# Patient Record
Sex: FEMALE | Race: OTHER | HISPANIC OR LATINO | Employment: OTHER | URBAN - METROPOLITAN AREA
[De-identification: names, ages, dates, MRNs, and addresses within clinical notes are randomized per-mention and may not be internally consistent; named-entity substitution may affect disease eponyms.]

---

## 2017-02-08 ENCOUNTER — ALLSCRIPTS OFFICE VISIT (OUTPATIENT)
Dept: OTHER | Facility: OTHER | Age: 27
End: 2017-02-08

## 2017-02-08 ENCOUNTER — HOSPITAL ENCOUNTER (OUTPATIENT)
Dept: RADIOLOGY | Facility: HOSPITAL | Age: 27
Discharge: HOME | End: 2017-02-08
Attending: ORTHOPAEDIC SURGERY
Payer: MEDICARE

## 2017-02-08 DIAGNOSIS — M25.539 PAIN IN WRIST: ICD-10-CM

## 2017-02-08 DIAGNOSIS — IMO0002 UNSPECIFIED DYSPAREUNIA: ICD-10-CM

## 2017-02-08 DIAGNOSIS — F32.9 MAJOR DEPRESSIVE DISORDER, SINGLE EPISODE: ICD-10-CM

## 2017-02-08 DIAGNOSIS — M06.9 RHEUMATOID ARTHRITIS (HCC): ICD-10-CM

## 2017-02-08 PROCEDURE — 73110 X-RAY EXAM OF WRIST: CPT

## 2017-02-12 ENCOUNTER — HOSPITAL ENCOUNTER (EMERGENCY)
Facility: HOSPITAL | Age: 27
Discharge: HOME | End: 2017-02-12
Attending: EMERGENCY MEDICINE | Admitting: EMERGENCY MEDICINE
Payer: MEDICARE

## 2017-02-12 ENCOUNTER — APPOINTMENT (EMERGENCY)
Dept: ULTRASOUND IMAGING | Facility: HOSPITAL | Age: 27
End: 2017-02-12
Payer: MEDICARE

## 2017-02-12 VITALS
SYSTOLIC BLOOD PRESSURE: 129 MMHG | RESPIRATION RATE: 26 BRPM | HEART RATE: 96 BPM | TEMPERATURE: 98.3 F | DIASTOLIC BLOOD PRESSURE: 77 MMHG | WEIGHT: 174 LBS | OXYGEN SATURATION: 99 %

## 2017-02-12 DIAGNOSIS — R10.9 ABDOMINAL PAIN AFFECTING PREGNANCY: Primary | ICD-10-CM

## 2017-02-12 DIAGNOSIS — R10.2 PELVIC PAIN AFFECTING PREGNANCY: ICD-10-CM

## 2017-02-12 DIAGNOSIS — O26.899 PELVIC PAIN AFFECTING PREGNANCY: ICD-10-CM

## 2017-02-12 DIAGNOSIS — O26.899 ABDOMINAL PAIN AFFECTING PREGNANCY: Primary | ICD-10-CM

## 2017-02-12 LAB
ALBUMIN SERPL BCP-MCNC: 3.6 G/DL (ref 3.5–5)
ALP SERPL-CCNC: 46 U/L (ref 46–116)
ALT SERPL W P-5'-P-CCNC: 23 U/L (ref 12–78)
ANION GAP SERPL CALCULATED.3IONS-SCNC: 7 MMOL/L (ref 4–13)
AST SERPL W P-5'-P-CCNC: 16 U/L (ref 5–45)
B-HCG SERPL-ACNC: 694.4 MIU/ML
BASOPHILS # BLD AUTO: 0.03 THOUSANDS/ΜL (ref 0–0.1)
BASOPHILS NFR BLD AUTO: 0 % (ref 0–1)
BILIRUB SERPL-MCNC: 0.4 MG/DL (ref 0.2–1)
BILIRUB UR QL STRIP: NEGATIVE
BUN SERPL-MCNC: 8 MG/DL (ref 5–25)
CALCIUM SERPL-MCNC: 8.3 MG/DL (ref 8.3–10.1)
CHLORIDE SERPL-SCNC: 106 MMOL/L (ref 100–108)
CLARITY UR: CLEAR
CO2 SERPL-SCNC: 27 MMOL/L (ref 21–32)
COLOR UR: YELLOW
COLOR, POC: YELLOW
CREAT SERPL-MCNC: 0.57 MG/DL (ref 0.6–1.3)
EOSINOPHIL # BLD AUTO: 0.02 THOUSAND/ΜL (ref 0–0.61)
EOSINOPHIL NFR BLD AUTO: 0 % (ref 0–6)
ERYTHROCYTE [DISTWIDTH] IN BLOOD BY AUTOMATED COUNT: 14.6 % (ref 11.6–15.1)
GFR SERPL CREATININE-BSD FRML MDRD: >60 ML/MIN/1.73SQ M
GLUCOSE SERPL-MCNC: 90 MG/DL (ref 65–140)
GLUCOSE UR STRIP-MCNC: NEGATIVE MG/DL
HCG UR QL: POSITIVE
HCT VFR BLD AUTO: 38.1 % (ref 34.8–46.1)
HGB BLD-MCNC: 13 G/DL (ref 11.5–15.4)
HGB UR QL STRIP.AUTO: NEGATIVE
KETONES UR STRIP-MCNC: ABNORMAL MG/DL
LEUKOCYTE ESTERASE UR QL STRIP: NEGATIVE
LIPASE SERPL-CCNC: 201 U/L (ref 73–393)
LYMPHOCYTES # BLD AUTO: 1.44 THOUSANDS/ΜL (ref 0.6–4.47)
LYMPHOCYTES NFR BLD AUTO: 17 % (ref 14–44)
MCH RBC QN AUTO: 31.3 PG (ref 26.8–34.3)
MCHC RBC AUTO-ENTMCNC: 34.1 G/DL (ref 31.4–37.4)
MCV RBC AUTO: 92 FL (ref 82–98)
MONOCYTES # BLD AUTO: 0.34 THOUSAND/ΜL (ref 0.17–1.22)
MONOCYTES NFR BLD AUTO: 4 % (ref 4–12)
NEUTROPHILS # BLD AUTO: 6.88 THOUSANDS/ΜL (ref 1.85–7.62)
NEUTS SEG NFR BLD AUTO: 79 % (ref 43–75)
NITRITE UR QL STRIP: NEGATIVE
NRBC BLD AUTO-RTO: 0 /100 WBCS
PH UR STRIP.AUTO: 6 [PH] (ref 4.5–8)
PLATELET # BLD AUTO: 194 THOUSANDS/UL (ref 149–390)
PMV BLD AUTO: 11 FL (ref 8.9–12.7)
POTASSIUM SERPL-SCNC: 3.8 MMOL/L (ref 3.5–5.3)
PROT SERPL-MCNC: 6.5 G/DL (ref 6.4–8.2)
PROT UR STRIP-MCNC: NEGATIVE MG/DL
RBC # BLD AUTO: 4.16 MILLION/UL (ref 3.81–5.12)
SODIUM SERPL-SCNC: 140 MMOL/L (ref 136–145)
SP GR UR STRIP.AUTO: 1.02 (ref 1–1.03)
UROBILINOGEN UR QL STRIP.AUTO: 0.2 E.U./DL
WBC # BLD AUTO: 8.71 THOUSAND/UL (ref 4.31–10.16)

## 2017-02-12 PROCEDURE — 81002 URINALYSIS NONAUTO W/O SCOPE: CPT | Performed by: EMERGENCY MEDICINE

## 2017-02-12 PROCEDURE — 80053 COMPREHEN METABOLIC PANEL: CPT | Performed by: EMERGENCY MEDICINE

## 2017-02-12 PROCEDURE — 96375 TX/PRO/DX INJ NEW DRUG ADDON: CPT

## 2017-02-12 PROCEDURE — 84702 CHORIONIC GONADOTROPIN TEST: CPT | Performed by: EMERGENCY MEDICINE

## 2017-02-12 PROCEDURE — 81003 URINALYSIS AUTO W/O SCOPE: CPT

## 2017-02-12 PROCEDURE — 99285 EMERGENCY DEPT VISIT HI MDM: CPT

## 2017-02-12 PROCEDURE — 85025 COMPLETE CBC W/AUTO DIFF WBC: CPT | Performed by: EMERGENCY MEDICINE

## 2017-02-12 PROCEDURE — 76815 OB US LIMITED FETUS(S): CPT

## 2017-02-12 PROCEDURE — 96374 THER/PROPH/DIAG INJ IV PUSH: CPT

## 2017-02-12 PROCEDURE — 96361 HYDRATE IV INFUSION ADD-ON: CPT

## 2017-02-12 PROCEDURE — 83690 ASSAY OF LIPASE: CPT | Performed by: EMERGENCY MEDICINE

## 2017-02-12 PROCEDURE — 36415 COLL VENOUS BLD VENIPUNCTURE: CPT | Performed by: EMERGENCY MEDICINE

## 2017-02-12 PROCEDURE — 81025 URINE PREGNANCY TEST: CPT | Performed by: EMERGENCY MEDICINE

## 2017-02-12 RX ORDER — ONDANSETRON 4 MG/1
4 TABLET, FILM COATED ORAL EVERY 8 HOURS PRN
COMMUNITY
End: 2018-12-28 | Stop reason: SDUPTHER

## 2017-02-12 RX ORDER — PREDNISONE 1 MG/1
20 TABLET ORAL DAILY
COMMUNITY

## 2017-02-12 RX ORDER — GABAPENTIN 300 MG/1
200 CAPSULE ORAL 2 TIMES DAILY
COMMUNITY
End: 2018-04-09 | Stop reason: ALTCHOICE

## 2017-02-12 RX ORDER — ONDANSETRON 2 MG/ML
4 INJECTION INTRAMUSCULAR; INTRAVENOUS ONCE
Status: COMPLETED | OUTPATIENT
Start: 2017-02-12 | End: 2017-02-12

## 2017-02-12 RX ORDER — METHOCARBAMOL 750 MG/1
750 TABLET, FILM COATED ORAL 3 TIMES DAILY PRN
COMMUNITY
End: 2017-09-01

## 2017-02-12 RX ORDER — ALPRAZOLAM 0.25 MG/1
0.5 TABLET ORAL 3 TIMES DAILY PRN
COMMUNITY
End: 2018-10-31

## 2017-02-12 RX ORDER — ALENDRONATE SODIUM 10 MG/1
10 TABLET ORAL
COMMUNITY
End: 2017-03-06

## 2017-02-12 RX ORDER — MORPHINE SULFATE 4 MG/ML
4 INJECTION, SOLUTION INTRAMUSCULAR; INTRAVENOUS ONCE
Status: COMPLETED | OUTPATIENT
Start: 2017-02-12 | End: 2017-02-12

## 2017-02-12 RX ORDER — RANITIDINE 150 MG/1
150 TABLET ORAL 2 TIMES DAILY
COMMUNITY
End: 2019-02-22 | Stop reason: SDUPTHER

## 2017-02-12 RX ORDER — MORPHINE SULFATE 2 MG/ML
2 INJECTION, SOLUTION INTRAMUSCULAR; INTRAVENOUS ONCE
Status: COMPLETED | OUTPATIENT
Start: 2017-02-12 | End: 2017-02-12

## 2017-02-12 RX ORDER — OXYCODONE HYDROCHLORIDE 30 MG/1
30 TABLET ORAL EVERY 8 HOURS PRN
COMMUNITY
End: 2019-01-14

## 2017-02-12 RX ORDER — ESCITALOPRAM OXALATE 20 MG/1
20 TABLET ORAL EVERY MORNING
COMMUNITY

## 2017-02-12 RX ADMIN — MORPHINE SULFATE 2 MG: 4 INJECTION, SOLUTION INTRAMUSCULAR; INTRAVENOUS at 15:04

## 2017-02-12 RX ADMIN — SODIUM CHLORIDE 1000 ML: 0.9 INJECTION, SOLUTION INTRAVENOUS at 15:01

## 2017-02-12 RX ADMIN — ONDANSETRON 4 MG: 2 INJECTION INTRAMUSCULAR; INTRAVENOUS at 15:01

## 2017-02-12 RX ADMIN — MORPHINE SULFATE 2 MG: 2 INJECTION, SOLUTION INTRAMUSCULAR; INTRAVENOUS at 16:55

## 2017-02-13 ENCOUNTER — ALLSCRIPTS OFFICE VISIT (OUTPATIENT)
Dept: OTHER | Facility: OTHER | Age: 27
End: 2017-02-13

## 2017-02-25 ENCOUNTER — HOSPITAL ENCOUNTER (EMERGENCY)
Facility: HOSPITAL | Age: 27
Discharge: HOME | End: 2017-02-25
Attending: EMERGENCY MEDICINE | Admitting: EMERGENCY MEDICINE
Payer: MEDICARE

## 2017-02-25 VITALS
WEIGHT: 168 LBS | DIASTOLIC BLOOD PRESSURE: 84 MMHG | SYSTOLIC BLOOD PRESSURE: 137 MMHG | HEART RATE: 95 BPM | TEMPERATURE: 98.1 F | RESPIRATION RATE: 16 BRPM | OXYGEN SATURATION: 99 %

## 2017-02-25 DIAGNOSIS — R10.9 ABDOMINAL CRAMPING: ICD-10-CM

## 2017-02-25 DIAGNOSIS — O20.9 VAGINAL BLEEDING IN PREGNANT PATIENT AT LESS THAN 20 WEEKS GESTATION: ICD-10-CM

## 2017-02-25 DIAGNOSIS — R11.2 NAUSEA AND VOMITING: ICD-10-CM

## 2017-02-25 DIAGNOSIS — O20.0 THREATENED MISCARRIAGE IN EARLY PREGNANCY: Primary | ICD-10-CM

## 2017-02-25 LAB
ABO GROUP BLD: NORMAL
B-HCG SERPL-ACNC: 1234.1 MIU/ML
BACTERIA UR QL AUTO: ABNORMAL /HPF
BASOPHILS # BLD AUTO: 0.02 THOUSANDS/ΜL (ref 0–0.1)
BASOPHILS NFR BLD AUTO: 0 % (ref 0–1)
BILIRUB UR QL STRIP: ABNORMAL
BLD GP AB SCN SERPL QL: NEGATIVE
CLARITY UR: CLEAR
COLOR UR: ABNORMAL
EOSINOPHIL # BLD AUTO: 0.02 THOUSAND/ΜL (ref 0–0.61)
EOSINOPHIL NFR BLD AUTO: 0 % (ref 0–6)
ERYTHROCYTE [DISTWIDTH] IN BLOOD BY AUTOMATED COUNT: 14.3 % (ref 11.6–15.1)
GLUCOSE UR STRIP-MCNC: NEGATIVE MG/DL
HCT VFR BLD AUTO: 38.6 % (ref 34.8–46.1)
HGB BLD-MCNC: 13.1 G/DL (ref 11.5–15.4)
HGB UR QL STRIP.AUTO: ABNORMAL
KETONES UR STRIP-MCNC: ABNORMAL MG/DL
LEUKOCYTE ESTERASE UR QL STRIP: NEGATIVE
LYMPHOCYTES # BLD AUTO: 1.48 THOUSANDS/ΜL (ref 0.6–4.47)
LYMPHOCYTES NFR BLD AUTO: 20 % (ref 14–44)
MCH RBC QN AUTO: 31.6 PG (ref 26.8–34.3)
MCHC RBC AUTO-ENTMCNC: 33.9 G/DL (ref 31.4–37.4)
MCV RBC AUTO: 93 FL (ref 82–98)
MONOCYTES # BLD AUTO: 0.39 THOUSAND/ΜL (ref 0.17–1.22)
MONOCYTES NFR BLD AUTO: 5 % (ref 4–12)
NEUTROPHILS # BLD AUTO: 5.68 THOUSANDS/ΜL (ref 1.85–7.62)
NEUTS SEG NFR BLD AUTO: 75 % (ref 43–75)
NITRITE UR QL STRIP: NEGATIVE
NON-SQ EPI CELLS URNS QL MICRO: ABNORMAL /HPF
NRBC BLD AUTO-RTO: 0 /100 WBCS
PH UR STRIP.AUTO: 5.5 [PH] (ref 4.5–8)
PLATELET # BLD AUTO: 165 THOUSANDS/UL (ref 149–390)
PMV BLD AUTO: 11.7 FL (ref 8.9–12.7)
PROGEST SERPL-MCNC: 6.4 NG/ML
PROT UR STRIP-MCNC: NEGATIVE MG/DL
RBC # BLD AUTO: 4.14 MILLION/UL (ref 3.81–5.12)
RBC #/AREA URNS AUTO: ABNORMAL /HPF
RH BLD: NEGATIVE
SP GR UR STRIP.AUTO: 1.02 (ref 1–1.03)
TSH SERPL DL<=0.05 MIU/L-ACNC: 0.31 UIU/ML (ref 0.36–3.74)
UROBILINOGEN UR QL STRIP.AUTO: 0.2 E.U./DL
WBC # BLD AUTO: 7.59 THOUSAND/UL (ref 4.31–10.16)
WBC #/AREA URNS AUTO: ABNORMAL /HPF

## 2017-02-25 PROCEDURE — 84702 CHORIONIC GONADOTROPIN TEST: CPT | Performed by: EMERGENCY MEDICINE

## 2017-02-25 PROCEDURE — 99284 EMERGENCY DEPT VISIT MOD MDM: CPT

## 2017-02-25 PROCEDURE — 87086 URINE CULTURE/COLONY COUNT: CPT

## 2017-02-25 PROCEDURE — 86900 BLOOD TYPING SEROLOGIC ABO: CPT | Performed by: EMERGENCY MEDICINE

## 2017-02-25 PROCEDURE — 81001 URINALYSIS AUTO W/SCOPE: CPT

## 2017-02-25 PROCEDURE — 81002 URINALYSIS NONAUTO W/O SCOPE: CPT | Performed by: EMERGENCY MEDICINE

## 2017-02-25 PROCEDURE — 96361 HYDRATE IV INFUSION ADD-ON: CPT

## 2017-02-25 PROCEDURE — 36415 COLL VENOUS BLD VENIPUNCTURE: CPT | Performed by: EMERGENCY MEDICINE

## 2017-02-25 PROCEDURE — 84443 ASSAY THYROID STIM HORMONE: CPT | Performed by: EMERGENCY MEDICINE

## 2017-02-25 PROCEDURE — 86901 BLOOD TYPING SEROLOGIC RH(D): CPT | Performed by: EMERGENCY MEDICINE

## 2017-02-25 PROCEDURE — 96372 THER/PROPH/DIAG INJ SC/IM: CPT

## 2017-02-25 PROCEDURE — 84144 ASSAY OF PROGESTERONE: CPT | Performed by: EMERGENCY MEDICINE

## 2017-02-25 PROCEDURE — 86850 RBC ANTIBODY SCREEN: CPT | Performed by: EMERGENCY MEDICINE

## 2017-02-25 PROCEDURE — 96374 THER/PROPH/DIAG INJ IV PUSH: CPT

## 2017-02-25 PROCEDURE — 85025 COMPLETE CBC W/AUTO DIFF WBC: CPT | Performed by: EMERGENCY MEDICINE

## 2017-02-25 RX ORDER — ONDANSETRON 2 MG/ML
4 INJECTION INTRAMUSCULAR; INTRAVENOUS ONCE
Status: COMPLETED | OUTPATIENT
Start: 2017-02-25 | End: 2017-02-25

## 2017-02-25 RX ORDER — MORPHINE SULFATE 4 MG/ML
4 INJECTION, SOLUTION INTRAMUSCULAR; INTRAVENOUS ONCE
Status: DISCONTINUED | OUTPATIENT
Start: 2017-02-25 | End: 2017-02-25

## 2017-02-25 RX ORDER — ACETAMINOPHEN 325 MG/1
650 TABLET ORAL ONCE
Status: COMPLETED | OUTPATIENT
Start: 2017-02-25 | End: 2017-02-25

## 2017-02-25 RX ADMIN — ONDANSETRON 4 MG: 2 INJECTION INTRAMUSCULAR; INTRAVENOUS at 14:21

## 2017-02-25 RX ADMIN — SODIUM CHLORIDE 500 ML: 0.9 INJECTION, SOLUTION INTRAVENOUS at 13:29

## 2017-02-25 RX ADMIN — HUMAN RHO(D) IMMUNE GLOBULIN 300 MCG: 300 INJECTION, SOLUTION INTRAMUSCULAR at 15:12

## 2017-02-25 RX ADMIN — ACETAMINOPHEN 650 MG: 325 TABLET, FILM COATED ORAL at 13:20

## 2017-02-26 LAB — BACTERIA UR CULT: NORMAL

## 2017-03-03 ENCOUNTER — HOSPITAL ENCOUNTER (EMERGENCY)
Facility: HOSPITAL | Age: 27
Discharge: HOME | End: 2017-03-03
Attending: EMERGENCY MEDICINE | Admitting: EMERGENCY MEDICINE
Payer: MEDICARE

## 2017-03-03 VITALS
OXYGEN SATURATION: 96 % | RESPIRATION RATE: 18 BRPM | HEART RATE: 84 BPM | SYSTOLIC BLOOD PRESSURE: 118 MMHG | TEMPERATURE: 99.1 F | DIASTOLIC BLOOD PRESSURE: 73 MMHG

## 2017-03-03 DIAGNOSIS — O03.4 INCOMPLETE MISCARRIAGE: Primary | ICD-10-CM

## 2017-03-03 LAB
B-HCG SERPL-ACNC: 1174.8 MIU/ML
BACTERIA UR QL AUTO: ABNORMAL /HPF
BILIRUB UR QL STRIP: ABNORMAL
CLARITY UR: ABNORMAL
COLOR UR: ABNORMAL
COLOR, POC: YELLOW
GLUCOSE SERPL-MCNC: 125 MG/DL (ref 65–140)
GLUCOSE UR STRIP-MCNC: NEGATIVE MG/DL
HCG UR QL: POSITIVE
HGB UR QL STRIP.AUTO: ABNORMAL
KETONES UR STRIP-MCNC: ABNORMAL MG/DL
LEUKOCYTE ESTERASE UR QL STRIP: NEGATIVE
NITRITE UR QL STRIP: NEGATIVE
NON-SQ EPI CELLS URNS QL MICRO: ABNORMAL /HPF
PH UR STRIP.AUTO: 6 [PH] (ref 4.5–8)
PROT UR STRIP-MCNC: NEGATIVE MG/DL
RBC #/AREA URNS AUTO: ABNORMAL /HPF
SP GR UR STRIP.AUTO: 1.02 (ref 1–1.03)
UROBILINOGEN UR QL STRIP.AUTO: 0.2 E.U./DL
WBC #/AREA URNS AUTO: ABNORMAL /HPF

## 2017-03-03 PROCEDURE — 84702 CHORIONIC GONADOTROPIN TEST: CPT | Performed by: EMERGENCY MEDICINE

## 2017-03-03 PROCEDURE — 87086 URINE CULTURE/COLONY COUNT: CPT

## 2017-03-03 PROCEDURE — 82948 REAGENT STRIP/BLOOD GLUCOSE: CPT

## 2017-03-03 PROCEDURE — 99284 EMERGENCY DEPT VISIT MOD MDM: CPT

## 2017-03-03 PROCEDURE — 36415 COLL VENOUS BLD VENIPUNCTURE: CPT | Performed by: EMERGENCY MEDICINE

## 2017-03-03 PROCEDURE — 81002 URINALYSIS NONAUTO W/O SCOPE: CPT | Performed by: EMERGENCY MEDICINE

## 2017-03-03 PROCEDURE — 81001 URINALYSIS AUTO W/SCOPE: CPT

## 2017-03-03 PROCEDURE — 81025 URINE PREGNANCY TEST: CPT | Performed by: EMERGENCY MEDICINE

## 2017-03-03 PROCEDURE — 96372 THER/PROPH/DIAG INJ SC/IM: CPT

## 2017-03-03 PROCEDURE — 87147 CULTURE TYPE IMMUNOLOGIC: CPT

## 2017-03-03 RX ORDER — KETOROLAC TROMETHAMINE 30 MG/ML
30 INJECTION, SOLUTION INTRAMUSCULAR; INTRAVENOUS ONCE
Status: COMPLETED | OUTPATIENT
Start: 2017-03-03 | End: 2017-03-03

## 2017-03-03 RX ADMIN — KETOROLAC TROMETHAMINE 30 MG: 30 INJECTION, SOLUTION INTRAMUSCULAR at 19:45

## 2017-03-04 LAB
BACTERIA UR CULT: NORMAL
BACTERIA UR CULT: NORMAL

## 2017-03-06 RX ORDER — LEVOTHYROXINE SODIUM 0.03 MG/1
25 TABLET ORAL DAILY
COMMUNITY
End: 2017-09-01

## 2017-03-06 RX ORDER — FAMOTIDINE 20 MG
1 TABLET ORAL DAILY
COMMUNITY
End: 2017-09-01

## 2017-03-06 RX ORDER — GABAPENTIN 600 MG/1
600 TABLET ORAL
COMMUNITY
End: 2018-04-09 | Stop reason: ALTCHOICE

## 2017-03-10 ENCOUNTER — ANESTHESIA EVENT (OUTPATIENT)
Dept: PERIOP | Facility: HOSPITAL | Age: 27
End: 2017-03-10
Payer: MEDICARE

## 2017-03-14 ENCOUNTER — ANESTHESIA (OUTPATIENT)
Dept: PERIOP | Facility: HOSPITAL | Age: 27
End: 2017-03-14
Payer: MEDICARE

## 2017-03-14 ENCOUNTER — HOSPITAL ENCOUNTER (OUTPATIENT)
Facility: HOSPITAL | Age: 27
Setting detail: OUTPATIENT SURGERY
Discharge: HOME | End: 2017-03-14
Attending: SURGERY | Admitting: SURGERY
Payer: MEDICARE

## 2017-03-14 VITALS
SYSTOLIC BLOOD PRESSURE: 147 MMHG | OXYGEN SATURATION: 98 % | HEART RATE: 120 BPM | BODY MASS INDEX: 27.31 KG/M2 | WEIGHT: 174 LBS | HEIGHT: 67 IN | DIASTOLIC BLOOD PRESSURE: 85 MMHG | TEMPERATURE: 99.4 F | RESPIRATION RATE: 16 BRPM

## 2017-03-14 DIAGNOSIS — K80.20 CALCULUS OF GALLBLADDER WITHOUT CHOLECYSTITIS WITHOUT OBSTRUCTION: ICD-10-CM

## 2017-03-14 LAB — EXT PREGNANCY TEST URINE: NEGATIVE

## 2017-03-14 PROCEDURE — 81025 URINE PREGNANCY TEST: CPT | Performed by: ANESTHESIOLOGY

## 2017-03-14 PROCEDURE — 88304 TISSUE EXAM BY PATHOLOGIST: CPT | Performed by: SURGERY

## 2017-03-14 RX ORDER — MAGNESIUM HYDROXIDE 1200 MG/15ML
LIQUID ORAL AS NEEDED
Status: DISCONTINUED | OUTPATIENT
Start: 2017-03-14 | End: 2017-03-14 | Stop reason: HOSPADM

## 2017-03-14 RX ORDER — KETOROLAC TROMETHAMINE 30 MG/ML
30 INJECTION, SOLUTION INTRAMUSCULAR; INTRAVENOUS ONCE
Status: COMPLETED | OUTPATIENT
Start: 2017-03-14 | End: 2017-03-14

## 2017-03-14 RX ORDER — MORPHINE SULFATE 4 MG/ML
4 INJECTION, SOLUTION INTRAMUSCULAR; INTRAVENOUS
Status: DISCONTINUED | OUTPATIENT
Start: 2017-03-14 | End: 2017-03-14 | Stop reason: HOSPADM

## 2017-03-14 RX ORDER — ONDANSETRON 2 MG/ML
INJECTION INTRAMUSCULAR; INTRAVENOUS AS NEEDED
Status: DISCONTINUED | OUTPATIENT
Start: 2017-03-14 | End: 2017-03-14 | Stop reason: SURG

## 2017-03-14 RX ORDER — OXYCODONE HYDROCHLORIDE AND ACETAMINOPHEN 5; 325 MG/1; MG/1
2 TABLET ORAL EVERY 4 HOURS PRN
Qty: 28 TABLET | Refills: 0 | Status: SHIPPED | OUTPATIENT
Start: 2017-03-14 | End: 2017-07-13 | Stop reason: ALTCHOICE

## 2017-03-14 RX ORDER — ROCURONIUM BROMIDE 10 MG/ML
INJECTION, SOLUTION INTRAVENOUS AS NEEDED
Status: DISCONTINUED | OUTPATIENT
Start: 2017-03-14 | End: 2017-03-14 | Stop reason: SURG

## 2017-03-14 RX ORDER — SODIUM CHLORIDE, SODIUM LACTATE, POTASSIUM CHLORIDE, CALCIUM CHLORIDE 600; 310; 30; 20 MG/100ML; MG/100ML; MG/100ML; MG/100ML
125 INJECTION, SOLUTION INTRAVENOUS CONTINUOUS
Status: DISCONTINUED | OUTPATIENT
Start: 2017-03-14 | End: 2017-03-14 | Stop reason: HOSPADM

## 2017-03-14 RX ORDER — FENTANYL CITRATE/PF 50 MCG/ML
25 SYRINGE (ML) INJECTION
Status: DISCONTINUED | OUTPATIENT
Start: 2017-03-14 | End: 2017-03-14 | Stop reason: HOSPADM

## 2017-03-14 RX ORDER — SODIUM CHLORIDE, SODIUM LACTATE, POTASSIUM CHLORIDE, CALCIUM CHLORIDE 600; 310; 30; 20 MG/100ML; MG/100ML; MG/100ML; MG/100ML
100 INJECTION, SOLUTION INTRAVENOUS CONTINUOUS
Status: DISCONTINUED | OUTPATIENT
Start: 2017-03-14 | End: 2017-03-14 | Stop reason: HOSPADM

## 2017-03-14 RX ORDER — PROPOFOL 10 MG/ML
INJECTION, EMULSION INTRAVENOUS AS NEEDED
Status: DISCONTINUED | OUTPATIENT
Start: 2017-03-14 | End: 2017-03-14 | Stop reason: SURG

## 2017-03-14 RX ORDER — ERGOCALCIFEROL 1.25 MG/1
50000 CAPSULE ORAL WEEKLY
COMMUNITY

## 2017-03-14 RX ORDER — FENTANYL CITRATE 50 UG/ML
INJECTION, SOLUTION INTRAMUSCULAR; INTRAVENOUS AS NEEDED
Status: DISCONTINUED | OUTPATIENT
Start: 2017-03-14 | End: 2017-03-14 | Stop reason: SURG

## 2017-03-14 RX ORDER — GLYCOPYRROLATE 0.2 MG/ML
INJECTION INTRAMUSCULAR; INTRAVENOUS AS NEEDED
Status: DISCONTINUED | OUTPATIENT
Start: 2017-03-14 | End: 2017-03-14 | Stop reason: SURG

## 2017-03-14 RX ORDER — MIDAZOLAM HYDROCHLORIDE 1 MG/ML
INJECTION INTRAMUSCULAR; INTRAVENOUS AS NEEDED
Status: DISCONTINUED | OUTPATIENT
Start: 2017-03-14 | End: 2017-03-14 | Stop reason: SURG

## 2017-03-14 RX ORDER — BUPIVACAINE HYDROCHLORIDE AND EPINEPHRINE 2.5; 5 MG/ML; UG/ML
INJECTION, SOLUTION EPIDURAL; INFILTRATION; INTRACAUDAL; PERINEURAL AS NEEDED
Status: DISCONTINUED | OUTPATIENT
Start: 2017-03-14 | End: 2017-03-14 | Stop reason: HOSPADM

## 2017-03-14 RX ORDER — PROMETHAZINE HYDROCHLORIDE 25 MG/ML
12.5 INJECTION, SOLUTION INTRAMUSCULAR; INTRAVENOUS ONCE
Status: COMPLETED | OUTPATIENT
Start: 2017-03-14 | End: 2017-03-14

## 2017-03-14 RX ORDER — OXYCODONE HYDROCHLORIDE AND ACETAMINOPHEN 5; 325 MG/1; MG/1
TABLET ORAL
Status: COMPLETED
Start: 2017-03-14 | End: 2017-03-14

## 2017-03-14 RX ORDER — ONDANSETRON 2 MG/ML
4 INJECTION INTRAMUSCULAR; INTRAVENOUS EVERY 4 HOURS PRN
Status: DISCONTINUED | OUTPATIENT
Start: 2017-03-14 | End: 2017-03-14 | Stop reason: HOSPADM

## 2017-03-14 RX ORDER — OXYCODONE HYDROCHLORIDE AND ACETAMINOPHEN 5; 325 MG/1; MG/1
2 TABLET ORAL EVERY 4 HOURS PRN
Status: DISCONTINUED | OUTPATIENT
Start: 2017-03-14 | End: 2017-03-14 | Stop reason: HOSPADM

## 2017-03-14 RX ORDER — LIDOCAINE HYDROCHLORIDE 10 MG/ML
INJECTION, SOLUTION INFILTRATION; PERINEURAL AS NEEDED
Status: DISCONTINUED | OUTPATIENT
Start: 2017-03-14 | End: 2017-03-14 | Stop reason: SURG

## 2017-03-14 RX ORDER — ACETAMINOPHEN 500 MG
500 TABLET ORAL EVERY 6 HOURS PRN
COMMUNITY
End: 2018-08-07

## 2017-03-14 RX ADMIN — FENTANYL CITRATE 50 MCG: 50 INJECTION, SOLUTION INTRAMUSCULAR; INTRAVENOUS at 08:05

## 2017-03-14 RX ADMIN — FENTANYL CITRATE 25 MCG: 50 INJECTION INTRAMUSCULAR; INTRAVENOUS at 08:58

## 2017-03-14 RX ADMIN — FENTANYL CITRATE 50 MCG: 50 INJECTION, SOLUTION INTRAMUSCULAR; INTRAVENOUS at 07:52

## 2017-03-14 RX ADMIN — HYDROMORPHONE HYDROCHLORIDE 0.4 MG: 1 INJECTION, SOLUTION INTRAMUSCULAR; INTRAVENOUS; SUBCUTANEOUS at 09:25

## 2017-03-14 RX ADMIN — CEFAZOLIN SODIUM 1000 MG: 1 SOLUTION INTRAVENOUS at 07:56

## 2017-03-14 RX ADMIN — NEOSTIGMINE METHYLSULFATE 3 MG: 1 INJECTION INTRAMUSCULAR; INTRAVENOUS; SUBCUTANEOUS at 08:30

## 2017-03-14 RX ADMIN — PROPOFOL 150 MG: 10 INJECTION, EMULSION INTRAVENOUS at 07:54

## 2017-03-14 RX ADMIN — FENTANYL CITRATE 25 MCG: 50 INJECTION INTRAMUSCULAR; INTRAVENOUS at 09:08

## 2017-03-14 RX ADMIN — GLYCOPYRROLATE 0.2 MG: 0.2 INJECTION INTRAMUSCULAR; INTRAVENOUS at 08:30

## 2017-03-14 RX ADMIN — LIDOCAINE HYDROCHLORIDE 50 MG: 10 INJECTION, SOLUTION INFILTRATION; PERINEURAL at 07:53

## 2017-03-14 RX ADMIN — FENTANYL CITRATE 50 MCG: 50 INJECTION, SOLUTION INTRAMUSCULAR; INTRAVENOUS at 08:48

## 2017-03-14 RX ADMIN — MIDAZOLAM HYDROCHLORIDE 2 MG: 1 INJECTION, SOLUTION INTRAMUSCULAR; INTRAVENOUS at 07:42

## 2017-03-14 RX ADMIN — FENTANYL CITRATE 25 MCG: 50 INJECTION INTRAMUSCULAR; INTRAVENOUS at 09:03

## 2017-03-14 RX ADMIN — SODIUM CHLORIDE, SODIUM LACTATE, POTASSIUM CHLORIDE, AND CALCIUM CHLORIDE 125 ML/HR: .6; .31; .03; .02 INJECTION, SOLUTION INTRAVENOUS at 09:40

## 2017-03-14 RX ADMIN — HYDROMORPHONE HYDROCHLORIDE 0.4 MG: 1 INJECTION, SOLUTION INTRAMUSCULAR; INTRAVENOUS; SUBCUTANEOUS at 09:15

## 2017-03-14 RX ADMIN — ONDANSETRON 4 MG: 2 INJECTION INTRAMUSCULAR; INTRAVENOUS at 08:26

## 2017-03-14 RX ADMIN — FENTANYL CITRATE 50 MCG: 50 INJECTION, SOLUTION INTRAMUSCULAR; INTRAVENOUS at 07:55

## 2017-03-14 RX ADMIN — DEXAMETHASONE SODIUM PHOSPHATE 10 MG: 10 INJECTION INTRAMUSCULAR; INTRAVENOUS at 08:09

## 2017-03-14 RX ADMIN — OXYCODONE HYDROCHLORIDE AND ACETAMINOPHEN 2 TABLET: 5; 325 TABLET ORAL at 10:12

## 2017-03-14 RX ADMIN — ROCURONIUM BROMIDE 30 MG: 10 INJECTION, SOLUTION INTRAVENOUS at 07:54

## 2017-03-14 RX ADMIN — PROMETHAZINE HYDROCHLORIDE 12.5 MG: 25 INJECTION, SOLUTION INTRAMUSCULAR; INTRAVENOUS at 09:03

## 2017-03-14 RX ADMIN — SODIUM CHLORIDE, SODIUM LACTATE, POTASSIUM CHLORIDE, AND CALCIUM CHLORIDE: .6; .31; .03; .02 INJECTION, SOLUTION INTRAVENOUS at 07:30

## 2017-03-14 RX ADMIN — HYDROMORPHONE HYDROCHLORIDE 0.4 MG: 1 INJECTION, SOLUTION INTRAMUSCULAR; INTRAVENOUS; SUBCUTANEOUS at 09:35

## 2017-03-14 RX ADMIN — FENTANYL CITRATE 25 MCG: 50 INJECTION INTRAMUSCULAR; INTRAVENOUS at 09:13

## 2017-03-14 RX ADMIN — KETOROLAC TROMETHAMINE 30 MG: 30 INJECTION, SOLUTION INTRAMUSCULAR at 08:52

## 2017-03-14 RX ADMIN — PROPOFOL 50 MG: 10 INJECTION, EMULSION INTRAVENOUS at 07:55

## 2017-03-27 ENCOUNTER — ALLSCRIPTS OFFICE VISIT (OUTPATIENT)
Dept: OTHER | Facility: OTHER | Age: 27
End: 2017-03-27

## 2017-03-31 ENCOUNTER — OFFICE VISIT (OUTPATIENT)
Dept: LAB | Facility: HOSPITAL | Age: 27
End: 2017-03-31
Attending: ORTHOPAEDIC SURGERY
Payer: MEDICARE

## 2017-03-31 ENCOUNTER — TRANSCRIBE ORDERS (OUTPATIENT)
Dept: LAB | Facility: HOSPITAL | Age: 27
End: 2017-03-31

## 2017-03-31 DIAGNOSIS — M25.532 PAIN IN JOINT, FOREARM, LEFT: Primary | ICD-10-CM

## 2017-03-31 DIAGNOSIS — M25.532 PAIN IN JOINT, FOREARM, LEFT: ICD-10-CM

## 2017-03-31 LAB
ATRIAL RATE: 84 BPM
P AXIS: 69 DEGREES
PR INTERVAL: 140 MS
QRS AXIS: 56 DEGREES
QRSD INTERVAL: 90 MS
QT INTERVAL: 360 MS
QTC INTERVAL: 425 MS
T WAVE AXIS: 35 DEGREES
VENTRICULAR RATE: 84 BPM

## 2017-03-31 PROCEDURE — 93005 ELECTROCARDIOGRAM TRACING: CPT

## 2017-04-04 ENCOUNTER — HOSPITAL ENCOUNTER (OUTPATIENT)
Dept: RADIOLOGY | Facility: HOSPITAL | Age: 27
Setting detail: SURGERY ADMIT
Discharge: HOME | End: 2017-04-04
Payer: MEDICARE

## 2017-04-04 ENCOUNTER — ANESTHESIA (OUTPATIENT)
Dept: PERIOP | Facility: HOSPITAL | Age: 27
End: 2017-04-04
Payer: MEDICARE

## 2017-04-04 ENCOUNTER — ANESTHESIA EVENT (OUTPATIENT)
Dept: PERIOP | Facility: HOSPITAL | Age: 27
End: 2017-04-04
Payer: MEDICARE

## 2017-04-04 ENCOUNTER — HOSPITAL ENCOUNTER (OUTPATIENT)
Facility: HOSPITAL | Age: 27
Discharge: HOME | End: 2017-04-05
Attending: ORTHOPAEDIC SURGERY | Admitting: ORTHOPAEDIC SURGERY
Payer: MEDICARE

## 2017-04-04 DIAGNOSIS — M05.732 RHEUMATOID ARTHRITIS INVOLVING LEFT WRIST WITH POSITIVE RHEUMATOID FACTOR (HCC): Primary | ICD-10-CM

## 2017-04-04 DIAGNOSIS — M19.90 DJD (DEGENERATIVE JOINT DISEASE): ICD-10-CM

## 2017-04-04 PROBLEM — M06.9 RHEUMATOID ARTHRITIS (HCC): Status: ACTIVE | Noted: 2017-04-04

## 2017-04-04 PROCEDURE — C1713 ANCHOR/SCREW BN/BN,TIS/BN: HCPCS | Performed by: ORTHOPAEDIC SURGERY

## 2017-04-04 PROCEDURE — C1776 JOINT DEVICE (IMPLANTABLE): HCPCS | Performed by: ORTHOPAEDIC SURGERY

## 2017-04-04 PROCEDURE — 73100 X-RAY EXAM OF WRIST: CPT

## 2017-04-04 DEVICE — LCP WRIST FUSION STANDARD BEND PLATE
Type: IMPLANTABLE DEVICE | Site: WRIST | Status: NON-FUNCTIONAL
Brand: LCP
Removed: 2018-11-13

## 2017-04-04 DEVICE — 3.5MM STARDRIVE CORTEX SCREW SELF-TAPPING 16MM
Type: IMPLANTABLE DEVICE | Site: WRIST | Status: NON-FUNCTIONAL
Removed: 2018-11-13

## 2017-04-04 DEVICE — 2.7MM CORTEX SCREW SLF-TPNG WITH T8 STARDRIVE RECESS 16MM
Type: IMPLANTABLE DEVICE | Site: WRIST | Status: NON-FUNCTIONAL
Removed: 2018-11-13

## 2017-04-04 DEVICE — 2.7MM CORTEX SCREW SLF-TPNG WITH T8 STARDRIVE RECESS 12MM
Type: IMPLANTABLE DEVICE | Site: WRIST | Status: NON-FUNCTIONAL
Removed: 2018-11-13

## 2017-04-04 DEVICE — 2.7MM LOCKING SCREW SLF-TPNG WITH T8 STARDRIVE RECESS 16MM
Type: IMPLANTABLE DEVICE | Site: WRIST | Status: NON-FUNCTIONAL
Removed: 2018-11-13

## 2017-04-04 DEVICE — IMPLANTABLE DEVICE: Type: IMPLANTABLE DEVICE | Site: WRIST | Status: FUNCTIONAL

## 2017-04-04 RX ORDER — SODIUM CHLORIDE, SODIUM LACTATE, POTASSIUM CHLORIDE, CALCIUM CHLORIDE 600; 310; 30; 20 MG/100ML; MG/100ML; MG/100ML; MG/100ML
20 INJECTION, SOLUTION INTRAVENOUS CONTINUOUS
Status: DISCONTINUED | OUTPATIENT
Start: 2017-04-04 | End: 2017-04-05 | Stop reason: HOSPADM

## 2017-04-04 RX ORDER — GABAPENTIN 100 MG/1
100 CAPSULE ORAL 2 TIMES DAILY
Status: DISCONTINUED | OUTPATIENT
Start: 2017-04-04 | End: 2017-04-04

## 2017-04-04 RX ORDER — GABAPENTIN 100 MG/1
200 CAPSULE ORAL 2 TIMES DAILY
Status: DISCONTINUED | OUTPATIENT
Start: 2017-04-04 | End: 2017-04-05 | Stop reason: HOSPADM

## 2017-04-04 RX ORDER — ERGOCALCIFEROL 1.25 MG/1
50000 CAPSULE ORAL WEEKLY
Status: DISCONTINUED | OUTPATIENT
Start: 2017-04-05 | End: 2017-04-05 | Stop reason: HOSPADM

## 2017-04-04 RX ORDER — METOCLOPRAMIDE HYDROCHLORIDE 5 MG/ML
10 INJECTION INTRAMUSCULAR; INTRAVENOUS ONCE AS NEEDED
Status: DISCONTINUED | OUTPATIENT
Start: 2017-04-04 | End: 2017-04-04

## 2017-04-04 RX ORDER — ROCURONIUM BROMIDE 10 MG/ML
INJECTION, SOLUTION INTRAVENOUS AS NEEDED
Status: DISCONTINUED | OUTPATIENT
Start: 2017-04-04 | End: 2017-04-04 | Stop reason: SURG

## 2017-04-04 RX ORDER — OXYCODONE HYDROCHLORIDE 10 MG/1
10 TABLET ORAL EVERY 4 HOURS PRN
Status: DISCONTINUED | OUTPATIENT
Start: 2017-04-04 | End: 2017-04-04

## 2017-04-04 RX ORDER — KETOROLAC TROMETHAMINE 30 MG/ML
INJECTION, SOLUTION INTRAMUSCULAR; INTRAVENOUS AS NEEDED
Status: DISCONTINUED | OUTPATIENT
Start: 2017-04-04 | End: 2017-04-04 | Stop reason: SURG

## 2017-04-04 RX ORDER — ONDANSETRON 2 MG/ML
INJECTION INTRAMUSCULAR; INTRAVENOUS AS NEEDED
Status: DISCONTINUED | OUTPATIENT
Start: 2017-04-04 | End: 2017-04-04 | Stop reason: SURG

## 2017-04-04 RX ORDER — ROPIVACAINE HYDROCHLORIDE 5 MG/ML
INJECTION, SOLUTION EPIDURAL; INFILTRATION; PERINEURAL AS NEEDED
Status: DISCONTINUED | OUTPATIENT
Start: 2017-04-04 | End: 2017-04-04 | Stop reason: SURG

## 2017-04-04 RX ORDER — OXYCODONE HYDROCHLORIDE AND ACETAMINOPHEN 5; 325 MG/1; MG/1
2 TABLET ORAL EVERY 4 HOURS PRN
Status: DISCONTINUED | OUTPATIENT
Start: 2017-04-04 | End: 2017-04-04 | Stop reason: SDUPTHER

## 2017-04-04 RX ORDER — ONDANSETRON 2 MG/ML
4 INJECTION INTRAMUSCULAR; INTRAVENOUS ONCE
Status: DISCONTINUED | OUTPATIENT
Start: 2017-04-04 | End: 2017-04-04

## 2017-04-04 RX ORDER — LEVOTHYROXINE SODIUM 0.03 MG/1
25 TABLET ORAL
Status: DISCONTINUED | OUTPATIENT
Start: 2017-04-05 | End: 2017-04-05 | Stop reason: HOSPADM

## 2017-04-04 RX ORDER — PROPOFOL 10 MG/ML
INJECTION, EMULSION INTRAVENOUS AS NEEDED
Status: DISCONTINUED | OUTPATIENT
Start: 2017-04-04 | End: 2017-04-04 | Stop reason: SURG

## 2017-04-04 RX ORDER — GABAPENTIN 300 MG/1
300 CAPSULE ORAL 2 TIMES DAILY
Status: DISCONTINUED | OUTPATIENT
Start: 2017-04-05 | End: 2017-04-04

## 2017-04-04 RX ORDER — ALPRAZOLAM 0.25 MG/1
0.25 TABLET ORAL DAILY
Status: DISCONTINUED | OUTPATIENT
Start: 2017-04-05 | End: 2017-04-05 | Stop reason: HOSPADM

## 2017-04-04 RX ORDER — ACETAMINOPHEN 325 MG/1
650 TABLET ORAL EVERY 4 HOURS PRN
Status: DISCONTINUED | OUTPATIENT
Start: 2017-04-04 | End: 2017-04-05

## 2017-04-04 RX ORDER — SODIUM CHLORIDE, SODIUM LACTATE, POTASSIUM CHLORIDE, CALCIUM CHLORIDE 600; 310; 30; 20 MG/100ML; MG/100ML; MG/100ML; MG/100ML
125 INJECTION, SOLUTION INTRAVENOUS CONTINUOUS
Status: DISCONTINUED | OUTPATIENT
Start: 2017-04-04 | End: 2017-04-05 | Stop reason: HOSPADM

## 2017-04-04 RX ORDER — ONDANSETRON 4 MG/1
4 TABLET, FILM COATED ORAL EVERY 8 HOURS PRN
Status: DISCONTINUED | OUTPATIENT
Start: 2017-04-04 | End: 2017-04-04

## 2017-04-04 RX ORDER — RANITIDINE 150 MG/1
150 TABLET ORAL 2 TIMES DAILY
Status: DISCONTINUED | OUTPATIENT
Start: 2017-04-04 | End: 2017-04-05

## 2017-04-04 RX ORDER — ACETAMINOPHEN 325 MG/1
500 TABLET ORAL EVERY 6 HOURS PRN
Status: DISCONTINUED | OUTPATIENT
Start: 2017-04-04 | End: 2017-04-04

## 2017-04-04 RX ORDER — OXYCODONE HYDROCHLORIDE 10 MG/1
30 TABLET ORAL EVERY 8 HOURS PRN
Status: DISCONTINUED | OUTPATIENT
Start: 2017-04-04 | End: 2017-04-05

## 2017-04-04 RX ORDER — PREDNISONE 1 MG/1
5 TABLET ORAL 2 TIMES DAILY
Status: DISCONTINUED | OUTPATIENT
Start: 2017-04-05 | End: 2017-04-05 | Stop reason: HOSPADM

## 2017-04-04 RX ORDER — OXYCODONE HYDROCHLORIDE 5 MG/1
5 TABLET ORAL EVERY 4 HOURS PRN
Status: DISCONTINUED | OUTPATIENT
Start: 2017-04-04 | End: 2017-04-04

## 2017-04-04 RX ORDER — ESCITALOPRAM OXALATE 20 MG/1
20 TABLET ORAL DAILY
Status: DISCONTINUED | OUTPATIENT
Start: 2017-04-05 | End: 2017-04-05 | Stop reason: HOSPADM

## 2017-04-04 RX ORDER — FENTANYL CITRATE/PF 50 MCG/ML
25 SYRINGE (ML) INJECTION
Status: DISCONTINUED | OUTPATIENT
Start: 2017-04-04 | End: 2017-04-04 | Stop reason: HOSPADM

## 2017-04-04 RX ORDER — GABAPENTIN 600 MG/1
600 TABLET ORAL
Status: DISCONTINUED | OUTPATIENT
Start: 2017-04-04 | End: 2017-04-04 | Stop reason: CLARIF

## 2017-04-04 RX ORDER — MIDAZOLAM HYDROCHLORIDE 1 MG/ML
INJECTION INTRAMUSCULAR; INTRAVENOUS AS NEEDED
Status: DISCONTINUED | OUTPATIENT
Start: 2017-04-04 | End: 2017-04-04 | Stop reason: SURG

## 2017-04-04 RX ORDER — GABAPENTIN 300 MG/1
600 CAPSULE ORAL
Status: DISCONTINUED | OUTPATIENT
Start: 2017-04-04 | End: 2017-04-05 | Stop reason: HOSPADM

## 2017-04-04 RX ORDER — MELATONIN
1000 DAILY
Status: DISCONTINUED | OUTPATIENT
Start: 2017-04-05 | End: 2017-04-05 | Stop reason: HOSPADM

## 2017-04-04 RX ORDER — ONDANSETRON 4 MG/1
4 TABLET, ORALLY DISINTEGRATING ORAL EVERY 8 HOURS PRN
Status: DISCONTINUED | OUTPATIENT
Start: 2017-04-04 | End: 2017-04-05 | Stop reason: HOSPADM

## 2017-04-04 RX ORDER — FENTANYL CITRATE/PF 50 MCG/ML
25 SYRINGE (ML) INJECTION
Status: DISCONTINUED | OUTPATIENT
Start: 2017-04-04 | End: 2017-04-04

## 2017-04-04 RX ORDER — MORPHINE SULFATE 4 MG/ML
4 INJECTION, SOLUTION INTRAMUSCULAR; INTRAVENOUS EVERY 4 HOURS PRN
Status: DISCONTINUED | OUTPATIENT
Start: 2017-04-04 | End: 2017-04-05 | Stop reason: HOSPADM

## 2017-04-04 RX ORDER — GLYCOPYRROLATE 0.2 MG/ML
INJECTION INTRAMUSCULAR; INTRAVENOUS AS NEEDED
Status: DISCONTINUED | OUTPATIENT
Start: 2017-04-04 | End: 2017-04-04 | Stop reason: SURG

## 2017-04-04 RX ORDER — MAGNESIUM HYDROXIDE 1200 MG/15ML
LIQUID ORAL AS NEEDED
Status: DISCONTINUED | OUTPATIENT
Start: 2017-04-04 | End: 2017-04-04 | Stop reason: HOSPADM

## 2017-04-04 RX ORDER — METHOCARBAMOL 750 MG/1
750 TABLET, FILM COATED ORAL 3 TIMES DAILY PRN
Status: DISCONTINUED | OUTPATIENT
Start: 2017-04-04 | End: 2017-04-05 | Stop reason: HOSPADM

## 2017-04-04 RX ORDER — ONDANSETRON 2 MG/ML
4 INJECTION INTRAMUSCULAR; INTRAVENOUS ONCE
Status: COMPLETED | OUTPATIENT
Start: 2017-04-04 | End: 2017-04-04

## 2017-04-04 RX ORDER — ALPRAZOLAM 0.5 MG/1
0.5 TABLET ORAL 3 TIMES DAILY PRN
Status: DISCONTINUED | OUTPATIENT
Start: 2017-04-04 | End: 2017-04-05 | Stop reason: HOSPADM

## 2017-04-04 RX ADMIN — OXYCODONE HYDROCHLORIDE 30 MG: 10 TABLET ORAL at 19:42

## 2017-04-04 RX ADMIN — ROPIVACAINE HYDROCHLORIDE 25 ML: 5 INJECTION, SOLUTION EPIDURAL; INFILTRATION; PERINEURAL at 11:23

## 2017-04-04 RX ADMIN — SODIUM CHLORIDE, SODIUM LACTATE, POTASSIUM CHLORIDE, AND CALCIUM CHLORIDE 125 ML/HR: .6; .31; .03; .02 INJECTION, SOLUTION INTRAVENOUS at 17:30

## 2017-04-04 RX ADMIN — HYDROMORPHONE HYDROCHLORIDE 0.5 MG: 1 INJECTION, SOLUTION INTRAMUSCULAR; INTRAVENOUS; SUBCUTANEOUS at 18:15

## 2017-04-04 RX ADMIN — NEOSTIGMINE METHYLSULFATE 2 MG: 1 INJECTION INTRAMUSCULAR; INTRAVENOUS; SUBCUTANEOUS at 15:23

## 2017-04-04 RX ADMIN — CEFAZOLIN SODIUM 1000 MG: 1 SOLUTION INTRAVENOUS at 21:47

## 2017-04-04 RX ADMIN — SODIUM CHLORIDE, SODIUM LACTATE, POTASSIUM CHLORIDE, AND CALCIUM CHLORIDE 125 ML/HR: .6; .31; .03; .02 INJECTION, SOLUTION INTRAVENOUS at 23:46

## 2017-04-04 RX ADMIN — GABAPENTIN 200 MG: 100 CAPSULE ORAL at 20:45

## 2017-04-04 RX ADMIN — GLYCOPYRROLATE 0.4 MG: 0.2 INJECTION INTRAMUSCULAR; INTRAVENOUS at 15:23

## 2017-04-04 RX ADMIN — ALPRAZOLAM 0.5 MG: 0.5 TABLET ORAL at 23:39

## 2017-04-04 RX ADMIN — FENTANYL CITRATE 25 MCG: 50 INJECTION INTRAMUSCULAR; INTRAVENOUS at 17:00

## 2017-04-04 RX ADMIN — HYDROCORTISONE SODIUM SUCCINATE 100 MG: 100 INJECTION, POWDER, FOR SOLUTION INTRAMUSCULAR; INTRAVENOUS at 12:46

## 2017-04-04 RX ADMIN — LIDOCAINE HYDROCHLORIDE 100 MG: 20 INJECTION, SOLUTION INTRAVENOUS at 12:40

## 2017-04-04 RX ADMIN — HYDROMORPHONE HYDROCHLORIDE 0.5 MG: 1 INJECTION, SOLUTION INTRAMUSCULAR; INTRAVENOUS; SUBCUTANEOUS at 17:27

## 2017-04-04 RX ADMIN — ACETAMINOPHEN 650 MG: 325 TABLET, FILM COATED ORAL at 19:42

## 2017-04-04 RX ADMIN — CEFAZOLIN SODIUM 2000 MG: 2 SOLUTION INTRAVENOUS at 12:45

## 2017-04-04 RX ADMIN — FENTANYL CITRATE 25 MCG: 50 INJECTION INTRAMUSCULAR; INTRAVENOUS at 16:54

## 2017-04-04 RX ADMIN — ROCURONIUM BROMIDE 30 MG: 10 INJECTION, SOLUTION INTRAVENOUS at 12:40

## 2017-04-04 RX ADMIN — ONDANSETRON 4 MG: 2 INJECTION INTRAMUSCULAR; INTRAVENOUS at 15:19

## 2017-04-04 RX ADMIN — SODIUM CHLORIDE, SODIUM LACTATE, POTASSIUM CHLORIDE, AND CALCIUM CHLORIDE 125 ML/HR: .6; .31; .03; .02 INJECTION, SOLUTION INTRAVENOUS at 19:44

## 2017-04-04 RX ADMIN — ROPIVACAINE HYDROCHLORIDE: 5 INJECTION, SOLUTION EPIDURAL; INFILTRATION; PERINEURAL at 19:44

## 2017-04-04 RX ADMIN — KETOROLAC TROMETHAMINE 30 MG: 30 INJECTION, SOLUTION INTRAMUSCULAR at 15:19

## 2017-04-04 RX ADMIN — GABAPENTIN 600 MG: 300 CAPSULE ORAL at 23:40

## 2017-04-04 RX ADMIN — FENTANYL CITRATE 25 MCG: 50 INJECTION INTRAMUSCULAR; INTRAVENOUS at 17:08

## 2017-04-04 RX ADMIN — METHOCARBAMOL 750 MG: 750 TABLET ORAL at 19:42

## 2017-04-04 RX ADMIN — FENTANYL CITRATE 25 MCG: 50 INJECTION INTRAMUSCULAR; INTRAVENOUS at 17:04

## 2017-04-04 RX ADMIN — SODIUM CHLORIDE, SODIUM LACTATE, POTASSIUM CHLORIDE, AND CALCIUM CHLORIDE 20 ML/HR: .6; .31; .03; .02 INJECTION, SOLUTION INTRAVENOUS at 11:03

## 2017-04-04 RX ADMIN — ONDANSETRON 4 MG: 2 INJECTION INTRAMUSCULAR; INTRAVENOUS at 18:17

## 2017-04-04 RX ADMIN — MIDAZOLAM HYDROCHLORIDE 2 MG: 1 INJECTION, SOLUTION INTRAMUSCULAR; INTRAVENOUS at 12:34

## 2017-04-04 RX ADMIN — MORPHINE SULFATE 4 MG: 4 INJECTION, SOLUTION INTRAMUSCULAR; INTRAVENOUS at 21:15

## 2017-04-04 RX ADMIN — FENTANYL CITRATE 25 MCG: 50 INJECTION INTRAMUSCULAR; INTRAVENOUS at 17:22

## 2017-04-04 RX ADMIN — FENTANYL CITRATE 25 MCG: 50 INJECTION INTRAMUSCULAR; INTRAVENOUS at 17:25

## 2017-04-04 RX ADMIN — ROPIVACAINE HYDROCHLORIDE: 5 INJECTION, SOLUTION EPIDURAL; INFILTRATION; PERINEURAL at 18:00

## 2017-04-04 RX ADMIN — PROPOFOL 200 MG: 10 INJECTION, EMULSION INTRAVENOUS at 12:40

## 2017-04-04 RX ADMIN — ROPIVACAINE HYDROCHLORIDE 5 ML: 5 INJECTION, SOLUTION EPIDURAL; INFILTRATION; PERINEURAL at 16:36

## 2017-04-05 VITALS
SYSTOLIC BLOOD PRESSURE: 147 MMHG | RESPIRATION RATE: 18 BRPM | OXYGEN SATURATION: 96 % | WEIGHT: 172 LBS | BODY MASS INDEX: 27 KG/M2 | DIASTOLIC BLOOD PRESSURE: 72 MMHG | HEIGHT: 67 IN | HEART RATE: 117 BPM | TEMPERATURE: 98.3 F

## 2017-04-05 LAB
ANION GAP SERPL CALCULATED.3IONS-SCNC: 5 MMOL/L (ref 4–13)
BUN SERPL-MCNC: 7 MG/DL (ref 5–25)
CALCIUM SERPL-MCNC: 8.4 MG/DL (ref 8.3–10.1)
CHLORIDE SERPL-SCNC: 105 MMOL/L (ref 100–108)
CO2 SERPL-SCNC: 28 MMOL/L (ref 21–32)
CREAT SERPL-MCNC: 0.58 MG/DL (ref 0.6–1.3)
ERYTHROCYTE [DISTWIDTH] IN BLOOD BY AUTOMATED COUNT: 13.6 % (ref 11.6–15.1)
GFR SERPL CREATININE-BSD FRML MDRD: >60 ML/MIN/1.73SQ M
GLUCOSE SERPL-MCNC: 81 MG/DL (ref 65–140)
HCT VFR BLD AUTO: 35.1 % (ref 34.8–46.1)
HGB BLD-MCNC: 11.2 G/DL (ref 11.5–15.4)
MCH RBC QN AUTO: 30.4 PG (ref 26.8–34.3)
MCHC RBC AUTO-ENTMCNC: 31.9 G/DL (ref 31.4–37.4)
MCV RBC AUTO: 95 FL (ref 82–98)
PLATELET # BLD AUTO: 179 THOUSANDS/UL (ref 149–390)
PMV BLD AUTO: 11.5 FL (ref 8.9–12.7)
POTASSIUM SERPL-SCNC: 3.7 MMOL/L (ref 3.5–5.3)
RBC # BLD AUTO: 3.69 MILLION/UL (ref 3.81–5.12)
SODIUM SERPL-SCNC: 138 MMOL/L (ref 136–145)
WBC # BLD AUTO: 9.09 THOUSAND/UL (ref 4.31–10.16)

## 2017-04-05 PROCEDURE — 85027 COMPLETE CBC AUTOMATED: CPT | Performed by: ORTHOPAEDIC SURGERY

## 2017-04-05 PROCEDURE — 80048 BASIC METABOLIC PNL TOTAL CA: CPT | Performed by: ORTHOPAEDIC SURGERY

## 2017-04-05 RX ORDER — OXYCODONE HYDROCHLORIDE AND ACETAMINOPHEN 5; 325 MG/1; MG/1
2 TABLET ORAL EVERY 4 HOURS PRN
Status: DISCONTINUED | OUTPATIENT
Start: 2017-04-05 | End: 2017-04-05 | Stop reason: HOSPADM

## 2017-04-05 RX ORDER — OXYCODONE HYDROCHLORIDE 10 MG/1
30 TABLET ORAL EVERY 8 HOURS PRN
Status: DISCONTINUED | OUTPATIENT
Start: 2017-04-05 | End: 2017-04-05 | Stop reason: HOSPADM

## 2017-04-05 RX ORDER — OXYCODONE HYDROCHLORIDE AND ACETAMINOPHEN 5; 325 MG/1; MG/1
1 TABLET ORAL EVERY 4 HOURS PRN
Status: DISCONTINUED | OUTPATIENT
Start: 2017-04-05 | End: 2017-04-05 | Stop reason: HOSPADM

## 2017-04-05 RX ORDER — FAMOTIDINE 20 MG/1
20 TABLET, FILM COATED ORAL 2 TIMES DAILY
Status: DISCONTINUED | OUTPATIENT
Start: 2017-04-05 | End: 2017-04-05 | Stop reason: HOSPADM

## 2017-04-05 RX ORDER — OXYCODONE HYDROCHLORIDE AND ACETAMINOPHEN 5; 325 MG/1; MG/1
TABLET ORAL
Qty: 60 TABLET | Refills: 0 | Status: SHIPPED | OUTPATIENT
Start: 2017-04-05 | End: 2017-07-13 | Stop reason: ALTCHOICE

## 2017-04-05 RX ADMIN — ESCITALOPRAM OXALATE 20 MG: 20 TABLET ORAL at 08:44

## 2017-04-05 RX ADMIN — OXYCODONE HYDROCHLORIDE 30 MG: 10 TABLET ORAL at 12:11

## 2017-04-05 RX ADMIN — METHOCARBAMOL 750 MG: 750 TABLET ORAL at 03:43

## 2017-04-05 RX ADMIN — OXYCODONE HYDROCHLORIDE AND ACETAMINOPHEN 2 TABLET: 5; 325 TABLET ORAL at 15:18

## 2017-04-05 RX ADMIN — ONDANSETRON 4 MG: 4 TABLET, ORALLY DISINTEGRATING ORAL at 06:21

## 2017-04-05 RX ADMIN — OXYCODONE HYDROCHLORIDE 30 MG: 10 TABLET ORAL at 03:43

## 2017-04-05 RX ADMIN — ALPRAZOLAM 0.25 MG: 0.25 TABLET ORAL at 08:44

## 2017-04-05 RX ADMIN — FAMOTIDINE 20 MG: 20 TABLET ORAL at 09:15

## 2017-04-05 RX ADMIN — SODIUM CHLORIDE, SODIUM LACTATE, POTASSIUM CHLORIDE, AND CALCIUM CHLORIDE 125 ML/HR: .6; .31; .03; .02 INJECTION, SOLUTION INTRAVENOUS at 05:33

## 2017-04-05 RX ADMIN — ERGOCALCIFEROL 50000 UNITS: 1.25 CAPSULE ORAL at 08:44

## 2017-04-05 RX ADMIN — MORPHINE SULFATE 4 MG: 4 INJECTION, SOLUTION INTRAMUSCULAR; INTRAVENOUS at 04:43

## 2017-04-05 RX ADMIN — PREDNISONE 5 MG: 5 TABLET ORAL at 08:44

## 2017-04-05 RX ADMIN — CEFAZOLIN SODIUM 1000 MG: 1 SOLUTION INTRAVENOUS at 05:30

## 2017-04-05 RX ADMIN — GABAPENTIN 200 MG: 100 CAPSULE ORAL at 08:44

## 2017-04-05 RX ADMIN — OXYCODONE HYDROCHLORIDE AND ACETAMINOPHEN 2 TABLET: 5; 325 TABLET ORAL at 08:45

## 2017-04-05 RX ADMIN — VITAMIN D, TAB 1000IU (100/BT) 1000 UNITS: 25 TAB at 08:45

## 2017-04-12 ENCOUNTER — ALLSCRIPTS OFFICE VISIT (OUTPATIENT)
Dept: OTHER | Facility: OTHER | Age: 27
End: 2017-04-12

## 2017-04-19 ENCOUNTER — HOSPITAL ENCOUNTER (OUTPATIENT)
Dept: RADIOLOGY | Facility: HOSPITAL | Age: 27
Discharge: HOME | End: 2017-04-19
Attending: ORTHOPAEDIC SURGERY
Payer: MEDICARE

## 2017-04-19 ENCOUNTER — ALLSCRIPTS OFFICE VISIT (OUTPATIENT)
Dept: OTHER | Facility: OTHER | Age: 27
End: 2017-04-19

## 2017-04-19 DIAGNOSIS — Z47.89 ENCOUNTER FOR OTHER ORTHOPEDIC AFTERCARE: ICD-10-CM

## 2017-04-19 DIAGNOSIS — M25.539 PAIN IN WRIST: ICD-10-CM

## 2017-04-19 DIAGNOSIS — Z47.89 OTHER ORTHOPEDIC AFTERCARE(V54.89): ICD-10-CM

## 2017-04-19 PROCEDURE — 73110 X-RAY EXAM OF WRIST: CPT

## 2017-05-05 ENCOUNTER — APPOINTMENT (OUTPATIENT)
Dept: OCCUPATIONAL THERAPY | Facility: HOSPITAL | Age: 27
End: 2017-05-05
Payer: MEDICARE

## 2017-05-05 ENCOUNTER — ALLSCRIPTS OFFICE VISIT (OUTPATIENT)
Dept: OTHER | Facility: OTHER | Age: 27
End: 2017-05-05

## 2017-05-05 PROCEDURE — L3906 WHO W/O JOINTS CF: HCPCS

## 2017-05-26 ENCOUNTER — ALLSCRIPTS OFFICE VISIT (OUTPATIENT)
Dept: OTHER | Facility: OTHER | Age: 27
End: 2017-05-26

## 2017-05-26 ENCOUNTER — HOSPITAL ENCOUNTER (OUTPATIENT)
Dept: RADIOLOGY | Facility: HOSPITAL | Age: 27
Discharge: HOME | End: 2017-05-26
Attending: ORTHOPAEDIC SURGERY
Payer: MEDICARE

## 2017-05-26 ENCOUNTER — APPOINTMENT (OUTPATIENT)
Dept: OCCUPATIONAL THERAPY | Facility: HOSPITAL | Age: 27
End: 2017-05-26
Payer: MEDICARE

## 2017-05-26 DIAGNOSIS — Z47.1 AFTERCARE FOLLOWING JOINT REPLACEMENT SURGERY: ICD-10-CM

## 2017-05-26 DIAGNOSIS — Z47.89 ENCOUNTER FOR OTHER ORTHOPEDIC AFTERCARE: ICD-10-CM

## 2017-05-26 PROCEDURE — 73110 X-RAY EXAM OF WRIST: CPT

## 2017-05-26 PROCEDURE — L3906 WHO W/O JOINTS CF: HCPCS

## 2017-06-06 ENCOUNTER — ALLSCRIPTS OFFICE VISIT (OUTPATIENT)
Dept: OTHER | Facility: OTHER | Age: 27
End: 2017-06-06

## 2017-07-07 ENCOUNTER — HOSPITAL ENCOUNTER (OUTPATIENT)
Dept: RADIOLOGY | Facility: HOSPITAL | Age: 27
Discharge: HOME | End: 2017-07-07
Attending: ORTHOPAEDIC SURGERY
Payer: MEDICARE

## 2017-07-07 ENCOUNTER — ALLSCRIPTS OFFICE VISIT (OUTPATIENT)
Dept: OTHER | Facility: OTHER | Age: 27
End: 2017-07-07

## 2017-07-07 DIAGNOSIS — Z47.1 AFTERCARE FOLLOWING JOINT REPLACEMENT SURGERY: ICD-10-CM

## 2017-07-07 PROCEDURE — 73110 X-RAY EXAM OF WRIST: CPT

## 2017-07-13 ENCOUNTER — HOSPITAL ENCOUNTER (EMERGENCY)
Facility: HOSPITAL | Age: 27
Discharge: HOME | End: 2017-07-13
Attending: EMERGENCY MEDICINE | Admitting: EMERGENCY MEDICINE
Payer: MEDICARE

## 2017-07-13 ENCOUNTER — APPOINTMENT (EMERGENCY)
Dept: ULTRASOUND IMAGING | Facility: HOSPITAL | Age: 27
End: 2017-07-13
Payer: MEDICARE

## 2017-07-13 VITALS
RESPIRATION RATE: 16 BRPM | TEMPERATURE: 98.6 F | HEART RATE: 85 BPM | WEIGHT: 170 LBS | SYSTOLIC BLOOD PRESSURE: 146 MMHG | OXYGEN SATURATION: 98 % | DIASTOLIC BLOOD PRESSURE: 92 MMHG | BODY MASS INDEX: 26.63 KG/M2

## 2017-07-13 DIAGNOSIS — O20.0 THREATENED MISCARRIAGE IN EARLY PREGNANCY: Primary | ICD-10-CM

## 2017-07-13 LAB
ABO GROUP BLD: NORMAL
B-HCG SERPL-ACNC: ABNORMAL MIU/ML
BACTERIA UR QL AUTO: ABNORMAL /HPF
BASOPHILS # BLD AUTO: 0.03 THOUSANDS/ΜL (ref 0–0.1)
BASOPHILS NFR BLD AUTO: 0 % (ref 0–1)
BILIRUB UR QL STRIP: ABNORMAL
BLD GP AB SCN SERPL QL: NEGATIVE
CLARITY UR: CLEAR
COLOR UR: ABNORMAL
EOSINOPHIL # BLD AUTO: 0.02 THOUSAND/ΜL (ref 0–0.61)
EOSINOPHIL NFR BLD AUTO: 0 % (ref 0–6)
ERYTHROCYTE [DISTWIDTH] IN BLOOD BY AUTOMATED COUNT: 14.3 % (ref 11.6–15.1)
GLUCOSE UR STRIP-MCNC: NEGATIVE MG/DL
HCG UR QL: POSITIVE
HCT VFR BLD AUTO: 37.7 % (ref 34.8–46.1)
HGB BLD-MCNC: 13.1 G/DL (ref 11.5–15.4)
HGB UR QL STRIP.AUTO: NEGATIVE
KETONES UR STRIP-MCNC: ABNORMAL MG/DL
LEUKOCYTE ESTERASE UR QL STRIP: NEGATIVE
LYMPHOCYTES # BLD AUTO: 1.44 THOUSANDS/ΜL (ref 0.6–4.47)
LYMPHOCYTES NFR BLD AUTO: 18 % (ref 14–44)
MCH RBC QN AUTO: 32.2 PG (ref 26.8–34.3)
MCHC RBC AUTO-ENTMCNC: 34.7 G/DL (ref 31.4–37.4)
MCV RBC AUTO: 93 FL (ref 82–98)
MONOCYTES # BLD AUTO: 0.36 THOUSAND/ΜL (ref 0.17–1.22)
MONOCYTES NFR BLD AUTO: 5 % (ref 4–12)
NEUTROPHILS # BLD AUTO: 6.03 THOUSANDS/ΜL (ref 1.85–7.62)
NEUTS SEG NFR BLD AUTO: 77 % (ref 43–75)
NITRITE UR QL STRIP: NEGATIVE
NON-SQ EPI CELLS URNS QL MICRO: ABNORMAL /HPF
NRBC BLD AUTO-RTO: 0 /100 WBCS
PH UR STRIP.AUTO: 7 [PH] (ref 4.5–8)
PLATELET # BLD AUTO: 166 THOUSANDS/UL (ref 149–390)
PMV BLD AUTO: 11.2 FL (ref 8.9–12.7)
PROT UR STRIP-MCNC: ABNORMAL MG/DL
RBC # BLD AUTO: 4.07 MILLION/UL (ref 3.81–5.12)
RBC #/AREA URNS AUTO: ABNORMAL /HPF
RH BLD: NEGATIVE
SP GR UR STRIP.AUTO: 1.02 (ref 1–1.03)
SPECIMEN EXPIRATION DATE: NORMAL
UROBILINOGEN UR QL STRIP.AUTO: 1 E.U./DL
WBC # BLD AUTO: 7.88 THOUSAND/UL (ref 4.31–10.16)
WBC #/AREA URNS AUTO: ABNORMAL /HPF

## 2017-07-13 PROCEDURE — 99284 EMERGENCY DEPT VISIT MOD MDM: CPT

## 2017-07-13 PROCEDURE — 85025 COMPLETE CBC W/AUTO DIFF WBC: CPT | Performed by: EMERGENCY MEDICINE

## 2017-07-13 PROCEDURE — 96372 THER/PROPH/DIAG INJ SC/IM: CPT

## 2017-07-13 PROCEDURE — 86901 BLOOD TYPING SEROLOGIC RH(D): CPT | Performed by: EMERGENCY MEDICINE

## 2017-07-13 PROCEDURE — 84702 CHORIONIC GONADOTROPIN TEST: CPT | Performed by: EMERGENCY MEDICINE

## 2017-07-13 PROCEDURE — 81002 URINALYSIS NONAUTO W/O SCOPE: CPT | Performed by: EMERGENCY MEDICINE

## 2017-07-13 PROCEDURE — 86850 RBC ANTIBODY SCREEN: CPT | Performed by: EMERGENCY MEDICINE

## 2017-07-13 PROCEDURE — 76801 OB US < 14 WKS SINGLE FETUS: CPT

## 2017-07-13 PROCEDURE — 81001 URINALYSIS AUTO W/SCOPE: CPT

## 2017-07-13 PROCEDURE — 36415 COLL VENOUS BLD VENIPUNCTURE: CPT | Performed by: EMERGENCY MEDICINE

## 2017-07-13 PROCEDURE — 81025 URINE PREGNANCY TEST: CPT | Performed by: EMERGENCY MEDICINE

## 2017-07-13 PROCEDURE — 86900 BLOOD TYPING SEROLOGIC ABO: CPT | Performed by: EMERGENCY MEDICINE

## 2017-07-13 RX ORDER — OXYCODONE HYDROCHLORIDE AND ACETAMINOPHEN 5; 325 MG/1; MG/1
1 TABLET ORAL ONCE
Status: COMPLETED | OUTPATIENT
Start: 2017-07-13 | End: 2017-07-13

## 2017-07-13 RX ORDER — SULFASALAZINE 500 MG/1
500 TABLET ORAL 2 TIMES DAILY
COMMUNITY
End: 2017-09-01

## 2017-07-13 RX ADMIN — HUMAN RHO(D) IMMUNE GLOBULIN 300 MCG: 300 INJECTION, SOLUTION INTRAMUSCULAR at 18:13

## 2017-07-13 RX ADMIN — OXYCODONE HYDROCHLORIDE AND ACETAMINOPHEN 1 TABLET: 5; 325 TABLET ORAL at 16:13

## 2017-08-25 RX ORDER — METHYLPREDNISOLONE SODIUM SUCCINATE 125 MG/2ML
100 INJECTION, POWDER, LYOPHILIZED, FOR SOLUTION INTRAMUSCULAR; INTRAVENOUS ONCE
Status: DISCONTINUED | OUTPATIENT
Start: 2017-08-28 | End: 2017-08-31 | Stop reason: HOSPADM

## 2017-08-25 RX ORDER — DIPHENHYDRAMINE HCL 25 MG
50 TABLET ORAL ONCE
Status: DISCONTINUED | OUTPATIENT
Start: 2017-08-28 | End: 2017-08-31 | Stop reason: HOSPADM

## 2017-08-25 RX ORDER — SODIUM CHLORIDE 9 MG/ML
20 INJECTION, SOLUTION INTRAVENOUS CONTINUOUS
Status: DISCONTINUED | OUTPATIENT
Start: 2017-08-28 | End: 2017-08-31 | Stop reason: HOSPADM

## 2017-08-25 RX ORDER — ACETAMINOPHEN 325 MG/1
1000 TABLET ORAL ONCE
Status: DISCONTINUED | OUTPATIENT
Start: 2017-08-28 | End: 2017-08-31 | Stop reason: HOSPADM

## 2017-08-28 ENCOUNTER — HOSPITAL ENCOUNTER (OUTPATIENT)
Dept: INFUSION CENTER | Facility: CLINIC | Age: 27
Discharge: HOME | End: 2017-08-28
Payer: MEDICARE

## 2017-08-31 RX ORDER — ACETAMINOPHEN 325 MG/1
1000 TABLET ORAL ONCE
Status: COMPLETED | OUTPATIENT
Start: 2017-09-01 | End: 2017-09-01

## 2017-08-31 RX ORDER — SODIUM CHLORIDE 9 MG/ML
20 INJECTION, SOLUTION INTRAVENOUS CONTINUOUS
Status: DISCONTINUED | OUTPATIENT
Start: 2017-09-01 | End: 2017-09-04 | Stop reason: HOSPADM

## 2017-08-31 RX ORDER — METHYLPREDNISOLONE SODIUM SUCCINATE 125 MG/2ML
100 INJECTION, POWDER, LYOPHILIZED, FOR SOLUTION INTRAMUSCULAR; INTRAVENOUS ONCE
Status: COMPLETED | OUTPATIENT
Start: 2017-09-01 | End: 2017-09-01

## 2017-08-31 RX ORDER — DIPHENHYDRAMINE HCL 25 MG
50 TABLET ORAL ONCE
Status: COMPLETED | OUTPATIENT
Start: 2017-09-01 | End: 2017-09-01

## 2017-09-01 ENCOUNTER — HOSPITAL ENCOUNTER (OUTPATIENT)
Dept: INFUSION CENTER | Facility: CLINIC | Age: 27
Discharge: HOME | End: 2017-09-01
Payer: MEDICARE

## 2017-09-01 VITALS
HEART RATE: 91 BPM | DIASTOLIC BLOOD PRESSURE: 85 MMHG | WEIGHT: 175.27 LBS | RESPIRATION RATE: 16 BRPM | SYSTOLIC BLOOD PRESSURE: 117 MMHG | BODY MASS INDEX: 27.45 KG/M2 | TEMPERATURE: 98.3 F

## 2017-09-01 LAB
ALBUMIN SERPL BCP-MCNC: 3.5 G/DL (ref 3.5–5)
ALP SERPL-CCNC: 83 U/L (ref 46–116)
ALT SERPL W P-5'-P-CCNC: 31 U/L (ref 12–78)
AST SERPL W P-5'-P-CCNC: 14 U/L (ref 5–45)
BILIRUB DIRECT SERPL-MCNC: 0.11 MG/DL (ref 0–0.2)
BILIRUB SERPL-MCNC: 0.27 MG/DL (ref 0.2–1)
CREAT SERPL-MCNC: 0.53 MG/DL (ref 0.6–1.3)
CRP SERPL QL: 47 MG/L
ERYTHROCYTE [DISTWIDTH] IN BLOOD BY AUTOMATED COUNT: 14.1 % (ref 11.6–15.1)
ERYTHROCYTE [SEDIMENTATION RATE] IN BLOOD: 13 MM/HOUR (ref 0–20)
GFR SERPL CREATININE-BSD FRML MDRD: 131 ML/MIN/1.73SQ M
GRANULOCYTES NFR BLD AUTO: 62.9 % (ref 47–80)
GRANULOCYTES NFR BLD: 5.4 THOUSAND/ΜL (ref 1.85–7.82)
HCT VFR BLD AUTO: 38.4 % (ref 34.8–46.1)
HGB BLD-MCNC: 12.6 G/DL (ref 11.5–15.4)
LYMPHOCYTES # BLD AUTO: 2.9 THOUSANDS/ΜL (ref 0.6–4.47)
LYMPHOCYTES NFR BLD AUTO: 34 % (ref 14–44)
MCH RBC QN AUTO: 31.4 PG (ref 26.8–34.3)
MCHC RBC AUTO-ENTMCNC: 32.8 G/DL (ref 31.4–37.4)
MCV RBC AUTO: 96 FL (ref 82–98)
MONOCYTES # BLD AUTO: 0.3 THOUSAND/ΜL (ref 0.17–1.22)
MONOCYTES NFR BLD AUTO: 4 % (ref 4–12)
PLATELET # BLD AUTO: 206 THOUSANDS/UL (ref 149–390)
PMV BLD AUTO: 8.6 FL (ref 8.9–12.7)
PROT SERPL-MCNC: 6.9 G/DL (ref 6.4–8.2)
RBC # BLD AUTO: 4.01 MILLION/UL (ref 3.81–5.12)
WBC # BLD AUTO: 8.6 THOUSAND/UL (ref 4.31–10.16)
WBC NRBC COR # BLD: 8.6 THOUSAND/UL (ref 4.31–10.16)

## 2017-09-01 PROCEDURE — 80076 HEPATIC FUNCTION PANEL: CPT | Performed by: INTERNAL MEDICINE

## 2017-09-01 PROCEDURE — 85652 RBC SED RATE AUTOMATED: CPT | Performed by: INTERNAL MEDICINE

## 2017-09-01 PROCEDURE — 85025 COMPLETE CBC W/AUTO DIFF WBC: CPT | Performed by: INTERNAL MEDICINE

## 2017-09-01 PROCEDURE — 82565 ASSAY OF CREATININE: CPT | Performed by: INTERNAL MEDICINE

## 2017-09-01 PROCEDURE — 96375 TX/PRO/DX INJ NEW DRUG ADDON: CPT

## 2017-09-01 PROCEDURE — 96413 CHEMO IV INFUSION 1 HR: CPT

## 2017-09-01 PROCEDURE — 96415 CHEMO IV INFUSION ADDL HR: CPT

## 2017-09-01 PROCEDURE — 86140 C-REACTIVE PROTEIN: CPT | Performed by: INTERNAL MEDICINE

## 2017-09-01 RX ORDER — DIPHENHYDRAMINE HYDROCHLORIDE 50 MG/ML
50 INJECTION INTRAMUSCULAR; INTRAVENOUS ONCE
Status: COMPLETED | OUTPATIENT
Start: 2017-09-01 | End: 2017-09-01

## 2017-09-01 RX ADMIN — RITUXIMAB: 10 INJECTION, SOLUTION INTRAVENOUS at 10:04

## 2017-09-01 RX ADMIN — METHYLPREDNISOLONE SODIUM SUCCINATE 100 MG: 125 INJECTION, POWDER, FOR SOLUTION INTRAMUSCULAR; INTRAVENOUS at 09:07

## 2017-09-01 RX ADMIN — DIPHENHYDRAMINE HYDROCHLORIDE 50 MG: 50 INJECTION INTRAMUSCULAR; INTRAVENOUS at 11:44

## 2017-09-01 RX ADMIN — SODIUM CHLORIDE 20 ML/HR: 0.9 INJECTION, SOLUTION INTRAVENOUS at 09:00

## 2017-09-01 RX ADMIN — FAMOTIDINE 20 MG: 10 INJECTION, SOLUTION INTRAVENOUS at 11:48

## 2017-09-01 RX ADMIN — HYDROCORTISONE SODIUM SUCCINATE 100 MG: 100 INJECTION, POWDER, FOR SOLUTION INTRAMUSCULAR; INTRAVENOUS at 11:46

## 2017-09-01 RX ADMIN — ACETAMINOPHEN 975 MG: 325 TABLET, FILM COATED ORAL at 08:42

## 2017-09-01 RX ADMIN — DIPHENHYDRAMINE HCL 50 MG: 25 TABLET ORAL at 08:42

## 2017-09-01 NOTE — PLAN OF CARE
Problem: Potential for Falls  Goal: Patient will remain free of falls  INTERVENTIONS:  - Assess patient frequently for physical needs  -  Identify cognitive and physical deficits and behaviors that affect risk of falls    -  [place] fall precautions as indicated by assessment   - Educate patient/family on patient safety including physical limitations  - Instruct patient to call for assistance with activity based on assessment  - Modify environment to reduce risk of injury  - Consider OT/PT consult to assist with strengthening/mobility   Outcome: Progressing

## 2017-09-01 NOTE — PROGRESS NOTES
Patient arrived to the infusion center for 1st dose of Rituxan  Pt denies any discomfort or active/recent infections  Rituxan infusion started post premeds  Approximately one hour after the start of Rituxan pt c/o of swelling/pain in her throat  Pt VS WNL  Pt denied SOB  Rituxan infusion stopped and hypersensitivity medications given  Once pt returned to baseline, Dr Massie Ganser notified and verbal order obtained to restart Rituxan  Pt tolerated remainder of infusion well without incident or complaints  Pt is aware of next Rituxan infusion appointment in 2 weeks

## 2017-09-14 RX ORDER — DIPHENHYDRAMINE HCL 25 MG
25 TABLET ORAL ONCE
Status: COMPLETED | OUTPATIENT
Start: 2017-09-15 | End: 2017-09-15

## 2017-09-14 RX ORDER — SODIUM CHLORIDE 9 MG/ML
20 INJECTION, SOLUTION INTRAVENOUS CONTINUOUS
Status: DISCONTINUED | OUTPATIENT
Start: 2017-09-15 | End: 2017-09-18 | Stop reason: HOSPADM

## 2017-09-14 RX ORDER — METHYLPREDNISOLONE SODIUM SUCCINATE 125 MG/2ML
100 INJECTION, POWDER, LYOPHILIZED, FOR SOLUTION INTRAMUSCULAR; INTRAVENOUS ONCE
Status: COMPLETED | OUTPATIENT
Start: 2017-09-15 | End: 2017-09-15

## 2017-09-14 RX ORDER — ACETAMINOPHEN 325 MG/1
650 TABLET ORAL ONCE
Status: COMPLETED | OUTPATIENT
Start: 2017-09-15 | End: 2017-09-15

## 2017-09-15 ENCOUNTER — HOSPITAL ENCOUNTER (OUTPATIENT)
Dept: INFUSION CENTER | Facility: CLINIC | Age: 27
Discharge: HOME | End: 2017-09-15
Payer: MEDICARE

## 2017-09-15 VITALS
RESPIRATION RATE: 18 BRPM | DIASTOLIC BLOOD PRESSURE: 82 MMHG | HEART RATE: 86 BPM | TEMPERATURE: 96.8 F | SYSTOLIC BLOOD PRESSURE: 119 MMHG

## 2017-09-15 PROCEDURE — 96413 CHEMO IV INFUSION 1 HR: CPT

## 2017-09-15 PROCEDURE — 96415 CHEMO IV INFUSION ADDL HR: CPT

## 2017-09-15 PROCEDURE — 96375 TX/PRO/DX INJ NEW DRUG ADDON: CPT

## 2017-09-15 RX ADMIN — DIPHENHYDRAMINE HCL 25 MG: 25 TABLET ORAL at 10:11

## 2017-09-15 RX ADMIN — METHYLPREDNISOLONE SODIUM SUCCINATE 100 MG: 125 INJECTION, POWDER, FOR SOLUTION INTRAMUSCULAR; INTRAVENOUS at 10:11

## 2017-09-15 RX ADMIN — ACETAMINOPHEN 650 MG: 325 TABLET, FILM COATED ORAL at 10:11

## 2017-09-15 RX ADMIN — RITUXIMAB: 10 INJECTION, SOLUTION INTRAVENOUS at 10:54

## 2017-09-15 RX ADMIN — SODIUM CHLORIDE 20 ML/HR: 0.9 INJECTION, SOLUTION INTRAVENOUS at 10:04

## 2017-09-15 NOTE — PROGRESS NOTES
Patient tolerated rituxan infusion today without complications  Patient aware of upcoming appointments   Declined AVS

## 2017-09-15 NOTE — PLAN OF CARE
Problem: Potential for Falls  Goal: Patient will remain free of falls  INTERVENTIONS:  - Assess patient frequently for physical needs  -  Identify cognitive and physical deficits and behaviors that affect risk of falls  -  [place] fall precautions as indicated by assessment   - Educate patient/family on patient safety including physical limitations  - Instruct patient to call for assistance with activity based on assessment  - Modify environment to reduce risk of injury  - Consider OT/PT consult to assist with strengthening/mobility   Outcome: Progressing      Problem: SAFETY ADULT  Goal: Patient will remain free of falls  INTERVENTIONS:  - Assess patient frequently for physical needs  -  Identify cognitive and physical deficits and behaviors that affect risk of falls  -  [place] fall precautions as indicated by assessment   - Educate patient/family on patient safety including physical limitations  - Instruct patient to call for assistance with activity based on assessment  - Modify environment to reduce risk of injury  - Consider OT/PT consult to assist with strengthening/mobility   Outcome: Progressing      Problem: Knowledge Deficit  Goal: Patient/family/caregiver demonstrates understanding of disease process, treatment plan, medications, and discharge instructions  Complete learning assessment and assess knowledge base    Interventions:  - Provide teaching at level of understanding  - Provide teaching via preferred learning methods  Outcome: Progressing

## 2017-09-27 ENCOUNTER — TRANSCRIBE ORDERS (OUTPATIENT)
Dept: LAB | Facility: HOSPITAL | Age: 27
End: 2017-09-27

## 2017-09-27 ENCOUNTER — APPOINTMENT (OUTPATIENT)
Dept: LAB | Facility: HOSPITAL | Age: 27
End: 2017-09-27
Attending: ORTHOPAEDIC SURGERY
Payer: MEDICARE

## 2017-09-27 ENCOUNTER — APPOINTMENT (OUTPATIENT)
Dept: OCCUPATIONAL THERAPY | Facility: HOSPITAL | Age: 27
End: 2017-09-27
Attending: ORTHOPAEDIC SURGERY
Payer: MEDICARE

## 2017-09-27 ENCOUNTER — ALLSCRIPTS OFFICE VISIT (OUTPATIENT)
Dept: OTHER | Facility: OTHER | Age: 27
End: 2017-09-27

## 2017-09-27 ENCOUNTER — TRANSCRIBE ORDERS (OUTPATIENT)
Dept: ADMINISTRATIVE | Facility: HOSPITAL | Age: 27
End: 2017-09-27

## 2017-09-27 ENCOUNTER — HOSPITAL ENCOUNTER (OUTPATIENT)
Dept: RADIOLOGY | Facility: HOSPITAL | Age: 27
Discharge: HOME | End: 2017-09-27
Attending: ORTHOPAEDIC SURGERY
Payer: MEDICARE

## 2017-09-27 DIAGNOSIS — Z96.632 PRESENCE OF LEFT ARTIFICIAL WRIST JOINT: ICD-10-CM

## 2017-09-27 DIAGNOSIS — Z98.1 ARTHRODESIS STATUS: ICD-10-CM

## 2017-09-27 DIAGNOSIS — M25.539 PAIN IN WRIST: ICD-10-CM

## 2017-09-27 DIAGNOSIS — Z96.632 PRESENCE OF LEFT ARTIFICIAL WRIST JOINT: Primary | ICD-10-CM

## 2017-09-27 DIAGNOSIS — Z47.1 AFTERCARE FOLLOWING JOINT REPLACEMENT SURGERY: ICD-10-CM

## 2017-09-27 DIAGNOSIS — N94.6 DYSMENORRHEA: ICD-10-CM

## 2017-09-27 LAB
ANION GAP SERPL CALCULATED.3IONS-SCNC: 7 MMOL/L (ref 4–13)
BUN SERPL-MCNC: 4 MG/DL (ref 5–25)
CALCIUM SERPL-MCNC: 8.7 MG/DL (ref 8.3–10.1)
CHLORIDE SERPL-SCNC: 105 MMOL/L (ref 100–108)
CO2 SERPL-SCNC: 26 MMOL/L (ref 21–32)
CREAT SERPL-MCNC: 0.64 MG/DL (ref 0.6–1.3)
CRP SERPL QL: 20.9 MG/L
ERYTHROCYTE [DISTWIDTH] IN BLOOD BY AUTOMATED COUNT: 12.9 % (ref 11.6–15.1)
ERYTHROCYTE [SEDIMENTATION RATE] IN BLOOD: 16 MM/HOUR (ref 0–20)
GFR SERPL CREATININE-BSD FRML MDRD: 123 ML/MIN/1.73SQ M
GLUCOSE SERPL-MCNC: 107 MG/DL (ref 65–140)
HCT VFR BLD AUTO: 38.5 % (ref 34.8–46.1)
HGB BLD-MCNC: 12.7 G/DL (ref 11.5–15.4)
MCH RBC QN AUTO: 31.7 PG (ref 26.8–34.3)
MCHC RBC AUTO-ENTMCNC: 33 G/DL (ref 31.4–37.4)
MCV RBC AUTO: 96 FL (ref 82–98)
PLATELET # BLD AUTO: 224 THOUSANDS/UL (ref 149–390)
PMV BLD AUTO: 11.1 FL (ref 8.9–12.7)
POTASSIUM SERPL-SCNC: 3.4 MMOL/L (ref 3.5–5.3)
RBC # BLD AUTO: 4.01 MILLION/UL (ref 3.81–5.12)
SODIUM SERPL-SCNC: 138 MMOL/L (ref 136–145)
WBC # BLD AUTO: 14.5 THOUSAND/UL (ref 4.31–10.16)

## 2017-09-27 PROCEDURE — 85652 RBC SED RATE AUTOMATED: CPT

## 2017-09-27 PROCEDURE — 36415 COLL VENOUS BLD VENIPUNCTURE: CPT

## 2017-09-27 PROCEDURE — 73130 X-RAY EXAM OF HAND: CPT

## 2017-09-27 PROCEDURE — L3702 EO W/O JOINTS CF: HCPCS

## 2017-09-27 PROCEDURE — L3906 WHO W/O JOINTS CF: HCPCS

## 2017-09-27 PROCEDURE — 80048 BASIC METABOLIC PNL TOTAL CA: CPT

## 2017-09-27 PROCEDURE — 85027 COMPLETE CBC AUTOMATED: CPT

## 2017-09-27 PROCEDURE — 86140 C-REACTIVE PROTEIN: CPT

## 2017-09-27 PROCEDURE — 73110 X-RAY EXAM OF WRIST: CPT

## 2017-09-28 ENCOUNTER — HOSPITAL ENCOUNTER (OUTPATIENT)
Dept: CT IMAGING | Facility: HOSPITAL | Age: 27
Discharge: HOME | End: 2017-09-28
Attending: ORTHOPAEDIC SURGERY
Payer: MEDICARE

## 2017-09-28 DIAGNOSIS — M25.539 PAIN IN WRIST: ICD-10-CM

## 2017-09-28 DIAGNOSIS — Z98.1 ARTHRODESIS STATUS: ICD-10-CM

## 2017-09-28 DIAGNOSIS — Z96.632 PRESENCE OF LEFT ARTIFICIAL WRIST JOINT: ICD-10-CM

## 2017-09-28 PROCEDURE — 73200 CT UPPER EXTREMITY W/O DYE: CPT

## 2017-10-03 ENCOUNTER — ALLSCRIPTS OFFICE VISIT (OUTPATIENT)
Dept: OTHER | Facility: OTHER | Age: 27
End: 2017-10-03

## 2017-10-04 ENCOUNTER — TRANSCRIBE ORDERS (OUTPATIENT)
Dept: LAB | Facility: HOSPITAL | Age: 27
End: 2017-10-04

## 2017-10-04 ENCOUNTER — GENERIC CONVERSION - ENCOUNTER (OUTPATIENT)
Dept: OTHER | Facility: OTHER | Age: 27
End: 2017-10-04

## 2017-10-04 ENCOUNTER — APPOINTMENT (OUTPATIENT)
Dept: LAB | Facility: HOSPITAL | Age: 27
End: 2017-10-04
Attending: OBSTETRICS & GYNECOLOGY
Payer: MEDICARE

## 2017-10-04 DIAGNOSIS — N94.6 DYSMENORRHEA: ICD-10-CM

## 2017-10-04 LAB
B-HCG SERPL-ACNC: <2 MIU/ML
ERYTHROCYTE [DISTWIDTH] IN BLOOD BY AUTOMATED COUNT: 13.7 % (ref 11.6–15.1)
HCT VFR BLD AUTO: 38.8 % (ref 34.8–46.1)
HGB BLD-MCNC: 12.7 G/DL (ref 11.5–15.4)
MCH RBC QN AUTO: 31.9 PG (ref 26.8–34.3)
MCHC RBC AUTO-ENTMCNC: 32.7 G/DL (ref 31.4–37.4)
MCV RBC AUTO: 98 FL (ref 82–98)
PLATELET # BLD AUTO: 260 THOUSANDS/UL (ref 149–390)
PMV BLD AUTO: 11.2 FL (ref 8.9–12.7)
RBC # BLD AUTO: 3.98 MILLION/UL (ref 3.81–5.12)
TSH SERPL DL<=0.05 MIU/L-ACNC: 1.7 UIU/ML (ref 0.36–3.74)
WBC # BLD AUTO: 16.89 THOUSAND/UL (ref 4.31–10.16)

## 2017-10-04 PROCEDURE — 36415 COLL VENOUS BLD VENIPUNCTURE: CPT

## 2017-10-04 PROCEDURE — 84443 ASSAY THYROID STIM HORMONE: CPT

## 2017-10-04 PROCEDURE — 85027 COMPLETE CBC AUTOMATED: CPT

## 2017-10-04 PROCEDURE — 84702 CHORIONIC GONADOTROPIN TEST: CPT

## 2017-10-05 ENCOUNTER — HOSPITAL ENCOUNTER (OUTPATIENT)
Dept: ULTRASOUND IMAGING | Facility: HOSPITAL | Age: 27
Discharge: HOME | End: 2017-10-05
Attending: OBSTETRICS & GYNECOLOGY
Payer: MEDICARE

## 2017-10-05 DIAGNOSIS — N94.6 DYSMENORRHEA: ICD-10-CM

## 2017-10-05 PROCEDURE — 76830 TRANSVAGINAL US NON-OB: CPT

## 2017-10-05 PROCEDURE — 76856 US EXAM PELVIC COMPLETE: CPT

## 2017-10-06 ENCOUNTER — HOSPITAL ENCOUNTER (EMERGENCY)
Facility: HOSPITAL | Age: 27
Discharge: HOME | End: 2017-10-07
Admitting: EMERGENCY MEDICINE
Payer: MEDICARE

## 2017-10-06 ENCOUNTER — GENERIC CONVERSION - ENCOUNTER (OUTPATIENT)
Dept: OTHER | Facility: OTHER | Age: 27
End: 2017-10-06

## 2017-10-06 DIAGNOSIS — N93.9 VAGINAL BLEEDING, ABNORMAL: Primary | ICD-10-CM

## 2017-10-06 LAB
BASOPHILS # BLD AUTO: 0.02 THOUSANDS/ΜL (ref 0–0.1)
BASOPHILS NFR BLD AUTO: 0 % (ref 0–1)
EOSINOPHIL # BLD AUTO: 0.02 THOUSAND/ΜL (ref 0–0.61)
EOSINOPHIL NFR BLD AUTO: 0 % (ref 0–6)
ERYTHROCYTE [DISTWIDTH] IN BLOOD BY AUTOMATED COUNT: 13.9 % (ref 11.6–15.1)
HCT VFR BLD AUTO: 34.9 % (ref 34.8–46.1)
HGB BLD-MCNC: 11.5 G/DL (ref 11.5–15.4)
LYMPHOCYTES # BLD AUTO: 1.09 THOUSANDS/ΜL (ref 0.6–4.47)
LYMPHOCYTES NFR BLD AUTO: 11 % (ref 14–44)
MCH RBC QN AUTO: 31.9 PG (ref 26.8–34.3)
MCHC RBC AUTO-ENTMCNC: 33 G/DL (ref 31.4–37.4)
MCV RBC AUTO: 97 FL (ref 82–98)
MONOCYTES # BLD AUTO: 0.38 THOUSAND/ΜL (ref 0.17–1.22)
MONOCYTES NFR BLD AUTO: 4 % (ref 4–12)
NEUTROPHILS # BLD AUTO: 8.4 THOUSANDS/ΜL (ref 1.85–7.62)
NEUTS SEG NFR BLD AUTO: 85 % (ref 43–75)
NRBC BLD AUTO-RTO: 0 /100 WBCS
PLATELET # BLD AUTO: 258 THOUSANDS/UL (ref 149–390)
PMV BLD AUTO: 11.3 FL (ref 8.9–12.7)
RBC # BLD AUTO: 3.6 MILLION/UL (ref 3.81–5.12)
WBC # BLD AUTO: 9.91 THOUSAND/UL (ref 4.31–10.16)

## 2017-10-06 PROCEDURE — 96361 HYDRATE IV INFUSION ADD-ON: CPT

## 2017-10-06 PROCEDURE — 96374 THER/PROPH/DIAG INJ IV PUSH: CPT

## 2017-10-06 PROCEDURE — 36415 COLL VENOUS BLD VENIPUNCTURE: CPT | Performed by: PHYSICIAN ASSISTANT

## 2017-10-06 PROCEDURE — 85025 COMPLETE CBC W/AUTO DIFF WBC: CPT | Performed by: PHYSICIAN ASSISTANT

## 2017-10-06 PROCEDURE — 96375 TX/PRO/DX INJ NEW DRUG ADDON: CPT

## 2017-10-06 RX ORDER — KETOROLAC TROMETHAMINE 30 MG/ML
15 INJECTION, SOLUTION INTRAMUSCULAR; INTRAVENOUS ONCE
Status: COMPLETED | OUTPATIENT
Start: 2017-10-06 | End: 2017-10-06

## 2017-10-06 RX ORDER — ONDANSETRON 2 MG/ML
4 INJECTION INTRAMUSCULAR; INTRAVENOUS ONCE
Status: COMPLETED | OUTPATIENT
Start: 2017-10-06 | End: 2017-10-06

## 2017-10-06 RX ORDER — ONDANSETRON 2 MG/ML
INJECTION INTRAMUSCULAR; INTRAVENOUS
Status: COMPLETED
Start: 2017-10-06 | End: 2017-10-06

## 2017-10-06 RX ADMIN — ONDANSETRON 4 MG: 2 INJECTION INTRAMUSCULAR; INTRAVENOUS at 22:43

## 2017-10-06 RX ADMIN — HYDROMORPHONE HYDROCHLORIDE 0.5 MG: 1 INJECTION, SOLUTION INTRAMUSCULAR; INTRAVENOUS; SUBCUTANEOUS at 23:07

## 2017-10-06 RX ADMIN — KETOROLAC TROMETHAMINE 15 MG: 30 INJECTION, SOLUTION INTRAMUSCULAR at 23:07

## 2017-10-06 RX ADMIN — SODIUM CHLORIDE 1000 ML: 0.9 INJECTION, SOLUTION INTRAVENOUS at 22:30

## 2017-10-07 VITALS
DIASTOLIC BLOOD PRESSURE: 62 MMHG | TEMPERATURE: 98 F | BODY MASS INDEX: 27.41 KG/M2 | OXYGEN SATURATION: 100 % | SYSTOLIC BLOOD PRESSURE: 107 MMHG | WEIGHT: 175 LBS | RESPIRATION RATE: 16 BRPM | HEART RATE: 82 BPM

## 2017-10-07 PROCEDURE — 96376 TX/PRO/DX INJ SAME DRUG ADON: CPT

## 2017-10-07 PROCEDURE — 99284 EMERGENCY DEPT VISIT MOD MDM: CPT

## 2017-10-07 RX ORDER — OXYCODONE HYDROCHLORIDE AND ACETAMINOPHEN 5; 325 MG/1; MG/1
1 TABLET ORAL EVERY 4 HOURS PRN
Qty: 10 TABLET | Refills: 0 | Status: SHIPPED | OUTPATIENT
Start: 2017-10-07 | End: 2017-10-16 | Stop reason: ALTCHOICE

## 2017-10-07 RX ADMIN — HYDROMORPHONE HYDROCHLORIDE 0.5 MG: 1 INJECTION, SOLUTION INTRAMUSCULAR; INTRAVENOUS; SUBCUTANEOUS at 00:45

## 2017-10-07 NOTE — ED NOTES
Patient returns from bathroom at this time  Reports that she saturated through 2 pads and "a paper towel"  Reports additional passage of blood clots          Los Youngblood RN  10/06/17 2704

## 2017-10-07 NOTE — ED PROVIDER NOTES
History  Chief Complaint   Patient presents with    Vaginal Bleeding     Has been bleeding for almost one week  Saw her doctor and had blood work and ultrasound done  Pt told that if she continues to bleed to come to ED  25-year-old female presents emergency department for persistent vaginal bleeding  Patient evaluated earlier in this week by her known OB gyn physician Roberto Shepard  An ultrasound was ordered demonstrating lesions suspicious av malformation in the posterior endometrium  Patient with persistent bleeding and had called known OB and was told to go to the emergency department for evaluation  Patient presents with abdominal cramping para to the right side  Patient denies fevers chills shortness of breath weakness of the hands arms legs or feet  At this time will obtain CBC and perform pelvic internal exam   Patient admits to multiple soaked sanitary napkins this day  Prior to Admission Medications   Prescriptions Last Dose Informant Patient Reported? Taking? ALPRAZolam (XANAX) 0 25 mg tablet  Self Yes Yes   Sig: Take 0 5 mg by mouth 3 (three) times a day as needed for anxiety     Calcium Carb-Cholecalciferol (CALCIUM 1000 + D PO)   Yes Yes   Sig: Take 1 tablet by mouth daily   NON FORMULARY   Yes Yes   Sig: Reports Iron Infusion   RiTUXimab (RITUXAN IV)   Yes Yes   Sig: Infuse into a venous catheter Reports that she is not due again for 4-6 months  Previous  Infusions this past September  Unsure of dose     acetaminophen (TYLENOL) 500 mg tablet   Yes Yes   Sig: Take 500 mg by mouth every 6 (six) hours as needed for mild pain   ergocalciferol (VITAMIN D2) 50,000 units   Yes Yes   Sig: Take 50,000 Units by mouth once a week   escitalopram (LEXAPRO) 20 mg tablet   Yes Yes   Sig: Take 20 mg by mouth daily   gabapentin (NEURONTIN) 300 mg capsule   Yes Yes   Sig: Take 200 mg by mouth 2 (two) times a day 600mg at night     gabapentin (NEURONTIN) 600 MG tablet   Yes Yes   Sig: Take 600 mg by mouth daily at bedtime   ondansetron (ZOFRAN) 4 mg tablet   Yes Yes   Sig: Take 4 mg by mouth every 8 (eight) hours as needed for nausea or vomiting   oxyCODONE (ROXICODONE) 30 MG immediate release tablet   Yes Yes   Sig: Take 30 mg by mouth every 8 (eight) hours as needed for moderate pain   predniSONE 5 mg tablet  Self Yes Yes   Sig: Take 10 mg by mouth daily     ranitidine (ZANTAC) 150 mg tablet   Yes Yes   Sig: Take 150 mg by mouth 2 (two) times a day      Facility-Administered Medications: None       Past Medical History:   Diagnosis Date    Anemia     Iron deficiency     Neuropathy     Osteopenia     Psychiatric disorder     Rheumatoid arthritis (HCC)     Vasculitis ([address] )        Past Surgical History:   Procedure Laterality Date    CHOLECYSTECTOMY      JOINT REPLACEMENT Bilateral     knees    OR FUSION/GRAFT WRIST JOINT Left 4/4/2017    Procedure: WRIST FUSION / SPLINT APPLICATION ;  Surgeon: Lilly Cunningham MD;  Location: [facility];  Service: Orthopedics    OR LAP,CHOLECYSTECTOMY N/A 3/14/2017    Procedure: CHOLECYSTECTOMY LAPAROSCOPIC;  Surgeon: Shantel Garcia DO;  Location: [facility];  Service: General    REPLACEMENT TOTAL KNEE      WRIST SURGERY      WRIST SURGERY Left 4/4/2017    Procedure: ARTHROPLASTY WRIST ULNAR HEAD ARTHROPLASTY - INTEGRA SIZE 5 5 MM, 16 HEAD;  Surgeon: Lilly Cunningham MD;  Location: [facility];  Service:        History reviewed  No pertinent family history  I have reviewed and agree with the history as documented  Social History   Substance Use Topics    Smoking status: Former Smoker    Smokeless tobacco: Former User    Alcohol use No        Review of Systems   Constitutional: Negative for fatigue and fever  HENT: Negative for dental problem  Eyes: Negative for pain  Respiratory: Negative for cough  Cardiovascular: Negative for chest pain  Gastrointestinal: Negative for abdominal pain          Lower abdominal pelvic cramping Genitourinary: Negative for dysuria and flank pain  Musculoskeletal: Negative for back pain  Neurological: Negative for light-headedness  Psychiatric/Behavioral: Negative for agitation and confusion  Physical Exam  ED Triage Vitals [10/06/17 2207]   Temperature Pulse Respirations Blood Pressure SpO2   98 °F (36 7 °C) 101 16 113/68 98 %      Temp Source Heart Rate Source Patient Position - Orthostatic VS BP Location FiO2 (%)   Temporal Monitor Sitting Right arm --      Pain Score       7           Physical Exam   Constitutional: She is oriented to person, place, and time  She appears well-developed and well-nourished  She appears distressed  Mild distress  HENT:   Head: Normocephalic and atraumatic  Eyes: Conjunctivae are normal  Right eye exhibits no discharge  Left eye exhibits no discharge  Neck: Normal range of motion  Neck supple  Cardiovascular: Normal rate and regular rhythm  Pulmonary/Chest: Effort normal    Abdominal: Soft  Genitourinary:             Neurological: She is alert and oriented to person, place, and time  Skin: Skin is warm and dry  Capillary refill takes less than 2 seconds  Normal turgor   Psychiatric: She has a normal mood and affect         ED Medications  Medications   sodium chloride 0 9 % bolus 1,000 mL (0 mL Intravenous Stopped 10/7/17 0040)   ondansetron (ZOFRAN) injection 4 mg (4 mg Intravenous Given 10/6/17 2243)   ondansetron (ZOFRAN) injection 4 mg (0 mg Intravenous Override Pull 10/6/17 2243)   ketorolac (TORADOL) 30 mg/mL injection 15 mg (15 mg Intravenous Given 10/6/17 2307)   HYDROmorphone (DILAUDID) 1 mg/mL injection 0 5 mg (0 5 mg Intravenous Given 10/6/17 2307)   HYDROmorphone (DILAUDID) 1 mg/mL injection 0 5 mg (0 5 mg Intravenous Given 10/7/17 0045)       Diagnostic Studies  Labs Reviewed   CBC AND DIFFERENTIAL - Abnormal        Result Value Ref Range Status    RBC 3 60 (*) 3 81 - 5 12 Million/uL Final    Neutrophils Relative 85 (*) 43 - 75 % Final    Lymphocytes Relative 11 (*) 14 - 44 % Final    Neutrophils Absolute 8 40 (*) 1 85 - 7 62 Thousands/µL Final    WBC 9 91  4 31 - 10 16 Thousand/uL Final    Hemoglobin 11 5  11 5 - 15 4 g/dL Final    Hematocrit 34 9  34 8 - 46 1 % Final    MCV 97  82 - 98 fL Final    MCH 31 9  26 8 - 34 3 pg Final    MCHC 33 0  31 4 - 37 4 g/dL Final    RDW 13 9  11 6 - 15 1 % Final    MPV 11 3  8 9 - 12 7 fL Final    Platelets 199  117 - 390 Thousands/uL Final    nRBC 0  /100 WBCs Final    Monocytes Relative 4  4 - 12 % Final    Eosinophils Relative 0  0 - 6 % Final    Basophils Relative 0  0 - 1 % Final    Lymphocytes Absolute 1 09  0 60 - 4 47 Thousands/µL Final    Monocytes Absolute 0 38  0 17 - 1 22 Thousand/µL Final    Eosinophils Absolute 0 02  0 00 - 0 61 Thousand/µL Final    Basophils Absolute 0 02  0 00 - 0 10 Thousands/µL Final       No orders to display       Procedures  Procedures      Phone Contacts  ED Phone Contact    ED Course  ED Course                                MDM  Number of Diagnoses or Management Options  Vaginal bleeding, abnormal:   Diagnosis management comments: Labs reviewed  History and physical discussed with chief OBGYN resident Juan Pablo Beckman who has run case by his attending  Patient with stable vital signs  Patient with known ultrasound results  Patient with stable H&H  At this time is felt the patient is safe for discharge to home with close follow-up with her known OBGYN  Patient was encouraged to continue to take medicines as prescribed in the office  Patient educated on persistent or worsening signs and symptoms and to return to the emergency department or call known OBGYN  Patient and male significant other admit to understanding and agreement  CritCare Time    Disposition  Final diagnoses:   Vaginal bleeding, abnormal     ED Disposition     ED Disposition Condition Comment    Discharge  [address] discharge to home/self care      Condition at discharge: Stable Follow-up Information     Follow up With Specialties Details Why Kristine Swan 1999, DO Obstetrics and Gynecology  If symptoms worsen [address] [address]  [place] [address]  [phone]          Discharge Medication List as of 10/7/2017 12:36 AM      CONTINUE these medications which have NOT CHANGED    Details   acetaminophen (TYLENOL) 500 mg tablet Take 500 mg by mouth every 6 (six) hours as needed for mild pain, Until Discontinued, Historical Med      ALPRAZolam (XANAX) 0 25 mg tablet Take 0 5 mg by mouth 3 (three) times a day as needed for anxiety  , Until Discontinued, Historical Med      Calcium Carb-Cholecalciferol (CALCIUM 1000 + D PO) Take 1 tablet by mouth daily, Historical Med      ergocalciferol (VITAMIN D2) 50,000 units Take 50,000 Units by mouth once a week, Until Discontinued, Historical Med      escitalopram (LEXAPRO) 20 mg tablet Take 20 mg by mouth daily, Until Discontinued, Historical Med      gabapentin (NEURONTIN) 300 mg capsule Take 200 mg by mouth 2 (two) times a day 600mg at night  , Until Discontinued, Historical Med      gabapentin (NEURONTIN) 600 MG tablet Take 600 mg by mouth daily at bedtime, Until Discontinued, Historical Med      NON FORMULARY Reports Iron Infusion, Historical Med      ondansetron (ZOFRAN) 4 mg tablet Take 4 mg by mouth every 8 (eight) hours as needed for nausea or vomiting, Until Discontinued, Historical Med      oxyCODONE (ROXICODONE) 30 MG immediate release tablet Take 30 mg by mouth every 8 (eight) hours as needed for moderate pain, Until Discontinued, Historical Med      predniSONE 5 mg tablet Take 10 mg by mouth daily  , Until Discontinued, Historical Med      ranitidine (ZANTAC) 150 mg tablet Take 150 mg by mouth 2 (two) times a day, Until Discontinued, Historical Med      RiTUXimab (RITUXAN IV) Infuse into a venous catheter Reports that she is not due again for 4-6 months  Previous  Infusions this past September   Unsure of dose , Historical Med           No discharge procedures on file      ED Provider  Electronically Signed by       Dana Kelly PA-C  10/07/17 5191

## 2017-10-07 NOTE — DISCHARGE INSTRUCTIONS
Dysfunctional Uterine Bleeding   WHAT YOU NEED TO KNOW:   Dysfunctional uterine bleeding (DUB) is abnormal uterine bleeding that is caused by a problem with your hormones  You may have bleeding from your uterus at times other than your normal monthly period  Your monthly periods may last longer or shorter, and bleeding may be heavier or lighter than usual    DISCHARGE INSTRUCTIONS:   Medicines:   · Hormones  help decrease bleeding by making your monthly periods more regular  Sometimes this medicine may be given as birth control pills  · NSAIDs  help decrease swelling, pain, and fever  This medicine is available with or without a doctor's order  NSAIDs can cause stomach bleeding or kidney problems in certain people  If you take blood thinner medicine, always ask your healthcare provider if NSAIDs are safe for you  Always read the medicine label and follow directions  · Iron supplements  may be given if your blood iron level decreases because of heavy bleeding  Iron may make you constipated  Ask your healthcare provider for ways to prevent or treat constipation  Iron may also make your bowel movements turn dark or black  · Take your medicine as directed  Contact your healthcare provider if you think your medicine is not helping or if you have side effects  Tell him or her if you are allergic to any medicine  Keep a list of the medicines, vitamins, and herbs you take  Include the amounts, and when and why you take them  Bring the list or the pill bottles to follow-up visits  Carry your medicine list with you in case of an emergency  Follow up with your healthcare provider as directed:  Write down your questions so you remember to ask them during your visits  Self-care:   · Apply heat  on your lower abdomen for 20 to 30 minutes every 2 hours for as many days as directed  Heat helps decrease pain and muscle spasms  · Include foods high in iron  if needed   Examples of foods high in iron are leafy green vegetables, beef, pork, liver, eggs, and whole-grain breads and cereals  · Keep a diary of your menstrual cycles  Keep track of the number of tampons or pads you use each day  · Talk to your healthcare provider before you start a weight loss program   You may need to wait until the abnormal bleeding has stopped before you try to lose weight  The amount of iron in your blood should be normal before you lose weight  Contact your healthcare provider if:   · You need to change your sanitary pad or tampon more than once an hour  · Your medicine causes nausea, vomiting, or diarrhea  · You have questions or concerns about your condition or care  Return to the emergency department if:   · You continue to bleed heavily, or you feel faint  © 2017 [address]  Information is for End User's use only and may not be sold, redistributed or otherwise used for commercial purposes  All illustrations and images included in CareNotes® are the copyrighted property of [employer]  or Dhruv Holley  The above information is an  only  It is not intended as medical advice for individual conditions or treatments  Talk to your doctor, nurse or pharmacist before following any medical regimen to see if it is safe and effective for you

## 2017-10-11 ENCOUNTER — HOSPITAL ENCOUNTER (OUTPATIENT)
Dept: NUCLEAR MEDICINE | Facility: HOSPITAL | Age: 27
End: 2017-10-11
Payer: MEDICARE

## 2017-10-11 ENCOUNTER — HOSPITAL ENCOUNTER (OUTPATIENT)
Dept: NUCLEAR MEDICINE | Facility: HOSPITAL | Age: 27
Discharge: HOME | End: 2017-10-11
Payer: MEDICARE

## 2017-10-11 DIAGNOSIS — Z98.1 ARTHRODESIS STATUS: ICD-10-CM

## 2017-10-12 ENCOUNTER — APPOINTMENT (OUTPATIENT)
Dept: NUCLEAR MEDICINE | Facility: HOSPITAL | Age: 27
End: 2017-10-12
Payer: MEDICARE

## 2017-10-12 ENCOUNTER — HOSPITAL ENCOUNTER (OUTPATIENT)
Dept: NUCLEAR MEDICINE | Facility: HOSPITAL | Age: 27
Discharge: HOME | End: 2017-10-12
Payer: MEDICARE

## 2017-10-13 ENCOUNTER — HOSPITAL ENCOUNTER (OUTPATIENT)
Dept: NUCLEAR MEDICINE | Facility: HOSPITAL | Age: 27
Discharge: HOME | End: 2017-10-13
Payer: MEDICARE

## 2017-10-13 DIAGNOSIS — Z98.1 ARTHRODESIS STATUS: ICD-10-CM

## 2017-10-13 PROCEDURE — 78807: CPT

## 2017-10-13 PROCEDURE — A9541 TC99M SULFUR COLLOID: HCPCS

## 2017-10-13 PROCEDURE — A9570 INDIUM IN-111 AUTO WBC: HCPCS

## 2017-10-16 ENCOUNTER — GENERIC CONVERSION - ENCOUNTER (OUTPATIENT)
Dept: OTHER | Facility: OTHER | Age: 27
End: 2017-10-16

## 2017-10-16 ENCOUNTER — HOSPITAL ENCOUNTER (EMERGENCY)
Facility: HOSPITAL | Age: 27
Discharge: HOME | End: 2017-10-16
Attending: EMERGENCY MEDICINE | Admitting: EMERGENCY MEDICINE
Payer: MEDICARE

## 2017-10-16 VITALS
HEART RATE: 93 BPM | WEIGHT: 172 LBS | BODY MASS INDEX: 26.94 KG/M2 | DIASTOLIC BLOOD PRESSURE: 79 MMHG | SYSTOLIC BLOOD PRESSURE: 126 MMHG | RESPIRATION RATE: 16 BRPM | TEMPERATURE: 98.4 F | OXYGEN SATURATION: 98 %

## 2017-10-16 DIAGNOSIS — N93.9 ABNORMAL VAGINAL BLEEDING: Primary | ICD-10-CM

## 2017-10-16 LAB
ABO GROUP BLD: NORMAL
ANION GAP SERPL CALCULATED.3IONS-SCNC: 6 MMOL/L (ref 4–13)
BASOPHILS # BLD AUTO: 0.05 THOUSANDS/ΜL (ref 0–0.1)
BASOPHILS NFR BLD AUTO: 1 % (ref 0–1)
BILIRUB UR QL STRIP: NEGATIVE
BLD GP AB SCN SERPL QL: NEGATIVE
BUN SERPL-MCNC: 7 MG/DL (ref 5–25)
CALCIUM SERPL-MCNC: 8.3 MG/DL (ref 8.3–10.1)
CHLORIDE SERPL-SCNC: 106 MMOL/L (ref 100–108)
CLARITY UR: CLEAR
CO2 SERPL-SCNC: 28 MMOL/L (ref 21–32)
COLOR UR: YELLOW
CREAT SERPL-MCNC: 0.63 MG/DL (ref 0.6–1.3)
EOSINOPHIL # BLD AUTO: 0.18 THOUSAND/ΜL (ref 0–0.61)
EOSINOPHIL NFR BLD AUTO: 2 % (ref 0–6)
ERYTHROCYTE [DISTWIDTH] IN BLOOD BY AUTOMATED COUNT: 15.3 % (ref 11.6–15.1)
EXT PREG TEST URINE: NEGATIVE
GFR SERPL CREATININE-BSD FRML MDRD: 122 ML/MIN/1.73SQ M
GLUCOSE SERPL-MCNC: 85 MG/DL (ref 65–140)
GLUCOSE UR STRIP-MCNC: NEGATIVE MG/DL
HCT VFR BLD AUTO: 36.7 % (ref 34.8–46.1)
HGB BLD-MCNC: 11.7 G/DL (ref 11.5–15.4)
HGB UR QL STRIP.AUTO: ABNORMAL
KETONES UR STRIP-MCNC: NEGATIVE MG/DL
LEUKOCYTE ESTERASE UR QL STRIP: NEGATIVE
LYMPHOCYTES # BLD AUTO: 3.23 THOUSANDS/ΜL (ref 0.6–4.47)
LYMPHOCYTES NFR BLD AUTO: 40 % (ref 14–44)
MCH RBC QN AUTO: 31.9 PG (ref 26.8–34.3)
MCHC RBC AUTO-ENTMCNC: 31.9 G/DL (ref 31.4–37.4)
MCV RBC AUTO: 100 FL (ref 82–98)
MONOCYTES # BLD AUTO: 0.46 THOUSAND/ΜL (ref 0.17–1.22)
MONOCYTES NFR BLD AUTO: 6 % (ref 4–12)
NEUTROPHILS # BLD AUTO: 4.25 THOUSANDS/ΜL (ref 1.85–7.62)
NEUTS SEG NFR BLD AUTO: 51 % (ref 43–75)
NITRITE UR QL STRIP: NEGATIVE
NRBC BLD AUTO-RTO: 0 /100 WBCS
PH UR STRIP.AUTO: 6.5 [PH] (ref 4.5–8)
PLATELET # BLD AUTO: 279 THOUSANDS/UL (ref 149–390)
PMV BLD AUTO: 11 FL (ref 8.9–12.7)
POTASSIUM SERPL-SCNC: 3.5 MMOL/L (ref 3.5–5.3)
PROT UR STRIP-MCNC: ABNORMAL MG/DL
RBC # BLD AUTO: 3.67 MILLION/UL (ref 3.81–5.12)
RH BLD: NEGATIVE
SODIUM SERPL-SCNC: 140 MMOL/L (ref 136–145)
SP GR UR STRIP.AUTO: 1.02 (ref 1–1.03)
SPECIMEN EXPIRATION DATE: NORMAL
UROBILINOGEN UR QL STRIP.AUTO: 0.2 E.U./DL
WBC # BLD AUTO: 8.17 THOUSAND/UL (ref 4.31–10.16)

## 2017-10-16 PROCEDURE — 86901 BLOOD TYPING SEROLOGIC RH(D): CPT | Performed by: PHYSICIAN ASSISTANT

## 2017-10-16 PROCEDURE — 81002 URINALYSIS NONAUTO W/O SCOPE: CPT | Performed by: PHYSICIAN ASSISTANT

## 2017-10-16 PROCEDURE — 96374 THER/PROPH/DIAG INJ IV PUSH: CPT

## 2017-10-16 PROCEDURE — 86850 RBC ANTIBODY SCREEN: CPT | Performed by: PHYSICIAN ASSISTANT

## 2017-10-16 PROCEDURE — 85025 COMPLETE CBC W/AUTO DIFF WBC: CPT | Performed by: PHYSICIAN ASSISTANT

## 2017-10-16 PROCEDURE — 80048 BASIC METABOLIC PNL TOTAL CA: CPT | Performed by: PHYSICIAN ASSISTANT

## 2017-10-16 PROCEDURE — 96361 HYDRATE IV INFUSION ADD-ON: CPT

## 2017-10-16 PROCEDURE — 86900 BLOOD TYPING SEROLOGIC ABO: CPT | Performed by: PHYSICIAN ASSISTANT

## 2017-10-16 PROCEDURE — 99284 EMERGENCY DEPT VISIT MOD MDM: CPT

## 2017-10-16 PROCEDURE — 36415 COLL VENOUS BLD VENIPUNCTURE: CPT | Performed by: PHYSICIAN ASSISTANT

## 2017-10-16 PROCEDURE — 81025 URINE PREGNANCY TEST: CPT | Performed by: PHYSICIAN ASSISTANT

## 2017-10-16 RX ORDER — KETOROLAC TROMETHAMINE 30 MG/ML
15 INJECTION, SOLUTION INTRAMUSCULAR; INTRAVENOUS ONCE
Status: COMPLETED | OUTPATIENT
Start: 2017-10-16 | End: 2017-10-16

## 2017-10-16 RX ADMIN — SODIUM CHLORIDE 1000 ML: 0.9 INJECTION, SOLUTION INTRAVENOUS at 12:33

## 2017-10-16 RX ADMIN — KETOROLAC TROMETHAMINE 15 MG: 30 INJECTION, SOLUTION INTRAMUSCULAR at 12:37

## 2017-10-16 NOTE — ED PROVIDER NOTES
History  Chief Complaint   Patient presents with    Generalized Body Aches     Pt reports generalized body aches with heavy vaginal bleeding x 2 weeks  pt also reports feels lightheaded and dizzy     Vaginal Bleeding     79-year-old female presents emergency room for evaluation of persistent vaginal bleeding over the past 2 weeks  Patient has been seen by her OBGYN and here in the emergency department  Patient had a pelvic ultrasound completed which was suspicious for an AV malformation  She states she is passing large clots and filling 5 pads and a tampon an hour in the morning  Bleeding then slows and quickens throughout the day with cramping  She complains of lower abdominal cramping radiating to her back  This is associated with nausea  Admits to urinary urgency and sensation that she needs to push her urine out  Patient admits to recent urinary tract infection for which she completed a course of antibiotics  Patient states she had appointment with her OBGYN today however missed it and came here  She has been taking a hormone pill called norethindrone which helped initially on the 1st day however is no longer giving her any relief  Patient states she has been feeling faint with dizziness, generalized weakness, and body aches and pains  States she has not passed out  She also admits to history of chronic anemia for which she use to receive iron infusions for  Also states she had a miscarriage and D&E August 10th  History provided by:  Patient  Generalized Body Aches   Associated symptoms: fatigue and nausea    Associated symptoms: no chest pain, no fever, no rash, no shortness of breath and no vomiting    Vaginal Bleeding   Associated symptoms: back pain, dizziness, fatigue and nausea    Associated symptoms: no dysuria, no fever and no vaginal discharge        Prior to Admission Medications   Prescriptions Last Dose Informant Patient Reported? Taking?    ALPRAZolam (XANAX) 0 25 mg tablet 10/16/2017 at Unknown time Self Yes Yes   Sig: Take 0 5 mg by mouth 3 (three) times a day as needed for anxiety     Calcium Carb-Cholecalciferol (CALCIUM 1000 + D PO) 10/15/2017 at Unknown time  Yes Yes   Sig: Take 1 tablet by mouth daily   NON FORMULARY Past Month at Unknown time  Yes Yes   Sig: Reports Iron Infusion   RiTUXimab (RITUXAN IV) Past Month at Unknown time  Yes Yes   Sig: Infuse into a venous catheter Reports that she is not due again for 4-6 months  Previous  Infusions this past September  Unsure of dose     acetaminophen (TYLENOL) 500 mg tablet 10/16/2017 at 1100  Yes Yes   Sig: Take 500 mg by mouth every 6 (six) hours as needed for mild pain   ergocalciferol (VITAMIN D2) 50,000 units 10/15/2017 at Unknown time  Yes Yes   Sig: Take 50,000 Units by mouth once a week   escitalopram (LEXAPRO) 20 mg tablet 10/15/2017 at Unknown time  Yes Yes   Sig: Take 20 mg by mouth daily   gabapentin (NEURONTIN) 300 mg capsule 10/16/2017 at Unknown time  Yes Yes   Sig: Take 200 mg by mouth 2 (two) times a day 600mg at night     gabapentin (NEURONTIN) 600 MG tablet 10/15/2017 at Unknown time  Yes Yes   Sig: Take 600 mg by mouth daily at bedtime   norethindrone (AYGESTIN) 5 mg tablet 10/15/2017 at Unknown time  Yes Yes   Sig: Take 5 mg by mouth daily   ondansetron (ZOFRAN) 4 mg tablet 10/15/2017 at Unknown time  Yes Yes   Sig: Take 4 mg by mouth every 8 (eight) hours as needed for nausea or vomiting   oxyCODONE (ROXICODONE) 30 MG immediate release tablet 10/15/2017 at Unknown time  Yes Yes   Sig: Take 30 mg by mouth every 8 (eight) hours as needed for moderate pain   predniSONE 5 mg tablet 10/15/2017 at Unknown time Self Yes Yes   Sig: Take 10 mg by mouth daily     ranitidine (ZANTAC) 150 mg tablet 10/16/2017 at Unknown time  Yes Yes   Sig: Take 150 mg by mouth 2 (two) times a day      Facility-Administered Medications: None       Past Medical History:   Diagnosis Date    Anemia     Iron deficiency     Neuropathy     Osteopenia     Psychiatric disorder     [facility] [address] [facility]     Vasculitis ([facility] [address] [facility]        Past Surgical History:   Procedure Laterality Date    CHOLECYSTECTOMY      JOINT REPLACEMENT Bilateral     knees    IL FUSION/GRAFT WRIST JOINT Left 4/4/2017    Procedure: WRIST FUSION / SPLINT APPLICATION ;  Surgeon: Enrique Dan MD;  [facility];  Service: Orthopedics    IL LAP,CHOLECYSTECTOMY N/A 3/14/2017    Procedure: CHOLECYSTECTOMY LAPAROSCOPIC;  Surgeon: Andrzej Benavides DO;  [facility];  Service: General    REPLACEMENT TOTAL KNEE      WRIST SURGERY      WRIST SURGERY Left 4/4/2017    Procedure: ARTHROPLASTY WRIST ULNAR HEAD ARTHROPLASTY - INTEGRA SIZE 5 5 MM, 16 HEAD;  Surgeon: Enrique Dan MD;  [facility];  Service:        History reviewed  No pertinent family history  I have reviewed and agree with the history as documented  Social History   Substance Use Topics    Smoking status: Former Smoker    Smokeless tobacco: Former User    Alcohol use No        Review of Systems   Constitutional: Positive for fatigue  Negative for chills and fever  Respiratory: Negative for shortness of breath  Cardiovascular: Negative for chest pain  Gastrointestinal: Positive for nausea  Negative for vomiting  Genitourinary: Positive for decreased urine volume, frequency and vaginal bleeding  Negative for dysuria, flank pain, vaginal discharge and vaginal pain  Musculoskeletal: Positive for back pain  Skin: Negative for rash and wound  Neurological: Positive for dizziness and light-headedness  Negative for syncope         Physical Exam  ED Triage Vitals [10/16/17 1157]   Temperature Pulse Respirations Blood Pressure SpO2   98 4 °F (36 9 °C) (!) 109 16 123/88 100 %      Temp Source Heart Rate Source Patient Position - Orthostatic VS BP Location FiO2 (%)   Oral Monitor Sitting Right arm --      Pain Score       8           Physical Exam   Constitutional: She appears well-developed and well-nourished  Eyes: Conjunctivae are normal    Neck: Neck supple  Cardiovascular: Normal rate, regular rhythm and normal heart sounds  Pulmonary/Chest: Effort normal and breath sounds normal    Abdominal: Soft  Bowel sounds are normal  She exhibits no distension  There is tenderness in the suprapubic area  There is no rigidity, no guarding and no CVA tenderness  Genitourinary: There is no injury on the right labia  There is no injury on the left labia  Uterus is tender (mild)  Uterus is not deviated, not enlarged and not fixed  Cervix exhibits no motion tenderness, no discharge and no friability  Right adnexum displays no mass, no tenderness and no fullness  Left adnexum displays no mass, no tenderness and no fullness  There is bleeding in the vagina  No erythema or tenderness in the vagina  No foreign body in the vagina  No signs of injury around the vagina  No vaginal discharge found  Genitourinary Comments: OS is parous, no active bleeding, no clots    Dmitriy Sosa RN present for exam   Neurological: She is alert  Skin: Skin is warm and dry  No rash noted  Psychiatric: She has a normal mood and affect  Nursing note and vitals reviewed        ED Medications  Medications   sodium chloride 0 9 % bolus 1,000 mL (0 mL Intravenous Stopped 10/16/17 1325)   ketorolac (TORADOL) 30 mg/mL injection 15 mg (15 mg Intravenous Given 10/16/17 1237)       Diagnostic Studies  Labs Reviewed   CBC AND DIFFERENTIAL - Abnormal        Result Value Ref Range Status    RBC 3 67 (*) 3 81 - 5 12 Million/uL Final     (*) 82 - 98 fL Final    RDW 15 3 (*) 11 6 - 15 1 % Final    WBC 8 17  4 31 - 10 16 Thousand/uL Final    Hemoglobin 11 7  11 5 - 15 4 g/dL Final    Hematocrit 36 7  34 8 - 46 1 % Final    MCH 31 9  26 8 - 34 3 pg Final    MCHC 31 9  31 4 - 37 4 g/dL Final    MPV 11 0  8 9 - 12 7 fL Final    Platelets 232  849 - 390 Thousands/uL Final    nRBC 0  /100 WBCs Final    Neutrophils Relative 51  43 - 75 % Final    Lymphocytes Relative 40  14 - 44 % Final    Monocytes Relative 6  4 - 12 % Final    Eosinophils Relative 2  0 - 6 % Final    Basophils Relative 1  0 - 1 % Final    Neutrophils Absolute 4 25  1 85 - 7 62 Thousands/µL Final    Lymphocytes Absolute 3 23  0 60 - 4 47 Thousands/µL Final    Monocytes Absolute 0 46  0 17 - 1 22 Thousand/µL Final    Eosinophils Absolute 0 18  0 00 - 0 61 Thousand/µL Final    Basophils Absolute 0 05  0 00 - 0 10 Thousands/µL Final   POCT URINALYSIS DIPSTICK - Abnormal    ED URINE MACROSCOPIC - Abnormal     Protein, UA 30 (1+) (*) Negative mg/dl Final    Blood, UA Large (*) Negative Final    Color, UA Yellow   Final    Clarity, UA Clear   Final    pH, UA 6 5  4 5 - 8 0 Final    Leukocytes, UA Negative  Negative Final    Nitrite, UA Negative  Negative Final    Glucose, UA Negative  Negative mg/dl Final    Ketones, UA Negative  Negative mg/dl Final    Urobilinogen, UA 0 2  0 2, 1 0 E U /dl E U /dl Final    Bilirubin, UA Negative  Negative Final    Specific Gravity, UA 1 020  1 003 - 1 030 Final    Narrative:     CLINITEK RESULT   POCT PREGNANCY, URINE - Normal    EXT PREG TEST UR (Ref: Negative) negative   Final   BASIC METABOLIC PANEL    Sodium 026  136 - 145 mmol/L Final    Potassium 3 5  3 5 - 5 3 mmol/L Final    Chloride 106  100 - 108 mmol/L Final    CO2 28  21 - 32 mmol/L Final    Anion Gap 6  4 - 13 mmol/L Final    BUN 7  5 - 25 mg/dL Final    Creatinine 0 63  0 60 - 1 30 mg/dL Final    Comment: Standardized to IDMS reference method    Glucose 85  65 - 140 mg/dL Final    Comment:   If the patient is fasting, the ADA then defines impaired fasting glucose as > 100 mg/dL and diabetes as > or equal to 123 mg/dL  Specimen collection should occur prior to Sulfasalazine administration due to the potential for falsely depressed results  Specimen collection should occur prior to Sulfapyridine administration due to the potential for falsely elevated results      Calcium 8 3  8 3 - 10 1 mg/dL Final    eGFR 122  ml/min/1 73sq m Final    Narrative:     National Kidney Disease Education Program recommendations are as follows:  GFR calculation is accurate only with a steady state creatinine  Chronic Kidney disease less than 60 ml/min/1 73 sq  meters  Kidney failure less than 15 ml/min/1 73 sq  meters  URINE MICROSCOPIC   TYPE AND SCREEN       No orders to display       Procedures  Procedures      Phone Contacts  ED Phone Contact    ED Course  ED Course as of Oct 16 1331   Mon Oct 16, 2017   7719  35 [facility] Ob/gyn Dr Dorinda Castillo paged  MDM  Number of Diagnoses or Management Options     Amount and/or Complexity of Data Reviewed  Clinical lab tests: ordered and reviewed  Review and summarize past medical records: yes  Discuss the patient with other providers: yes (Ob/gyn Dr Aurora Sparks - who advised patient to come to his office this afternoon to discuss further care  He states she will need an elective outpatient D&C )      CritCare Time    Disposition  Final diagnoses:   Abnormal vaginal bleeding     ED Disposition     ED Disposition Condition Comment    Discharge  [address] discharge to home/self care  Condition at discharge: Good        Follow-up Information     Follow up With Specialties Details Why Contact Info Additional Information    MUSTAPHA Gan, DO Obstetrics and Gynecology Today  [address]  [address]  [facility] [phone]       [facility] Emergency Department Emergency Medicine  If symptoms worsen [address] [facility]  [phone] [place] ED, [address] [facility] , [place], [place], [address]        Patient's Medications   Discharge Prescriptions    No medications on file     No discharge procedures on file      ED Provider  Electronically Signed by       Sid Gonsalez PA-C  10/16/17 2704

## 2017-10-17 ENCOUNTER — TRANSCRIBE ORDERS (OUTPATIENT)
Dept: ADMINISTRATIVE | Facility: HOSPITAL | Age: 27
End: 2017-10-17

## 2017-10-17 DIAGNOSIS — N93.9 VAGINAL HEMORRHAGE: Primary | ICD-10-CM

## 2017-10-17 DIAGNOSIS — Q27.30 ARTERIOVENOUS MALFORMATION: ICD-10-CM

## 2017-10-17 DIAGNOSIS — N92.1 METRORRHAGIA: ICD-10-CM

## 2017-10-25 ENCOUNTER — HOSPITAL ENCOUNTER (OUTPATIENT)
Dept: RADIOLOGY | Facility: HOSPITAL | Age: 27
Discharge: HOME | End: 2017-10-25
Attending: OBSTETRICS & GYNECOLOGY
Payer: MEDICARE

## 2017-10-25 DIAGNOSIS — N93.9 VAGINAL HEMORRHAGE: ICD-10-CM

## 2017-10-25 DIAGNOSIS — Q27.30 ARTERIOVENOUS MALFORMATION: ICD-10-CM

## 2017-10-25 DIAGNOSIS — N92.1 METRORRHAGIA: ICD-10-CM

## 2017-10-25 PROCEDURE — 72197 MRI PELVIS W/O & W/DYE: CPT

## 2017-10-25 PROCEDURE — A9585 GADOBUTROL INJECTION: HCPCS | Performed by: OBSTETRICS & GYNECOLOGY

## 2017-10-25 RX ADMIN — GADOBUTROL 8 ML: 604.72 INJECTION INTRAVENOUS at 23:21

## 2017-10-27 NOTE — PROGRESS NOTES
Assessment  1  Abnormal uterine bleeding (AUB) (626 9) (N93 9)   2  Menometrorrhagia (626 2) (N92 1)   3  Severe dysmenorrhea (625 3) (N94 6)   4  Legal termination of pregnancy (635 90) (Z33 2)    Plan  Severe dysmenorrhea    · Norethindrone Acetate 5 MG Oral Tablet; Take 4 pills daily x 4 days, then 3 tablets  daily x 3 days, then 2 tablets daily continous   Rx By: Beba Bowers; Dispense: 14 Days ; #:40 Tablet; Refill: 1;For: Severe dysmenorrhea; BROWN = N; Sent To: "[facility]" [address]   · (1) CBC/ PLT (NO DIFF); Status:Active; Requested SANDRA:[id number];    Perform:[facility]; UGA:[id number]; Ordered; For:Severe dysmenorrhea; Ordered By:Riedel, C William;   · (1) HCG QUANT; Status:Active; Requested DOJ:[id number];    Perform:[facility]; DZJ:[id number]; Ordered; For:Severe dysmenorrhea; Ordered By:Riedel, C William;   · (1) TSH WITH FT4 REFLEX; Status:Active; Requested WRQ:[id number];    Perform:[facility]; DEQ:[id number]; Ordered; For:Severe dysmenorrhea; Ordered By:Riedel, C William;   · * US PELVIS COMPLETE (TRANSABDOMINAL AND TRANSVAGINAL); Status:Hold For -  Scheduling; Requested YXB:[id number];    Perform:[facility]; MTM:[id number]; Ordered; For:Severe dysmenorrhea; Ordered By:Riedel, C William;  Unlinked    · PredniSONE 5 MG Oral Tablet   Dispense: 0 Days ; #:0 Tablet; Refill: 0; BROWN = N; Record; Last Updated By: Barbarann Kocher; 10/3/2017 4:31:58 PM    Discussion/Summary  Discussion Summary:   As noted above patient with heavy menstrual flow severe dysmenorrhea and menorrhagia status post elective termination of pregnancy in August of this past year  Recommend screening laboratory studies to rule out retained products recommend pelvic ultrasound  Norethindrone taper ordered  20 mg daily ? 4 days 50 mg daily ? 3 days then 10 mg continuous  Follow-up within the next 2 weeks     Counseling Documentation With Imm: The patient was counseled regarding diagnostic results,-- instructions for management,-- risk factor reductions,-- prognosis,-- impressions,-- importance of compliance with treatment,-- Menometrorrhagia, abnormal uterine bleeding, recent termination of pregnancy  total time of encounter was 25min minutes-- and-- 15 minutes minutes was spent counseling  Chief Complaint  Chief Complaint Free Text Note Form: PATIENT IS HERE FOR PROBLEM VISIT, PATIENT C/O HEAVY BLEEDING WITH LARGE CLOTS,BAD CRAMPS SINCE SATURDAY, PATIENT IS GOING THROUGH 1 TAMPON AND PAD EVERY HOUR      History of Present Illness  HPI: 58-year-old female  4 para 2? 0?2-2 here today with complaints of heavy vaginal bleeding cramps and passing large blood clots  Patient states she was pregnant recently and had a termination done in late August of this year  Her leading heavily several days ago and is passing large clots at times  She does have a history of chronic anemia and has received iron infusions in the past to help improve her anemia  Currently she's not on any birth control  She has severe rheumatoid arthritis has had multiple surgeries in both hands for reconstruction and plates and screws  Was recently told that she may have an infection in one of her surgically implanted orthopedic devices  Today patient complains of bleeding heavily and bleeding past protection  Review of Systems  ROS Reviewed:   ROS reviewed  Active Problems  1  Aftercare following left wrist joint replacement surgery (V54 81,V43 63) (Z47 1,Z96 632)   2  Aftercare following surgery of the musculoskeletal system (V58 78) (Z47 89)   3  Bacterial vaginosis (616 10,041 9) (N76 0,B96 89)   4  Birth control (V25 9) (Z30 9)   5  Closed Intra-articular Fracture Of The Base Of The Right Fourth Metacarpal (815 02)   6  Depression (311) (F32 9)   7  Dyspareunia, female (625 0) (N94 10)   8  Encounter for initial prescription of contraceptive pills (V25 01) (Z30 011)   9  Mass of right elbow (719 62) (R22 31)   10  Nondisplaced Fracture Of Lunate Bone Of Right Wrist (814 02)   11  Orthopedic Aftercare For Healing Traumatic Fx Lower Arm (V54 12)   12  Rheumatoid arthritis (714 0) (M06 9)   13  Rupture of extensor tendon of right hand, initial encounter (842 10) (B14 285C)   14  S/P wrist joint replacement, left (V43 63) (N75 031)   15  Status post fusion of wrist (V45 4) (Z98 1)   16  Thyroid condition (246 9) (E07 9)   17  Urge urinary incontinence (788 31) (N39 41)   18  Wrist pain (719 43) (M25 539)    Past Medical History  1  Aftercare following surgery of the musculoskeletal system (V58 78) (Z47 89)   2  History of chlamydia infection (V12 09) (Z86 19)   3  History of Pregnancy test-positive (V72 42) (Z32 01)    Surgical History  1  History of Denial Of Any Significant Medical History   2  Orthopedic Aftercare For Healing Traumatic Fx Lower Arm (V54 12)    Family History  Family History    1  Family history of Osteoarthritis (V17 7)    Social History   · Former smoker (Y94 75) (Q50 568)   · Tobacco Non-user    Current Meds   1  ALPRAZolam 0 25 MG Oral Tablet Recorded   2  Clotrimazole-Betamethasone 1-0 05 % External Cream; APPLY  AND RUB  IN A THIN   FILM TO AFFECTED AREAS TWICE DAILY  (AM AND PM); Therapy: 05XLX1813 to (Last Rx:16Bfs8415)  Requested for: 66WPI2797 Ordered   3  Escitalopram Oxalate 20 MG Oral Tablet Recorded   4  MonoNessa 0 25-35 MG-MCG Oral Tablet; TAKE 1 TABLET BY MOUTH EVERY DAY AS   DIRECTED, START FIRST SUNDAY AFTER THE ONSET OF HER NEXT MENSTRUAL   CYCLE; Therapy: 18PVJ1368 to (Evaluate:59Ghy8172)  Requested for: 72AEF2725; Last   ZY:[id number] Ordered   5  Oxycodone-Acetaminophen 5-325 MG Oral Tablet; TAKE 1 TABLET EVERY 6 HOURS; Therapy: 12Rkf3989 to (Evaluate:89Qtj9892); Last Rx:[id number] Ordered   6  PredniSONE 5 MG Oral Tablet Recorded   7  Sprintec 28 0 25-35 MG-MCG Oral Tablet; TAKE 1 TABLET DAILY AS DIRECTED;    Therapy: 12UJI5118 to (Evaluate:69Lax7890)  Requested for: 27MEX8574; Last ZN:[id number] Ordered   8  Sprintec 28 0 25-35 MG-MCG Oral Tablet; Take 1 tablet daily; Therapy: 28IXL0910 to (Evaluate:35Zqu2769)  Requested for: 21Nov2016; Last   Rx:19May2016; Status: ACTIVE - Renewal Denied Ordered   9  VESIcare 10 MG Oral Tablet; one by mouth daily; Therapy: 84NCO0415 to (Last Rx:19May2016)  Requested for: 14SNP7292 Ordered    Allergies  1  No Known Drug Allergies    Vitals  Vital Signs    Recorded: 14CAA1117 39:71DI   Systolic 488, LUE, Sitting   Diastolic 84, LUE, Sitting   Height 5 ft 7 in   Weight 175 lb    BMI Calculated 27 41   BSA Calculated 1 91   LMP 68Tru3487     Physical Exam    Pulmonary   Auscultation of lungs: Clear to auscultation  Cardiovascular   Auscultation of heart: Normal rate and rhythm, normal S1 and S2, no murmurs  Genitourinary   External genitalia: Normal and no lesions appreciated  Vagina: Abnormal  -- Heavy menstrual flow, no clots  Cervix: Normal, no palpable masses  Uterus: Normal, non-tender, not enlarged, and no palpable masses  Adnexa/parametria: Normal, non-tender and no fullness or masses appreciated  Future Appointments    Date/Time Provider Specialty Site   10/04/2017 10:00 AM [facility]   [address]     Signatures   Electronically signed by : Ree Ashley DO; Oct  3 2017  5:08PM EST                       (Author)

## 2017-10-30 DIAGNOSIS — Z96.632 PRESENCE OF LEFT ARTIFICIAL WRIST JOINT: ICD-10-CM

## 2017-10-30 DIAGNOSIS — N93.9 ABNORMAL UTERINE AND VAGINAL BLEEDING, UNSPECIFIED (CODE): ICD-10-CM

## 2017-10-30 DIAGNOSIS — Q27.30 ARTERIOVENOUS MALFORMATION: ICD-10-CM

## 2017-11-03 ENCOUNTER — HOSPITAL ENCOUNTER (OUTPATIENT)
Dept: RADIOLOGY | Facility: HOSPITAL | Age: 27
Discharge: HOME | End: 2017-11-03
Attending: OBSTETRICS & GYNECOLOGY
Payer: MEDICARE

## 2017-11-03 ENCOUNTER — TRANSCRIBE ORDERS (OUTPATIENT)
Dept: ADMINISTRATIVE | Facility: HOSPITAL | Age: 27
End: 2017-11-03

## 2017-11-03 DIAGNOSIS — Q27.30 ARTERIOVENOUS MALFORMATION: ICD-10-CM

## 2017-11-03 DIAGNOSIS — N93.9 VAGINAL HEMORRHAGE: Primary | ICD-10-CM

## 2017-11-03 DIAGNOSIS — N93.9 VAGINAL HEMORRHAGE: ICD-10-CM

## 2017-11-03 PROCEDURE — 76856 US EXAM PELVIC COMPLETE: CPT

## 2017-11-03 PROCEDURE — 76830 TRANSVAGINAL US NON-OB: CPT

## 2017-11-06 ENCOUNTER — GENERIC CONVERSION - ENCOUNTER (OUTPATIENT)
Dept: OTHER | Facility: OTHER | Age: 27
End: 2017-11-06

## 2017-11-13 ENCOUNTER — GENERIC CONVERSION - ENCOUNTER (OUTPATIENT)
Dept: OTHER | Facility: OTHER | Age: 27
End: 2017-11-13

## 2017-11-16 ENCOUNTER — GENERIC CONVERSION - ENCOUNTER (OUTPATIENT)
Dept: OTHER | Facility: OTHER | Age: 27
End: 2017-11-16

## 2017-11-18 ENCOUNTER — APPOINTMENT (EMERGENCY)
Dept: RADIOLOGY | Facility: HOSPITAL | Age: 27
End: 2017-11-18
Payer: MEDICARE

## 2017-11-18 ENCOUNTER — HOSPITAL ENCOUNTER (EMERGENCY)
Facility: HOSPITAL | Age: 27
Discharge: HOME | End: 2017-11-18
Attending: EMERGENCY MEDICINE | Admitting: EMERGENCY MEDICINE
Payer: MEDICARE

## 2017-11-18 VITALS
DIASTOLIC BLOOD PRESSURE: 80 MMHG | WEIGHT: 174 LBS | RESPIRATION RATE: 16 BRPM | SYSTOLIC BLOOD PRESSURE: 135 MMHG | OXYGEN SATURATION: 99 % | BODY MASS INDEX: 27.25 KG/M2 | HEART RATE: 78 BPM | TEMPERATURE: 98.4 F

## 2017-11-18 DIAGNOSIS — M06.9 RHEUMATOID ARTHRITIS (HCC): ICD-10-CM

## 2017-11-18 DIAGNOSIS — M86.9 OSTEOMYELITIS OF RIGHT WRIST (HCC): Primary | ICD-10-CM

## 2017-11-18 LAB
ANION GAP SERPL CALCULATED.3IONS-SCNC: 11 MMOL/L (ref 4–13)
BASOPHILS # BLD MANUAL: 0 THOUSAND/UL (ref 0–0.1)
BASOPHILS NFR MAR MANUAL: 0 % (ref 0–1)
BILIRUB UR QL STRIP: NEGATIVE
BUN SERPL-MCNC: 4 MG/DL (ref 5–25)
CALCIUM SERPL-MCNC: 8.5 MG/DL (ref 8.3–10.1)
CHLORIDE SERPL-SCNC: 105 MMOL/L (ref 100–108)
CLARITY UR: CLEAR
CO2 SERPL-SCNC: 25 MMOL/L (ref 21–32)
COLOR UR: YELLOW
COLOR, POC: YELLOW
CREAT SERPL-MCNC: 0.59 MG/DL (ref 0.6–1.3)
EOSINOPHIL # BLD MANUAL: 0.08 THOUSAND/UL (ref 0–0.4)
EOSINOPHIL NFR BLD MANUAL: 1 % (ref 0–6)
ERYTHROCYTE [DISTWIDTH] IN BLOOD BY AUTOMATED COUNT: 13.3 % (ref 11.6–15.1)
EXT PREG TEST URINE: NEGATIVE
GFR SERPL CREATININE-BSD FRML MDRD: 125 ML/MIN/1.73SQ M
GLUCOSE SERPL-MCNC: 91 MG/DL (ref 65–140)
GLUCOSE UR STRIP-MCNC: NEGATIVE MG/DL
HCT VFR BLD AUTO: 42.6 % (ref 34.8–46.1)
HGB BLD-MCNC: 14.2 G/DL (ref 11.5–15.4)
HGB UR QL STRIP.AUTO: NEGATIVE
KETONES UR STRIP-MCNC: NEGATIVE MG/DL
LEUKOCYTE ESTERASE UR QL STRIP: NEGATIVE
LYMPHOCYTES # BLD AUTO: 0.25 THOUSAND/UL (ref 0.6–4.47)
LYMPHOCYTES # BLD AUTO: 3 % (ref 14–44)
MCH RBC QN AUTO: 32.3 PG (ref 26.8–34.3)
MCHC RBC AUTO-ENTMCNC: 33.3 G/DL (ref 31.4–37.4)
MCV RBC AUTO: 97 FL (ref 82–98)
MONOCYTES # BLD AUTO: 0.25 THOUSAND/UL (ref 0–1.22)
MONOCYTES NFR BLD: 3 % (ref 4–12)
NEUTROPHILS # BLD MANUAL: 7.76 THOUSAND/UL (ref 1.85–7.62)
NEUTS BAND NFR BLD MANUAL: 3 % (ref 0–8)
NEUTS SEG NFR BLD AUTO: 90 % (ref 43–75)
NITRITE UR QL STRIP: NEGATIVE
NRBC BLD AUTO-RTO: 0 /100 WBCS
PH UR STRIP.AUTO: 8.5 [PH] (ref 4.5–8)
PLATELET # BLD AUTO: 219 THOUSANDS/UL (ref 149–390)
PLATELET BLD QL SMEAR: ADEQUATE
PMV BLD AUTO: 11.7 FL (ref 8.9–12.7)
POTASSIUM SERPL-SCNC: 4 MMOL/L (ref 3.5–5.3)
PROT UR STRIP-MCNC: NEGATIVE MG/DL
RBC # BLD AUTO: 4.4 MILLION/UL (ref 3.81–5.12)
RBC MORPH BLD: NORMAL
SODIUM SERPL-SCNC: 141 MMOL/L (ref 136–145)
SP GR UR STRIP.AUTO: 1.01 (ref 1–1.03)
TOTAL CELLS COUNTED SPEC: 100
UROBILINOGEN UR QL STRIP.AUTO: 0.2 E.U./DL
WBC # BLD AUTO: 8.34 THOUSAND/UL (ref 4.31–10.16)

## 2017-11-18 PROCEDURE — 81002 URINALYSIS NONAUTO W/O SCOPE: CPT | Performed by: PHYSICIAN ASSISTANT

## 2017-11-18 PROCEDURE — 85007 BL SMEAR W/DIFF WBC COUNT: CPT | Performed by: PHYSICIAN ASSISTANT

## 2017-11-18 PROCEDURE — 73130 X-RAY EXAM OF HAND: CPT

## 2017-11-18 PROCEDURE — 73090 X-RAY EXAM OF FOREARM: CPT

## 2017-11-18 PROCEDURE — 36415 COLL VENOUS BLD VENIPUNCTURE: CPT | Performed by: PHYSICIAN ASSISTANT

## 2017-11-18 PROCEDURE — 81003 URINALYSIS AUTO W/O SCOPE: CPT

## 2017-11-18 PROCEDURE — 85027 COMPLETE CBC AUTOMATED: CPT | Performed by: PHYSICIAN ASSISTANT

## 2017-11-18 PROCEDURE — 99283 EMERGENCY DEPT VISIT LOW MDM: CPT

## 2017-11-18 PROCEDURE — 81025 URINE PREGNANCY TEST: CPT | Performed by: PHYSICIAN ASSISTANT

## 2017-11-18 PROCEDURE — 80048 BASIC METABOLIC PNL TOTAL CA: CPT | Performed by: PHYSICIAN ASSISTANT

## 2017-11-18 RX ORDER — IBUPROFEN 600 MG/1
600 TABLET ORAL ONCE
Status: COMPLETED | OUTPATIENT
Start: 2017-11-18 | End: 2017-11-18

## 2017-11-18 RX ORDER — HYDROXYZINE HYDROCHLORIDE 25 MG/1
25 TABLET, FILM COATED ORAL 2 TIMES DAILY
COMMUNITY
End: 2018-10-31

## 2017-11-18 RX ORDER — IBUPROFEN 600 MG/1
600 TABLET ORAL EVERY 6 HOURS PRN
Qty: 12 TABLET | Refills: 0 | Status: SHIPPED | OUTPATIENT
Start: 2017-11-18 | End: 2018-08-07

## 2017-11-18 RX ADMIN — IBUPROFEN 600 MG: 600 TABLET, FILM COATED ORAL at 20:03

## 2017-11-18 NOTE — ED ATTENDING ATTESTATION
Daryl Bernabe MD, saw and evaluated the patient  I have discussed the patient with the resident/non-physician practitioner and agree with the resident's/non-physician practitioner's findings, Plan of Care, and MDM as documented in the resident's/non-physician practitioner's note, except where noted  All available labs and Radiology studies were reviewed  At this point I agree with the current assessment done in the Emergency Department  I have conducted an independent evaluation of this patient a history and physical is as follows:  29 yo female with RA, c/o right wrist pain, saying that she is following up with surgeon to consider additional surgery for possible infection  Her next followup is in 2 days, but came in for increased pain  Right wrist has surgical scars, chronic appearing deformity, but no acute redness, or crepitus of the skin  Review of records shows osteomyelitis was already localized by NM study to the wrist, corresponding to her history  She is non toxic  D/W orthopedics, agree that outpatient followup is still indicated without any immediate intervention at this time          Critical Care Time  CritCare Time

## 2017-11-18 NOTE — ED PROVIDER NOTES
History  Chief Complaint   Patient presents with    Wrist Pain     patient reports she had R wrist surgery in 2015  patient states she needs to have surgery to remove plates in her wrist  patient stating she has some kind of infection in her R wrist where she is having the pain  patient stating she has a joint infection   Medical Problem     The patient is a 27 yo female with PMHx of RA who presents to the ED with R wrist pain  She has had corrective surgery on both wrists for RA -- the left in March 2017, and the right in 2015  She had to go off of biologics perioperatively in March, and was told she could expect a flare up of pain in the R wrist, which she did  In April, she noticed that the R hand was becoming deformed in that it was "stuck" in partial flexion of the MCPs, in a "claw-like" position  Noticed more pain with this as well  This was ongoing for several months  Saw her hand surgeon periodically for follow ups on the L wrist surgery  In October, she was there and did have x-rays done of both wrists and was told she had an infection of the R wrist  She says she was told the treatment would be to remove the hardware in her right wrist, followed by a course of IV abx and then replace the hardware  Over the last several days, she had had worsening pain and "swelling" of the R hand/wrist/forearm  Says she cannot do anything with the hand  Very tender  Today, she notes that she has had "chills " States she has some chills at baseline because of hormonal medication she is taking for vaginal bleeding, but that today's was "different " No objective fever  No urinary complaints  No headache currently  During exam, she does complain of some tenderness of the cervical paraspinous muscles that limits ROM of the neck, but states she often has this during RA flares  She was supposed to see the surgeon today, but was late for her appt and rescheduled for Monday      DDX includes but is not limited to: worsening infection, fracture, cellulitis            Prior to Admission Medications   Prescriptions Last Dose Informant Patient Reported? Taking? ALPRAZolam (XANAX) 0 25 mg tablet  Self Yes No   Sig: Take 0 5 mg by mouth 3 (three) times a day as needed for anxiety     Calcium Carb-Cholecalciferol (CALCIUM 1000 + D PO)   Yes No   Sig: Take 1 tablet by mouth daily   NON FORMULARY   Yes No   Sig: Reports Iron Infusion   RiTUXimab (RITUXAN IV)   Yes No   Sig: Infuse into a venous catheter Reports that she is not due again for 4-6 months  Previous  Infusions this past September  Unsure of dose     acetaminophen (TYLENOL) 500 mg tablet   Yes No   Sig: Take 500 mg by mouth every 6 (six) hours as needed for mild pain   ergocalciferol (VITAMIN D2) 50,000 units   Yes No   Sig: Take 50,000 Units by mouth once a week   escitalopram (LEXAPRO) 20 mg tablet   Yes No   Sig: Take 20 mg by mouth daily   gabapentin (NEURONTIN) 300 mg capsule   Yes No   Sig: Take 200 mg by mouth 2 (two) times a day 600mg at night     gabapentin (NEURONTIN) 600 MG tablet   Yes No   Sig: Take 600 mg by mouth daily at bedtime   hydrOXYzine HCL (ATARAX) 25 mg tablet   Yes Yes   Sig: Take 25 mg by mouth 2 (two) times a day   norethindrone (AYGESTIN) 5 mg tablet   Yes No   Sig: Take 5 mg by mouth daily   ondansetron (ZOFRAN) 4 mg tablet   Yes No   Sig: Take 4 mg by mouth every 8 (eight) hours as needed for nausea or vomiting   oxyCODONE (ROXICODONE) 30 MG immediate release tablet   Yes No   Sig: Take 30 mg by mouth every 8 (eight) hours as needed for moderate pain   predniSONE 5 mg tablet  Self Yes No   Sig: Take 10 mg by mouth daily     ranitidine (ZANTAC) 150 mg tablet   Yes No   Sig: Take 150 mg by mouth 2 (two) times a day      Facility-Administered Medications: None       Past Medical History:   Diagnosis Date    Anemia     Iron deficiency     Neuropathy     Osteopenia     Psychiatric disorder     Rheumatoid arthritis (HCC)     Vasculitis (HCC) Past Surgical History:   Procedure Laterality Date    CHOLECYSTECTOMY      JOINT REPLACEMENT Bilateral     knees    OK FUSION/GRAFT WRIST JOINT Left 4/4/2017    Procedure: WRIST FUSION / SPLINT APPLICATION ;  Surgeon: Vernon Schmid MD;  [facility];  Service: Orthopedics    OK LAP,CHOLECYSTECTOMY N/A 3/14/2017    Procedure: CHOLECYSTECTOMY LAPAROSCOPIC;  Surgeon: Carlos Parker DO;  [facility];  Service: General    REPLACEMENT TOTAL KNEE      WRIST SURGERY      WRIST SURGERY Left 4/4/2017    Procedure: ARTHROPLASTY WRIST ULNAR HEAD ARTHROPLASTY - INTEGRA SIZE 5 5 MM, 16 HEAD;  Surgeon: Vernon Schmid MD;  Location[facility];  Service:        History reviewed  No pertinent family history  I have reviewed and agree with the history as documented  Social History   Substance Use Topics    Smoking status: Former Smoker    Smokeless tobacco: Former User    Alcohol use No        Review of Systems   Constitutional: Positive for chills  Musculoskeletal: Positive for arthralgias, joint swelling, myalgias and neck pain  All other systems reviewed and are negative  Physical Exam  ED Triage Vitals   Temperature Pulse Respirations Blood Pressure SpO2   11/18/17 1754 11/18/17 1752 11/18/17 1752 11/18/17 1752 11/18/17 1752   98 4 °F (36 9 °C) 90 18 146/96 98 %      Temp Source Heart Rate Source Patient Position - Orthostatic VS BP Location FiO2 (%)   11/18/17 1754 11/18/17 1752 11/18/17 1752 11/18/17 1857 --   Oral Monitor Sitting Left arm       Pain Score       11/18/17 1752       7           Orthostatic Vital Signs  Vitals:    11/18/17 1752 11/18/17 1857   BP: 146/96 135/80   Pulse: 90 78   Patient Position - Orthostatic VS: Sitting Lying       Physical Exam   Constitutional: She is oriented to person, place, and time  She appears well-developed and well-nourished  No distress  HENT:   Head: Normocephalic and atraumatic     Right Ear: External ear normal    Left Ear: External ear normal    Mouth/Throat: Oropharynx is clear and moist    Eyes: Conjunctivae are normal    Neck: Normal range of motion  Neck supple  Cardiovascular: Normal rate, regular rhythm and normal heart sounds  Exam reveals no gallop and no friction rub  No murmur heard  Pulmonary/Chest: Effort normal and breath sounds normal  No respiratory distress  She has no wheezes  She has no rales  Abdominal: Soft  Musculoskeletal:   Tenderness to palpation of the cervical paraspinous muscles  Pain elicited with ROM, but able to complete normal ROM (pt states these sx are consistent with previous RA flares)  Surgical scar noted on the dorsal aspect of the R wrist  The R hand is deformed with partial flexion of the MCPs into a "claw-like" position  There is a nodule over the distal ulna that is mobile and not exquisitely tender to palpation  No erythema, edema, or induration noted  No abscess  There is tenderness to palpation over the R forearm, wrist, and hand  The hand is normal color and warmth  Pulses intact  ROM of the R fingers is decreased  Flexion/extension of the wrist is absent (previous surgical fusion)  Flexion/extension of the R elbow and abduction/adduction/flexion/extension of the R shoulder are normal      Sensation to light touch is impaired from the R wrist to the fingers  Neurological: She is alert and oriented to person, place, and time  Skin: Skin is warm and dry  She is not diaphoretic  Psychiatric: She has a normal mood and affect   Her behavior is normal  Judgment and thought content normal        ED Medications  Medications   ibuprofen (MOTRIN) tablet 600 mg (600 mg Oral Given 11/18/17 2003)       Diagnostic Studies  Results Reviewed     Procedure Component Value Units Date/Time    CBC and differential [[id number]]  (Normal) Collected:  11/18/17 1852    Lab Status:  Final result Specimen:  Blood from Arm, Left Updated:  11/18/17 1944     WBC 8 34 Thousand/uL      RBC 4 40 Million/uL      Hemoglobin 14 2 g/dL      Hematocrit 42 6 %      MCV 97 fL      MCH 32 3 pg      MCHC 33 3 g/dL      RDW 13 3 %      MPV 11 7 fL      Platelets 746 Thousands/uL      nRBC 0 /100 WBCs     Narrative: This is an appended report  These results have been appended to a previously verified report  POCT pregnancy, urine [[id number]]  (Normal) Resulted:  11/18/17 1906    Lab Status:  Final result Updated:  11/18/17 1907     EXT PREG TEST UR (Ref: Negative) negative    POCT urinalysis dipstick [[id number]]  (Normal) Resulted:  11/18/17 1906    Lab Status:  Final result Updated:  11/18/17 1906     Color, UA yellow    Basic metabolic panel [[id number]]  (Abnormal) Collected:  11/18/17 1852    Lab Status:  Final result Specimen:  Blood from Arm, Left Updated:  11/18/17 1906     Sodium 141 mmol/L      Potassium 4 0 mmol/L      Chloride 105 mmol/L      CO2 25 mmol/L      Anion Gap 11 mmol/L      BUN 4 (L) mg/dL      Creatinine 0 59 (L) mg/dL      Glucose 91 mg/dL      Calcium 8 5 mg/dL      eGFR 125 ml/min/1 73sq m     Narrative:         National Kidney Disease Education Program recommendations are as follows:  GFR calculation is accurate only with a steady state creatinine  Chronic Kidney disease less than 60 ml/min/1 73 sq  meters  Kidney failure less than 15 ml/min/1 73 sq  meters      ED Urine Macroscopic [[id number]]  (Abnormal) Collected:  11/18/17 2016    Lab Status:  Final result Specimen:  Urine Updated:  11/18/17 1900     Color, UA Yellow     Clarity, UA Clear     pH, UA 8 5 (H)     Leukocytes, UA Negative     Nitrite, UA Negative     Protein, UA Negative mg/dl      Glucose, UA Negative mg/dl      Ketones, UA Negative mg/dl      Urobilinogen, UA 0 2 E U /dl      Bilirubin, UA Negative     Blood, UA Negative     Specific Gravity, UA 1 015    Narrative:       CLINITEK RESULT                 XR forearm 2 views RIGHT    (Results Pending)   XR hand 3+ views RIGHT    (Results Pending) Procedures  Procedures       Phone Contacts  ED Phone Contact    ED Course  ED Course                                MDM  Number of Diagnoses or Management Options  Osteomyelitis of right wrist [facility]):   Rheumatoid arthritis [facility]):   Diagnosis management comments: 27 yo female with PMHx of RA and bilateral wrist surgeries with hardware placement presenting for worsening R wrist and hand pain  See HPI  Reviewed result of her previous nuclear study done last month that diagnosed osteomyelitis of the R wrist  Plan is to undergo removal of hardware in the near future followed by IV abx, per the patient  See HPI  On exam, she is non-toxic and well-appearing  Afebrile  Labs showed normal WBC  X-rays of the R forearm/wrist/hand were reviewed by Dr Mare Rangel and me  Hardware noted, no fracture seen  Discussed with PA from ortho (Dr Perla Best office); comfortable keeping her appt on Monday  She was given Rx for ibuprofen for pain and given first dose in the Ed  Instructed the patient to return to the ED for any development of redness, swelling, increased pain, fever or chills, which she understood  She understood and agreed with the treatment plan and had no further questions          Amount and/or Complexity of Data Reviewed  Clinical lab tests: reviewed and ordered  Tests in the radiology section of CPT®: ordered and reviewed  Tests in the medicine section of CPT®: ordered and reviewed      CritCare Time    Disposition  Final diagnoses:   Osteomyelitis of right wrist ([facility] [address] )   Rheumatoid arthritis ([facility] [address] )     Time reflects when diagnosis was documented in both MDM as applicable and the Disposition within this note     Time User Action Codes Description Comment    11/18/2017  7:58 PM Diane Solorzano [address] [M86 9] Osteomyelitis of right wrist ([facility] [address] )     11/18/2017  7:58 PM Samson Solorzano [address] [M06 9] Rheumatoid arthritis [facility])       ED Disposition     ED Disposition Condition Comment    Discharge  Canelo Saenz discharge to home/self care      Condition at discharge: Good        Follow-up Information     Follow up With Specialties Details Why Contact Info    Vernon Schmid MD Orthopedic Surgery In 2 days Keep appt on 11/21 as scheduled [facility]  [phone]          Discharge Medication List as of 11/18/2017  8:00 PM      START taking these medications    Details   ibuprofen (MOTRIN) 600 mg tablet Take 1 tablet by mouth every 6 (six) hours as needed for mild pain for up to 3 days, Starting Sat 11/18/2017, Until Tue 11/21/2017, Print         CONTINUE these medications which have NOT CHANGED    Details   hydrOXYzine HCL (ATARAX) 25 mg tablet Take 25 mg by mouth 2 (two) times a day, Historical Med      acetaminophen (TYLENOL) 500 mg tablet Take 500 mg by mouth every 6 (six) hours as needed for mild pain, Until Discontinued, Historical Med      ALPRAZolam (XANAX) 0 25 mg tablet Take 0 5 mg by mouth 3 (three) times a day as needed for anxiety  , Until Discontinued, Historical Med      Calcium Carb-Cholecalciferol (CALCIUM 1000 + D PO) Take 1 tablet by mouth daily, Historical Med      ergocalciferol (VITAMIN D2) 50,000 units Take 50,000 Units by mouth once a week, Until Discontinued, Historical Med      escitalopram (LEXAPRO) 20 mg tablet Take 20 mg by mouth daily, Until Discontinued, Historical Med      gabapentin (NEURONTIN) 300 mg capsule Take 200 mg by mouth 2 (two) times a day 600mg at night  , Until Discontinued, Historical Med      gabapentin (NEURONTIN) 600 MG tablet Take 600 mg by mouth daily at bedtime, Until Discontinued, Historical Med      NON FORMULARY Reports Iron Infusion, Historical Med      norethindrone (AYGESTIN) 5 mg tablet Take 5 mg by mouth daily, Historical Med      ondansetron (ZOFRAN) 4 mg tablet Take 4 mg by mouth every 8 (eight) hours as needed for nausea or vomiting, Until Discontinued, Historical Med      oxyCODONE (ROXICODONE) 30 MG immediate release tablet Take 30 mg by mouth every 8 (eight) hours as needed for moderate pain, Until Discontinued, Historical Med      predniSONE 5 mg tablet Take 10 mg by mouth daily  , Until Discontinued, Historical Med      ranitidine (ZANTAC) 150 mg tablet Take 150 mg by mouth 2 (two) times a day, Until Discontinued, Historical Med      RiTUXimab (RITUXAN IV) Infuse into a venous catheter Reports that she is not due again for 4-6 months  Previous  Infusions this past September  Unsure of dose , Historical Med           No discharge procedures on file      ED Provider  Electronically Signed by           Leigh Sánchez PA-C  11/18/17 4236

## 2017-11-19 NOTE — DISCHARGE INSTRUCTIONS
Osteomyelitis   WHAT YOU NEED TO KNOW:   Osteomyelitis is a severe bone infection  It can develop in any bone, but often involves the long bones, such as your arm and leg bones, or the bones of the spine  Osteomyelitis is caused by different types of germs, such as bacteria or a fungus  DISCHARGE INSTRUCTIONS:   Call 911 for the following:   · You have sudden trouble breathing  Seek care immediately if:   · You have severe pain  · Your bone breaks  Contact your healthcare provider if:   · Your symptoms return  · You have increased swelling, pain, or redness of your infected area  · You have new drainage or an odor from your wound  · You have questions or concerns about your condition or care  Medicines:   · Prescription pain medicine  may be given  Ask how to take this medicine safely  · Antibiotics  help treat or prevent a bacterial infection  · Antifungals  help treat or prevent a fungal infection  · Acetaminophen  decreases pain and fever  It is available without a doctor's order  Ask how much to take and how often to take it  Follow directions  Read the labels of all other medicines you are using to see if they also contain acetaminophen, or ask your doctor or pharmacist  Acetaminophen can cause liver damage if not taken correctly  Do not use more than 4 grams (4,000 milligrams) total of acetaminophen in one day  · Take your medicine as directed  Contact your healthcare provider if you think your medicine is not helping or if you have side effects  Tell him or her if you are allergic to any medicine  Keep a list of the medicines, vitamins, and herbs you take  Include the amounts, and when and why you take them  Bring the list or the pill bottles to follow-up visits  Carry your medicine list with you in case of an emergency  Rest and immobilize: You may need to rest and wear a splint to help your bone heal  A splint will prevent your bone from moving   Keep weight off of your leg by using crutches, a cane, or walker as directed  Ask your healthcare provider for more information about splints and when you can return to your normal activities  Eat a variety of healthy foods:  Healthy foods include fruits, vegetables, whole-grain breads, low-fat dairy products, beans, lean meats, and fish  Healthy foods will help you heal  Ask if you need to be on a special diet  Do not smoke:  Nicotine and other chemicals in cigarettes and cigars can cause lung damage and prevent healing  Ask your healthcare provider for information if you currently smoke and need help to quit  E-cigarettes or smokeless tobacco still contain nicotine  Talk to your healthcare provider before you use these products  If you smoke, it is never too late to quit  Ask your healthcare provider for information if you need help quitting  Control other medical conditions:  Control other medical conditions, such as diabetes, to prevent more bone damage  It is hard to get rid of an infection if your blood sugars are high  Wound care:  Care for your wound as directed  Carefully wash the wound with soap and water  Dry the area and put on new, clean bandages as directed  Change your bandages when they get wet or dirty  Follow up with your healthcare provider as directed: You may need to return for more blood tests or x-rays  Write down your questions so you remember to ask them during your visits  © 2017 [employer]  Information is for End User's use only and may not be sold, redistributed or otherwise used for commercial purposes  All illustrations and images included in CareNotes® are the copyrighted property of [employer]  or [address]  The above information is an  only  It is not intended as medical advice for individual conditions or treatments  Talk to your doctor, nurse or pharmacist before following any medical regimen to see if it is safe and effective for you

## 2017-11-20 ENCOUNTER — GENERIC CONVERSION - ENCOUNTER (OUTPATIENT)
Dept: OTHER | Facility: OTHER | Age: 27
End: 2017-11-20

## 2017-11-21 ENCOUNTER — GENERIC CONVERSION - ENCOUNTER (OUTPATIENT)
Dept: OTHER | Facility: OTHER | Age: 27
End: 2017-11-21

## 2017-11-30 ENCOUNTER — ANESTHESIA EVENT (OUTPATIENT)
Dept: PERIOP | Facility: HOSPITAL | Age: 27
End: 2017-11-30
Payer: MEDICARE

## 2017-11-30 NOTE — PRE-PROCEDURE INSTRUCTIONS
Pre-Surgery Instructions:   Medication Instructions    acetaminophen (TYLENOL) 500 mg tablet Instructed patient per Anesthesia Guidelines   ALPRAZolam (XANAX) 0 25 mg tablet Instructed patient per Anesthesia Guidelines   Calcium Carb-Cholecalciferol (CALCIUM 1000 + D PO) Instructed patient per Anesthesia Guidelines   ergocalciferol (VITAMIN D2) 50,000 units Instructed patient per Anesthesia Guidelines   escitalopram (LEXAPRO) 20 mg tablet Instructed patient per Anesthesia Guidelines   gabapentin (NEURONTIN) 300 mg capsule Instructed patient per Anesthesia Guidelines   gabapentin (NEURONTIN) 600 MG tablet Instructed patient per Anesthesia Guidelines   hydrOXYzine HCL (ATARAX) 25 mg tablet Instructed patient per Anesthesia Guidelines   ibuprofen (MOTRIN) 600 mg tablet Instructed patient per Anesthesia Guidelines   NON FORMULARY Instructed patient per Anesthesia Guidelines   norethindrone (AYGESTIN) 5 mg tablet Instructed patient per Anesthesia Guidelines   ondansetron (ZOFRAN) 4 mg tablet Instructed patient per Anesthesia Guidelines   oxyCODONE (ROXICODONE) 30 MG immediate release tablet Instructed patient per Anesthesia Guidelines   predniSONE 5 mg tablet Instructed patient per Anesthesia Guidelines   ranitidine (ZANTAC) 150 mg tablet Instructed patient per Anesthesia Guidelines   RiTUXimab (RITUXAN IV) Instructed patient per Anesthesia Guidelines  Per anesthesia patient was told on day of surgery with sip of anna she may take medication for anxiety/depression if needed, Oxycodone if needed, Prednisone, Zantac and Gabapentin if needed

## 2017-12-01 ENCOUNTER — HOSPITAL ENCOUNTER (OUTPATIENT)
Facility: HOSPITAL | Age: 27
Setting detail: OUTPATIENT SURGERY
Discharge: HOME | End: 2017-12-01
Attending: OBSTETRICS & GYNECOLOGY | Admitting: OBSTETRICS & GYNECOLOGY
Payer: MEDICARE

## 2017-12-01 ENCOUNTER — ANESTHESIA (OUTPATIENT)
Dept: PERIOP | Facility: HOSPITAL | Age: 27
End: 2017-12-01
Payer: MEDICARE

## 2017-12-01 VITALS
BODY MASS INDEX: 27 KG/M2 | DIASTOLIC BLOOD PRESSURE: 72 MMHG | OXYGEN SATURATION: 99 % | RESPIRATION RATE: 20 BRPM | SYSTOLIC BLOOD PRESSURE: 121 MMHG | HEIGHT: 67 IN | HEART RATE: 94 BPM | WEIGHT: 172 LBS | TEMPERATURE: 98.7 F

## 2017-12-01 DIAGNOSIS — N93.9 ABNORMAL UTERINE AND VAGINAL BLEEDING, UNSPECIFIED (CODE): ICD-10-CM

## 2017-12-01 DIAGNOSIS — N92.0 EXCESSIVE AND FREQUENT MENSTRUATION WITH REGULAR CYCLE: ICD-10-CM

## 2017-12-01 PROBLEM — Z98.890 S/P DILATION AND CURETTAGE: Status: ACTIVE | Noted: 2017-12-01

## 2017-12-01 PROBLEM — N94.10 DYSPAREUNIA, FEMALE: Status: ACTIVE | Noted: 2017-12-01

## 2017-12-01 PROBLEM — N94.6 SEVERE DYSMENORRHEA: Status: ACTIVE | Noted: 2017-12-01

## 2017-12-01 PROBLEM — M17.10 OSTEOARTHRITIS OF KNEE: Status: ACTIVE | Noted: 2017-12-01

## 2017-12-01 PROBLEM — Z33.2 LEGAL TERMINATION OF PREGNANCY: Status: ACTIVE | Noted: 2017-12-01

## 2017-12-01 PROBLEM — S62.123A: Status: ACTIVE | Noted: 2017-12-01

## 2017-12-01 PROBLEM — Z96.60 HISTORY OF ARTIFICIAL JOINT: Status: ACTIVE | Noted: 2017-12-01

## 2017-12-01 PROBLEM — F32.A DEPRESSION: Status: ACTIVE | Noted: 2017-12-01

## 2017-12-01 PROBLEM — S62.319A: Status: ACTIVE | Noted: 2017-12-01

## 2017-12-01 PROBLEM — S66.919A RUPTURE OF TENDON OF HAND: Status: ACTIVE | Noted: 2017-12-01

## 2017-12-01 PROBLEM — D64.9 ANEMIA: Status: ACTIVE | Noted: 2017-12-01

## 2017-12-01 PROBLEM — N39.41 URGE INCONTINENCE OF URINE: Status: ACTIVE | Noted: 2017-12-01

## 2017-12-01 PROBLEM — M25.9 ELBOW DISORDER: Status: ACTIVE | Noted: 2017-12-01

## 2017-12-01 PROBLEM — Q27.30 AVM (ARTERIOVENOUS MALFORMATION): Status: ACTIVE | Noted: 2017-12-01

## 2017-12-01 PROBLEM — N92.1 MENOMETRORRHAGIA: Status: ACTIVE | Noted: 2017-12-01

## 2017-12-01 PROBLEM — M17.9 OSTEOARTHRITIS OF KNEE: Status: ACTIVE | Noted: 2017-12-01

## 2017-12-01 LAB
BASOPHILS # BLD AUTO: 0.04 THOUSANDS/ΜL (ref 0–0.1)
BASOPHILS NFR BLD AUTO: 0 % (ref 0–1)
EOSINOPHIL # BLD AUTO: 0.09 THOUSAND/ΜL (ref 0–0.61)
EOSINOPHIL NFR BLD AUTO: 1 % (ref 0–6)
ERYTHROCYTE [DISTWIDTH] IN BLOOD BY AUTOMATED COUNT: 13.5 % (ref 11.6–15.1)
HCG SERPL QL: NEGATIVE
HCT VFR BLD AUTO: 45.5 % (ref 34.8–46.1)
HGB BLD-MCNC: 15.1 G/DL (ref 11.5–15.4)
LYMPHOCYTES # BLD AUTO: 3.01 THOUSANDS/ΜL (ref 0.6–4.47)
LYMPHOCYTES NFR BLD AUTO: 25 % (ref 14–44)
MCH RBC QN AUTO: 32.5 PG (ref 26.8–34.3)
MCHC RBC AUTO-ENTMCNC: 33.2 G/DL (ref 31.4–37.4)
MCV RBC AUTO: 98 FL (ref 82–98)
MONOCYTES # BLD AUTO: 0.74 THOUSAND/ΜL (ref 0.17–1.22)
MONOCYTES NFR BLD AUTO: 6 % (ref 4–12)
NEUTROPHILS # BLD AUTO: 7.98 THOUSANDS/ΜL (ref 1.85–7.62)
NEUTS SEG NFR BLD AUTO: 68 % (ref 43–75)
NRBC BLD AUTO-RTO: 0 /100 WBCS
PLATELET # BLD AUTO: 230 THOUSANDS/UL (ref 149–390)
PMV BLD AUTO: 11.4 FL (ref 8.9–12.7)
RBC # BLD AUTO: 4.65 MILLION/UL (ref 3.81–5.12)
WBC # BLD AUTO: 11.86 THOUSAND/UL (ref 4.31–10.16)

## 2017-12-01 PROCEDURE — 85025 COMPLETE CBC W/AUTO DIFF WBC: CPT | Performed by: OBSTETRICS & GYNECOLOGY

## 2017-12-01 PROCEDURE — 84703 CHORIONIC GONADOTROPIN ASSAY: CPT | Performed by: OBSTETRICS & GYNECOLOGY

## 2017-12-01 PROCEDURE — 88305 TISSUE EXAM BY PATHOLOGIST: CPT | Performed by: OBSTETRICS & GYNECOLOGY

## 2017-12-01 RX ORDER — PROPOFOL 10 MG/ML
INJECTION, EMULSION INTRAVENOUS AS NEEDED
Status: DISCONTINUED | OUTPATIENT
Start: 2017-12-01 | End: 2017-12-01 | Stop reason: SURG

## 2017-12-01 RX ORDER — KETOROLAC TROMETHAMINE 30 MG/ML
INJECTION, SOLUTION INTRAMUSCULAR; INTRAVENOUS AS NEEDED
Status: DISCONTINUED | OUTPATIENT
Start: 2017-12-01 | End: 2017-12-01 | Stop reason: SURG

## 2017-12-01 RX ORDER — MIDAZOLAM HYDROCHLORIDE 1 MG/ML
INJECTION INTRAMUSCULAR; INTRAVENOUS AS NEEDED
Status: DISCONTINUED | OUTPATIENT
Start: 2017-12-01 | End: 2017-12-01 | Stop reason: SURG

## 2017-12-01 RX ORDER — OXYCODONE HYDROCHLORIDE AND ACETAMINOPHEN 5; 325 MG/1; MG/1
2 TABLET ORAL EVERY 4 HOURS PRN
Status: DISCONTINUED | OUTPATIENT
Start: 2017-12-01 | End: 2017-12-01 | Stop reason: HOSPADM

## 2017-12-01 RX ORDER — OXYCODONE HYDROCHLORIDE AND ACETAMINOPHEN 5; 325 MG/1; MG/1
1 TABLET ORAL EVERY 4 HOURS PRN
Status: DISCONTINUED | OUTPATIENT
Start: 2017-12-01 | End: 2017-12-01 | Stop reason: HOSPADM

## 2017-12-01 RX ORDER — IBUPROFEN 600 MG/1
600 TABLET ORAL EVERY 6 HOURS PRN
Status: DISCONTINUED | OUTPATIENT
Start: 2017-12-01 | End: 2017-12-01 | Stop reason: HOSPADM

## 2017-12-01 RX ORDER — SODIUM CHLORIDE, SODIUM LACTATE, POTASSIUM CHLORIDE, CALCIUM CHLORIDE 600; 310; 30; 20 MG/100ML; MG/100ML; MG/100ML; MG/100ML
125 INJECTION, SOLUTION INTRAVENOUS CONTINUOUS
Status: DISCONTINUED | OUTPATIENT
Start: 2017-12-01 | End: 2017-12-01 | Stop reason: HOSPADM

## 2017-12-01 RX ORDER — SODIUM CHLORIDE 9 MG/ML
125 INJECTION, SOLUTION INTRAVENOUS CONTINUOUS
Status: DISCONTINUED | OUTPATIENT
Start: 2017-12-01 | End: 2017-12-01 | Stop reason: HOSPADM

## 2017-12-01 RX ORDER — SODIUM CHLORIDE 9 MG/ML
INJECTION, SOLUTION INTRAVENOUS AS NEEDED
Status: DISCONTINUED | OUTPATIENT
Start: 2017-12-01 | End: 2017-12-01 | Stop reason: HOSPADM

## 2017-12-01 RX ORDER — FENTANYL CITRATE/PF 50 MCG/ML
25 SYRINGE (ML) INJECTION
Status: COMPLETED | OUTPATIENT
Start: 2017-12-01 | End: 2017-12-01

## 2017-12-01 RX ORDER — ONDANSETRON 2 MG/ML
INJECTION INTRAMUSCULAR; INTRAVENOUS AS NEEDED
Status: DISCONTINUED | OUTPATIENT
Start: 2017-12-01 | End: 2017-12-01 | Stop reason: SURG

## 2017-12-01 RX ORDER — ONDANSETRON 2 MG/ML
4 INJECTION INTRAMUSCULAR; INTRAVENOUS ONCE AS NEEDED
Status: COMPLETED | OUTPATIENT
Start: 2017-12-01 | End: 2017-12-01

## 2017-12-01 RX ORDER — LIDOCAINE HYDROCHLORIDE 10 MG/ML
INJECTION, SOLUTION INFILTRATION; PERINEURAL AS NEEDED
Status: DISCONTINUED | OUTPATIENT
Start: 2017-12-01 | End: 2017-12-01 | Stop reason: SURG

## 2017-12-01 RX ADMIN — CEFAZOLIN SODIUM 1000 MG: 1 SOLUTION INTRAVENOUS at 12:26

## 2017-12-01 RX ADMIN — FENTANYL CITRATE 25 MCG: 50 INJECTION INTRAMUSCULAR; INTRAVENOUS at 13:14

## 2017-12-01 RX ADMIN — SODIUM CHLORIDE: 0.9 INJECTION, SOLUTION INTRAVENOUS at 11:54

## 2017-12-01 RX ADMIN — PROPOFOL 200 MG: 10 INJECTION, EMULSION INTRAVENOUS at 12:21

## 2017-12-01 RX ADMIN — FENTANYL CITRATE 25 MCG: 50 INJECTION INTRAMUSCULAR; INTRAVENOUS at 12:59

## 2017-12-01 RX ADMIN — KETOROLAC TROMETHAMINE 30 MG: 30 INJECTION, SOLUTION INTRAMUSCULAR at 12:33

## 2017-12-01 RX ADMIN — LIDOCAINE HYDROCHLORIDE 80 MG: 10 INJECTION, SOLUTION INFILTRATION; PERINEURAL at 12:21

## 2017-12-01 RX ADMIN — SODIUM CHLORIDE 125 ML/HR: 0.9 INJECTION, SOLUTION INTRAVENOUS at 13:19

## 2017-12-01 RX ADMIN — HYDROMORPHONE HYDROCHLORIDE 0.5 MG: 1 INJECTION, SOLUTION INTRAMUSCULAR; INTRAVENOUS; SUBCUTANEOUS at 12:23

## 2017-12-01 RX ADMIN — ONDANSETRON 4 MG: 2 INJECTION INTRAMUSCULAR; INTRAVENOUS at 13:01

## 2017-12-01 RX ADMIN — OXYCODONE HYDROCHLORIDE AND ACETAMINOPHEN 2 TABLET: 5; 325 TABLET ORAL at 14:04

## 2017-12-01 RX ADMIN — FENTANYL CITRATE 25 MCG: 50 INJECTION INTRAMUSCULAR; INTRAVENOUS at 13:20

## 2017-12-01 RX ADMIN — METRONIDAZOLE 500 MG: 500 INJECTION, SOLUTION INTRAVENOUS at 12:26

## 2017-12-01 RX ADMIN — DEXAMETHASONE SODIUM PHOSPHATE 4 MG: 10 INJECTION INTRAMUSCULAR; INTRAVENOUS at 12:25

## 2017-12-01 RX ADMIN — ONDANSETRON HYDROCHLORIDE 4 MG: 2 INJECTION, SOLUTION INTRAVENOUS at 12:25

## 2017-12-01 RX ADMIN — MIDAZOLAM HYDROCHLORIDE 2 MG: 1 INJECTION, SOLUTION INTRAMUSCULAR; INTRAVENOUS at 12:17

## 2017-12-01 RX ADMIN — FENTANYL CITRATE 25 MCG: 50 INJECTION INTRAMUSCULAR; INTRAVENOUS at 13:08

## 2017-12-01 NOTE — ANESTHESIA PREPROCEDURE EVALUATION
Review of Systems/Medical History  Patient summary reviewed  Chart reviewed  History of anesthetic complications PONV    Cardiovascular  Negative cardio ROS Exercise tolerance: good,     Pulmonary  Negative pulmonary ROS ,        GI/Hepatic    GERD well controlled,        Negative  ROS        Endo/Other  Arthritis (rheumatoid arthritis)  Comment: Nondisplaced Fracture Of Lunate Bone Of Right Wrist  Chronic steroid use: 10mg prednisone daily, took it today   GYN       Hematology  Negative hematology ROS      Musculoskeletal  Rheumatoid arthritis Severity: moderate,        Neurology   Psychology   Anxiety, Depression , being treated for depression,          Lab Results   Component Value Date    WBC 8 34 11/18/2017    HGB 14 2 11/18/2017    HCT 42 6 11/18/2017    MCV 97 11/18/2017     11/18/2017     Lab Results   Component Value Date    GLUCOSE 91 11/18/2017    CALCIUM 8 5 11/18/2017     11/18/2017    K 4 0 11/18/2017    CO2 25 11/18/2017     11/18/2017    BUN 4 (L) 11/18/2017    CREATININE 0 59 (L) 11/18/2017     Lab Results   Component Value Date    INR 1 07 06/10/2014    PROTIME 14 1 06/10/2014     No results found for: PTT  Type and Screen:  O        I, Dr Jackie Lange, the attending physician, have personally seen and evaluated the patient prior to anesthetic care  I have reviewed the pre-anesthetic record, and other medical records if appropriate to the anesthetic care  If a CRNA is involved in the case, I have reviewed the CRNA assessment, if present, and agree  The patient is in a suitable condition to proceed with my formulated anesthetic plan  Physical Exam    Airway  Comment: Bilateral TMJ pain  Limited extension due to pain  Mallampati score:  I  TM Distance: >3 FB  Neck ROM: limited     Dental   No notable dental hx     Cardiovascular  Comment: Negative ROS, Rhythm: regular, Rate: normal, Cardiovascular exam normal    Pulmonary  Pulmonary exam normal Breath sounds clear to auscultation,     Other Findings        Anesthesia Plan  ASA Score- 2       Anesthesia Type- general with ASA Monitors  Additional Monitors:   Airway Plan:     Comment: PONV with epidurals only  Induction- intravenous  Informed Consent- Anesthetic plan and risks discussed with patient

## 2017-12-01 NOTE — DISCHARGE INSTRUCTIONS
Dilation and Curettage   WHAT YOU SHOULD KNOW:   Dilation and curettage (D and C) is a procedure to remove tissue from the lining of your uterus  AFTER YOU LEAVE:   Medicines:   · Pain medicine: You may be given medicine to take away or decrease pain  Do not wait until the pain is severe before you take your medicine  · Antibiotics: This medicine will help fight or prevent an infection  · Take your medicine as directed  Call your healthcare provider if you think your medicine is not helping or if you have side effects  Tell him if you are allergic to any medicine  Keep a list of the medicines, vitamins, and herbs you take  Include the amounts, and when and why you take them  Bring the list or the pill bottles to follow-up visits  Carry your medicine list with you in case of an emergency  Follow up with your healthcare provider as directed:  Write down your questions so you remember to ask them during your visits  Activity:  Ask your primary healthcare provider when you can return to your normal activities  Contact your primary healthcare provider if:   · You have foul-smelling vaginal discharge  · You do not get your monthly period  · You feel depressed or anxious  · You feel very tired and weak  · You have questions or concerns about your condition or care  Seek care immediately or call 911 if:   · You have heavy vaginal bleeding that soaks 1 pad in 1 hour for 2 hours in a row  · You have a fever and abdominal cramps  · Your pain does not get better, even after treatment  © 2014 [employer] is for End User's use only and may not be sold, redistributed or otherwise used for commercial purposes  All illustrations and images included in CareNotes® are the copyrighted property of [employer]  or [employer]  The above information is an  only  It is not intended as medical advice for individual conditions or treatments  Talk to your doctor, nurse or pharmacist before following any medical regimen to see if it is safe and effective for you

## 2017-12-01 NOTE — OP NOTE
OPERATIVE REPORT  PATIENT NAME: Salvador Pedraza    :  1990  MRN: [id number]  Pt Location: [facility] OR ROOM 04    SURGERY DATE: 2017    Surgeon(s) and Role:     * MUSTAPHA Mcintyre DO - Primary     * Erica Chairez MD - Assisting    Preop Diagnosis:  Excessive and frequent menstruation with regular cycle [N92 0]  Abnormal uterine and vaginal bleeding, unspecified (CODE) [N93 9]    Post-Op Diagnosis Codes:     * Excessive and frequent menstruation with regular cycle [N92 0]     * Abnormal uterine and vaginal bleeding, unspecified (CODE) [N93 9]    Procedure(s) (LRB):  DILATATION AND CURETTAGE (D&C) WITH HYSTEROSCOPY (N/A)    Specimen(s):  ID Type Source Tests Collected by Time Destination   1 : endometrial curettings Tissue Endometrium TISSUE EXAM MUSTAPHA Mcintyre DO 2017 1228        Estimated Blood Loss:   Minimal    Drains:       Anesthesia Type:   Choice    Operative Indications:  Excessive and frequent menstruation with regular cycle [N92 0]  Abnormal uterine and vaginal bleeding, unspecified (CODE) [N93 9]      Operative Findings:  Proliferative endometrium    Complications:   None    Procedure and Technique:    Pt is a 28year old  female with a history of abnormal uterine bleeding, menometrorrhagia, and dysmenorrhea who presents for a dilation and curettage with hysteroscopy  She currently takes 20mg norethindrone daily and will taper down to 5mg daily after the procedure  Patient was taken to the operating room were a time out was performed to confirm correct patient and correct procedure  General LMA anesthesia (LMA) was administered and the patient was positioned on the OR table in the dorsal lithotomy position  All pressure points were padded and a herve hugger was placed to maintain control of core body temperature  A bimanual exam was performed and the uterus was noted to be anteverted, normal in size and consistency with no palpable adnexal masses or fullness   The patient was prepped and draped in the usual sterile fashion  A straight catheter was introduced into the bladder, which was drained of 20cc of clear yellow urine  A weighted speculum was inserted into the vagina and a Maldonado retractor was used to visualize the anterior lip of the cervix, which was then grasped with a single toothed tenaculum  The uterus was sounded to 8cm  The cervix was serially dilated to 16 using Hanks dilators for introduction of the hysteroscope  Hysteroscope was introduced under direct visualization using normal saline solution as the distention media  Hysteroscope was advanced to the uterine fundus and the entire uterine cavity was inspected in a systematic manner  There was noted to be proliferative endometrium  Bilateral ostia were visualized and appeared normal  Hysteroscope was withdrawn and sharp curetting was performed, starting at the 12'oclock position and rotating a total of 360 degrees to cover all surfaces  Four total passes were made  Endometrial tissue was obtained and sent for pathology  The single toothed tenaculum was removed from the anterior lip of the cervix  Good hemostasis was confirmed at the tenaculum puncture sites  Weighted speculum was then removed from the vagina  At the conclusion of the procedure, all needle, sponge, and instrument counts were noted to be correct x2  Dr Allison Cedillo was present and participated in all key portions of the case      Patient Disposition:  PACU     SIGNATURE: Sudha Zuniga MD  DATE: December 1, 2017  TIME: 12:52 PM

## 2017-12-01 NOTE — PROGRESS NOTES
OR holding made aware of scratches/rash & itching of legs & Rt arm, Rt arm brace not permited, but they will position for comfort

## 2017-12-08 ENCOUNTER — TRANSCRIBE ORDERS (OUTPATIENT)
Dept: LAB | Facility: HOSPITAL | Age: 27
End: 2017-12-08

## 2017-12-08 ENCOUNTER — GENERIC CONVERSION - ENCOUNTER (OUTPATIENT)
Dept: OBGYN CLINIC | Facility: HOSPITAL | Age: 27
End: 2017-12-08

## 2017-12-08 ENCOUNTER — GENERIC CONVERSION - ENCOUNTER (OUTPATIENT)
Dept: OTHER | Facility: OTHER | Age: 27
End: 2017-12-08

## 2017-12-08 ENCOUNTER — LAB (OUTPATIENT)
Dept: LAB | Facility: HOSPITAL | Age: 27
End: 2017-12-08
Attending: ORTHOPAEDIC SURGERY
Payer: MEDICARE

## 2017-12-08 DIAGNOSIS — Z96.632 PRESENCE OF LEFT ARTIFICIAL WRIST JOINT: ICD-10-CM

## 2017-12-08 DIAGNOSIS — Z96.632 PRESENCE OF LEFT ARTIFICIAL WRIST JOINT: Primary | ICD-10-CM

## 2017-12-08 LAB
ATRIAL RATE: 70 BPM
BASOPHILS # BLD AUTO: 0.03 THOUSANDS/ΜL (ref 0–0.1)
BASOPHILS NFR BLD AUTO: 0 % (ref 0–1)
EOSINOPHIL # BLD AUTO: 0.04 THOUSAND/ΜL (ref 0–0.61)
EOSINOPHIL NFR BLD AUTO: 0 % (ref 0–6)
ERYTHROCYTE [DISTWIDTH] IN BLOOD BY AUTOMATED COUNT: 13.4 % (ref 11.6–15.1)
HCT VFR BLD AUTO: 43.5 % (ref 34.8–46.1)
HGB BLD-MCNC: 14.2 G/DL (ref 11.5–15.4)
LYMPHOCYTES # BLD AUTO: 1.05 THOUSANDS/ΜL (ref 0.6–4.47)
LYMPHOCYTES NFR BLD AUTO: 10 % (ref 14–44)
MCH RBC QN AUTO: 31.5 PG (ref 26.8–34.3)
MCHC RBC AUTO-ENTMCNC: 32.6 G/DL (ref 31.4–37.4)
MCV RBC AUTO: 97 FL (ref 82–98)
MONOCYTES # BLD AUTO: 0.53 THOUSAND/ΜL (ref 0.17–1.22)
MONOCYTES NFR BLD AUTO: 5 % (ref 4–12)
NEUTROPHILS # BLD AUTO: 8.69 THOUSANDS/ΜL (ref 1.85–7.62)
NEUTS SEG NFR BLD AUTO: 85 % (ref 43–75)
NRBC BLD AUTO-RTO: 0 /100 WBCS
P AXIS: 33 DEGREES
PLATELET # BLD AUTO: 225 THOUSANDS/UL (ref 149–390)
PMV BLD AUTO: 11.9 FL (ref 8.9–12.7)
PR INTERVAL: 178 MS
QRS AXIS: 49 DEGREES
QRSD INTERVAL: 80 MS
QT INTERVAL: 380 MS
QTC INTERVAL: 410 MS
RBC # BLD AUTO: 4.51 MILLION/UL (ref 3.81–5.12)
T WAVE AXIS: 35 DEGREES
VENTRICULAR RATE: 70 BPM
WBC # BLD AUTO: 10.37 THOUSAND/UL (ref 4.31–10.16)

## 2017-12-08 PROCEDURE — 85025 COMPLETE CBC W/AUTO DIFF WBC: CPT

## 2017-12-08 PROCEDURE — 36415 COLL VENOUS BLD VENIPUNCTURE: CPT

## 2017-12-08 PROCEDURE — 93005 ELECTROCARDIOGRAM TRACING: CPT

## 2017-12-09 LAB
ATRIAL RATE: 67 BPM
P AXIS: 33 DEGREES
PR INTERVAL: 160 MS
QRS AXIS: 47 DEGREES
QRSD INTERVAL: 80 MS
QT INTERVAL: 394 MS
QTC INTERVAL: 416 MS
T WAVE AXIS: 32 DEGREES
VENTRICULAR RATE: 67 BPM

## 2017-12-11 ENCOUNTER — GENERIC CONVERSION - ENCOUNTER (OUTPATIENT)
Dept: OTHER | Facility: OTHER | Age: 27
End: 2017-12-11

## 2017-12-26 ENCOUNTER — ANESTHESIA EVENT (OUTPATIENT)
Dept: PERIOP | Facility: HOSPITAL | Age: 27
End: 2017-12-26

## 2017-12-26 ENCOUNTER — HOSPITAL ENCOUNTER (OUTPATIENT)
Dept: RADIOLOGY | Facility: HOSPITAL | Age: 27
Setting detail: OUTPATIENT SURGERY
Discharge: HOME | End: 2017-12-26

## 2017-12-26 ENCOUNTER — GENERIC CONVERSION - ENCOUNTER (OUTPATIENT)
Dept: PERIOP | Facility: HOSPITAL | Age: 27
End: 2017-12-26

## 2017-12-26 ENCOUNTER — ANESTHESIA (OUTPATIENT)
Dept: PERIOP | Facility: HOSPITAL | Age: 27
End: 2017-12-26

## 2017-12-26 DIAGNOSIS — Z98.1 ARTHRODESIS STATUS: ICD-10-CM

## 2018-01-11 NOTE — PROGRESS NOTES
Preliminary Nursing Report                Patient Information    Initial Encounter Entry Date:   2017 9:15 AM EST (Automated Transmission Automated Transmission)       Last Modified:   {RichardsonH  ModifiedOn}              Legal Name: Yolanda Duarte        Social Security Number:        YOB: 1990        Age (years): 28        Gender: F        Body Mass Index (BMI): 28 kg/m2        Height: 67 in  Weight: 177 lbs (80 kgs)           Address:   [address] [place] [id number] [place]              Phone: [phone]   (consent to leave messages)        Email:        Ethnicity: Decline to State        Rastafarian:        Marital Status:        Preferred Language: English        Race: Other Race                    Patient Insurance Information        Primary Insurance Information Carrier Name: {Primary  CarrierName}           Carrier Address:   {Primary  CarrierAddress}              Carrier Phone: {Primary  CarrierPhone}          Group Number: {Primary  GroupNumber}          Policy Number: {Primary  PolicyNumber}          Insured Name: {Primary  InsuredName}          Insured : {Primary  InsuredDOB}          Relationship to Insured: {Primary  RelationshiptoInsured}           Secondary Insurance Information Carrier Name: {Secondary  CarrierName}           Carrier Address:   {Secondary  CarrierAddress}              Carrier Phone: {Secondary  CarrierPhone}          Group Number: {Secondary  GroupNumber}          Policy Number: {Secondary  PolicyNumber}          Insured Name: {Secondary  InsuredName}          Insured : {Secondary  InsuredDOB}          Relationship to Insured: {Secondary  RelationshiptoInsured}                       Health Profile   Booking #:   Nagi Levine #: [id number]               DOS: 2018    Surgery : FUSION/GRAFT OF WRIST JOINT      Add'l Procedures/Notes:     Surgery Risk: Intermediate          Precautions          Allergies    No Known Drug Allergies Medications    ALPRAZolam 0 25 MG Oral Tablet       Clotrimazole-Betamethasone 1-0 05 % External Cream       Escitalopram Oxalate 20 MG Oral Tablet       HydrOXYzine HCl - 25 MG Oral Tablet       MonoNessa 0 25-35 MG-MCG Oral Tablet       Norethindrone Acetate 5 MG Oral Tablet       Oxycodone-Acetaminophen 5-325 MG Oral Tablet       PredniSONE 5 MG Oral Tablet       Sprintec 28 0 25-35 MG-MCG Oral Tablet       VESIcare 10 MG Oral Tablet               Conditions    Abnormal uterine bleeding (AUB)       Aftercare following left wrist joint replacement surgery       Aftercare following surgery of the musculoskeletal system       AVM (arteriovenous malformation)       Bacterial vaginosis       Birth control       Closed Intra-articular Fracture Of The Base Of The Right Fourth Metacarpal       Depression       Dyspareunia, female       Encounter for initial prescription of contraceptive pills       Legal termination of pregnancy       Mass of right elbow       Menometrorrhagia       Nondisplaced Fracture Of Lunate Bone Of Right Wrist       Rheumatoid arthritis       Rupture of extensor tendon of right hand, initial encounter       S/P wrist joint replacement, left       Severe dysmenorrhea       Status post fusion of wrist       Thyroid condition       Urge urinary incontinence       Wrist pain               Family History    None             Surgical History    None             Social History    Former smoker       Tobacco Non-user                               Patient Instructions       Medical Procedure Risk  NPO Instructions   The day before surgery it is recommended to have a light dinner at your usual time and you are allowed a light snack early in the evening  Do not eat anything heavy or eat a big meal after 7pm  Do not eat or drink anything after midnight prior to your surgery  If you are supposed to take any of your medications, do so with a sip of water   Failure to follow these instructions can lead to an increased risk of lung complications and may result in a delay or cancellation of your procedure  If you have any questions, contact your institution for further instructions  No candy, no gum, no mints, no chewing tobacco          ALPRAZolam 0 25 MG Oral Tablet  Medication Instruction (Benzodiazepine) 15  Please continue the following medications, if needed, up to and including the day of surgery (with a sip of water)  Oxycodone-Acetaminophen 5-325 MG Oral Tablet  Medication Instruction (Opioids - Pain Medication) 62  Please continue the following medications, if needed, up to and including the day of surgery (with a sip of water)  Escitalopram Oxalate 20 MG Oral Tablet  Medication Instruction (SSRI - Antidepressants) 80  Please continue to take this medication on your normal schedule  If this is an oral medication and you take in the morning, you may do so with a sip of water  PredniSONE 5 MG Oral Tablet  Medication Instruction (Steroids) 83, 84, 102  Please continue your steroid medications up to and including the day of surgery (with a sip of water, if oral medication)  Norethindrone Acetate 5 MG Oral Tablet  Medication Instruction (Hormone Replacement Therapy) 42, 43, 44, 45  Please continue to take this medication on your normal schedule  If this is an oral medication and you take in the morning, you may do so with a sip of water  This medication may need to be discontinued four weeks before surgery if the surgical procedure is associated with a moderate to high risk of venous thromboembolism (e g  hip or knee replacement)  Please discuss with your surgeon to determine if this is required  Testing Considerations       ? Complete Blood Count (CBC) t  If test was completed and normal within last six months, repeat test is not necessary  Triggered by: Menometrorrhagia         ?  Electrocardiogram (ECG) t  Patient does not need new test if normal ECG is present within the last six months and no change in clinical condition  Triggered by: Rheumatoid arthritis         ? Pregnancy Test t  Consider a urine pregnancy test on the morning of surgery  Triggered by: Age or Facility Rec, Gender               Consultations       ? Primary Care Physician Evaluation   Primary care physician may need to evaluate patient prior to surgery  This is likely NOT necessary if the listed conditions are chronic and stable  Triggered by: Rheumatoid arthritis               Miscellaneous Questions         Question: Are you able to walk up a flight of stairs, walk up a hill or do heavy housework WITHOUT having chest pain or shortness of breath? Answer: YES                   Allergies/Conditions/Medications Not Found        The following were not recognized by our system when generating the recommendations  Please consider if this would impact any preoperative protocols  ? Closed Intra-articular Fracture Of The Base Of The Right Fourth Metacarpal       ? Nondisplaced Fracture Of Lunate Bone Of Right Wrist       ? Tobacco Non-user       ? Clotrimazole-Betamethasone 1-0 05 % External Cream       ? MonoNessa 0 25-35 MG-MCG Oral Tablet       ? Sprintec 28 0 25-35 MG-MCG Oral Tablet                  Appointment Information         Date:    02/20/2018        Location:    [facility]        Address:           Directions:                      Footnotes revision 14      ?? Denotes a free-text entry  Legal Disclaimer: Any and all recommendations and services provided herein are designed to assist in the preoperative care of the patient  Nothing contained herein is designed to replace, eliminate or alleviate the responsibility of the attending physician to supervise and determine the patient?s preoperative care and course of treatment  Failure to provide complete, accurate information may negatively impact the system?s ability to recommend the proper preoperative protocol       THE ATTENDING PHYSICIAN IS RESPONSIBLE TO REVIEW THE SUGGESTED PREOPERATIVE PROTOCOLS/COURSE OF TREATMENT AND PRESCRIBE THE FINAL COURSE OF PREOPERATIVE TREATMENT IN CONSULTATION WITH THE PATIENT  THE ePREOP SYSTEM AND ITS MATERIALS ARE PROVIDED ? AS IS? WITHOUT WARRANTY OF ANY KIND, EXPRESS OR IMPLIED, INCLUDING, BUT NOT LIMITED TO, WARRANTIES OF PERFORMANCE OR MERCHANTABILITY OR FITNESS FOR A PARTICULAR PURPOSE  PATIENT AND PHYSICIANS HEREBY AGREE THAT THEIR USE OF THE MATERIALS AND RESOURCES ACT AS A CONSENT TO RELEASE AND WAIVE ePREOP FROM ANY AND ALL CLAIMS OF WARRANTY, TORT OR CONTRACT LAW OF ANY KIND  Electronically signed Seb ROSS    Nov 21 2017  1:35PM EST

## 2018-01-12 VITALS
HEIGHT: 67 IN | BODY MASS INDEX: 27 KG/M2 | WEIGHT: 172 LBS | DIASTOLIC BLOOD PRESSURE: 89 MMHG | SYSTOLIC BLOOD PRESSURE: 135 MMHG

## 2018-01-12 VITALS
DIASTOLIC BLOOD PRESSURE: 84 MMHG | BODY MASS INDEX: 27.15 KG/M2 | WEIGHT: 173 LBS | SYSTOLIC BLOOD PRESSURE: 160 MMHG | HEIGHT: 67 IN | HEART RATE: 99 BPM

## 2018-01-12 NOTE — MISCELLANEOUS
Provider Comments  Provider Comments:   PT NO SHOW 11/06/2017 6TH NO SHOW GIVEN TO DAV[facility] TO ADDRESS      Signatures   Electronically signed by :  [facility], ; Nov 6 2017  3:25PM EST                       (Author)

## 2018-01-13 VITALS
HEART RATE: 76 BPM | BODY MASS INDEX: 27.07 KG/M2 | DIASTOLIC BLOOD PRESSURE: 77 MMHG | HEIGHT: 67 IN | WEIGHT: 172.5 LBS | SYSTOLIC BLOOD PRESSURE: 114 MMHG

## 2018-01-13 VITALS
SYSTOLIC BLOOD PRESSURE: 126 MMHG | HEIGHT: 67 IN | BODY MASS INDEX: 27.47 KG/M2 | WEIGHT: 175 LBS | DIASTOLIC BLOOD PRESSURE: 84 MMHG

## 2018-01-13 VITALS
BODY MASS INDEX: 27.15 KG/M2 | HEIGHT: 67 IN | DIASTOLIC BLOOD PRESSURE: 85 MMHG | WEIGHT: 173 LBS | SYSTOLIC BLOOD PRESSURE: 120 MMHG

## 2018-01-13 VITALS
WEIGHT: 171.38 LBS | SYSTOLIC BLOOD PRESSURE: 126 MMHG | DIASTOLIC BLOOD PRESSURE: 72 MMHG | HEIGHT: 67 IN | BODY MASS INDEX: 26.9 KG/M2

## 2018-01-13 VITALS
SYSTOLIC BLOOD PRESSURE: 110 MMHG | WEIGHT: 173.25 LBS | HEIGHT: 67 IN | DIASTOLIC BLOOD PRESSURE: 75 MMHG | BODY MASS INDEX: 27.19 KG/M2 | HEART RATE: 112 BPM

## 2018-01-14 VITALS
HEART RATE: 102 BPM | SYSTOLIC BLOOD PRESSURE: 136 MMHG | HEIGHT: 67 IN | DIASTOLIC BLOOD PRESSURE: 89 MMHG | WEIGHT: 175.25 LBS | BODY MASS INDEX: 27.51 KG/M2

## 2018-01-14 VITALS
BODY MASS INDEX: 25.27 KG/M2 | WEIGHT: 161 LBS | DIASTOLIC BLOOD PRESSURE: 89 MMHG | HEART RATE: 86 BPM | HEIGHT: 67 IN | SYSTOLIC BLOOD PRESSURE: 128 MMHG

## 2018-01-14 VITALS
DIASTOLIC BLOOD PRESSURE: 77 MMHG | BODY MASS INDEX: 27.02 KG/M2 | WEIGHT: 172.13 LBS | SYSTOLIC BLOOD PRESSURE: 110 MMHG | HEIGHT: 67 IN | HEART RATE: 88 BPM

## 2018-01-14 VITALS
WEIGHT: 171 LBS | DIASTOLIC BLOOD PRESSURE: 70 MMHG | SYSTOLIC BLOOD PRESSURE: 112 MMHG | BODY MASS INDEX: 26.84 KG/M2 | HEIGHT: 67 IN

## 2018-01-16 NOTE — MISCELLANEOUS
Message  PTS SPOUSE CALLED PT HAS HEADACHE, DIZZY AND BLURRED VISION  SHE IS 18 WEEKS PREGNANT  PER DR DUTTON PT TO BE SEEN THIS AFTERNOON, APPT MADE      Active Problems    1  Abnormal uterine bleeding (AUB) (626 9) (N93 9)   2  Aftercare following left wrist joint replacement surgery (V54 81,V43 63) (Z47 1,Z96 632)   3  Aftercare following surgery of the musculoskeletal system (V58 78) (Z47 89)   4  Bacterial vaginosis (616 10,041 9) (N76 0,B96 89)   5  Birth control (V25 9) (Z30 9)   6  Closed Intra-articular Fracture Of The Base Of The Right Fourth Metacarpal (815 02)   7  Depression (311) (F32 9)   8  Dyspareunia, female (625 0) (N94 10)   9  Encounter for initial prescription of contraceptive pills (V25 01) (Z30 011)   10  Legal termination of pregnancy (635 90) (Z33 2)   11  Mass of right elbow (719 62) (R22 31)   12  Menometrorrhagia (626 2) (N92 1)   13  Nondisplaced Fracture Of Lunate Bone Of Right Wrist (814 02)   14  Orthopedic Aftercare For Healing Traumatic Fx Lower Arm (V54 12)   15  Rheumatoid arthritis (714 0) (M06 9)   16  Rupture of extensor tendon of right hand, initial encounter (842 10) (X27 243K)   17  S/P wrist joint replacement, left (V43 63) (U75 602)   18  Severe dysmenorrhea (625 3) (N94 6)   19  Status post fusion of wrist (V45 4) (Z98 1)   20  Thyroid condition (246 9) (E07 9)   21  Urge urinary incontinence (788 31) (N39 41)   22  Wrist pain (719 43) (M25 539)    Current Meds   1  ALPRAZolam 0 25 MG Oral Tablet Recorded   2  Clotrimazole-Betamethasone 1-0 05 % External Cream; APPLY  AND RUB  IN A THIN   FILM TO AFFECTED AREAS TWICE DAILY  (AM AND PM); Therapy: 67NRG6142 to (Last Rx:66Ndy7470)  Requested for: 32NIS4703 Ordered   3  Escitalopram Oxalate 20 MG Oral Tablet Recorded   4  MonoNessa 0 25-35 MG-MCG Oral Tablet; TAKE 1 TABLET BY MOUTH EVERY DAY AS   DIRECTED, START FIRST SUNDAY AFTER THE ONSET OF HER NEXT MENSTRUAL   CYCLE;    Therapy: 59OKQ0215 to Melissa Hudson) Requested for: 26HPK3842; Last   BD:96LNM6504 Ordered   5  Norethindrone Acetate 5 MG Oral Tablet; TAKE 4 TABLETS BY MOUTH DAILY FOR 4   DAYS THEN 3 TABLETS FOR 3 DAYS THEN 2 TABLETS DAILY CONTINOUS;   Therapy: 95UVV7833 to (Evaluate:31Oct2017)  Requested for: 98BMP5740; Last   Rx:03Oct2017 Ordered   6  Oxycodone-Acetaminophen 5-325 MG Oral Tablet (Percocet); TAKE 1 TABLET EVERY 6   HOURS; Therapy: 73Fsr9536 to (Evaluate:05May2017); Last Rx:25Apr2017 Ordered   7  PredniSONE 5 MG Oral Tablet Recorded   8  Sprintec 28 0 25-35 MG-MCG Oral Tablet; TAKE 1 TABLET DAILY AS DIRECTED; Therapy: 25DLA0485 to (Evaluate:75Ggf5914)  Requested for: 37BBC0711; Last   Rx:06Jun2017 Ordered   9  Sprintec 28 0 25-35 MG-MCG Oral Tablet; Take 1 tablet daily; Therapy: 60WKR2769 to (Evaluate:99Xjh2600)  Requested for: 21Nov2016; Last   Rx:18Fae4029; Status: ACTIVE - Renewal Denied Ordered   10  VESIcare 10 MG Oral Tablet; one by mouth daily; Therapy: 82RRL4758 to (Last Rx:46Whk5433)  Requested for: 61IZU7986 Ordered    Allergies    1  No Known Drug Allergies    Signatures   Electronically signed by :  Anand Young, ; Oct 16 2017 12:01PM EST                       (Author)

## 2018-01-18 NOTE — MISCELLANEOUS
Message  Message Free Text Note Form: Patient called with complaints of pelvic cramps and heavy bleeding  She was changing pads/tampon every 2-3 hours with clots  Suggested rest, adequate hydration, and continuation of progesterone medication as prescribed by Dr Theresa Amaya  Recommend she follow up with Matt Sadler in the near future as needed        Signatures   Electronically signed by : INGE Castillo ; Oct 12 2017  1:09PM EST                       (Author)

## 2018-01-22 VITALS
DIASTOLIC BLOOD PRESSURE: 82 MMHG | HEART RATE: 85 BPM | HEIGHT: 67 IN | WEIGHT: 177 LBS | BODY MASS INDEX: 27.78 KG/M2 | SYSTOLIC BLOOD PRESSURE: 121 MMHG

## 2018-01-22 VITALS
HEIGHT: 67 IN | DIASTOLIC BLOOD PRESSURE: 80 MMHG | SYSTOLIC BLOOD PRESSURE: 130 MMHG | BODY MASS INDEX: 27.47 KG/M2 | WEIGHT: 175 LBS

## 2018-01-22 VITALS
DIASTOLIC BLOOD PRESSURE: 80 MMHG | SYSTOLIC BLOOD PRESSURE: 114 MMHG | BODY MASS INDEX: 27.37 KG/M2 | WEIGHT: 174.38 LBS | HEIGHT: 67 IN

## 2018-01-22 VITALS
HEIGHT: 67 IN | DIASTOLIC BLOOD PRESSURE: 70 MMHG | WEIGHT: 177 LBS | SYSTOLIC BLOOD PRESSURE: 125 MMHG | BODY MASS INDEX: 27.78 KG/M2

## 2018-01-22 VITALS
BODY MASS INDEX: 27.94 KG/M2 | HEIGHT: 67 IN | WEIGHT: 178 LBS | SYSTOLIC BLOOD PRESSURE: 123 MMHG | HEART RATE: 92 BPM | DIASTOLIC BLOOD PRESSURE: 75 MMHG

## 2018-01-23 NOTE — PROGRESS NOTES
Preliminary Nursing Report                Patient Information    Initial Encounter Entry Date:   2017 2:20 PM EST (Automated Transmission Automated Transmission)       Last Modified:   {Shay Travis}              Legal Name: Nahun Luna        Social Security Number:        YOB: 1990        Age (years): 28        Gender: F        Body Mass Index (BMI): 28 kg/m2        Height: 67 in  Weight: 177 lbs (80 kgs)           Address:   [address] [place] [id number]               Phone: [phone]   (consent to leave messages)        Email:        Ethnicity: Decline to State        Druze:        Marital Status:        Preferred Language: English        Race: Other Race                    Patient Insurance Information        Primary Insurance Information Carrier Name: {Primary  CarrierName}           Carrier Address:   {Primary  CarrierAddress}              Carrier Phone: {Primary  CarrierPhone}          Group Number: {Primary  GroupNumber}          Policy Number: {Primary  PolicyNumber}          Insured Name: {Primary  InsuredName}          Insured : {Primary  InsuredDOB}          Relationship to Insured: {Primary  RelationshiptoInsured}           Secondary Insurance Information Carrier Name: {Secondary  CarrierName}           Carrier Address:   {Secondary  CarrierAddress}              Carrier Phone: {Secondary  CarrierPhone}          Group Number: {Secondary  GroupNumber}          Policy Number: {Secondary  PolicyNumber}          Insured Name: {Secondary  InsuredName}          Insured : {Secondary  InsuredDOB}          Relationship to Insured: {Secondary  RelationshiptoInsured}                       Health Profile   Booking #:   Prachi Elder #: [id number]               DOS: 2018    Surgery : FUSION/GRAFT OF WRIST JOINT      Add'l Procedures/Notes:     Surgery Risk: Intermediate          Precautions          Allergies    No Known Drug Allergies Medications    ALPRAZolam 0 25 MG Oral Tablet       Clotrimazole-Betamethasone 1-0 05 % External Cream       Escitalopram Oxalate 20 MG Oral Tablet       HydrOXYzine HCl - 25 MG Oral Tablet       MonoNessa 0 25-35 MG-MCG Oral Tablet       Norethindrone Acetate 5 MG Oral Tablet       Oxycodone-Acetaminophen 5-325 MG Oral Tablet       PredniSONE 5 MG Oral Tablet       Sprintec 28 0 25-35 MG-MCG Oral Tablet       VESIcare 10 MG Oral Tablet               Conditions    Abnormal uterine bleeding (AUB)       Aftercare following left wrist joint replacement surgery       Aftercare following surgery of the musculoskeletal system       AVM (arteriovenous malformation)       Bacterial vaginosis       Birth control       Closed Intra-articular Fracture Of The Base Of The Right Fourth Metacarpal       Depression       Dyspareunia, female       Encounter for initial prescription of contraceptive pills       Legal termination of pregnancy       Mass of right elbow       Menometrorrhagia       Nondisplaced Fracture Of Lunate Bone Of Right Wrist       Rheumatoid arthritis       Rupture of extensor tendon of right hand, initial encounter       S/P wrist joint replacement, left       Severe dysmenorrhea       Status post fusion of wrist       Thyroid condition       Urge urinary incontinence       Wrist pain               Family History    None             Surgical History    None             Social History    Former smoker       Tobacco Non-user                               Patient Instructions       Medical Procedure Risk  NPO Instructions   The day before surgery it is recommended to have a light dinner at your usual time and you are allowed a light snack early in the evening  Do not eat anything heavy or eat a big meal after 7pm  Do not eat or drink anything after midnight prior to your surgery  If you are supposed to take any of your medications, do so with a sip of water   Failure to follow these instructions can lead to an increased risk of lung complications and may result in a delay or cancellation of your procedure  If you have any questions, contact your institution for further instructions  No candy, no gum, no mints, no chewing tobacco          ALPRAZolam 0 25 MG Oral Tablet  Medication Instruction (Benzodiazepine) 15  Please continue the following medications, if needed, up to and including the day of surgery (with a sip of water)  Oxycodone-Acetaminophen 5-325 MG Oral Tablet  Medication Instruction (Opioids - Pain Medication) 62  Please continue the following medications, if needed, up to and including the day of surgery (with a sip of water)  Escitalopram Oxalate 20 MG Oral Tablet  Medication Instruction (SSRI - Antidepressants) 80  Please continue to take this medication on your normal schedule  If this is an oral medication and you take in the morning, you may do so with a sip of water  PredniSONE 5 MG Oral Tablet  Medication Instruction (Steroids) 83, 84, 102  Please continue your steroid medications up to and including the day of surgery (with a sip of water, if oral medication)  Norethindrone Acetate 5 MG Oral Tablet  Medication Instruction (Hormone Replacement Therapy) 42, 43, 44, 45  Please continue to take this medication on your normal schedule  If this is an oral medication and you take in the morning, you may do so with a sip of water  This medication may need to be discontinued four weeks before surgery if the surgical procedure is associated with a moderate to high risk of venous thromboembolism (e g  hip or knee replacement)  Please discuss with your surgeon to determine if this is required  Testing Considerations       ? Complete Blood Count (CBC) t  If test was completed and normal within last six months, repeat test is not necessary  Triggered by: Menometrorrhagia         ?  Electrocardiogram (ECG) t  Patient does not need new test if normal ECG is present within the last six months and no change in clinical condition  Triggered by: Rheumatoid arthritis         ? Pregnancy Test t  Consider a urine pregnancy test on the morning of surgery  Triggered by: Age or Facility Rec, Gender               Consultations       ? Primary Care Physician Evaluation   Primary care physician may need to evaluate patient prior to surgery  This is likely NOT necessary if the listed conditions are chronic and stable  Triggered by: Rheumatoid arthritis               Miscellaneous Questions         Question: Are you able to walk up a flight of stairs, walk up a hill or do heavy housework WITHOUT having chest pain or shortness of breath? Answer: YES                   Allergies/Conditions/Medications Not Found        The following were not recognized by our system when generating the recommendations  Please consider if this would impact any preoperative protocols  ? Closed Intra-articular Fracture Of The Base Of The Right Fourth Metacarpal       ? Nondisplaced Fracture Of Lunate Bone Of Right Wrist       ? Tobacco Non-user       ? Clotrimazole-Betamethasone 1-0 05 % External Cream       ? MonoNessa 0 25-35 MG-MCG Oral Tablet       ? Sprintec 28 0 25-35 MG-MCG Oral Tablet                  Appointment Information         Date:    01/30/2018        Location:    [place]        Address:           Directions:                      Footnotes revision 14      ?? Denotes a free-text entry  Legal Disclaimer: Any and all recommendations and services provided herein are designed to assist in the preoperative care of the patient  Nothing contained herein is designed to replace, eliminate or alleviate the responsibility of the attending physician to supervise and determine the patient?s preoperative care and course of treatment  Failure to provide complete, accurate information may negatively impact the system?s ability to recommend the proper preoperative protocol       THE ATTENDING PHYSICIAN IS RESPONSIBLE TO REVIEW THE SUGGESTED PREOPERATIVE PROTOCOLS/COURSE OF TREATMENT AND PRESCRIBE THE FINAL COURSE OF PREOPERATIVE TREATMENT IN CONSULTATION WITH THE PATIENT  THE ePREOP SYSTEM AND ITS MATERIALS ARE PROVIDED ? AS IS? WITHOUT WARRANTY OF ANY KIND, EXPRESS OR IMPLIED, INCLUDING, BUT NOT LIMITED TO, WARRANTIES OF PERFORMANCE OR MERCHANTABILITY OR FITNESS FOR A PARTICULAR PURPOSE  PATIENT AND PHYSICIANS HEREBY AGREE THAT THEIR USE OF THE MATERIALS AND RESOURCES ACT AS A CONSENT TO RELEASE AND WAIVE ePREOP FROM ANY AND ALL CLAIMS OF WARRANTY, TORT OR CONTRACT LAW OF ANY KIND  Electronically signed Cory ROSS    Dec 13 2017  3:48PM EST

## 2018-01-23 NOTE — MISCELLANEOUS
Provider Comments  Provider Comments:   Patient was scheduled for surgical intervention today at 12:30 p m  in the afternoon  Patient did not show up to the hospital as scheduled at 9:30 a m    Eight separate phone calls were made to the patient in an attempt to contact her to remind her to come in  She finally returned phone call at approximately 12:20 p m  saying that she has an infection and will not be showing up for surgery today  Patient's case was canceled        Signatures   Electronically signed by : Leopold Mitts, M D ; Dec 26 2017  1:42PM EST                       (Author)

## 2018-04-06 ENCOUNTER — OFFICE VISIT (OUTPATIENT)
Dept: OBGYN CLINIC | Facility: HOSPITAL | Age: 28
End: 2018-04-06
Payer: MEDICARE

## 2018-04-06 VITALS
HEIGHT: 67 IN | DIASTOLIC BLOOD PRESSURE: 78 MMHG | BODY MASS INDEX: 30.01 KG/M2 | SYSTOLIC BLOOD PRESSURE: 113 MMHG | WEIGHT: 191.2 LBS | HEART RATE: 112 BPM

## 2018-04-06 DIAGNOSIS — M25.531 PAIN IN RIGHT WRIST: Primary | ICD-10-CM

## 2018-04-06 PROCEDURE — 99214 OFFICE O/P EST MOD 30 MIN: CPT | Performed by: ORTHOPAEDIC SURGERY

## 2018-04-06 RX ORDER — GABAPENTIN 400 MG/1
600 CAPSULE ORAL 2 TIMES DAILY
Refills: 1 | COMMUNITY
Start: 2018-03-30 | End: 2019-01-14

## 2018-04-06 RX ORDER — METRONIDAZOLE 500 MG/1
TABLET ORAL
COMMUNITY
Start: 2018-01-22 | End: 2018-04-09 | Stop reason: ALTCHOICE

## 2018-04-06 RX ORDER — OXYCODONE HYDROCHLORIDE AND ACETAMINOPHEN 5; 325 MG/1; MG/1
1 TABLET ORAL EVERY 6 HOURS PRN
Qty: 20 TABLET | Refills: 0 | Status: SHIPPED | OUTPATIENT
Start: 2018-04-06 | End: 2018-08-07

## 2018-04-06 NOTE — PROGRESS NOTES
ASSESSMENT/PLAN:    There are no diagnoses linked to this encounter  Assessment:   Rheumatoid arthritis bilaterally and failed wrist fusion on the right    Plan:   Removal of hardware and possible revision right wrist fusion    Follow Up: After Surgery    To Do Next Visit:  Sutures out    General Discussions:       Operative Discussions:         _____________________________________________________  CHIEF COMPLAINT:  Chief Complaint   Patient presents with    Right Wrist - Follow-up         SUBJECTIVE:  Sherine Macario is a 28y o  year old female who presents for follow up regarding bilateral wrist pain  She is right hand dominant  She is experiencing global right hand and wrist pain  Her left hand pain is in all of her fingers  She has been unable to perform daily functions such as shower and brush teeth due to pain  Patient has a surgery date scheduled for 04/10/2018  Patient has a history of rheumatoid arthritis      PAST MEDICAL HISTORY:  Past Medical History:   Diagnosis Date    Anemia     Anxiety     Depression     Iron deficiency     Irregular menses     Menorrhagia     Migraines     Neuropathy     Osteopenia     PONV (postoperative nausea and vomiting)     RA (rheumatoid arthritis) ([address]     Rheumatoid arthritis ([address] )     Scratches     On back, stomach and legs from scratching due to urticaria    Vasculitis ([address] )        PAST SURGICAL HISTORY:  Past Surgical History:   Procedure Laterality Date    CHOLECYSTECTOMY      Laparoscopic    DILATION AND CURETTAGE OF UTERUS      JOINT REPLACEMENT Bilateral     knees    AR FUSION/GRAFT WRIST JOINT Left 4/4/2017    Procedure: WRIST FUSION / SPLINT APPLICATION ;  Surgeon: Enriqueta Lance MD;  [facility];  Service: Orthopedics    AR HYSTEROSCOPY,W/ENDO BX N/A 12/1/2017    Procedure: DILATATION AND CURETTAGE (D&C) WITH HYSTEROSCOPY;  Surgeon: Ana Maria Norman DO;  [facility];  Service: Gynecology    AR LAP,CHOLECYSTECTOMY N/A 3/14/2017    Procedure: CHOLECYSTECTOMY LAPAROSCOPIC;  Surgeon: Olya Montana DO;  [facility];  Service: General    REPLACEMENT TOTAL KNEE Bilateral     WRIST SURGERY Right     For arthritis    WRIST SURGERY Left 4/4/2017    Procedure: ARTHROPLASTY WRIST ULNAR HEAD ARTHROPLASTY - INTEGRA SIZE 5 5 MM, 16 HEAD;  Surgeon: Trey Madrid MD;  [facility];  Service:        FAMILY HISTORY:  Family History   Problem Relation Age of Onset    Hypertension Mother    [facility] Rheum arthritis Mother     Depression Mother     Anxiety disorder Mother        SOCIAL HISTORY:  Social History   Substance Use Topics    Smoking status: Former Smoker     Packs/day: 0 25     Years: 3 00     Types: Cigarettes     Quit date: 2010    Smokeless tobacco: Never Used    Alcohol use Yes      Comment: Rare- once or twice yearly       MEDICATIONS:    Current Outpatient Prescriptions:     acetaminophen (TYLENOL) 500 mg tablet, Take 500 mg by mouth every 6 (six) hours as needed for mild pain, Disp: , Rfl:     ALPRAZolam (XANAX) 0 25 mg tablet, Take 0 5 mg by mouth 3 (three) times a day as needed for anxiety  , Disp: , Rfl:     Calcium Carb-Cholecalciferol (CALCIUM 1000 + D PO), Take 1 tablet by mouth daily, Disp: , Rfl:     ergocalciferol (VITAMIN D2) 50,000 units, Take 50,000 Units by mouth once a week, Disp: , Rfl:     escitalopram (LEXAPRO) 20 mg tablet, Take 20 mg by mouth every morning  , Disp: , Rfl:     hydrOXYzine HCL (ATARAX) 25 mg tablet, Take 25 mg by mouth 2 (two) times a day, Disp: , Rfl:     NON FORMULARY, Reports Iron Infusion, Disp: , Rfl:     norethindrone (AYGESTIN) 5 mg tablet, Take 5 mg by mouth daily, Disp: , Rfl:     ondansetron (ZOFRAN) 4 mg tablet, Take 4 mg by mouth every 8 (eight) hours as needed for nausea or vomiting, Disp: , Rfl:     oxyCODONE (ROXICODONE) 30 MG immediate release tablet, Take 30 mg by mouth every 8 (eight) hours as needed for moderate pain, Disp: , Rfl:    predniSONE 5 mg tablet, Take 10 mg by mouth daily  , Disp: , Rfl:     ranitidine (ZANTAC) 150 mg tablet, Take 150 mg by mouth 2 (two) times a day, Disp: , Rfl:     RiTUXimab (RITUXAN IV), Infuse into a venous catheter Reports that she is not due again for 4-6 months  Previous  Infusions this past September  Unsure of dose , Disp: , Rfl:     gabapentin (NEURONTIN) 300 mg capsule, Take 200 mg by mouth 2 (two) times a day 600mg at night  , Disp: , Rfl:     gabapentin (NEURONTIN) 400 mg capsule, , Disp: , Rfl: 1    gabapentin (NEURONTIN) 600 MG tablet, Take 600 mg by mouth daily at bedtime, Disp: , Rfl:     ibuprofen (MOTRIN) 600 mg tablet, Take 1 tablet by mouth every 6 (six) hours as needed for mild pain for up to 3 days, Disp: 12 tablet, Rfl: 0    metroNIDAZOLE (FLAGYL) 500 mg tablet, , Disp: , Rfl:     ALLERGIES:  No Known Allergies    REVIEW OF SYSTEMS:  Pertinent items are noted in HPI  A comprehensive review of systems was negative      LABS:  HgA1c: No results found for: HGBA1C  BMP:   Lab Results   Component Value Date    GLUCOSE 91 11/18/2017    CALCIUM 8 5 11/18/2017     11/18/2017    K 4 0 11/18/2017    CO2 25 11/18/2017     11/18/2017    BUN 4 (L) 11/18/2017    CREATININE 0 59 (L) 11/18/2017           _____________________________________________________  PHYSICAL EXAMINATION:  General: well developed and well nourished, alert, oriented times 3 and appears comfortable  Psychiatric: Normal  HEENT: Trachea Midline, No torticollis  Cardiovascular: No discernable arrhythmia  Pulmonary: No wheezing or stridor  Skin: No masses, erthema, lacerations, fluctation, ulcerations  Neurovascular: Sensation Intact to the Median, Ulnar, Radial Nerve, Motor Intact to the Median, Ulnar, Radial Nerve and Pulses Intact    MUSCULOSKELETAL EXAMINATION:  Bilateral hands:  Globally tender to palpation   Well healed incisions  Painful ROM  No redness or effusion  Full composite fist      _____________________________________________________  STUDIES REVIEWED:  I have personally reviewed pertinent films in PACS        PROCEDURES PERFORMED:  Procedures  No Procedures performed today

## 2018-04-07 NOTE — H&P
ASSESSMENT/PLAN:    There are no diagnoses linked to this encounter  Assessment:   Rheumatoid arthritis bilaterally and failed wrist fusion on the right    Plan:   Removal of hardware and possible revision right wrist fusion    Follow Up: After Surgery    To Do Next Visit:  Sutures out    General Discussions:       Operative Discussions:         _____________________________________________________  CHIEF COMPLAINT:  Chief Complaint   Patient presents with    Right Wrist - Follow-up         SUBJECTIVE:  Yosi Louise is a 28y o  year old female who presents for follow up regarding bilateral wrist pain  She is right hand dominant  She is experiencing global right hand and wrist pain  Her left hand pain is in all of her fingers  She has been unable to perform daily functions such as shower and brush teeth due to pain  Patient has a surgery date scheduled for 04/10/2018  Patient has a history of rheumatoid arthritis      PAST MEDICAL HISTORY:  Past Medical History:   Diagnosis Date    Anemia     Anxiety     Depression     Iron deficiency     Irregular menses     Menorrhagia     Migraines     Neuropathy     Osteopenia     PONV (postoperative nausea and vomiting)     RA (rheumatoid arthritis) ([address]     Rheumatoid arthritis ([address]     Scratches     On back, stomach and legs from scratching due to urticaria    Vasculitis ([address] )        PAST SURGICAL HISTORY:  Past Surgical History:   Procedure Laterality Date    CHOLECYSTECTOMY      Laparoscopic    DILATION AND CURETTAGE OF UTERUS      JOINT REPLACEMENT Bilateral     knees    AR FUSION/GRAFT WRIST JOINT Left 4/4/2017    Procedure: WRIST FUSION / SPLINT APPLICATION ;  Surgeon: Gerardo Mixon MD;  [facility];  Service: Orthopedics    AR HYSTEROSCOPY,W/ENDO BX N/A 12/1/2017    Procedure: DILATATION AND CURETTAGE (D&C) WITH HYSTEROSCOPY;  Surgeon: Aguila Hernandez DO;  [facility];  Service: Gynecology    AR LAP,CHOLECYSTECTOMY N/A 3/14/2017    Procedure: CHOLECYSTECTOMY LAPAROSCOPIC;  Surgeon: Olya Montana DO;  [facility];  Service: General    REPLACEMENT TOTAL KNEE Bilateral     WRIST SURGERY Right     For arthritis    WRIST SURGERY Left 4/4/2017    Procedure: ARTHROPLASTY WRIST ULNAR HEAD ARTHROPLASTY - INTEGRA SIZE 5 5 MM, 16 HEAD;  Surgeon: Trey Madrid MD;  [facility];  Service:        FAMILY HISTORY:  Family History   Problem Relation Age of Onset    Hypertension Mother    [facility] Rheum arthritis Mother     Depression Mother     Anxiety disorder Mother        SOCIAL HISTORY:  Social History   Substance Use Topics    Smoking status: Former Smoker     Packs/day: 0 25     Years: 3 00     Types: Cigarettes     Quit date: 2010    Smokeless tobacco: Never Used    Alcohol use Yes      Comment: Rare- once or twice yearly       MEDICATIONS:    Current Outpatient Prescriptions:     acetaminophen (TYLENOL) 500 mg tablet, Take 500 mg by mouth every 6 (six) hours as needed for mild pain, Disp: , Rfl:     ALPRAZolam (XANAX) 0 25 mg tablet, Take 0 5 mg by mouth 3 (three) times a day as needed for anxiety  , Disp: , Rfl:     Calcium Carb-Cholecalciferol (CALCIUM 1000 + D PO), Take 1 tablet by mouth daily, Disp: , Rfl:     ergocalciferol (VITAMIN D2) 50,000 units, Take 50,000 Units by mouth once a week, Disp: , Rfl:     escitalopram (LEXAPRO) 20 mg tablet, Take 20 mg by mouth every morning  , Disp: , Rfl:     hydrOXYzine HCL (ATARAX) 25 mg tablet, Take 25 mg by mouth 2 (two) times a day, Disp: , Rfl:     NON FORMULARY, Reports Iron Infusion, Disp: , Rfl:     norethindrone (AYGESTIN) 5 mg tablet, Take 5 mg by mouth daily, Disp: , Rfl:     ondansetron (ZOFRAN) 4 mg tablet, Take 4 mg by mouth every 8 (eight) hours as needed for nausea or vomiting, Disp: , Rfl:     oxyCODONE (ROXICODONE) 30 MG immediate release tablet, Take 30 mg by mouth every 8 (eight) hours as needed for moderate pain, Disp: , Rfl:    predniSONE 5 mg tablet, Take 10 mg by mouth daily  , Disp: , Rfl:     ranitidine (ZANTAC) 150 mg tablet, Take 150 mg by mouth 2 (two) times a day, Disp: , Rfl:     RiTUXimab (RITUXAN IV), Infuse into a venous catheter Reports that she is not due again for 4-6 months  Previous  Infusions this past September  Unsure of dose , Disp: , Rfl:     gabapentin (NEURONTIN) 300 mg capsule, Take 200 mg by mouth 2 (two) times a day 600mg at night  , Disp: , Rfl:     gabapentin (NEURONTIN) 400 mg capsule, , Disp: , Rfl: 1    gabapentin (NEURONTIN) 600 MG tablet, Take 600 mg by mouth daily at bedtime, Disp: , Rfl:     ibuprofen (MOTRIN) 600 mg tablet, Take 1 tablet by mouth every 6 (six) hours as needed for mild pain for up to 3 days, Disp: 12 tablet, Rfl: 0    metroNIDAZOLE (FLAGYL) 500 mg tablet, , Disp: , Rfl:     ALLERGIES:  No Known Allergies    REVIEW OF SYSTEMS:  Pertinent items are noted in HPI  A comprehensive review of systems was negative      LABS:  HgA1c: No results found for: HGBA1C  BMP:   Lab Results   Component Value Date    GLUCOSE 91 11/18/2017    CALCIUM 8 5 11/18/2017     11/18/2017    K 4 0 11/18/2017    CO2 25 11/18/2017     11/18/2017    BUN 4 (L) 11/18/2017    CREATININE 0 59 (L) 11/18/2017           _____________________________________________________  PHYSICAL EXAMINATION:  General: well developed and well nourished, alert, oriented times 3 and appears comfortable  Psychiatric: Normal  HEENT: Trachea Midline, No torticollis  Cardiovascular: No discernable arrhythmia  Pulmonary: No wheezing or stridor  Skin: No masses, erthema, lacerations, fluctation, ulcerations  Neurovascular: Sensation Intact to the Median, Ulnar, Radial Nerve, Motor Intact to the Median, Ulnar, Radial Nerve and Pulses Intact    MUSCULOSKELETAL EXAMINATION:  Bilateral hands:  Globally tender to palpation   Well healed incisions  Painful ROM  No redness or effusion  Full composite fist      _____________________________________________________  STUDIES REVIEWED:  I have personally reviewed pertinent films in PACS  PROCEDURES PERFORMED:  Procedures  No Procedures performed today      Attestation signed by Fantasma Levy MD at 4/6/2018  9:01 PM:  A lengthy discussion was had with the patient  Patient was significant problems and significant rheumatoid arthritis  Now cleared from the standpoint multiple infections  Patient is for right wrist removal of hardware and either leaving her wrist alone if it is fused, or revision fixation and fusion if it is not yet fused  Cultures will be taken  Follow up with me after surgery    Scribe Attestation    I,:   Candelario Bell am acting as a scribe while in the presence of the attending physician :        I,:   Fantasma Levy MD personally performed the services described in this documentation    as scribed in my presence :

## 2018-04-09 ENCOUNTER — ANESTHESIA EVENT (OUTPATIENT)
Dept: PERIOP | Facility: HOSPITAL | Age: 28
End: 2018-04-09
Payer: MEDICARE

## 2018-04-09 ENCOUNTER — TELEPHONE (OUTPATIENT)
Dept: OBGYN CLINIC | Facility: HOSPITAL | Age: 28
End: 2018-04-09

## 2018-04-09 PROBLEM — M25.531 PAIN IN RIGHT WRIST: Status: ACTIVE | Noted: 2018-04-09

## 2018-04-09 NOTE — PRE-PROCEDURE INSTRUCTIONS
Pre-Surgery Instructions:   Medication Instructions    acetaminophen (TYLENOL) 500 mg tablet Instructed patient per Anesthesia Guidelines   ALPRAZolam (XANAX) 0 25 mg tablet Instructed patient per Anesthesia Guidelines   Calcium Carb-Cholecalciferol (CALCIUM 1000 + D PO) Instructed patient per Anesthesia Guidelines   ergocalciferol (VITAMIN D2) 50,000 units Instructed patient per Anesthesia Guidelines   escitalopram (LEXAPRO) 20 mg tablet Instructed patient per Anesthesia Guidelines   gabapentin (NEURONTIN) 400 mg capsule Instructed patient per Anesthesia Guidelines   hydrOXYzine HCL (ATARAX) 25 mg tablet Instructed patient per Anesthesia Guidelines   ibuprofen (MOTRIN) 600 mg tablet Instructed patient per Anesthesia Guidelines   norethindrone (AYGESTIN) 5 mg tablet Instructed patient per Anesthesia Guidelines   ondansetron (ZOFRAN) 4 mg tablet Instructed patient per Anesthesia Guidelines   oxyCODONE (ROXICODONE) 30 MG immediate release tablet Instructed patient per Anesthesia Guidelines   oxyCODONE-acetaminophen (PERCOCET) 5-325 mg per tablet Instructed patient per Anesthesia Guidelines   predniSONE 5 mg tablet Instructed patient per Anesthesia Guidelines   ranitidine (ZANTAC) 150 mg tablet Instructed patient per Anesthesia Guidelines   RiTUXimab (RITUXAN IV) Instructed patient per Anesthesia Guidelines   [DISCONTINUED] gabapentin (NEURONTIN) 300 mg capsule Instructed patient per Anesthesia Guidelines  5HT3 Antagonists Med Class   Continue to take this medication on your normal schedule  If this is an oral medication and you take it in the morning, then you may take this medicine with a sip of water  Acetaminophen Med Class   Continue to take this medication on your normal schedule  If this is an oral medication and you take it in the morning, then you may take this medicine with a sip of water      Antidepressant Med Class   Continue to take this medication on your normal schedule  If this is an oral medication and you take it in the morning, then you may take this medicine with a sip of water  Antiepileptic Med Class   Continue to take this medication on your normal schedule  If this is an oral medication and you take it in the morning, then you may take this medicine with a sip of water  Benzodiazepine antagonist Med Class   If this medication is needed please continue to take on your normal schedule  If you take it in the morning, then you may take this medicine with a sip of water  Biologic Response Modifiers Med Class     Stop taking this medication one week before surgery after talking to your prescribing physicain and surgeon about the risk of flareup  H2 Blocker Med Class     Continue to take this medication on your normal schedule  If this is an oral medication and you take it in the morning, then you may take this medicine with a sip of water  NSAID Med Class   Stop taking this medication at least 3 days prior to surgery/procedure  Opioid Med Class   Continue to take this medication on your normal schedule  If this is an oral medication and you take it in the morning, then you may take this medicine with a sip of water  Steroids Med Class   Continue to take this medication on your normal schedule  If this is an oral medication and you take it in the morning, then you may take this medicine with a sip of water  Instructed pt on her medications, shower & ASC call instructions  Pt verbalized understanding to all  Pt concerned with her pain after surgery  ERAS sent to Dr Danny Sharpe for review

## 2018-04-09 NOTE — TELEPHONE ENCOUNTER
Caller: [facility]  Call back number: [phone]  Patient's doctor: Dr Kathy Milligan    Patient goes to pain management and takes oxycodone  Hortensia Rae is checking to make sure the doctor wants percocet filled   Please advise

## 2018-04-10 ENCOUNTER — HOSPITAL ENCOUNTER (OUTPATIENT)
Dept: RADIOLOGY | Facility: HOSPITAL | Age: 28
Setting detail: OUTPATIENT SURGERY
Discharge: HOME | End: 2018-04-10
Payer: MEDICARE

## 2018-04-10 ENCOUNTER — ANESTHESIA (OUTPATIENT)
Dept: PERIOP | Facility: HOSPITAL | Age: 28
End: 2018-04-10
Payer: MEDICARE

## 2018-04-10 ENCOUNTER — HOSPITAL ENCOUNTER (OUTPATIENT)
Facility: HOSPITAL | Age: 28
Setting detail: OUTPATIENT SURGERY
Discharge: HOME | End: 2018-04-11
Attending: ORTHOPAEDIC SURGERY | Admitting: ORTHOPAEDIC SURGERY
Payer: MEDICARE

## 2018-04-10 VITALS — HEART RATE: 94 BPM | OXYGEN SATURATION: 98 % | SYSTOLIC BLOOD PRESSURE: 114 MMHG | DIASTOLIC BLOOD PRESSURE: 67 MMHG

## 2018-04-10 DIAGNOSIS — Z98.890 HISTORY OF REMOVAL OF RETAINED HARDWARE: ICD-10-CM

## 2018-04-10 DIAGNOSIS — M25.531 PAIN IN RIGHT WRIST: Primary | ICD-10-CM

## 2018-04-10 LAB
ANION GAP SERPL CALCULATED.3IONS-SCNC: 4 MMOL/L (ref 4–13)
BUN SERPL-MCNC: 7 MG/DL (ref 5–25)
CALCIUM SERPL-MCNC: 7.9 MG/DL
CHLORIDE SERPL-SCNC: 109 MMOL/L (ref 100–108)
CO2 SERPL-SCNC: 27 MMOL/L (ref 21–32)
CREAT SERPL-MCNC: 0.55 MG/DL (ref 0.6–1.3)
EXT PREGNANCY TEST URINE: NEGATIVE
GFR SERPL CREATININE-BSD FRML MDRD: 128 ML/MIN/1.73SQ M
GLUCOSE P FAST SERPL-MCNC: 110 MG/DL (ref 65–99)
GLUCOSE SERPL-MCNC: 110 MG/DL (ref 65–140)
POTASSIUM SERPL-SCNC: 4.2 MMOL/L (ref 3.5–5.3)
SODIUM SERPL-SCNC: 140 MMOL/L (ref 136–145)

## 2018-04-10 PROCEDURE — 87176 TISSUE HOMOGENIZATION CULTR: CPT | Performed by: ORTHOPAEDIC SURGERY

## 2018-04-10 PROCEDURE — 82652 VIT D 1 25-DIHYDROXY: CPT | Performed by: PHYSICIAN ASSISTANT

## 2018-04-10 PROCEDURE — 87102 FUNGUS ISOLATION CULTURE: CPT | Performed by: ORTHOPAEDIC SURGERY

## 2018-04-10 PROCEDURE — 87075 CULTR BACTERIA EXCEPT BLOOD: CPT | Performed by: ORTHOPAEDIC SURGERY

## 2018-04-10 PROCEDURE — 87206 SMEAR FLUORESCENT/ACID STAI: CPT | Performed by: ORTHOPAEDIC SURGERY

## 2018-04-10 PROCEDURE — 20680 REMOVAL OF IMPLANT DEEP: CPT | Performed by: ORTHOPAEDIC SURGERY

## 2018-04-10 PROCEDURE — 87070 CULTURE OTHR SPECIMN AEROBIC: CPT | Performed by: ORTHOPAEDIC SURGERY

## 2018-04-10 PROCEDURE — 73100 X-RAY EXAM OF WRIST: CPT

## 2018-04-10 PROCEDURE — 87205 SMEAR GRAM STAIN: CPT | Performed by: ORTHOPAEDIC SURGERY

## 2018-04-10 PROCEDURE — 26843 FUSION OF HAND JOINT: CPT | Performed by: ORTHOPAEDIC SURGERY

## 2018-04-10 PROCEDURE — C1713 ANCHOR/SCREW BN/BN,TIS/BN: HCPCS | Performed by: ORTHOPAEDIC SURGERY

## 2018-04-10 PROCEDURE — 87116 MYCOBACTERIA CULTURE: CPT | Performed by: ORTHOPAEDIC SURGERY

## 2018-04-10 PROCEDURE — 81025 URINE PREGNANCY TEST: CPT | Performed by: ORTHOPAEDIC SURGERY

## 2018-04-10 PROCEDURE — 80048 BASIC METABOLIC PNL TOTAL CA: CPT | Performed by: PHYSICIAN ASSISTANT

## 2018-04-10 PROCEDURE — 83885 ASSAY OF NICKEL: CPT | Performed by: ORTHOPAEDIC SURGERY

## 2018-04-10 DEVICE — GRAFT BONE CRUSHED CANC CHIPS 0.1-4MM 30ML FREEZE DRIED: Type: IMPLANTABLE DEVICE | Site: WRIST | Status: FUNCTIONAL

## 2018-04-10 DEVICE — C-WIRE PAK DOUBLE ENDED ORTHOPAEDIC WIRE, SPADE, .045" (1.14 MM)
Type: IMPLANTABLE DEVICE | Site: WRIST | Status: FUNCTIONAL
Brand: C-WIRE

## 2018-04-10 RX ORDER — ONDANSETRON 2 MG/ML
INJECTION INTRAMUSCULAR; INTRAVENOUS AS NEEDED
Status: DISCONTINUED | OUTPATIENT
Start: 2018-04-10 | End: 2018-04-10 | Stop reason: SURG

## 2018-04-10 RX ORDER — SODIUM CHLORIDE, SODIUM LACTATE, POTASSIUM CHLORIDE, CALCIUM CHLORIDE 600; 310; 30; 20 MG/100ML; MG/100ML; MG/100ML; MG/100ML
20 INJECTION, SOLUTION INTRAVENOUS CONTINUOUS
Status: DISCONTINUED | OUTPATIENT
Start: 2018-04-10 | End: 2018-04-10

## 2018-04-10 RX ORDER — LIDOCAINE HYDROCHLORIDE 10 MG/ML
INJECTION, SOLUTION INFILTRATION; PERINEURAL AS NEEDED
Status: DISCONTINUED | OUTPATIENT
Start: 2018-04-10 | End: 2018-04-10 | Stop reason: SURG

## 2018-04-10 RX ORDER — SODIUM CHLORIDE 9 MG/ML
125 INJECTION, SOLUTION INTRAVENOUS CONTINUOUS
Status: DISCONTINUED | OUTPATIENT
Start: 2018-04-10 | End: 2018-04-11 | Stop reason: HOSPADM

## 2018-04-10 RX ORDER — MIDAZOLAM HYDROCHLORIDE 1 MG/ML
INJECTION INTRAMUSCULAR; INTRAVENOUS AS NEEDED
Status: DISCONTINUED | OUTPATIENT
Start: 2018-04-10 | End: 2018-04-10 | Stop reason: SURG

## 2018-04-10 RX ORDER — ALPRAZOLAM 0.5 MG/1
0.5 TABLET ORAL 3 TIMES DAILY PRN
Status: DISCONTINUED | OUTPATIENT
Start: 2018-04-10 | End: 2018-04-11 | Stop reason: HOSPADM

## 2018-04-10 RX ORDER — FAMOTIDINE 20 MG/1
20 TABLET, FILM COATED ORAL 2 TIMES DAILY
Status: DISCONTINUED | OUTPATIENT
Start: 2018-04-10 | End: 2018-04-11 | Stop reason: HOSPADM

## 2018-04-10 RX ORDER — ONDANSETRON 4 MG/1
4 TABLET, ORALLY DISINTEGRATING ORAL EVERY 6 HOURS PRN
Status: DISCONTINUED | OUTPATIENT
Start: 2018-04-10 | End: 2018-04-11 | Stop reason: HOSPADM

## 2018-04-10 RX ORDER — DIPHENHYDRAMINE HYDROCHLORIDE 50 MG/ML
INJECTION INTRAMUSCULAR; INTRAVENOUS AS NEEDED
Status: DISCONTINUED | OUTPATIENT
Start: 2018-04-10 | End: 2018-04-10 | Stop reason: SURG

## 2018-04-10 RX ORDER — SODIUM CHLORIDE, SODIUM LACTATE, POTASSIUM CHLORIDE, CALCIUM CHLORIDE 600; 310; 30; 20 MG/100ML; MG/100ML; MG/100ML; MG/100ML
100 INJECTION, SOLUTION INTRAVENOUS CONTINUOUS
Status: DISCONTINUED | OUTPATIENT
Start: 2018-04-10 | End: 2018-04-11 | Stop reason: HOSPADM

## 2018-04-10 RX ORDER — FENTANYL CITRATE 50 UG/ML
INJECTION, SOLUTION INTRAMUSCULAR; INTRAVENOUS AS NEEDED
Status: DISCONTINUED | OUTPATIENT
Start: 2018-04-10 | End: 2018-04-10 | Stop reason: SURG

## 2018-04-10 RX ORDER — ESCITALOPRAM OXALATE 20 MG/1
20 TABLET ORAL EVERY MORNING
Status: DISCONTINUED | OUTPATIENT
Start: 2018-04-11 | End: 2018-04-11 | Stop reason: HOSPADM

## 2018-04-10 RX ORDER — OXYCODONE HYDROCHLORIDE 5 MG/1
5 TABLET ORAL EVERY 4 HOURS PRN
Status: DISCONTINUED | OUTPATIENT
Start: 2018-04-10 | End: 2018-04-10

## 2018-04-10 RX ORDER — OXYCODONE HYDROCHLORIDE 10 MG/1
10 TABLET ORAL EVERY 4 HOURS PRN
Status: DISCONTINUED | OUTPATIENT
Start: 2018-04-10 | End: 2018-04-10

## 2018-04-10 RX ORDER — OXYCODONE HYDROCHLORIDE 10 MG/1
30 TABLET ORAL EVERY 4 HOURS PRN
Status: DISCONTINUED | OUTPATIENT
Start: 2018-04-10 | End: 2018-04-11 | Stop reason: HOSPADM

## 2018-04-10 RX ORDER — FENTANYL CITRATE 50 UG/ML
INJECTION, SOLUTION INTRAMUSCULAR; INTRAVENOUS
Status: DISPENSED
Start: 2018-04-10 | End: 2018-04-11

## 2018-04-10 RX ORDER — ONDANSETRON 2 MG/ML
4 INJECTION INTRAMUSCULAR; INTRAVENOUS EVERY 4 HOURS PRN
Status: DISCONTINUED | OUTPATIENT
Start: 2018-04-10 | End: 2018-04-10 | Stop reason: HOSPADM

## 2018-04-10 RX ORDER — FENTANYL CITRATE/PF 50 MCG/ML
25 SYRINGE (ML) INJECTION
Status: DISCONTINUED | OUTPATIENT
Start: 2018-04-10 | End: 2018-04-10 | Stop reason: HOSPADM

## 2018-04-10 RX ORDER — PREDNISONE 10 MG/1
10 TABLET ORAL DAILY
Status: DISCONTINUED | OUTPATIENT
Start: 2018-04-11 | End: 2018-04-11 | Stop reason: HOSPADM

## 2018-04-10 RX ORDER — HYDROXYZINE HYDROCHLORIDE 25 MG/1
25 TABLET, FILM COATED ORAL 2 TIMES DAILY
Status: DISCONTINUED | OUTPATIENT
Start: 2018-04-10 | End: 2018-04-11 | Stop reason: HOSPADM

## 2018-04-10 RX ORDER — PROPOFOL 10 MG/ML
INJECTION, EMULSION INTRAVENOUS AS NEEDED
Status: DISCONTINUED | OUTPATIENT
Start: 2018-04-10 | End: 2018-04-10 | Stop reason: SURG

## 2018-04-10 RX ORDER — ONDANSETRON 4 MG/1
4 TABLET, ORALLY DISINTEGRATING ORAL EVERY 8 HOURS PRN
Status: DISCONTINUED | OUTPATIENT
Start: 2018-04-10 | End: 2018-04-10

## 2018-04-10 RX ORDER — MAGNESIUM HYDROXIDE 1200 MG/15ML
LIQUID ORAL AS NEEDED
Status: DISCONTINUED | OUTPATIENT
Start: 2018-04-10 | End: 2018-04-10 | Stop reason: HOSPADM

## 2018-04-10 RX ORDER — ACETAMINOPHEN 325 MG/1
975 TABLET ORAL EVERY 8 HOURS SCHEDULED
Status: DISCONTINUED | OUTPATIENT
Start: 2018-04-10 | End: 2018-04-11 | Stop reason: HOSPADM

## 2018-04-10 RX ORDER — MIDAZOLAM HYDROCHLORIDE 1 MG/ML
INJECTION INTRAMUSCULAR; INTRAVENOUS
Status: DISCONTINUED
Start: 2018-04-10 | End: 2018-04-10 | Stop reason: WASHOUT

## 2018-04-10 RX ORDER — GABAPENTIN 400 MG/1
400 CAPSULE ORAL 2 TIMES DAILY
Status: DISCONTINUED | OUTPATIENT
Start: 2018-04-10 | End: 2018-04-11 | Stop reason: HOSPADM

## 2018-04-10 RX ADMIN — ACETAMINOPHEN 975 MG: 325 TABLET, FILM COATED ORAL at 21:16

## 2018-04-10 RX ADMIN — HYDROCORTISONE SODIUM SUCCINATE 50 MG: 100 INJECTION, POWDER, FOR SOLUTION INTRAMUSCULAR; INTRAVENOUS at 12:59

## 2018-04-10 RX ADMIN — ONDANSETRON 4 MG: 4 TABLET, ORALLY DISINTEGRATING ORAL at 22:36

## 2018-04-10 RX ADMIN — MIDAZOLAM HYDROCHLORIDE 2 MG: 1 INJECTION, SOLUTION INTRAMUSCULAR; INTRAVENOUS at 12:35

## 2018-04-10 RX ADMIN — FENTANYL CITRATE 25 MCG: 50 INJECTION, SOLUTION INTRAMUSCULAR; INTRAVENOUS at 14:15

## 2018-04-10 RX ADMIN — Medication 0.3 MCG/KG/HR: at 13:20

## 2018-04-10 RX ADMIN — GABAPENTIN 400 MG: 400 CAPSULE ORAL at 21:16

## 2018-04-10 RX ADMIN — ROPIVACAINE HYDROCHLORIDE: 2 INJECTION, SOLUTION EPIDURAL; INFILTRATION at 16:31

## 2018-04-10 RX ADMIN — SODIUM CHLORIDE 125 ML/HR: 0.9 INJECTION, SOLUTION INTRAVENOUS at 21:20

## 2018-04-10 RX ADMIN — CEFAZOLIN SODIUM 2000 MG: 2 SOLUTION INTRAVENOUS at 13:20

## 2018-04-10 RX ADMIN — SODIUM CHLORIDE, SODIUM LACTATE, POTASSIUM CHLORIDE, AND CALCIUM CHLORIDE: .6; .31; .03; .02 INJECTION, SOLUTION INTRAVENOUS at 14:18

## 2018-04-10 RX ADMIN — SODIUM CHLORIDE, SODIUM LACTATE, POTASSIUM CHLORIDE, AND CALCIUM CHLORIDE: .6; .31; .03; .02 INJECTION, SOLUTION INTRAVENOUS at 12:20

## 2018-04-10 RX ADMIN — SODIUM CHLORIDE 125 ML/HR: 0.9 INJECTION, SOLUTION INTRAVENOUS at 20:00

## 2018-04-10 RX ADMIN — LIDOCAINE HYDROCHLORIDE 30 MG: 10 INJECTION, SOLUTION INFILTRATION; PERINEURAL at 12:53

## 2018-04-10 RX ADMIN — FAMOTIDINE 20 MG: 20 TABLET, FILM COATED ORAL at 21:16

## 2018-04-10 RX ADMIN — ONDANSETRON 4 MG: 2 INJECTION INTRAMUSCULAR; INTRAVENOUS at 14:25

## 2018-04-10 RX ADMIN — DIPHENHYDRAMINE HYDROCHLORIDE 12.5 MG: 50 INJECTION, SOLUTION INTRAMUSCULAR; INTRAVENOUS at 14:30

## 2018-04-10 RX ADMIN — FENTANYL CITRATE 25 MCG: 50 INJECTION, SOLUTION INTRAMUSCULAR; INTRAVENOUS at 14:04

## 2018-04-10 RX ADMIN — OXYCODONE HYDROCHLORIDE 30 MG: 10 TABLET ORAL at 21:39

## 2018-04-10 RX ADMIN — FENTANYL CITRATE 50 MCG: 50 INJECTION, SOLUTION INTRAMUSCULAR; INTRAVENOUS at 12:35

## 2018-04-10 RX ADMIN — CEFAZOLIN SODIUM 2000 MG: 2 SOLUTION INTRAVENOUS at 21:16

## 2018-04-10 RX ADMIN — PROPOFOL 200 MG: 10 INJECTION, EMULSION INTRAVENOUS at 12:53

## 2018-04-10 RX ADMIN — HYDROCORTISONE SODIUM SUCCINATE 50 MG: 100 INJECTION, POWDER, FOR SOLUTION INTRAMUSCULAR; INTRAVENOUS at 23:55

## 2018-04-10 NOTE — ANESTHESIA PROCEDURE NOTES
Peripheral Block    Patient location during procedure: holding area  Start time: 4/10/2018 12:15 PM  Reason for block: at surgeon's request and post-op pain management  Staffing  Anesthesiologist: Benita Wheeler  Performed: anesthesiologist   Preanesthetic Checklist  Completed: patient identified, site marked, surgical consent, pre-op evaluation, timeout performed, IV checked, risks and benefits discussed and monitors and equipment checked  Peripheral Block  Patient position: supine  Prep: ChloraPrep and site prepped and draped  Patient monitoring: continuous pulse ox and frequent blood pressure checks  Block type: infraclavicular  Laterality: right  Injection technique: single-shot and catheter  Procedures: ultrasound guided  Ultrasound permanent image saved  Local infiltration: ropivacaine (1:400K Epi)  Infiltration strength: 0 5 %  Dose: 20 mL  Needle  Needle type: Stimuplex   Needle gauge: 22 G  Needle localization: ultrasound guidance  Catheter at skin depth: 6 cm  Assessment  Injection assessment: incremental injection, local visualized surrounding nerve on ultrasound, negative aspiration for CSF, negative aspiration for heme and no paresthesia on injection  Heart rate change: no  Slow fractionated injection: yes  Post-procedure:  sterile dressing applied  patient tolerated the procedure well with no immediate complications

## 2018-04-10 NOTE — CONSULTS
Consultation - Anesthesia Acute Pain Management   Mercedes Huerta 28 y o  female MRN: [id number]  Unit/Bed#: OR [place] Encounter: [id number]               Admission Diagnosis:  Pain in right wrist [M25 531]    Consult for pain management per surgeon's request     Advanced Care Plan; Patient aged 72 years & older:  [facility] was not discussed  DOS: 4/10/18    Assessment/Plan     Assessment:   28y o  year old female Status Post R wrist hardware removal/fusion on POD # 0 with well controlled postop pain using infraclavicular block/catheter in the setting of chronic opioid use  Surgery team has requested recommendations for management of chronic pain medication in the acute postop setting  Plan:   1  The PDMP was reviewed prior to making pain medication recommendations  2  This pt would benefit from multimodal analgesia  Continue all home meds  Recommend:   - Continue Infraclavicular catheter as ordered x3 days   - Start Tylenol 975mg PO q8 hours   - Continue Gabapentin 400mg PO BID (home med)   - Continue Xanax 0 5mg PO TID prn anxiety (home med)    - Change Oxycodone to 30mg PO q4 hours prn pain (home med; D/C the orders for 5mg and 10mg)  3  Do not order IV opioids unless needed; start with 0 5mg IV q3 hours prn breakthrough pain only, titrate up to 1mg IV q3 hours prn breakthrough pain only if needed  4  Recommend continuous pulse oximetry  5  APS will continue to follow  6  D/w Ortho intern    History of Present Illness    Admit Date:  4/10/2018  Hospital Day:  0 days  Primary Service:  Orthopedics  Attending Provider:  Fantasma Levy MD  Physician Requesting Consult: Fantasma Levy MD  Reason for Consult / Principal Problem: ACUTE POSTOPERATIVE PAIN CONTROL  Hx and PE limited by: Pt sleepy in PACU    HPI  29 yo woman with a h/o severe Rheumatoid arthritis requiring chronic opioid use  Pt utilizes Xanax 0 5mg PO TID and Oxycodone 30mg PO q4 hours prn pain at home per PDMP   She reports pain that was difficult to control with previous surgeries  Pt was seen in PACU immediately postop  She was sleepy but reported well controlled pain, despite high pain score  Pt endorses numbness of the RUE, including the hand, and was unable to move fingers when prompted      Review of Systems  Denies    Historical Information   Past Medical History:   Diagnosis Date    Anemia     Anxiety     Depression     Iron deficiency     Irregular menses     Menorrhagia     Migraines     Neuropathy     Osteopenia     PONV (postoperative nausea and vomiting)     [facility] [address] [facility]     Rheumatoid [facility] [address] [facility]     Scratches     On back, stomach and legs from scratching due to urticaria    Vasculitis (HCC)      Past Surgical History:   Procedure Laterality Date    CHOLECYSTECTOMY      Laparoscopic    DILATION AND CURETTAGE OF UTERUS      JOINT REPLACEMENT Bilateral     knees    DC FUSION/GRAFT WRIST JOINT Left 4/4/2017    Procedure: WRIST FUSION / SPLINT APPLICATION ;  Surgeon: Angelika Chase MD;  [facility];  Service: Orthopedics    DC HYSTEROSCOPY,W/ENDO BX N/A 12/1/2017    Procedure: DILATATION AND CURETTAGE (D&C) WITH HYSTEROSCOPY;  Surgeon: Lise Hawkins DO;  [facility];  Service: Gynecology    DC LAP,CHOLECYSTECTOMY N/A 3/14/2017    Procedure: CHOLECYSTECTOMY LAPAROSCOPIC;  Surgeon: Mary Ellen Flores DO;  [facility];  Service: General    REPLACEMENT TOTAL KNEE Bilateral     WRIST SURGERY Right     For arthritis    WRIST SURGERY Left 4/4/2017    Procedure: ARTHROPLASTY WRIST ULNAR HEAD ARTHROPLASTY - INTEGRA SIZE 5 5 MM, 16 HEAD;  Surgeon: Angelika Chase MD;  [facility];  Service:      Social History   History   Alcohol Use    Yes     Comment: Rare- once or twice yearly     History   Drug Use No     History   Smoking Status    Former Smoker    Packs/day: 0 25    Years: 3 00    Types: Cigarettes    Quit date: 2010   Smokeless Tobacco    Never Used       Meds/Allergies   all current active meds have been reviewed    No Known Allergies    Objective   /68   Pulse 100   Temp 98 6 °F (37 °C)   Resp 19   Ht 5' 7" (1 702 m)   Wt 86 6 kg (191 lb)   SpO2 100%   BMI 29 91 kg/m²   Temp:  [98 °F (36 7 °C)-98 6 °F (37 °C)] 98 6 °F (37 °C)  HR:  [] 100  Resp:  [19] 19  BP: (110-140)/(67-86) 116/68    Intake/Output Summary (Last 24 hours) at 04/10/18 1605  Last data filed at 04/10/18 1532   Gross per 24 hour   Intake             1600 ml   Output              100 ml   Net             1500 ml       Physical Exam  Gen: NAD, sleepy  HEENT: PER, EOMI  CV: mild tachycardia  Pulm: Nonlabored  Ext: RUE drsng C/D/I, in sling; no cyanosis  Neuro: Sleepy but rousable, answers questions appropriately  CNs II-XII grossly intact, moves all extremities except RUE as appropriate;    Lab Results: I have personally reviewed pertinent labs  Imaging Studies: I have personally reviewed pertinent reports  EKG, Pathology, and Other Studies: I have personally reviewed pertinent reports  Counseling / Coordination of Care  Total floor / unit time spent today 20 minutes minutes  Greater than 50% of total time was spent with the patient and / or family counseling and / or coordination of care  A description of the counseling / coordination of care: Pain Medication and use of Infraclavicular Catheter      SIGNATURE: Valorie Ritchie MD  DATE: April 10, 2018  TIME: 4:05 PM

## 2018-04-10 NOTE — ANESTHESIA PREPROCEDURE EVALUATION
Review of Systems/Medical History  Patient summary reviewed  Chart reviewed  History of anesthetic complications (jaw pain after prior intubation) PONV    Cardiovascular  Negative cardio ROS Exercise tolerance: good,     Pulmonary  Negative pulmonary ROS        GI/Hepatic    GERD well controlled,             Endo/Other  History of thyroid disease , hypothyroidism,      GYN  Not currently pregnant ,          Hematology  Anemia ,     Musculoskeletal  Rheumatoid arthritis Severity: severe,   Comment: Takes Prednisone regularly Arthritis     Neurology    Headaches,    Psychology   Anxiety, Depression ,              Physical Exam    Airway  Comment: Poor mouth opening 2/2 pain  Mallampati score: IV  TM Distance: >3 FB  Neck ROM: limited     Dental       Cardiovascular  Comment: Negative ROS,     Pulmonary      Other Findings        Anesthesia Plan  ASA Score- 3     Anesthesia Type- general and regional with ASA Monitors  Additional Monitors:   Airway Plan: LMA  Comment: LMA + infraclavicular catheter for post op pain control  Stress dose steroids discussed with surgeon for 24 hours  Plan Factors-    Induction- intravenous  Postoperative Plan-     Informed Consent- Anesthetic plan and risks discussed with patient  I personally reviewed this patient with the CRNA  Discussed and agreed on the Anesthesia Plan with the CRNA  Swetha Hinson Pt had severe jaw pain after last general anesthetic she thinks because her mouth was opened too wide  She has had severe neck stiffness/pain post, she thinks because her head was not anchored and was allowed to turn/fall to the side  She requests her head/neck be stabilized with pillows and that her mouth be opened as little as possible for as little time as possible

## 2018-04-10 NOTE — H&P (VIEW-ONLY)
ASSESSMENT/PLAN:    There are no diagnoses linked to this encounter  Assessment:   Rheumatoid arthritis bilaterally and failed wrist fusion on the right    Plan:   Removal of hardware and possible revision right wrist fusion    Follow Up: After Surgery    To Do Next Visit:  Sutures out    General Discussions:       Operative Discussions:         _____________________________________________________  CHIEF COMPLAINT:  Chief Complaint   Patient presents with    Right Wrist - Follow-up         SUBJECTIVE:  Inocente Pereira is a 28y o  year old female who presents for follow up regarding bilateral wrist pain  She is right hand dominant  She is experiencing global right hand and wrist pain  Her left hand pain is in all of her fingers  She has been unable to perform daily functions such as shower and brush teeth due to pain  Patient has a surgery date scheduled for 04/10/2018  Patient has a history of rheumatoid arthritis      PAST MEDICAL HISTORY:  Past Medical History:   Diagnosis Date    Anemia     Anxiety     Depression     Iron deficiency     Irregular menses     Menorrhagia     Migraines     Neuropathy     Osteopenia     PONV (postoperative nausea and vomiting)     RA (rheumatoid arthritis) ([address]     Rheumatoid arthritis ([address]     Scratches     On back, stomach and legs from scratching due to urticaria    Vasculitis ([facility] [address] [facility]        PAST SURGICAL HISTORY:  Past Surgical History:   Procedure Laterality Date    CHOLECYSTECTOMY      Laparoscopic    DILATION AND CURETTAGE OF UTERUS      JOINT REPLACEMENT Bilateral     knees    CA FUSION/GRAFT WRIST JOINT Left 4/4/2017    Procedure: WRIST FUSION / SPLINT APPLICATION ;  Surgeon: Daisy Mallory MD;  Location:  MAIN OR;  Service: Orthopedics    CA HYSTEROSCOPY,W/ENDO BX N/A 12/1/2017    Procedure: DILATATION AND CURETTAGE (D&C) WITH HYSTEROSCOPY;  Surgeon: Damir Damon DO;  [facility];  Service: Gynecology    CA LAP,CHOLECYSTECTOMY N/A 3/14/2017    Procedure: CHOLECYSTECTOMY LAPAROSCOPIC;  Surgeon: Eliezer Villa DO;  [facility];  Service: General    REPLACEMENT TOTAL KNEE Bilateral     WRIST SURGERY Right     For arthritis    WRIST SURGERY Left 4/4/2017    Procedure: ARTHROPLASTY WRIST ULNAR HEAD ARTHROPLASTY - INTEGRA SIZE 5 5 MM, 16 HEAD;  Surgeon: Neel Schilling MD;  [facility];  Service:        FAMILY HISTORY:  Family History   Problem Relation Age of Onset    Hypertension Mother    Mollie Sizer Rheum arthritis Mother     Depression Mother     Anxiety disorder Mother        SOCIAL HISTORY:  Social History   Substance Use Topics    Smoking status: Former Smoker     Packs/day: 0 25     Years: 3 00     Types: Cigarettes     Quit date: 2010    Smokeless tobacco: Never Used    Alcohol use Yes      Comment: Rare- once or twice yearly       MEDICATIONS:    Current Outpatient Prescriptions:     acetaminophen (TYLENOL) 500 mg tablet, Take 500 mg by mouth every 6 (six) hours as needed for mild pain, Disp: , Rfl:     ALPRAZolam (XANAX) 0 25 mg tablet, Take 0 5 mg by mouth 3 (three) times a day as needed for anxiety  , Disp: , Rfl:     Calcium Carb-Cholecalciferol (CALCIUM 1000 + D PO), Take 1 tablet by mouth daily, Disp: , Rfl:     ergocalciferol (VITAMIN D2) 50,000 units, Take 50,000 Units by mouth once a week, Disp: , Rfl:     escitalopram (LEXAPRO) 20 mg tablet, Take 20 mg by mouth every morning  , Disp: , Rfl:     hydrOXYzine HCL (ATARAX) 25 mg tablet, Take 25 mg by mouth 2 (two) times a day, Disp: , Rfl:     NON FORMULARY, Reports Iron Infusion, Disp: , Rfl:     norethindrone (AYGESTIN) 5 mg tablet, Take 5 mg by mouth daily, Disp: , Rfl:     ondansetron (ZOFRAN) 4 mg tablet, Take 4 mg by mouth every 8 (eight) hours as needed for nausea or vomiting, Disp: , Rfl:     oxyCODONE (ROXICODONE) 30 MG immediate release tablet, Take 30 mg by mouth every 8 (eight) hours as needed for moderate pain, Disp: , Rfl:    predniSONE 5 mg tablet, Take 10 mg by mouth daily  , Disp: , Rfl:     ranitidine (ZANTAC) 150 mg tablet, Take 150 mg by mouth 2 (two) times a day, Disp: , Rfl:     RiTUXimab (RITUXAN IV), Infuse into a venous catheter Reports that she is not due again for 4-6 months  Previous  Infusions this past September  Unsure of dose , Disp: , Rfl:     gabapentin (NEURONTIN) 300 mg capsule, Take 200 mg by mouth 2 (two) times a day 600mg at night  , Disp: , Rfl:     gabapentin (NEURONTIN) 400 mg capsule, , Disp: , Rfl: 1    gabapentin (NEURONTIN) 600 MG tablet, Take 600 mg by mouth daily at bedtime, Disp: , Rfl:     ibuprofen (MOTRIN) 600 mg tablet, Take 1 tablet by mouth every 6 (six) hours as needed for mild pain for up to 3 days, Disp: 12 tablet, Rfl: 0    metroNIDAZOLE (FLAGYL) 500 mg tablet, , Disp: , Rfl:     ALLERGIES:  No Known Allergies    REVIEW OF SYSTEMS:  Pertinent items are noted in HPI  A comprehensive review of systems was negative      LABS:  HgA1c: No results found for: HGBA1C  BMP:   Lab Results   Component Value Date    GLUCOSE 91 11/18/2017    CALCIUM 8 5 11/18/2017     11/18/2017    K 4 0 11/18/2017    CO2 25 11/18/2017     11/18/2017    BUN 4 (L) 11/18/2017    CREATININE 0 59 (L) 11/18/2017           _____________________________________________________  PHYSICAL EXAMINATION:  General: well developed and well nourished, alert, oriented times 3 and appears comfortable  Psychiatric: Normal  HEENT: Trachea Midline, No torticollis  Cardiovascular: No discernable arrhythmia  Pulmonary: No wheezing or stridor  Skin: No masses, erthema, lacerations, fluctation, ulcerations  Neurovascular: Sensation Intact to the Median, Ulnar, Radial Nerve, Motor Intact to the Median, Ulnar, Radial Nerve and Pulses Intact    MUSCULOSKELETAL EXAMINATION:  Bilateral hands:  Globally tender to palpation   Well healed incisions  Painful ROM  No redness or effusion  Full composite fist      _____________________________________________________  STUDIES REVIEWED:  I have personally reviewed pertinent films in PACS  PROCEDURES PERFORMED:  Procedures  No Procedures performed today      Attestation signed by Ct Watts MD at 4/6/2018  9:01 PM:  A lengthy discussion was had with the patient  Patient was significant problems and significant rheumatoid arthritis  Now cleared from the standpoint multiple infections  Patient is for right wrist removal of hardware and either leaving her wrist alone if it is fused, or revision fixation and fusion if it is not yet fused  Cultures will be taken  Follow up with me after surgery    Scribe Attestation    I,:   Cherelle Hendrickson am acting as a scribe while in the presence of the attending physician :        I,:   Ct Watts MD personally performed the services described in this documentation    as scribed in my presence :

## 2018-04-10 NOTE — OP NOTE
OPERATIVE REPORT  PATIENT NAME: Laron Coates  :  1990  MRN: [id number]  Pt Location: [facility]    SURGERY DATE: 04/10/18    Surgeon(s) and Role:     * Enrique Dan MD - Primary     * Richie Grullon - Assisting    Pre-Op Diagnosis:  Pain in right wrist [M25 531]    Post-Op Diagnosis Codes:     * Pain from implanted hardware [T85 848A]    Procedure(s):  Removal of hardware right wrist with Fusion of Long finger carpometacarpal joint, splint application Right Wrist (Right)    Specimen(s):    Order Name Source Comment Collection Info Order Time   ANAEROBIC CULTURE AND GRAM STAIN Joint, Right Wrist  Collected By: Enrique Dan MD 4/10/2018  1:17 PM   FUNGAL CULTURE Joint, Right Wrist  Collected By: Enrique Dan MD 4/10/2018  1:17 PM   CULTURE, TISSUE AND GRAM STAIN Joint, Right Wrist  Collected By: Enrique Dan MD 4/10/2018  1:17 PM   AFB CULTURE WITH STAIN Joint, Right Wrist  Collected By: Enrique Dan MD 4/10/2018  1:17 PM       Estimated Blood Loss:   100 mL      Anesthesia Type:   General w/ Regional    Operative Indications: The patient has a history of a failed right total wrist fusion that was recalcitrant to conservative management  The decision was made to bring the patient to the operating room for removal of hardware and possible wrist fusion  Risks of the procedure were explained which include, but are not limited to bleeding; infection; damage to nerves, arteries,veins, tendons; scar; pain; need for reoperation; failure to give desired result; and risks of anaesthesia  All questions were answered to satisfaction and they were willing to proceed           Operative Findings:  Reaction to the metallic plate, broken screws of which 2 were unable to be removed, fused midcarpal and radiocarpal joint but unfused long finger CMC joint    Complications:   Fracture long finger metacarpal due to poor bone quality    Procedure and Technique:  After the patient, site, and procedure were identified, the patient was brought into the operating room in a supine position  Regional and general anaesthesia were provided  A well padded tourniquet was applied to the extremity, set at 250 mmHg  The  right upper extremity was then prepped and drapped in a normal, sterile, orthopedic fashion  After the patient, site, and procedure were identified attention was turned towards the right wrist   An Esmarch bandage used to exsanguinate the limb the tourniquet was inflated to 250 mm of mercury  Previous longitudinal incision was then made on the dorsal aspect of the right wrist   We dissected down through the skin and subcutaneous tissues elevating full-thickness flaps  The previously transposed extensor pollicis longus tendon was identified and protected  The extensor retinaculum was then opened in the 2nd and 4th compartment  The extensor tendons which included only 3 as she had previous tendon transfers distally were retracted ulnarly and the wrist flexor tendons were retracted in a radial direction  We then opened up the periosteum overlying the plate and identified fluid which was then sent for culture  We identified what appeared to be reactive tissue but no purulence was noted  Utilizing a rongeur and sharp instrumentation, this reactive tissue was meticulously debrided excisional in nature back to healthy bleeding tissue  There were 3 broken screws distally, the screw heads were able to be removed from the metacarpal shaft, the screw into the capitate was removed and the 4 screws into the radius removed followed by the plate  As we were trying to remove this screw into the middle aspect of the long finger metacarpal, the bone quality was soft enough that the metacarpal sustained a fracture  At this point, the decision was made to leave the remaining 2 portions of the screws as removal would risk fragmentation and shattering of the long finger metacarpal as the bone quality was so poor  At this point, the wound was irrigated with 3 L of sterile saline solution  At this point, live fluoroscopic imaging confirmed fused radiocarpal and midcarpal joints but an unfused carpometacarpal joint of the long finger with excessive motion  This carpometacarpal joint was opened meticulously debrided of fibrinous material back to healthy bleeding edges at the base of the long finger metacarpal and at the distal aspect of the capitate  This was packed with bone graft  Two C-wires size 0 045 inches was then inserted through the metacarpal head down through the long finger metacarpal securing the long finger metacarpal fracture, and crossing the carpometacarpal joint of the long finger into the capitate and then extending into the distal radius  This secured our fusion site  The carpometacarpal joint was packed with bone graft, and the fracture site was also packed with bone graft as well as the holes of the previous screws  AP, lateral, and oblique x-rays demonstrated good placement of the wires, the patient had good fixation of both the long finger metacarpal fracture as well as the long finger CMC joint  The tourniquet was then deflated  The pins were then bent, cut, and pin caps were placed  At the completion of the procedure, hemostasis was obtained with cautery and direct pressure  The wounds were copiously irrigated with sterile solution  The wounds were closed with Vicryl and Prolene  Sterile dressings were applied, including Xeroform, gauze, tweeners, webril, ACE, Volar Splint and Dorsal Splint  Please note, all sponge, needle, and instrument counts were correct prior to closure  Loupe magnification was utilized  The patient tolerated the procedure well  We contacted the patient's mother and explained the details of the surgical procedure  Patient will undergo an intra hospital pain management consultation, allergy testing for metallic ions will be pursued       I was present for the entire procedure    Patient Disposition:  PACU  and hemodynamically stable    SIGNATURE: Sinai Melgar MD  DATE: 04/10/18  TIME: 3:10 PM

## 2018-04-10 NOTE — DISCHARGE INSTRUCTIONS
Post Operative Instructions    You have had surgery on your arm today, please read and follow the information below:  · Elevate your hand above your elbow during the next 24-48 hours to help with swelling  · Place your hand and arm over your head with motion at your shoulder three times a day  · Do not apply any cream/ointment/oil to your incisions including antibiotics  · Do not soak your hands in standing water (dishwater, tubs, Jacuzzi's, pools, etc ) until given permission (typically 2-3 weeks after injury)    Call the office if you notice any:  · Increased numbness or tingling of your hand or fingers that is not relieved with elevation  · Increasing pain that is not controlled with medication  · Difficulty chewing, breathing, swallowing  · Chest pains or shortness of breath  · Fever over 101 4 degrees  Bandage: Do NOT remove bandage until follow-up appointment  Motion: Move fingers into a fist 5 times a day, DO NOT move any splinted fingers  Weight bearing status: The operated extremity should be non-weight bearing until further notice  Ice: Ice for 10 minutes every hour as needed for swelling x 24 hours  Sling: Sling for comfort for 2-3 days  Pain medication: Please continue with your pain management physician's recommendations  Follow-up Appointment: 7-10 days  Please call the office if you have any questions or concerns regarding your post-operative care

## 2018-04-10 NOTE — ANESTHESIA POSTPROCEDURE EVALUATION
Post-Op Assessment Note      CV Status:  Stable    Mental Status:  Alert and awake    Hydration Status:  Euvolemic    PONV Controlled:  Controlled    Airway Patency:  Patent    Post Op Vitals Reviewed: Yes          Staff: CRNA     Post-op block assessment: catheter intact        BP   140/85   Temp   98 6   Pulse 92   Resp   18   SpO2   100 4L

## 2018-04-11 ENCOUNTER — ANESTHESIA (OUTPATIENT)
Dept: NEPHROLOGY | Facility: HOSPITAL | Age: 28
End: 2018-04-11
Payer: MEDICARE

## 2018-04-11 ENCOUNTER — ANESTHESIA EVENT (OUTPATIENT)
Dept: NEPHROLOGY | Facility: HOSPITAL | Age: 28
End: 2018-04-11
Payer: MEDICARE

## 2018-04-11 VITALS
BODY MASS INDEX: 29.72 KG/M2 | WEIGHT: 189.38 LBS | RESPIRATION RATE: 18 BRPM | HEIGHT: 67 IN | OXYGEN SATURATION: 100 % | HEART RATE: 99 BPM | SYSTOLIC BLOOD PRESSURE: 134 MMHG | TEMPERATURE: 98.9 F | DIASTOLIC BLOOD PRESSURE: 77 MMHG

## 2018-04-11 VITALS — HEART RATE: 99 BPM | SYSTOLIC BLOOD PRESSURE: 110 MMHG | OXYGEN SATURATION: 97 % | DIASTOLIC BLOOD PRESSURE: 78 MMHG

## 2018-04-11 PROCEDURE — 99024 POSTOP FOLLOW-UP VISIT: CPT | Performed by: ORTHOPAEDIC SURGERY

## 2018-04-11 RX ADMIN — PREDNISONE 10 MG: 10 TABLET ORAL at 08:10

## 2018-04-11 RX ADMIN — CEFAZOLIN SODIUM 2000 MG: 2 SOLUTION INTRAVENOUS at 12:51

## 2018-04-11 RX ADMIN — ONDANSETRON 4 MG: 4 TABLET, ORALLY DISINTEGRATING ORAL at 07:15

## 2018-04-11 RX ADMIN — ACETAMINOPHEN 975 MG: 325 TABLET, FILM COATED ORAL at 05:02

## 2018-04-11 RX ADMIN — ROPIVACAINE HYDROCHLORIDE: 2 INJECTION, SOLUTION EPIDURAL; INFILTRATION at 13:56

## 2018-04-11 RX ADMIN — OXYCODONE HYDROCHLORIDE 30 MG: 10 TABLET ORAL at 12:50

## 2018-04-11 RX ADMIN — FAMOTIDINE 20 MG: 20 TABLET, FILM COATED ORAL at 08:10

## 2018-04-11 RX ADMIN — GABAPENTIN 400 MG: 400 CAPSULE ORAL at 08:10

## 2018-04-11 RX ADMIN — ALPRAZOLAM 0.5 MG: 0.5 TABLET ORAL at 08:13

## 2018-04-11 RX ADMIN — HYDROCORTISONE SODIUM SUCCINATE 50 MG: 100 INJECTION, POWDER, FOR SOLUTION INTRAMUSCULAR; INTRAVENOUS at 05:02

## 2018-04-11 RX ADMIN — ESCITALOPRAM OXALATE 20 MG: 20 TABLET ORAL at 08:10

## 2018-04-11 RX ADMIN — CEFAZOLIN SODIUM 2000 MG: 2 SOLUTION INTRAVENOUS at 05:02

## 2018-04-11 RX ADMIN — NORETHINDRONE ACETATE 5 MG: 5 TABLET ORAL at 08:10

## 2018-04-11 RX ADMIN — HYDROMORPHONE HYDROCHLORIDE 0.5 MG: 1 INJECTION, SOLUTION INTRAMUSCULAR; INTRAVENOUS; SUBCUTANEOUS at 10:44

## 2018-04-11 RX ADMIN — OXYCODONE HYDROCHLORIDE 30 MG: 10 TABLET ORAL at 07:19

## 2018-04-11 RX ADMIN — OXYCODONE HYDROCHLORIDE 30 MG: 10 TABLET ORAL at 02:26

## 2018-04-11 RX ADMIN — ACETAMINOPHEN 975 MG: 325 TABLET, FILM COATED ORAL at 12:50

## 2018-04-11 NOTE — PROGRESS NOTES
Received call from ortho dr stating they are not Sunil Fitzpatrick send pt home on any additional pain meds and that acute pain mgmt will be up to see pt and speak with her

## 2018-04-11 NOTE — DISCHARGE SUMMARY
Discharge Summary - Orthopedics   [facility] 28 y o  female MRN: [id number]  [facility]    Attending Physician: Josh Hsieh    Admitting diagnosis: Pain in right wrist [M25 531]    Discharge diagnosis: Pain in right wrist [M25 531]    Date of admission: 4/10/2018    Date of discharge: 04/11/18    Procedure: Right wrist Removal of hardware and placement of 2 K wires     HPI: 28 y o  female with a history of Right wrist failure of hardware with concern for nickel allergy who has been seen by Dr Josh Hsieh in clinic  Pt has failed previous non operative therapies and was scheduled for Removal of Hardware of right wrist   Prior to surgery the risks and benefits of surgery were explained and informed consent was obtained  Hospital course: Pt was taken to the OR on 4/10/2018  Durign surgery, patient was found to have an iatrogenic long finger metacarpal fracture, for which 2 K wires were placed for fixation  Intra-operative cultures were taken to monitor for infection  After surgery, patient was discharged to the PACU in a stable condition and was transferred to the floor  On discharge date pt was cleared by PT and the medicine team and determined to be safe for discharge  Daily discussion was had with the patient, nursing staff, orthopaedic team, and family members if present  All questions were answered to the patients satisifaction        0  Lab Value Date/Time   HGB 14 2 12/08/2017 1204   HGB 15 1 12/01/2017 1104   HGB 14 2 11/18/2017 1852   HGB 11 7 10/16/2017 1230   HGB 11 5 10/06/2017 2243   HGB 12 7 10/04/2017 1136   HGB 12 7 09/27/2017 1324   HGB 12 6 09/01/2017 0902   HGB 13 1 07/13/2017 1437   HGB 11 2 (L) 04/05/2017 0448   HGB 13 1 02/25/2017 1328   HGB 13 0 02/12/2017 1502   HGB 11 9 11/11/2015 1300   HGB 10 8 (L) 10/27/2015 1257   HGB 11 6 09/12/2015 1709   HGB 11 9 04/02/2015 1332   HGB 9 5 (L) 07/21/2014 0435   HGB 8 4 (L) 07/20/2014 0600   HGB 8 7 (L) 07/19/2014 0440   HGB 9 2 (L) 07/18/2014 1450   HGB 11 9 06/29/2014 1320   HGB 10 3 (L) 06/10/2014 1210   HGB 11 8 03/14/2014 1443       Discharge Instructions:   · NWB RUE in splint  · Keep dressings clean and dry at all times   · Physical therapy  · Follow-up as scheduled, otherwise call for appt  Discharge Medications: For the complete list of discharge medications, please refer to the patient's medication reconciliation

## 2018-04-11 NOTE — TELEPHONE ENCOUNTER
Patient is calling to ask why she is still in the hospital and not being discharged? She is waiting for the pain medication  She is asking what she is supposed to do?

## 2018-04-11 NOTE — SOCIAL WORK
Cm met with pt and aware cm role at discharge  Pt for d/c tonight and will take a taxi home   No cm needs   CM reviewed d/c planning process including the following: identifying help at home, patient preference for d/c planning needs, Discharge Lounge, [facility] Meds to Bed program, availability of treatment team to discuss questions or concerns patient and/or family may have regarding understanding medications and recognizing signs and symptoms once discharged  CM also encouraged patient to follow up with all recommended appointments after discharge  Patient advised of importance for patient and family to participate in managing patients medical well being

## 2018-04-11 NOTE — PROGRESS NOTES
Pt received from OR after having nerve block replaced, catheter intact but no ambu pump intact   Per report given by OR RN "they would attach it if it came in time, otherwise they or anesthesia will have to come up and reattach it "

## 2018-04-11 NOTE — CASE MANAGEMENT
Initial Clinical Review    Age/Sex: 28 y o  female     4/11 had peripheral block done 2nd to severe pain  Surgery Date: 4/10/18    Procedure:   SURGERY DATE: 04/10/18     Surgeon(s) and Role:     * Wesly Pedraza MD - Primary     * Boone Arnold - Assisting     Pre-Op Diagnosis:  Pain in right wrist [M25 531]     Post-Op Diagnosis Codes:     * Pain from implanted hardware [T85 848A]     Procedure(s):  Removal of hardware right wrist with Fusion of Long finger carpometacarpal joint, splint application Right Wrist (Right)     Anesthesia: General w/ regional    Admission Orders: Date/Time/Statement: [facility] 4/10/18 @ 1539    Vital Signs: /79 (BP Location: Left arm)   Pulse 105   Temp 98 5 °F (36 9 °C) (Oral)   Resp 18   Ht 5' 7" (1 702 m)   Wt 85 9 kg (189 lb 6 oz)   SpO2 99%   BMI 29 66 kg/m²     Diet:        Diet Orders            Start     Ordered    04/10/18 1530  Diet Regular; Regular House  Diet effective now     Question Answer Comment   Diet Type Regular    Regular Regular House    RD to adjust diet per protocol? Yes        04/10/18 1539        Mobility:   NWB to arm   Activity as trav     DVT Prophylaxis:     Pain Control: PRN Meds:  ALPRAZolam x1 4/11    Ondansetron PO x 2 in last 24 hr    oxyCODONE PO x 4 in last 24 hrs  Pain rating from a low of 6 to high of 9

## 2018-04-11 NOTE — PROGRESS NOTES
Progress Note - Anesthesia Acute Pain Management    Lilo Penaloza 28 y o  female MRN: [id number]  Unit/Bed#: [facility] 832-01 Encounter: [id number]      SURGERY DATE: 4/10/2018  Post-Op Diagnosis Codes:     * Pain from implanted hardware [T85 848A]    Assessment:   28 y o  female status post Procedure(s):  Removal of hardware right wrist with Fusion of Long finger carpometacarpal joint, splint application Right Wrist POD# 1    Principal Problem:    Pain in right wrist  Active Problems:    Rheumatoid arthritis ([address] )    Plan:   1  Acute on chronic pain in the setting of opioid dependence-  Currently rates pain 08/10, to her right arm  Describes as a burning sensation and soreness  States she is getting no relief from the block/catheter  States she has had "these blocks" a few times before and knows what to expect  I spoke to primary team recommend a one time dose of IV dilaudid 0 5mg once  Will have anesthesia come to evaluate patient and block/catheter  Possible Plan for discontinuation of current block/catheter and insertion of a new block today  Start times one dose   Dilaudid 0 5 mg IV times one  Continue  Tylenol 975 mg p o  Q 8 hours scheduled  Gabapentin 400 mg p o  B i d  Xanax 0 5 mg p o  T i d  P r n  Oxycodone IR 30 mg p o  Q 4 hours p r n     2  Plan of care- I spoke to ortho and discussed plan  Agree with one time dose of IV dilaudid, awaiting anesthesia to come to room to troubleshoot catheter  3  Discharge needs per ortho service  Pain specialist is [facility]       [facility] will continue to follow; please call NARDA7 / Zonia Gaspar ( [facility] [phone]) with any questions      Pain Course:  Current pain location(s): right arm   Pain Scale:  7-8/10    Meds/Allergies   all current active meds have been reviewed    No Known Allergies    Objective     Physical Exam: /70 (BP Location: Left arm)   Pulse 89   Temp 98 9 °F (37 2 °C) (Oral)   Resp 18   Ht 5' 7" (1 702 m)   Wt 85 9 kg (189 lb 6 oz)   SpO2 96%   BMI 29 66 kg/m²   Gen: Well appearing and well nourished, NAD  Skin: Warm, Dry   HEENT:  DIXON, EOMI  Pul: non labored   Abd: round   Ext:  No cyanosis or edema  Neuro: AAOx3; CN II-XII grossly intact; no gross focal neurologic deficits  Psych:  upset    Labs:     CHEM - BUN/Cr/glu/ALT/AST/amyl/lip:7/0 55*/--/--/--/--/-- (04/10 1624)     LYTES - Na/K/Cl/CO2: 140/4 2/109*/27 (04/10 1624)      SIGNATURE: HAMZAH Simons  DATE: April 11, 2018  TIME: 9:05 AM    Please note that the [facility] provides consultative services regarding pain management only  With the exception of ketamine and epidural infusions and except when indicated, final decisions regarding starting or changing doses of analgesic medications are at the discretion of the consulting service  Off hours consultation and/or medication management is generally not available

## 2018-04-11 NOTE — PROGRESS NOTES
Orthopedics   Earl Clay 28 y o  female MRN: [id number]  Unit/Bed#: [facility] 832-01      Subjective:  28 y  o female post operative day 1 status post right wrist removal of hardware, pinning of long finger metacarpal to the distal radius    No fevers or chills overnight    Labs:    0  Lab Value Date/Time   HCT 43 5 12/08/2017 1204   HCT 45 5 12/01/2017 1104   HCT 42 6 11/18/2017 1852   HCT 38 4 11/11/2015 1300   HCT 34 6 (L) 10/27/2015 1257   HCT 37 3 04/02/2015 1332   HGB 14 2 12/08/2017 1204   HGB 15 1 12/01/2017 1104   HGB 14 2 11/18/2017 1852   HGB 11 9 11/11/2015 1300   HGB 10 8 (L) 10/27/2015 1257   HGB 11 6 09/12/2015 1709   HGB 11 9 04/02/2015 1332   INR 1 07 06/10/2014 1210   WBC 10 37 (H) 12/08/2017 1204   WBC 11 86 (H) 12/01/2017 1104   WBC 8 34 11/18/2017 1852   WBC 9 97 11/11/2015 1300   WBC 10 94 (H) 10/27/2015 1257   WBC 8 96 04/02/2015 1332   ESR 16 09/27/2017 1324   ESR 20 11/11/2015 1300   CRP 20 9 (H) 09/27/2017 1324   CRP 53 9 (H) 11/11/2015 1300       Meds:    Current Facility-Administered Medications:     acetaminophen (TYLENOL) tablet 975 mg, 975 mg, Oral, Q8H [facility], Marco Antonio Lopez MD, 975 mg at 04/11/18 1250    ALPRAZolam (XANAX) tablet 0 5 mg, 0 5 mg, Oral, TID PRN, Bhavesh Keys PA-C, 0 5 mg at 04/11/18 0813    ceFAZolin (ANCEF) IVPB (premix) 2,000 mg, 2,000 mg, Intravenous, Q8H, Roxana Samuel PA-C, Stopped at 04/11/18 1321    escitalopram (LEXAPRO) tablet 20 mg, 20 mg, Oral, QAM, Roxana Samuel PA-C, 20 mg at 04/11/18 0810    famotidine (PEPCID) tablet 20 mg, 20 mg, Oral, BID, Roxana Samuel PA-C, 20 mg at 04/11/18 0810    gabapentin (NEURONTIN) capsule 400 mg, 400 mg, Oral, BID, Roxana Samuel PA-C, 400 mg at 04/11/18 0810    hydrOXYzine HCL (ATARAX) tablet 25 mg, 25 mg, Oral, BID, Roxana Samuel PA-C    lactated ringers infusion, 100 mL/hr, Intravenous, Continuous, Jesus Joel CRNA    lactated ringers infusion, 100 mL/hr, Intravenous, Continuous, [facility] norethindrone (AYGESTIN) tablet 5 mg, 5 mg, Oral, Daily, Roxana Samuel PA-C, 5 mg at 04/11/18 0810    ondansetron (ZOFRAN-ODT) dispersible tablet 4 mg, 4 mg, Oral, Q6H PRN, Rosalia Hanson, 4 mg at 04/11/18 0715    oxyCODONE (ROXICODONE) immediate release tablet 30 mg, 30 mg, Oral, Q4H PRN, Manuel Silva MD, 30 mg at 04/11/18 1250    predniSONE tablet 10 mg, 10 mg, Oral, Daily, Roxana Samuel PA-C, 10 mg at 04/11/18 0810    ropivacaine (NAROPIN) 0 2 % 600 mL in elastomeric reservoir continuous peripheral nerve block, , Subcutaneous, Continuous, Kinsey Smith MD, Last Rate: 8 mL/hr at 04/11/18 1356    sodium chloride 0 9 % infusion, 125 mL/hr, Intravenous, Continuous, Servando Mary MD, Stopped at 04/11/18 3681    Blood Culture:   No results found for: BLOODCX    Wound Culture:   No results found for: WOUNDCULT    Ins and Outs:  I/O last 24 hours: In: 3819 2 [P O :600; I V :3079 2; IV Piggyback:140]  Out: 3500 [Urine:3400; Blood:100]          Physical:  Vitals:    04/11/18 1500   BP: 134/77   Pulse: 99   Resp: 18   Temp: 98 9 °F (37 2 °C)   SpO2: 100%     right upper extremity  · Dressings clean dry intact  · Sensation intact to axillary, musculocutaneous, radial, ulna, median nerves  · Motor intact to axillary, musculocutaneous, radial, ulna, median nerves  · Unable to extend the long finger  · 2+ Radial pulse    _*_*_*_*_*_*_*_*_*_*_*_*_*_*_*_*_*_*_*_*_*_*_*_*_*_*_*_*_*_*_*_*_*_*_*_*_*_*_*_*_*    Assessment: 27 y  o female post operative day 1 status post right wrist removal of hardware, pinning of long finger metacarpal to the distal radius     Plan:  · Nonweight bearing to  right upper extremity  · Up out of bed  · Keep Dressing Clean Dry Intact  · DVT prophylaxis  · Analgesics  · Dispo: [id number] Vandana Fischer for discharge from ortho perspective  · Will continue to assess for acute blood loss anemia      Rosalia Hanson

## 2018-04-11 NOTE — PROGRESS NOTES
Paged and spoke with ortho resident on call in regards to when pt was going to be d/c'd now that ropivicaine ambu pump is in place  Ortho  stating he is waiting for acute pain to get back in touch with him to coordinate what pain meds/if any pt should be d/c'd home with  Pt made aware

## 2018-04-11 NOTE — PROGRESS NOTES
Pt very upset, stating when Dr Kathy Milligan was in to see her, he said she could get IV pain meds  No IV pain meds ordered, pt just recently medicated with PRN oxy  Explained this to pt and pt irratte, hysterical, and crying  Paged Dr Kathy Milligan who stated that was not the conversation he had with pt, but that he did tell her pain mgmt did mention in their notes to hold off on IV dilaudid but If need be they suggested 0 5mg IV dilaudid  Dr Kathy Milligan asking that I page and speak with pain mgmt  Pain mgmt paged and spoke with Velma Rebolledo, NP made her aware of situation, stated she would look into pts situation and talk with rest of team to decide if IV dilaudid would be ordered or not  Pt made aware of conversations, requesting to speak with charge RN, Bhavik Mckeon made aware  Pain mgmt did call back and suggest giving pt her PRN xanax, which was recently just given  Will continue to monitor  Per discussion with Dr Obdulia Estrada is for pt to be d/c'd today  Pt also aware of this

## 2018-04-11 NOTE — ANESTHESIA PROCEDURE NOTES
Peripheral Block    Patient location during procedure: holding area  Start time: 4/11/2018 11:11 AM  Reason for block: procedure for pain, at surgeon's request and post-op pain management  Staffing  Anesthesiologist: Abby Carter  Performed: anesthesiologist   Preanesthetic Checklist  Completed: patient identified, site marked, surgical consent, pre-op evaluation, timeout performed, IV checked, risks and benefits discussed and monitors and equipment checked  Peripheral Block  Patient position: supine  Prep: ChloraPrep and site prepped and draped  Patient monitoring: continuous pulse ox and frequent blood pressure checks  Block type: infraclavicular  Laterality: right  Injection technique: single-shot and catheter  Procedures: ultrasound guided  Local infiltration: ropivacaine (1:400K Epi)  Infiltration strength: 0 5 %  Dose: 20 mL  Needle  Needle localization: ultrasound guidance  Catheter type: contiplex continuous nerve block catheter with needle    Catheter at skin depth: 6 cm  Assessment  Injection assessment: incremental injection, local visualized surrounding nerve on ultrasound, negative aspiration for heme and no paresthesia on injection  Heart rate change: no  Slow fractionated injection: yes  Post-procedure:  sterile dressing applied  patient tolerated the procedure well with no immediate complications  Additional Notes  NO CHARGE FOR THIS BLOCK AS IT IS A REDO

## 2018-04-12 LAB — 1,25(OH)2D3 SERPL-MCNC: 48 PG/ML (ref 19.9–79.3)

## 2018-04-13 DIAGNOSIS — M96.0 PSEUDARTHROSIS AFTER FUSION OR ARTHRODESIS: Primary | ICD-10-CM

## 2018-04-13 LAB
BACTERIA SPEC ANAEROBE CULT: NO GROWTH
BACTERIA TISS AEROBE CULT: NO GROWTH
GRAM STN SPEC: NORMAL

## 2018-04-17 LAB — MISCELLANEOUS LAB TEST RESULT: NORMAL

## 2018-04-25 ENCOUNTER — OFFICE VISIT (OUTPATIENT)
Dept: OBGYN CLINIC | Facility: HOSPITAL | Age: 28
End: 2018-04-25

## 2018-04-25 ENCOUNTER — HOSPITAL ENCOUNTER (OUTPATIENT)
Dept: RADIOLOGY | Facility: HOSPITAL | Age: 28
Discharge: HOME | End: 2018-04-25
Attending: ORTHOPAEDIC SURGERY
Payer: MEDICARE

## 2018-04-25 VITALS
WEIGHT: 190.6 LBS | DIASTOLIC BLOOD PRESSURE: 79 MMHG | BODY MASS INDEX: 29.91 KG/M2 | HEART RATE: 118 BPM | SYSTOLIC BLOOD PRESSURE: 114 MMHG | HEIGHT: 67 IN

## 2018-04-25 DIAGNOSIS — Z48.89 AFTERCARE FOLLOWING SURGERY: Primary | ICD-10-CM

## 2018-04-25 DIAGNOSIS — L23.0 ALLERGIC REACTION TO NICKEL: ICD-10-CM

## 2018-04-25 DIAGNOSIS — Z48.89 AFTERCARE FOLLOWING SURGERY: ICD-10-CM

## 2018-04-25 PROCEDURE — 73110 X-RAY EXAM OF WRIST: CPT

## 2018-04-25 PROCEDURE — 99024 POSTOP FOLLOW-UP VISIT: CPT | Performed by: ORTHOPAEDIC SURGERY

## 2018-04-25 NOTE — PROGRESS NOTES
27 y o female 1st post-op visit 15 days s/p removal of hardware right wrist with fusion of long finger CMC joint from 4/10/18  Negative culture results  Failed primary fusion from 4/4/17      Review of Systems  Review of systems negative unless otherwise specified in HPI    Past Medical History  Past Medical History:   Diagnosis Date    Anemia     Anxiety     Depression     Iron deficiency     Irregular menses     Menorrhagia     Migraines     Neuropathy     Osteopenia     PONV (postoperative nausea and vomiting)     [facility] [address] [facility]     Rheumatoid arthritis [facility] [address] [facility]     Scratches     On back, stomach and legs from scratching due to urticaria    Vasculitis (HCC)        Past Surgical History  Past Surgical History:   Procedure Laterality Date    CHOLECYSTECTOMY      Laparoscopic    DILATION AND CURETTAGE OF UTERUS      JOINT REPLACEMENT Bilateral     knees    MN FUSION/GRAFT WRIST JOINT Left 4/4/2017    Procedure: WRIST FUSION / Nola Mercer ;  Surgeon: Beck Tyler MD;  [facility];  Service: Orthopedics    MN FUSION/GRAFT WRIST JOINT Right 4/10/2018    Procedure: Removal of hardware right wrist with Fusion of Long finger carpometacarpal joint, splint application Right Wrist;  Surgeon: Beck Tyler MD;  [facility];  Service: Orthopedics    MN HYSTEROSCOPY,W/ENDO BX N/A 12/1/2017    Procedure: DILATATION AND CURETTAGE (D&C) WITH HYSTEROSCOPY;  Surgeon: William Johnson DO;  [facility];  Service: Gynecology    MN LAP,CHOLECYSTECTOMY N/A 3/14/2017    Procedure: CHOLECYSTECTOMY LAPAROSCOPIC;  Surgeon: Maxi Martínez DO;  [facility];  Service: General    REPLACEMENT TOTAL KNEE Bilateral     WRIST SURGERY Right     For arthritis    WRIST SURGERY Left 4/4/2017    Procedure: ARTHROPLASTY WRIST ULNAR HEAD ARTHROPLASTY - INTEGRA SIZE 5 5 MM, 16 HEAD;  Surgeon: Beck Tyler MD;  [facility];  Service:        Current Medications  Current Outpatient Prescriptions on File Prior to Visit   Medication Sig Dispense Refill    acetaminophen (TYLENOL) 500 mg tablet Take 500 mg by mouth every 6 (six) hours as needed for mild pain      ALPRAZolam (XANAX) 0 25 mg tablet Take 0 5 mg by mouth 3 (three) times a day as needed for anxiety        Calcium Carb-Cholecalciferol (CALCIUM 1000 + D PO) Take 1 tablet by mouth daily      ergocalciferol (VITAMIN D2) 50,000 units Take 50,000 Units by mouth once a week      escitalopram (LEXAPRO) 20 mg tablet Take 20 mg by mouth every morning        gabapentin (NEURONTIN) 400 mg capsule 400 mg 2 (two) times a day 800 mg at bed time   1    hydrOXYzine HCL (ATARAX) 25 mg tablet Take 25 mg by mouth 2 (two) times a day      NON FORMULARY Reports Iron Infusion      norethindrone (AYGESTIN) 5 mg tablet Take 5 mg by mouth daily      ondansetron (ZOFRAN) 4 mg tablet Take 4 mg by mouth every 8 (eight) hours as needed for nausea or vomiting      oxyCODONE (ROXICODONE) 30 MG immediate release tablet Take 30 mg by mouth every 8 (eight) hours as needed for moderate pain      oxyCODONE-acetaminophen (PERCOCET) 5-325 mg per tablet Take 1 tablet by mouth every 6 (six) hours as needed for moderate pain or severe pain Max Daily Amount: 4 tablets 20 tablet 0    predniSONE 5 mg tablet Take 10 mg by mouth daily        ranitidine (ZANTAC) 150 mg tablet Take 150 mg by mouth 2 (two) times a day      RiTUXimab (RITUXAN IV) Infuse into a venous catheter Reports that she is not due again for 4-6 months  Previous  Infusions this past September  Unsure of dose   ibuprofen (MOTRIN) 600 mg tablet Take 1 tablet by mouth every 6 (six) hours as needed for mild pain for up to 3 days 12 tablet 0     No current facility-administered medications on file prior to visit          Recent Labs [facility])    0  Lab Value Date/Time   HCT 43 5 12/08/2017 1204   HCT 38 4 11/11/2015 1300   HGB 14 2 12/08/2017 1204   HGB 11 9 11/11/2015 1300   WBC 10 37 (H) 12/08/2017 1204   WBC 9 97 11/11/2015 1300   INR 1 07 06/10/2014 1210   ESR 16 09/27/2017 1324   ESR 20 11/11/2015 1300   CRP 20 9 (H) 09/27/2017 1324   CRP 53 9 (H) 11/11/2015 1300   GLUCOSE 110 04/10/2018 1624   GLUCOSE 135 11/11/2015 1300         Physical exam  · General: Awake, Alert, Oriented  · Eyes: Pupils equal, round and reactive to light  · Heart: regular rate and rhythm  · Lungs: No audible wheezing  · Abdomen: soft  Right wrist/hand: incision is healed nicely with her 2 pins intact  Good STLT, no evidence of infection     Imaging  Right wrist x-rays taken today show her previous wrist fusion hardware was removed except for the 2 retained broken screws     Procedure  SAC applied today      Assessment/Plan:   28 y  o female 15 days s/p removal of hardware right wrist with fusion of long finger CMC joint from 4/10/18  [address] applied today  Advised minimal movement at her fingers  Refer for allergy testing for possible nickel allergy     Re-check in 4 weeks, cast off and pins out for x-rays  Scribe Attestation    I,:   Michaela Walker am acting as a scribe while in the presence of the attending physician :        I,:   Angelika Chase MD personally performed the services described in this documentation    as scribed in my presence :

## 2018-05-14 LAB — FUNGUS SPEC CULT: NORMAL

## 2018-05-18 ENCOUNTER — TELEPHONE (OUTPATIENT)
Dept: OBGYN CLINIC | Facility: HOSPITAL | Age: 28
End: 2018-05-18

## 2018-05-18 NOTE — TELEPHONE ENCOUNTER
Dr Hayden Lang patient - cast got hot coffee spilled in it a few days ago  She developed some blisters and now they have popped and she is having irritation and swelling  Dr Hayden Lang was consulted and he advised patient to go to ER for evaluation  Patient was given this information and she verbalized understanding  She will go to the [facility]

## 2018-05-23 ENCOUNTER — HOSPITAL ENCOUNTER (OUTPATIENT)
Dept: RADIOLOGY | Facility: HOSPITAL | Age: 28
Discharge: HOME | End: 2018-05-23
Attending: ORTHOPAEDIC SURGERY
Payer: MEDICARE

## 2018-05-23 ENCOUNTER — OFFICE VISIT (OUTPATIENT)
Dept: OBGYN CLINIC | Facility: HOSPITAL | Age: 28
End: 2018-05-23

## 2018-05-23 VITALS
DIASTOLIC BLOOD PRESSURE: 77 MMHG | SYSTOLIC BLOOD PRESSURE: 115 MMHG | WEIGHT: 195 LBS | BODY MASS INDEX: 30.61 KG/M2 | HEIGHT: 67 IN | HEART RATE: 108 BPM

## 2018-05-23 DIAGNOSIS — M25.531 PAIN IN RIGHT WRIST: ICD-10-CM

## 2018-05-23 DIAGNOSIS — Z48.89 AFTERCARE FOLLOWING SURGERY: ICD-10-CM

## 2018-05-23 DIAGNOSIS — Z48.89 AFTERCARE FOLLOWING SURGERY: Primary | ICD-10-CM

## 2018-05-23 PROCEDURE — 73110 X-RAY EXAM OF WRIST: CPT

## 2018-05-23 PROCEDURE — 99024 POSTOP FOLLOW-UP VISIT: CPT | Performed by: ORTHOPAEDIC SURGERY

## 2018-05-23 NOTE — PROGRESS NOTES
ASSESSMENT/PLAN:    Diagnoses and all orders for this visit:    Aftercare following surgery  -     XR wrist 3+ vw right; Future  -     Ambulatory referral to PT/OT hand therapy; Future    Pain in right wrist  -     Ambulatory referral to PT/OT hand therapy; Future        Assessment:   Status post right wrist arthrodesis    Plan:   Resume activities as tolerated and therapy  Forearm based wrist brace  Physical hand therapy    Follow Up:  6  week(s)    To Do Next Visit:  Re-evaluate range of motion of fingers    General Discussions:      Operative Discussions:      _____________________________________________________  CHIEF COMPLAINT:  Chief Complaint   Patient presents with    Right Hand - Post-op         SUBJECTIVE:  Lynne Rome is a 28y o  year old female who presents for follow up regarding right wrist arthrodesis  She did have discomfort in the cast   We advised her to go to the emergency department to have it replaced however she did not do this      PAST MEDICAL HISTORY:  Past Medical History:   Diagnosis Date    Anemia     Anxiety     Depression     Iron deficiency     Irregular menses     Menorrhagia     Migraines     Neuropathy     Osteopenia     PONV (postoperative nausea and vomiting)     RA (rheumatoid arthritis) ([address]     Rheumatoid arthritis ([address]     Scratches     On back, stomach and legs from scratching due to urticaria    Vasculitis ([address] )        PAST SURGICAL HISTORY:  Past Surgical History:   Procedure Laterality Date    CHOLECYSTECTOMY      Laparoscopic    DILATION AND CURETTAGE OF UTERUS      JOINT REPLACEMENT Bilateral     knees    MO FUSION/GRAFT WRIST JOINT Left 4/4/2017    Procedure: WRIST FUSION / [place] ;  Surgeon: Piedad Calderón MD;  [facility];  Service: Orthopedics    MO FUSION/GRAFT WRIST JOINT Right 4/10/2018    Procedure: Removal of hardware right wrist with Fusion of Long finger carpometacarpal joint, splint application Right Wrist; Surgeon: Gerardo Mixon MD;  [facility];  Service: Orthopedics    WA HYSTEROSCOPY,W/ENDO BX N/A 12/1/2017    Procedure: DILATATION AND CURETTAGE (D&C) WITH HYSTEROSCOPY;  Surgeon: Aguila Hernandez DO;  [facility];  Service: Gynecology    WA LAP,CHOLECYSTECTOMY N/A 3/14/2017    Procedure: CHOLECYSTECTOMY LAPAROSCOPIC;  Surgeon: Philomena Arellano DO;  [facility];  Service: General    REPLACEMENT TOTAL KNEE Bilateral     WRIST SURGERY Right     For arthritis    WRIST SURGERY Left 4/4/2017    Procedure: ARTHROPLASTY WRIST ULNAR HEAD ARTHROPLASTY - INTEGRA SIZE 5 5 MM, 16 HEAD;  Surgeon: Gerardo Mixon MD;  [facility];  Service:        FAMILY HISTORY:  Family History   Problem Relation Age of Onset    Hypertension Mother    [facility] Rheum arthritis Mother     Depression Mother     Anxiety disorder Mother        SOCIAL HISTORY:  Social History   Substance Use Topics    Smoking status: Former Smoker     Packs/day: 0 25     Years: 3 00     Types: Cigarettes     Quit date: 2010    Smokeless tobacco: Never Used    Alcohol use Yes      Comment: Rare- once or twice yearly       MEDICATIONS:    Current Outpatient Prescriptions:     acetaminophen (TYLENOL) 500 mg tablet, Take 500 mg by mouth every 6 (six) hours as needed for mild pain, Disp: , Rfl:     ALPRAZolam (XANAX) 0 25 mg tablet, Take 0 5 mg by mouth 3 (three) times a day as needed for anxiety  , Disp: , Rfl:     Calcium Carb-Cholecalciferol (CALCIUM 1000 + D PO), Take 1 tablet by mouth daily, Disp: , Rfl:     ergocalciferol (VITAMIN D2) 50,000 units, Take 50,000 Units by mouth once a week, Disp: , Rfl:     escitalopram (LEXAPRO) 20 mg tablet, Take 20 mg by mouth every morning  , Disp: , Rfl:     gabapentin (NEURONTIN) 400 mg capsule, 400 mg 2 (two) times a day 800 mg at bed time , Disp: , Rfl: 1    hydrOXYzine HCL (ATARAX) 25 mg tablet, Take 25 mg by mouth 2 (two) times a day, Disp: , Rfl:     NON FORMULARY, Reports Iron Infusion, Disp: , Rfl:     norethindrone (AYGESTIN) 5 mg tablet, Take 5 mg by mouth daily, Disp: , Rfl:     ondansetron (ZOFRAN) 4 mg tablet, Take 4 mg by mouth every 8 (eight) hours as needed for nausea or vomiting, Disp: , Rfl:     oxyCODONE (ROXICODONE) 30 MG immediate release tablet, Take 30 mg by mouth every 8 (eight) hours as needed for moderate pain, Disp: , Rfl:     oxyCODONE-acetaminophen (PERCOCET) 5-325 mg per tablet, Take 1 tablet by mouth every 6 (six) hours as needed for moderate pain or severe pain Max Daily Amount: 4 tablets, Disp: 20 tablet, Rfl: 0    predniSONE 5 mg tablet, Take 10 mg by mouth daily  , Disp: , Rfl:     ranitidine (ZANTAC) 150 mg tablet, Take 150 mg by mouth 2 (two) times a day, Disp: , Rfl:     RiTUXimab (RITUXAN IV), Infuse into a venous catheter Reports that she is not due again for 4-6 months  Previous  Infusions this past September  Unsure of dose , Disp: , Rfl:     ibuprofen (MOTRIN) 600 mg tablet, Take 1 tablet by mouth every 6 (six) hours as needed for mild pain for up to 3 days, Disp: 12 tablet, Rfl: 0    ALLERGIES:  No Known Allergies    REVIEW OF SYSTEMS:  Pertinent items are noted in HPI  A comprehensive review of systems was negative      LABS:  HgA1c: No results found for: HGBA1C  BMP:   Lab Results   Component Value Date    GLUCOSE 110 04/10/2018    CALCIUM 7 9 04/10/2018     04/10/2018    K 4 2 04/10/2018    CO2 27 04/10/2018     (H) 04/10/2018    BUN 7 04/10/2018    CREATININE 0 55 (L) 04/10/2018           _____________________________________________________  PHYSICAL EXAMINATION:  General: well developed and well nourished, alert, oriented times 3 and appears comfortable  Psychiatric: Normal  HEENT: Trachea Midline, No torticollis  Cardiovascular: No discernable arrhythmia  Pulmonary: No wheezing or stridor  Skin:  2 pins in place the 2 pins in the metacarpal of the long finger  Neurovascular: Sensation Intact to the Median, Ulnar, Radial Nerve, Motor Intact to the Median, Ulnar, Radial Nerve and Pulses Intact    MUSCULOSKELETAL EXAMINATION:  Right:  Pin sites are clean     _____________________________________________________  STUDIES REVIEWED:  I have personally reviewed pertinent films in PACS and my interpretation is Interval healing and fusion of the right wrist with pain in place        PROCEDURES PERFORMED:  Procedures    2 pins were removed from the right the long finger metacarpal

## 2018-05-30 LAB
MYCOBACTERIUM SPEC CULT: NORMAL
RHODAMINE-AURAMINE STN SPEC: NORMAL

## 2018-06-08 ENCOUNTER — TELEPHONE (OUTPATIENT)
Dept: OBGYN CLINIC | Facility: HOSPITAL | Age: 28
End: 2018-06-08

## 2018-06-11 ENCOUNTER — TELEPHONE (OUTPATIENT)
Dept: OBGYN CLINIC | Facility: CLINIC | Age: 28
End: 2018-06-11

## 2018-06-11 NOTE — TELEPHONE ENCOUNTER
----- Message from Ghislaine Sgeura DO sent at 6/10/2018  5:24 PM EDT -----  Regarding: Hysterectomy   Would be happy to schedule hysterectomy, last seen in January and she was supposed to follow up in three months  Recommend she restart the medication and get in for F/U and her earliest convenience  ----- Message -----  From: Lachelle Mar MA  Sent: 6/6/2018   4:01 PM  To: Ghislaine Segura DO    Patient states she wants to talk about scheduling a hysterectomy because the medication is not working  When asked the last time she was bleeding, she said she's not because she is on medicine but a couple of weeks ago she stopped it for a week and all her symptoms came back

## 2018-06-15 DIAGNOSIS — N93.9 ABNORMAL UTERINE BLEEDING: Primary | ICD-10-CM

## 2018-06-18 ENCOUNTER — OFFICE VISIT (OUTPATIENT)
Dept: OBGYN CLINIC | Facility: CLINIC | Age: 28
End: 2018-06-18
Payer: MEDICARE

## 2018-06-18 VITALS
HEIGHT: 67 IN | SYSTOLIC BLOOD PRESSURE: 116 MMHG | BODY MASS INDEX: 29.7 KG/M2 | WEIGHT: 189.2 LBS | DIASTOLIC BLOOD PRESSURE: 70 MMHG

## 2018-06-18 DIAGNOSIS — N94.10 DYSPAREUNIA, FEMALE: ICD-10-CM

## 2018-06-18 DIAGNOSIS — N93.9 ABNORMAL UTERINE BLEEDING: Primary | ICD-10-CM

## 2018-06-18 DIAGNOSIS — N94.6 DYSMENORRHEA: ICD-10-CM

## 2018-06-18 DIAGNOSIS — N93.9 ABNORMAL UTERINE BLEEDING: ICD-10-CM

## 2018-06-18 DIAGNOSIS — M05.731 RHEUMATOID ARTHRITIS INVOLVING RIGHT WRIST WITH POSITIVE RHEUMATOID FACTOR (HCC): ICD-10-CM

## 2018-06-18 DIAGNOSIS — R10.2 PELVIC PAIN IN FEMALE: ICD-10-CM

## 2018-06-18 PROCEDURE — 99214 OFFICE O/P EST MOD 30 MIN: CPT | Performed by: OBSTETRICS & GYNECOLOGY

## 2018-06-18 PROCEDURE — 87491 CHLMYD TRACH DNA AMP PROBE: CPT | Performed by: OBSTETRICS & GYNECOLOGY

## 2018-06-18 PROCEDURE — 87591 N.GONORRHOEAE DNA AMP PROB: CPT | Performed by: OBSTETRICS & GYNECOLOGY

## 2018-06-18 NOTE — PROGRESS NOTES
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)        Subjective:  Recurrent pelvic pain     Patient ID: Salvador Pedraza is a 28 y o  female  HPI   58-year-old female with severe rheumatoid arthritis has had multiple surgeries involving her hands and wrists among others  Patient has been followed by myself with abnormal uterine bleeding symptoms and pelvic pain with dyspareunia  She did undergo a D&C hysteroscopy in December 2017 endometrial curettings were benign for hyperplasia or carcinoma  Patient last seen here in January this past year  Patient is still taking norethindrone 1 daily  She states that she stops norethindrone she will bleed more  She has concern with ongoing weight gain due to this medication and is requesting hysterectomy  Review of Systems   Genitourinary: Positive for dyspareunia, pelvic pain and vaginal bleeding  All other systems reviewed and are negative  Objective:  Pelvic pain     Physical Exam   Constitutional: She is oriented to person, place, and time  She appears well-developed and well-nourished  HENT:   Head: Normocephalic  Eyes: Pupils are equal, round, and reactive to light  Neck: Normal range of motion  Genitourinary: Vagina normal    Genitourinary Comments: Pelvic exam  Normal external genitalia  Normal size uterus midline uterine tenderness  No CMT  No pelvic masses    Culture for GC chlamydia obtained   Musculoskeletal: She exhibits tenderness and deformity  Neurological: She is alert and oriented to person, place, and time  Skin: Skin is warm and dry  Psychiatric: She has a normal mood and affect   Her behavior is normal  Judgment and thought content normal

## 2018-06-20 LAB
CHLAMYDIA DNA CVX QL NAA+PROBE: NORMAL
N GONORRHOEA DNA GENITAL QL NAA+PROBE: NORMAL

## 2018-08-07 ENCOUNTER — HOSPITAL ENCOUNTER (EMERGENCY)
Facility: HOSPITAL | Age: 28
Discharge: HOME | End: 2018-08-07
Attending: EMERGENCY MEDICINE | Admitting: EMERGENCY MEDICINE
Payer: MEDICARE

## 2018-08-07 ENCOUNTER — APPOINTMENT (EMERGENCY)
Dept: CT IMAGING | Facility: HOSPITAL | Age: 28
End: 2018-08-07
Payer: MEDICARE

## 2018-08-07 VITALS
SYSTOLIC BLOOD PRESSURE: 117 MMHG | WEIGHT: 188 LBS | DIASTOLIC BLOOD PRESSURE: 67 MMHG | HEART RATE: 76 BPM | TEMPERATURE: 99 F | OXYGEN SATURATION: 98 % | RESPIRATION RATE: 16 BRPM | BODY MASS INDEX: 29.44 KG/M2

## 2018-08-07 DIAGNOSIS — R19.7 DIARRHEA: ICD-10-CM

## 2018-08-07 DIAGNOSIS — R10.9 ABDOMINAL PAIN: Primary | ICD-10-CM

## 2018-08-07 LAB
ALBUMIN SERPL BCP-MCNC: 4.4 G/DL (ref 3.5–5)
ALP SERPL-CCNC: 95 U/L (ref 46–116)
ALT SERPL W P-5'-P-CCNC: 21 U/L (ref 12–78)
ANION GAP SERPL CALCULATED.3IONS-SCNC: 7 MMOL/L (ref 4–13)
AST SERPL W P-5'-P-CCNC: 15 U/L (ref 5–45)
BACTERIA UR QL AUTO: ABNORMAL /HPF
BASOPHILS # BLD AUTO: 0.05 THOUSANDS/ΜL (ref 0–0.1)
BASOPHILS NFR BLD AUTO: 1 % (ref 0–1)
BILIRUB SERPL-MCNC: 0.3 MG/DL (ref 0.2–1)
BILIRUB UR QL STRIP: NEGATIVE
BUN SERPL-MCNC: 6 MG/DL (ref 5–25)
CALCIUM SERPL-MCNC: 9.4 MG/DL (ref 8.3–10.1)
CHLORIDE SERPL-SCNC: 103 MMOL/L (ref 100–108)
CLARITY UR: CLEAR
CO2 SERPL-SCNC: 30 MMOL/L (ref 21–32)
COLOR UR: YELLOW
CREAT SERPL-MCNC: 0.73 MG/DL (ref 0.6–1.3)
EOSINOPHIL # BLD AUTO: 0.01 THOUSAND/ΜL (ref 0–0.61)
EOSINOPHIL NFR BLD AUTO: 0 % (ref 0–6)
ERYTHROCYTE [DISTWIDTH] IN BLOOD BY AUTOMATED COUNT: 12.9 % (ref 11.6–15.1)
EXT PREG TEST URINE: NEGATIVE
GFR SERPL CREATININE-BSD FRML MDRD: 112 ML/MIN/1.73SQ M
GLUCOSE SERPL-MCNC: 97 MG/DL (ref 65–140)
GLUCOSE UR STRIP-MCNC: NEGATIVE MG/DL
HCT VFR BLD AUTO: 45.5 % (ref 34.8–46.1)
HGB BLD-MCNC: 15.3 G/DL (ref 11.5–15.4)
HGB UR QL STRIP.AUTO: ABNORMAL
KETONES UR STRIP-MCNC: NEGATIVE MG/DL
LEUKOCYTE ESTERASE UR QL STRIP: NEGATIVE
LIPASE SERPL-CCNC: 201 U/L (ref 73–393)
LYMPHOCYTES # BLD AUTO: 1.32 THOUSANDS/ΜL (ref 0.6–4.47)
LYMPHOCYTES NFR BLD AUTO: 13 % (ref 14–44)
MCH RBC QN AUTO: 32.4 PG (ref 26.8–34.3)
MCHC RBC AUTO-ENTMCNC: 33.6 G/DL (ref 31.4–37.4)
MCV RBC AUTO: 96 FL (ref 82–98)
MONOCYTES # BLD AUTO: 0.33 THOUSAND/ΜL (ref 0.17–1.22)
MONOCYTES NFR BLD AUTO: 3 % (ref 4–12)
NEUTROPHILS # BLD AUTO: 8.16 THOUSANDS/ΜL (ref 1.85–7.62)
NEUTS SEG NFR BLD AUTO: 83 % (ref 43–75)
NITRITE UR QL STRIP: NEGATIVE
NON-SQ EPI CELLS URNS QL MICRO: ABNORMAL /HPF
NRBC BLD AUTO-RTO: 0 /100 WBCS
PH UR STRIP.AUTO: 7 [PH] (ref 4.5–8)
PLATELET # BLD AUTO: 220 THOUSANDS/UL (ref 149–390)
PMV BLD AUTO: 11.8 FL (ref 8.9–12.7)
POTASSIUM SERPL-SCNC: 3.9 MMOL/L (ref 3.5–5.3)
PROT SERPL-MCNC: 8.2 G/DL (ref 6.4–8.2)
PROT UR STRIP-MCNC: NEGATIVE MG/DL
RBC # BLD AUTO: 4.72 MILLION/UL (ref 3.81–5.12)
RBC #/AREA URNS AUTO: ABNORMAL /HPF
SODIUM SERPL-SCNC: 140 MMOL/L (ref 136–145)
SP GR UR STRIP.AUTO: 1.01 (ref 1–1.03)
UROBILINOGEN UR QL STRIP.AUTO: 0.2 E.U./DL
WBC # BLD AUTO: 9.87 THOUSAND/UL (ref 4.31–10.16)
WBC #/AREA URNS AUTO: ABNORMAL /HPF

## 2018-08-07 PROCEDURE — 99284 EMERGENCY DEPT VISIT MOD MDM: CPT

## 2018-08-07 PROCEDURE — 85025 COMPLETE CBC W/AUTO DIFF WBC: CPT | Performed by: EMERGENCY MEDICINE

## 2018-08-07 PROCEDURE — 36415 COLL VENOUS BLD VENIPUNCTURE: CPT | Performed by: EMERGENCY MEDICINE

## 2018-08-07 PROCEDURE — 80053 COMPREHEN METABOLIC PANEL: CPT | Performed by: EMERGENCY MEDICINE

## 2018-08-07 PROCEDURE — 81001 URINALYSIS AUTO W/SCOPE: CPT

## 2018-08-07 PROCEDURE — 81025 URINE PREGNANCY TEST: CPT | Performed by: EMERGENCY MEDICINE

## 2018-08-07 PROCEDURE — 96374 THER/PROPH/DIAG INJ IV PUSH: CPT

## 2018-08-07 PROCEDURE — 96361 HYDRATE IV INFUSION ADD-ON: CPT

## 2018-08-07 PROCEDURE — 74177 CT ABD & PELVIS W/CONTRAST: CPT

## 2018-08-07 PROCEDURE — 83690 ASSAY OF LIPASE: CPT | Performed by: EMERGENCY MEDICINE

## 2018-08-07 RX ORDER — ONDANSETRON 2 MG/ML
4 INJECTION INTRAMUSCULAR; INTRAVENOUS ONCE
Status: COMPLETED | OUTPATIENT
Start: 2018-08-07 | End: 2018-08-07

## 2018-08-07 RX ORDER — DICYCLOMINE HCL 20 MG
20 TABLET ORAL 2 TIMES DAILY
Qty: 20 TABLET | Refills: 0 | Status: SHIPPED | OUTPATIENT
Start: 2018-08-07 | End: 2019-09-20 | Stop reason: SDUPTHER

## 2018-08-07 RX ORDER — DIPHENOXYLATE HYDROCHLORIDE AND ATROPINE SULFATE 2.5; .025 MG/1; MG/1
1 TABLET ORAL ONCE
Status: COMPLETED | OUTPATIENT
Start: 2018-08-07 | End: 2018-08-07

## 2018-08-07 RX ORDER — SUMATRIPTAN 100 MG/1
100 TABLET, FILM COATED ORAL ONCE AS NEEDED
COMMUNITY

## 2018-08-07 RX ORDER — DIPHENOXYLATE HYDROCHLORIDE AND ATROPINE SULFATE 2.5; .025 MG/1; MG/1
1 TABLET ORAL 4 TIMES DAILY PRN
Qty: 15 TABLET | Refills: 0 | Status: SHIPPED | OUTPATIENT
Start: 2018-08-07 | End: 2018-10-12 | Stop reason: SDUPTHER

## 2018-08-07 RX ORDER — DICYCLOMINE HCL 20 MG
20 TABLET ORAL ONCE
Status: COMPLETED | OUTPATIENT
Start: 2018-08-07 | End: 2018-08-07

## 2018-08-07 RX ADMIN — DICYCLOMINE HYDROCHLORIDE 20 MG: 20 TABLET ORAL at 19:59

## 2018-08-07 RX ADMIN — DIPHENOXYLATE HYDROCHLORIDE AND ATROPINE SULFATE 1 TABLET: 2.5; .025 TABLET ORAL at 21:23

## 2018-08-07 RX ADMIN — IOHEXOL 100 ML: 350 INJECTION, SOLUTION INTRAVENOUS at 21:01

## 2018-08-07 RX ADMIN — SODIUM CHLORIDE 1000 ML: 0.9 INJECTION, SOLUTION INTRAVENOUS at 20:02

## 2018-08-07 RX ADMIN — ONDANSETRON 4 MG: 2 INJECTION INTRAMUSCULAR; INTRAVENOUS at 20:02

## 2018-08-07 NOTE — ED ATTENDING ATTESTATION
Akbar Yuan MD, saw and evaluated the patient  I have discussed the patient with the resident/non-physician practitioner and agree with the resident's/non-physician practitioner's findings, Plan of Care, and MDM as documented in the resident's/non-physician practitioner's note, except where noted  All available labs and Radiology studies were reviewed  At this point I agree with the current assessment done in the Emergency Department  I have conducted an independent evaluation of this patient a history and physical is as follows:      Critical Care Time  CritCare Time    Procedures     C/o abd  Pain for one year, worse over the past month dominic  Today  +nausea, vomiting   +diarrhea  No fevers  Uncomfortable, RRR, CTA b/l, abd  Periumbilical abd   Tenderness, no edema

## 2018-08-08 DIAGNOSIS — N93.9 ABNORMAL UTERINE BLEEDING: ICD-10-CM

## 2018-08-08 NOTE — ED PROVIDER NOTES
History  Chief Complaint   Patient presents with    Abdominal Cramping     Abdominal cramping, nausea, vomiting; unable to verbalize onset  HPI   Patient is a 25-year-old woman who presents for evaluation of abdominal cramping  Pain is been pain waxing and waning for approximately 1 year, progressively worsened last month which is what brought the patient to the emergency department  Patient states pain is periumbilical does not radiate, associated nausea and vomiting, and diarrhea  worse after eating  Patient describes his pain as cramping  Patient denies any dysuria home that patient otherwise denies fevers chills sweats chest shortness of breath  Prior to Admission Medications   Prescriptions Last Dose Informant Patient Reported? Taking? ALPRAZolam (XANAX) 0 25 mg tablet  Self Yes Yes   Sig: Take 0 5 mg by mouth 3 (three) times a day as needed for anxiety     Calcium Carb-Cholecalciferol (CALCIUM 1000 + D PO)  Self Yes Yes   Sig: Take 1 tablet by mouth daily   RiTUXimab (RITUXAN IV)  Self Yes Yes   Sig: Infuse into a venous catheter Reports that she is not due again for 4-6 months  Previous  Infusions this past September  Unsure of dose     SUMAtriptan (IMITREX) 100 mg tablet   Yes Yes   Sig: Take 100 mg by mouth once as needed for migraine   ergocalciferol (VITAMIN D2) 50,000 units  Self Yes Yes   Sig: Take 50,000 Units by mouth once a week   escitalopram (LEXAPRO) 20 mg tablet  Self Yes Yes   Sig: Take 20 mg by mouth every morning     gabapentin (NEURONTIN) 400 mg capsule  Self Yes Yes   Si mg 2 (two) times a day 800 mg at bed time    hydrOXYzine HCL (ATARAX) 25 mg tablet  Self Yes Yes   Sig: Take 25 mg by mouth 2 (two) times a day   norethindrone (AYGESTIN) 5 mg tablet   No Yes   Sig: TAKE 2 TO 4 TABLETS BY MOUTH EVERY DAY AS NEEDED TO CONTROL BLEEDING SYMPTOMS   ondansetron (ZOFRAN) 4 mg tablet  Self Yes Yes   Sig: Take 4 mg by mouth every 8 (eight) hours as needed for nausea or vomiting oxyCODONE (ROXICODONE) 30 MG immediate release tablet  Self Yes Yes   Sig: Take 30 mg by mouth every 8 (eight) hours as needed for moderate pain   predniSONE 5 mg tablet  Self Yes Yes   Sig: Take 10 mg by mouth daily     ranitidine (ZANTAC) 150 mg tablet  Self Yes Yes   Sig: Take 150 mg by mouth 2 (two) times a day      Facility-Administered Medications: None       Past Medical History:   Diagnosis Date    Abnormal Pap smear of cervix     Anemia     Anxiety     Depression     Iron deficiency     Irregular menses     Menorrhagia     Migraines     Neuropathy     Osteopenia     PONV (postoperative nausea and vomiting)     RA (rheumatoid arthritis) (HCC)     [facility]oid arthritis ([facility] [address] [facility]     Scratches     On back, stomach and legs from scratching due to urticaria    [facility] ([email or web address] )        Past Surgical History:   Procedure Laterality Date    CHOLECYSTECTOMY      Laparoscopic    DILATION AND CURETTAGE OF UTERUS      JOINT REPLACEMENT Bilateral     knees    MN FUSION/GRAFT WRIST JOINT Left 4/4/2017    Procedure: WRIST FUSION / SPLINT APPLICATION ;  Surgeon: Trey Madrid MD;  [facility];  Service: Orthopedics    MN FUSION/GRAFT WRIST JOINT Right 4/10/2018    Procedure: Removal of hardware right wrist with Fusion of Long finger carpometacarpal joint, splint application Right Wrist;  Surgeon: Trey Madrid MD;  [facility];  Service: Orthopedics    MN HYSTEROSCOPY,W/ENDO BX N/A 12/1/2017    Procedure: DILATATION AND CURETTAGE (D&C) WITH HYSTEROSCOPY;  Surgeon: Ghislaine Segura DO;  Location: [facility];  Service: Gynecology    MN LAP,CHOLECYSTECTOMY N/A 3/14/2017    Procedure: CHOLECYSTECTOMY LAPAROSCOPIC;  Surgeon: Olya Montana DO;  [facility];  Service: General    REPLACEMENT TOTAL KNEE Bilateral     WRIST SURGERY Right     For arthritis    WRIST SURGERY Left 4/4/2017    Procedure: ARTHROPLASTY WRIST ULNAR HEAD ARTHROPLASTY - INTEGRA SIZE 5 5 MM, 16 HEAD; Surgeon: Bayron Yousif MD;  [facility];  Service:        Family History   Problem Relation Age of Onset    Hypertension Mother     Rheum arthritis Mother     Depression Mother     Anxiety disorder Mother      I have reviewed and agree with the history as documented  Social History   Substance Use Topics    Smoking status: Former Smoker     Packs/day: 0 25     Years: 3 00     Types: Cigarettes     Quit date: 2010    Smokeless tobacco: Never Used    Alcohol use No      Comment: Rare- once or twice yearly        Review of Systems   Constitutional: Negative  HENT: Negative  Eyes: Negative  Respiratory: Negative  Cardiovascular: Negative  Gastrointestinal: Positive for abdominal pain, diarrhea, nausea and vomiting  Endocrine: Negative  Genitourinary: Negative  Musculoskeletal: Negative  Skin: Negative  Allergic/Immunologic: Negative  Neurological: Negative  Hematological: Negative  Psychiatric/Behavioral: Negative  Physical Exam  ED Triage Vitals [08/07/18 1712]   Temperature Pulse Respirations Blood Pressure SpO2   99 °F (37 2 °C) 92 18 155/80 99 %      Temp Source Heart Rate Source Patient Position - Orthostatic VS BP Location FiO2 (%)   Oral Monitor Sitting Right arm --      Pain Score       6           Orthostatic Vital Signs  Vitals:    08/07/18 1712 08/07/18 1921 08/07/18 2125   BP: 155/80 108/70 117/67   Pulse: 92 74 76   Patient Position - Orthostatic VS: Sitting Lying Lying       Physical Exam   Constitutional: She is oriented to person, place, and time  She appears well-developed and well-nourished  No distress  HENT:   Head: Normocephalic and atraumatic  Right Ear: External ear normal    Left Ear: External ear normal    Mouth/Throat: Oropharynx is clear and moist    Eyes: Conjunctivae and EOM are normal  Pupils are equal, round, and reactive to light  Right eye exhibits no discharge  Left eye exhibits no discharge  No scleral icterus  Neck: Normal range of motion  Neck supple  No tracheal deviation present  No thyromegaly present  Cardiovascular: Normal rate, regular rhythm and intact distal pulses  Exam reveals no gallop and no friction rub  No murmur heard  Pulmonary/Chest: Effort normal and breath sounds normal  No stridor  No respiratory distress  She has no wheezes  She has no rales  Abdominal: Soft  Bowel sounds are normal  She exhibits no distension  There is tenderness (Minimally tender at the umbilicus)  There is no rebound and no guarding  Musculoskeletal: Normal range of motion  She exhibits no edema or deformity  Neurological: She is alert and oriented to person, place, and time  No cranial nerve deficit  Skin: Skin is warm and dry  No rash noted  She is not diaphoretic  No erythema  Psychiatric: She has a normal mood and affect  Her behavior is normal  Thought content normal    Nursing note and vitals reviewed        ED Medications  Medications   ondansetron (ZOFRAN) injection 4 mg (4 mg Intravenous Given 8/7/18 2002)   dicyclomine (BENTYL) tablet 20 mg (20 mg Oral Given 8/7/18 1959)   sodium chloride 0 9 % bolus 1,000 mL (0 mL Intravenous Stopped 8/7/18 2103)   iohexol (OMNIPAQUE) 350 MG/ML injection (MULTI-DOSE) 100 mL (100 mL Intravenous Given 8/7/18 2101)   diphenoxylate-atropine (LOMOTIL) 2 5-0 025 mg per tablet 1 tablet (1 tablet Oral Given 8/7/18 2123)       Diagnostic Studies  Results Reviewed     Procedure Component Value Units Date/Time    Comprehensive metabolic panel [[id number]] Collected:  08/07/18 1958    Lab Status:  Final result Specimen:  Blood from Arm, Left Updated:  08/07/18 2024     Sodium 140 mmol/L      Potassium 3 9 mmol/L      Chloride 103 mmol/L      CO2 30 mmol/L      ANION GAP 7 mmol/L      BUN 6 mg/dL      Creatinine 0 73 mg/dL      Glucose 97 mg/dL      Calcium 9 4 mg/dL      AST 15 U/L      ALT 21 U/L      Alkaline Phosphatase 95 U/L      Total Protein 8 2 g/dL      Albumin 4 4 g/dL Total Bilirubin 0 30 mg/dL      eGFR 112 ml/min/1 73sq m     Narrative:         National Kidney Disease Education Program recommendations are as follows:  GFR calculation is accurate only with a steady state creatinine  Chronic Kidney disease less than 60 ml/min/1 73 sq  meters  Kidney failure less than 15 ml/min/1 73 sq  meters      Lipase [[id number]]  (Normal) Collected:  08/07/18 1958    Lab Status:  Final result Specimen:  Blood from Arm, Left Updated:  08/07/18 2024     Lipase 201 u/L     CBC and differential [[id number]]  (Abnormal) Collected:  08/07/18 1958    Lab Status:  Final result Specimen:  Blood from Arm, Left Updated:  08/07/18 2011     WBC 9 87 Thousand/uL      RBC 4 72 Million/uL      Hemoglobin 15 3 g/dL      Hematocrit 45 5 %      MCV 96 fL      MCH 32 4 pg      MCHC 33 6 g/dL      RDW 12 9 %      MPV 11 8 fL      Platelets 259 Thousands/uL      nRBC 0 /100 WBCs      Neutrophils Relative 83 (H) %      Lymphocytes Relative 13 (L) %      Monocytes Relative 3 (L) %      Eosinophils Relative 0 %      Basophils Relative 1 %      Neutrophils Absolute 8 16 (H) Thousands/µL      Lymphocytes Absolute 1 32 Thousands/µL      Monocytes Absolute 0 33 Thousand/µL      Eosinophils Absolute 0 01 Thousand/µL      Basophils Absolute 0 05 Thousands/µL     POCT urinalysis dipstick [[id number]]  (Abnormal) Resulted:  08/07/18 1832    Lab Status:  Final result Specimen:  Urine Updated:  08/07/18 1925    POCT pregnancy, urine [[id number]]  (Normal) Resulted:  08/07/18 1832    Lab Status:  Final result Updated:  08/07/18 1925     EXT PREG TEST UR (Ref: Negative) negative    Urine Microscopic [[id number]]  (Abnormal) Collected:  08/07/18 1839    Lab Status:  Final result Specimen:  Urine from Urine, Clean Catch Updated:  08/07/18 1922     RBC, UA 1-2 (A) /hpf      WBC, UA None Seen /hpf      Epithelial Cells Occasional /hpf      Bacteria, UA Occasional /hpf     ED Urine Macroscopic [[id number]]  (Abnormal) Collected:  08/07/18 1839 Lab Status:  Final result Specimen:  Urine Updated:  08/07/18 1832     Color, UA Yellow     Clarity, UA Clear     pH, UA 7 0     Leukocytes, UA Negative     Nitrite, UA Negative     Protein, UA Negative mg/dl      Glucose, UA Negative mg/dl      Ketones, UA Negative mg/dl      Urobilinogen, UA 0 2 E U /dl      Bilirubin, UA Negative     Blood, UA Small (A)     Specific [place], UA 1 015    Narrative:       CLINITEK RESULT                 CT abdomen pelvis with contrast   Final Result by Hayden Mora MD (08/07 2109)         1  No evidence of bowel obstruction, colitis or diverticulitis  Normal appendix  2   No evidence of pyelonephritis or obstructive uropathy  Workstation performed: [id number]               Procedures  Procedures      Phone Consults  ED Phone Contact    ED Course        Discussed results of the workup with patient  Will treat symptomatically Bentyl  Return precautions were discussed patient verbalized understanding patient was discharged  MDM  Number of Diagnoses or Management Options  Abdominal pain:   Diarrhea:   Diagnosis management comments: 79-year-old woman presents with 1 year of abdominal pain, progressively worsening last month  Will evaluate with CBC, CMP, lipase to evaluate for anemia leukocytosis, liver function as well as electrolytes kidney function  Lipase to rule out pancreatitis  Will get a urinalysis urine pregnancy for UTI in pregnancy    Given progressive nature of her symptoms in the last month, will get a CT Abdomen pelvis with contrast to evaluate for any intra-abdominal nor anything within pelvis    CritCare Time    Disposition  Final diagnoses:   Abdominal pain   Diarrhea     Time reflects when diagnosis was documented in both MDM as applicable and the Disposition within this note     Time User Action Codes Description Comment    8/7/2018  9:17 PM Ludwig Rosales Add [R10 9] Abdominal pain     8/7/2018  9:17 PM Zayra Prasad [address] [R19 7] Diarrhea       ED Disposition     ED Disposition Condition Comment    Discharge  Kaylin Carrasquillo discharge to home/self care  Condition at discharge: Stable        Follow-up Information     Follow up With Specialties Details Why [facility] Emergency Department Emergency Medicine Go to As needed, If symptoms worsen [address] [facility]  [phone] [place] [facility], [address]  , [place], [address] [facility], [address] [facility] Schedule an appointment as soon as possible for a visit To follow up being seen in the emergency department [facility] [phone]  [address], DO Family Medicine Schedule an appointment as soon as possible for a visit As needed, If symptoms worsen [address] [facility]  [phone]             Discharge Medication List as of 8/7/2018  9:31 PM      START taking these medications    Details   dicyclomine (BENTYL) 20 mg tablet Take 1 tablet (20 mg total) by mouth 2 (two) times a day, Starting Tue 8/7/2018, Print      diphenoxylate-atropine (LOMOTIL) 2 5-0 025 mg per tablet Take 1 tablet by mouth 4 (four) times a day as needed for diarrhea for up to 15 doses, Starting Tue 8/7/2018, Print         CONTINUE these medications which have NOT CHANGED    Details   ALPRAZolam (XANAX) 0 25 mg tablet Take 0 5 mg by mouth 3 (three) times a day as needed for anxiety  , Until Discontinued, Historical Med      Calcium Carb-Cholecalciferol (CALCIUM 1000 + D PO) Take 1 tablet by mouth daily, Historical Med      ergocalciferol (VITAMIN D2) 50,000 units Take 50,000 Units by mouth once a week, Historical Med      escitalopram (LEXAPRO) 20 mg tablet Take 20 mg by mouth every morning  , Historical Med      gabapentin (NEURONTIN) 400 mg capsule 400 mg 2 (two) times a day 800 mg at bed time , Starting Fri 3/30/2018, Historical Med      hydrOXYzine HCL (ATARAX) 25 mg tablet Take 25 mg by mouth 2 (two) times a day, Historical Med      norethindrone (AYGESTIN) 5 mg tablet TAKE 2 TO 4 TABLETS BY MOUTH EVERY DAY AS NEEDED TO CONTROL BLEEDING SYMPTOMS, Normal      ondansetron (ZOFRAN) 4 mg tablet Take 4 mg by mouth every 8 (eight) hours as needed for nausea or vomiting, Historical Med      oxyCODONE (ROXICODONE) 30 MG immediate release tablet Take 30 mg by mouth every 8 (eight) hours as needed for moderate pain, Historical Med      predniSONE 5 mg tablet Take 10 mg by mouth daily  , Until Discontinued, Historical Med      ranitidine (ZANTAC) 150 mg tablet Take 150 mg by mouth 2 (two) times a day, Historical Med      RiTUXimab (RITUXAN IV) Infuse into a venous catheter Reports that she is not due again for 4-6 months  Previous  Infusions this past September  Unsure of dose , Historical Med      SUMAtriptan (IMITREX) 100 mg tablet Take 100 mg by mouth once as needed for migraine, Historical Med           No discharge procedures on file  ED Provider  Attending physically available and evaluated Yolanda Chaparro I managed the patient along with the ED Attending      Electronically Signed by         Guero Pisano MD  08/29/18 1900

## 2018-08-08 NOTE — DISCHARGE INSTRUCTIONS
Your CT scan in your labs are unremarkable  Please follow-up with [facility] gastroenterology has also follow up with her primary care provider  Please return emergency department have any changes in her symptoms, worsening symptoms, or any other concerns

## 2018-09-06 ENCOUNTER — TELEPHONE (OUTPATIENT)
Dept: OBGYN CLINIC | Facility: HOSPITAL | Age: 28
End: 2018-09-06

## 2018-09-06 ENCOUNTER — TRANSCRIBE ORDERS (OUTPATIENT)
Dept: ADMINISTRATIVE | Facility: HOSPITAL | Age: 28
End: 2018-09-06

## 2018-09-06 DIAGNOSIS — Q27.30 AVM (ARTERIOVENOUS MALFORMATION): Primary | ICD-10-CM

## 2018-09-06 NOTE — TELEPHONE ENCOUNTER
Patient sees Dr Chato Cordoba    [phone]    Patient is calling to schedule an appt  Patient had surgery in April on rt wrist & saw you one time after that  Patient would like to be seen for both wrists because she is stating that she has to get the metal out due to allergic reaction

## 2018-09-07 ENCOUNTER — HOSPITAL ENCOUNTER (OUTPATIENT)
Dept: ULTRASOUND IMAGING | Facility: HOSPITAL | Age: 28
Discharge: HOME | End: 2018-09-07
Attending: OBSTETRICS & GYNECOLOGY
Payer: MEDICARE

## 2018-09-07 ENCOUNTER — TRANSCRIBE ORDERS (OUTPATIENT)
Dept: ADMINISTRATIVE | Facility: HOSPITAL | Age: 28
End: 2018-09-07

## 2018-09-07 ENCOUNTER — APPOINTMENT (OUTPATIENT)
Dept: LAB | Facility: HOSPITAL | Age: 28
End: 2018-09-07
Attending: OBSTETRICS & GYNECOLOGY
Payer: MEDICARE

## 2018-09-07 ENCOUNTER — TRANSCRIBE ORDERS (OUTPATIENT)
Dept: LAB | Facility: HOSPITAL | Age: 28
End: 2018-09-07

## 2018-09-07 ENCOUNTER — APPOINTMENT (OUTPATIENT)
Dept: LAB | Facility: HOSPITAL | Age: 28
End: 2018-09-07
Payer: MEDICARE

## 2018-09-07 DIAGNOSIS — R10.9 STOMACH ACHE: ICD-10-CM

## 2018-09-07 DIAGNOSIS — E64.9 SEQUELA OF NUTRITIONAL DEFICIENCY: ICD-10-CM

## 2018-09-07 DIAGNOSIS — R63.4 LOSS OF WEIGHT: ICD-10-CM

## 2018-09-07 DIAGNOSIS — R11.0 NAUSEA: ICD-10-CM

## 2018-09-07 DIAGNOSIS — R82.991 HYPOCITRATURIA: ICD-10-CM

## 2018-09-07 DIAGNOSIS — R31.9 HEMATURIA, UNSPECIFIED TYPE: ICD-10-CM

## 2018-09-07 DIAGNOSIS — E03.9 HYPOTHYROIDISM, UNSPECIFIED TYPE: ICD-10-CM

## 2018-09-07 DIAGNOSIS — E53.8 BIOTIN-(PROPIONYL-COA-CARBOXYLASE) LIGASE DEFICIENCY: ICD-10-CM

## 2018-09-07 DIAGNOSIS — R53.83 FATIGUE, UNSPECIFIED TYPE: ICD-10-CM

## 2018-09-07 DIAGNOSIS — R63.5 ABNORMAL WEIGHT GAIN: ICD-10-CM

## 2018-09-07 DIAGNOSIS — R05.9 COUGH: ICD-10-CM

## 2018-09-07 DIAGNOSIS — F39 MILD MOOD DISORDER (HCC): ICD-10-CM

## 2018-09-07 DIAGNOSIS — R21 RASH AND OTHER NONSPECIFIC SKIN ERUPTION: ICD-10-CM

## 2018-09-07 DIAGNOSIS — R35.1 NOCTURIA: ICD-10-CM

## 2018-09-07 DIAGNOSIS — E03.9 MYXEDEMA HEART DISEASE: ICD-10-CM

## 2018-09-07 DIAGNOSIS — R21 RASH: ICD-10-CM

## 2018-09-07 DIAGNOSIS — R82.998 HYPERURICURIA: ICD-10-CM

## 2018-09-07 DIAGNOSIS — R51.9 FACIAL PAIN: ICD-10-CM

## 2018-09-07 DIAGNOSIS — R42 DIZZINESS: ICD-10-CM

## 2018-09-07 DIAGNOSIS — E78.1 PURE HYPERGLYCERIDEMIA: ICD-10-CM

## 2018-09-07 DIAGNOSIS — R42 DIZZINESS AND GIDDINESS: ICD-10-CM

## 2018-09-07 DIAGNOSIS — R31.9 HEMATURIA SYNDROME: ICD-10-CM

## 2018-09-07 DIAGNOSIS — I51.9 MYXEDEMA HEART DISEASE: ICD-10-CM

## 2018-09-07 DIAGNOSIS — M79.609 PAIN IN EXTREMITY, UNSPECIFIED EXTREMITY: ICD-10-CM

## 2018-09-07 DIAGNOSIS — R73.01 IMPAIRED FASTING GLUCOSE: ICD-10-CM

## 2018-09-07 DIAGNOSIS — R19.7 DIARRHEA OF PRESUMED INFECTIOUS ORIGIN: ICD-10-CM

## 2018-09-07 DIAGNOSIS — E28.39 RESISTANT OVARY SYNDROME: ICD-10-CM

## 2018-09-07 DIAGNOSIS — D50.9 IRON DEFICIENCY ANEMIA, UNSPECIFIED IRON DEFICIENCY ANEMIA TYPE: ICD-10-CM

## 2018-09-07 DIAGNOSIS — F41.9 ANXIETY HYPERVENTILATION: ICD-10-CM

## 2018-09-07 DIAGNOSIS — M05.89 OTHER RHEUMATOID ARTHRITIS WITH RHEUMATOID FACTOR OF MULTIPLE SITES (HCC): Primary | ICD-10-CM

## 2018-09-07 DIAGNOSIS — D64.9 ANEMIA, UNSPECIFIED TYPE: ICD-10-CM

## 2018-09-07 DIAGNOSIS — R68.82 DECREASED LIBIDO: ICD-10-CM

## 2018-09-07 DIAGNOSIS — F32.A VASCULAR DEMENTIA WITH DEPRESSED MOOD (HCC): ICD-10-CM

## 2018-09-07 DIAGNOSIS — Q27.30 AVM (ARTERIOVENOUS MALFORMATION): ICD-10-CM

## 2018-09-07 DIAGNOSIS — F01.50 VASCULAR DEMENTIA WITH DEPRESSED MOOD (HCC): ICD-10-CM

## 2018-09-07 DIAGNOSIS — M25.50 PAIN IN JOINT, MULTIPLE SITES: ICD-10-CM

## 2018-09-07 DIAGNOSIS — M25.50 ARTHRALGIA, UNSPECIFIED JOINT: ICD-10-CM

## 2018-09-07 DIAGNOSIS — M54.40 LOW BACK PAIN WITH SCIATICA, SCIATICA LATERALITY UNSPECIFIED, UNSPECIFIED BACK PAIN LATERALITY, UNSPECIFIED CHRONICITY: ICD-10-CM

## 2018-09-07 DIAGNOSIS — E53.8 VITAMIN B12 DEFICIENCY: ICD-10-CM

## 2018-09-07 DIAGNOSIS — R79.9 ABNORMAL BLOOD CHEMISTRY: ICD-10-CM

## 2018-09-07 DIAGNOSIS — E88.81 DYSMETABOLIC SYNDROME X: ICD-10-CM

## 2018-09-07 DIAGNOSIS — Z13.9 SCREENING FOR CONDITION: Primary | ICD-10-CM

## 2018-09-07 DIAGNOSIS — E55.9 AVITAMINOSIS D: ICD-10-CM

## 2018-09-07 DIAGNOSIS — I70.91 GENERALIZED ATHEROSCLEROSIS: ICD-10-CM

## 2018-09-07 DIAGNOSIS — R19.7 DIARRHEA, UNSPECIFIED TYPE: ICD-10-CM

## 2018-09-07 DIAGNOSIS — N93.9 ABNORMAL UTERINE BLEEDING: ICD-10-CM

## 2018-09-07 DIAGNOSIS — R10.9 ABDOMINAL PAIN, UNSPECIFIED ABDOMINAL LOCATION: ICD-10-CM

## 2018-09-07 DIAGNOSIS — Z13.9 SCREENING FOR CONDITION: ICD-10-CM

## 2018-09-07 DIAGNOSIS — Z00.00 ROUTINE GENERAL MEDICAL EXAMINATION AT A HEALTH CARE FACILITY: ICD-10-CM

## 2018-09-07 DIAGNOSIS — R51.9 NONINTRACTABLE HEADACHE, UNSPECIFIED CHRONICITY PATTERN, UNSPECIFIED HEADACHE TYPE: ICD-10-CM

## 2018-09-07 DIAGNOSIS — M54.5 LOW BACK PAIN, UNSPECIFIED BACK PAIN LATERALITY, UNSPECIFIED CHRONICITY, WITH SCIATICA PRESENCE UNSPECIFIED: ICD-10-CM

## 2018-09-07 DIAGNOSIS — R11.2 NAUSEA AND VOMITING, INTRACTABILITY OF VOMITING NOT SPECIFIED, UNSPECIFIED VOMITING TYPE: ICD-10-CM

## 2018-09-07 DIAGNOSIS — F45.8 ANXIETY HYPERVENTILATION: ICD-10-CM

## 2018-09-07 DIAGNOSIS — M05.89 OTHER RHEUMATOID ARTHRITIS WITH RHEUMATOID FACTOR OF MULTIPLE SITES (HCC): ICD-10-CM

## 2018-09-07 DIAGNOSIS — E55.9 VITAMIN D DEFICIENCY: ICD-10-CM

## 2018-09-07 LAB
25(OH)D3 SERPL-MCNC: 17.7 NG/ML (ref 30–100)
ALBUMIN SERPL BCP-MCNC: 4.7 G/DL (ref 3–5.2)
ALP SERPL-CCNC: 87 U/L (ref 43–122)
ALT SERPL W P-5'-P-CCNC: 37 U/L (ref 9–52)
ANION GAP SERPL CALCULATED.3IONS-SCNC: 11 MMOL/L (ref 5–14)
AST SERPL W P-5'-P-CCNC: 25 U/L (ref 14–36)
BACTERIA UR QL AUTO: ABNORMAL /HPF
BASOPHILS # BLD AUTO: 0.06 THOUSANDS/ΜL (ref 0–0.1)
BASOPHILS # BLD AUTO: 0.1 THOUSANDS/ΜL (ref 0–0.1)
BASOPHILS NFR BLD AUTO: 1 % (ref 0–1)
BASOPHILS NFR BLD AUTO: 1 % (ref 0–1)
BILIRUB SERPL-MCNC: 0.6 MG/DL
BILIRUB UR QL STRIP: NEGATIVE
BUN SERPL-MCNC: 9 MG/DL (ref 5–25)
CALCIUM SERPL-MCNC: 9.3 MG/DL (ref 8.4–10.2)
CHLORIDE SERPL-SCNC: 101 MMOL/L (ref 97–108)
CLARITY UR: CLEAR
CO2 SERPL-SCNC: 28 MMOL/L (ref 22–30)
COLOR UR: YELLOW
CREAT SERPL-MCNC: 0.72 MG/DL (ref 0.6–1.2)
CRP SERPL QL: <3 MG/L
EOSINOPHIL # BLD AUTO: 0 THOUSAND/ΜL (ref 0–0.4)
EOSINOPHIL # BLD AUTO: 0 THOUSAND/ΜL (ref 0–0.61)
EOSINOPHIL NFR BLD AUTO: 0 % (ref 0–6)
EOSINOPHIL NFR BLD AUTO: 1 % (ref 0–6)
ERYTHROCYTE [DISTWIDTH] IN BLOOD BY AUTOMATED COUNT: 12 % (ref 11.6–15.1)
ERYTHROCYTE [DISTWIDTH] IN BLOOD BY AUTOMATED COUNT: 12.6 %
ERYTHROCYTE [SEDIMENTATION RATE] IN BLOOD: 6 MM/HOUR (ref 0–20)
FERRITIN SERPL-MCNC: 228 NG/ML (ref 8–388)
FOLATE SERPL-MCNC: 17.8 NG/ML (ref 3.1–17.5)
GFR SERPL CREATININE-BSD FRML MDRD: 114 ML/MIN/1.73SQ M
GLUCOSE P FAST SERPL-MCNC: 89 MG/DL (ref 70–99)
GLUCOSE UR STRIP-MCNC: NEGATIVE MG/DL
HCT VFR BLD AUTO: 42.1 % (ref 34.8–46.1)
HCT VFR BLD AUTO: 44.2 % (ref 36–46)
HGB BLD-MCNC: 13.8 G/DL (ref 11.5–15.4)
HGB BLD-MCNC: 14.9 G/DL (ref 12–16)
HGB UR QL STRIP.AUTO: 25
IGA SERPL-MCNC: 273 MG/DL (ref 70–400)
IGG SERPL-MCNC: 801 MG/DL (ref 700–1600)
IGM SERPL-MCNC: 33 MG/DL (ref 40–230)
IMM GRANULOCYTES # BLD AUTO: 0.06 THOUSAND/UL (ref 0–0.2)
IMM GRANULOCYTES NFR BLD AUTO: 1 % (ref 0–2)
IRON SATN MFR SERPL: 46 %
IRON SERPL-MCNC: 137 UG/DL (ref 50–170)
KETONES UR STRIP-MCNC: NEGATIVE MG/DL
LEUKOCYTE ESTERASE UR QL STRIP: NEGATIVE
LYMPHOCYTES # BLD AUTO: 0.99 THOUSANDS/ΜL (ref 0.6–4.47)
LYMPHOCYTES # BLD AUTO: 3.8 THOUSANDS/ΜL (ref 0.5–4)
LYMPHOCYTES NFR BLD AUTO: 42 % (ref 20–50)
LYMPHOCYTES NFR BLD AUTO: 8 % (ref 14–44)
MAGNESIUM SERPL-MCNC: 2 MG/DL (ref 1.6–2.3)
MCH RBC QN AUTO: 31.2 PG (ref 26.8–34.3)
MCH RBC QN AUTO: 32.4 PG (ref 26–34)
MCHC RBC AUTO-ENTMCNC: 32.8 G/DL (ref 31.4–37.4)
MCHC RBC AUTO-ENTMCNC: 33.8 G/DL (ref 31–36)
MCV RBC AUTO: 95 FL (ref 82–98)
MCV RBC AUTO: 96 FL (ref 80–100)
MONOCYTES # BLD AUTO: 0.42 THOUSAND/ΜL (ref 0.17–1.22)
MONOCYTES # BLD AUTO: 0.7 THOUSAND/ΜL (ref 0.2–0.9)
MONOCYTES NFR BLD AUTO: 4 % (ref 4–12)
MONOCYTES NFR BLD AUTO: 8 % (ref 1–10)
NEUTROPHILS # BLD AUTO: 10.45 THOUSANDS/ΜL (ref 1.85–7.62)
NEUTROPHILS # BLD AUTO: 4.5 THOUSANDS/ΜL (ref 1.8–7.8)
NEUTS SEG NFR BLD AUTO: 50 % (ref 45–65)
NEUTS SEG NFR BLD AUTO: 86 % (ref 43–75)
NITRITE UR QL STRIP: NEGATIVE
NON-SQ EPI CELLS URNS QL MICRO: ABNORMAL /HPF
NRBC BLD AUTO-RTO: 0 /100 WBCS
PH UR STRIP.AUTO: 6 [PH] (ref 4.5–8)
PHOSPHATE SERPL-MCNC: 3 MG/DL (ref 2.5–4.8)
PLATELET # BLD AUTO: 198 THOUSANDS/UL (ref 150–450)
PLATELET # BLD AUTO: 210 THOUSANDS/UL (ref 149–390)
PMV BLD AUTO: 10.1 FL (ref 8.9–12.7)
PMV BLD AUTO: 11.9 FL (ref 8.9–12.7)
POTASSIUM SERPL-SCNC: 4.1 MMOL/L (ref 3.6–5)
PROT SERPL-MCNC: 7.9 G/DL (ref 5.9–8.4)
PROT UR STRIP-MCNC: NEGATIVE MG/DL
RBC # BLD AUTO: 4.42 MILLION/UL (ref 3.81–5.12)
RBC # BLD AUTO: 4.61 MILLION/UL (ref 4–5.2)
RBC #/AREA URNS AUTO: ABNORMAL /HPF
SODIUM SERPL-SCNC: 140 MMOL/L (ref 137–147)
SP GR UR STRIP.AUTO: 1.01 (ref 1–1.04)
TIBC SERPL-MCNC: 295 UG/DL (ref 250–450)
TSH SERPL DL<=0.05 MIU/L-ACNC: 2.07 UIU/ML (ref 0.47–4.68)
URATE SERPL-MCNC: 4.3 MG/DL (ref 2.7–7.5)
UROBILINOGEN UA: NEGATIVE MG/DL
VIT B12 SERPL-MCNC: 395 PG/ML (ref 100–900)
WBC # BLD AUTO: 11.98 THOUSAND/UL (ref 4.31–10.16)
WBC # BLD AUTO: 9.1 THOUSAND/UL (ref 4.5–11)
WBC #/AREA URNS AUTO: ABNORMAL /HPF

## 2018-09-07 PROCEDURE — 86359 T CELLS TOTAL COUNT: CPT

## 2018-09-07 PROCEDURE — 82175 ASSAY OF ARSENIC: CPT

## 2018-09-07 PROCEDURE — 84443 ASSAY THYROID STIM HORMONE: CPT

## 2018-09-07 PROCEDURE — 83825 ASSAY OF MERCURY: CPT

## 2018-09-07 PROCEDURE — 86355 B CELLS TOTAL COUNT: CPT

## 2018-09-07 PROCEDURE — 36415 COLL VENOUS BLD VENIPUNCTURE: CPT

## 2018-09-07 PROCEDURE — 86360 T CELL ABSOLUTE COUNT/RATIO: CPT

## 2018-09-07 PROCEDURE — 82728 ASSAY OF FERRITIN: CPT

## 2018-09-07 PROCEDURE — 82784 ASSAY IGA/IGD/IGG/IGM EACH: CPT

## 2018-09-07 PROCEDURE — 84100 ASSAY OF PHOSPHORUS: CPT

## 2018-09-07 PROCEDURE — 82306 VITAMIN D 25 HYDROXY: CPT

## 2018-09-07 PROCEDURE — 83550 IRON BINDING TEST: CPT

## 2018-09-07 PROCEDURE — 81003 URINALYSIS AUTO W/O SCOPE: CPT | Performed by: FAMILY MEDICINE

## 2018-09-07 PROCEDURE — 76830 TRANSVAGINAL US NON-OB: CPT

## 2018-09-07 PROCEDURE — 85025 COMPLETE CBC W/AUTO DIFF WBC: CPT

## 2018-09-07 PROCEDURE — 83789 MASS SPECTROMETRY QUAL/QUAN: CPT

## 2018-09-07 PROCEDURE — 80053 COMPREHEN METABOLIC PANEL: CPT

## 2018-09-07 PROCEDURE — 83655 ASSAY OF LEAD: CPT

## 2018-09-07 PROCEDURE — 81001 URINALYSIS AUTO W/SCOPE: CPT | Performed by: FAMILY MEDICINE

## 2018-09-07 PROCEDURE — 85652 RBC SED RATE AUTOMATED: CPT

## 2018-09-07 PROCEDURE — 84550 ASSAY OF BLOOD/URIC ACID: CPT

## 2018-09-07 PROCEDURE — 83540 ASSAY OF IRON: CPT

## 2018-09-07 PROCEDURE — 82607 VITAMIN B-12: CPT

## 2018-09-07 PROCEDURE — 83735 ASSAY OF MAGNESIUM: CPT

## 2018-09-07 PROCEDURE — 86140 C-REACTIVE PROTEIN: CPT

## 2018-09-07 PROCEDURE — 82746 ASSAY OF FOLIC ACID SERUM: CPT

## 2018-09-07 PROCEDURE — 76856 US EXAM PELVIC COMPLETE: CPT

## 2018-09-07 PROCEDURE — 82300 ASSAY OF CADMIUM: CPT

## 2018-09-08 LAB
BASOPHILS # BLD AUTO: 0 X10E3/UL (ref 0–0.2)
BASOPHILS NFR BLD AUTO: 0 %
CD19 CELLS # BLD: 68 /UL (ref 12–645)
CD19 CELLS NFR BLD: 6.8 % (ref 3.3–25.4)
CD3 CELLS # BLD: 831 /UL (ref 622–2402)
CD3 CELLS NFR BLD: 83.1 % (ref 57.5–86.2)
CD3+CD4+ CELLS # BLD: 478 /UL (ref 359–1519)
CD3+CD4+ CELLS NFR BLD: 47.8 % (ref 30.8–58.5)
CD3+CD4+ CELLS/CD3+CD8+ CLL BLD: 1.49 % (ref 0.92–3.72)
CD3+CD8+ CELLS # BLD: 321 /UL (ref 109–897)
CD3+CD8+ CELLS NFR BLD: 32.1 % (ref 12–35.5)
EOSINOPHIL # BLD AUTO: 0 X10E3/UL (ref 0–0.4)
EOSINOPHIL NFR BLD AUTO: 0 %
ERYTHROCYTE [DISTWIDTH] IN BLOOD BY AUTOMATED COUNT: 12.9 % (ref 12.3–15.4)
HCT VFR BLD AUTO: 40.9 % (ref 34–46.6)
HGB BLD-MCNC: 13.8 G/DL (ref 11.1–15.9)
IMM GRANULOCYTES # BLD: 0 X10E3/UL (ref 0–0.1)
IMM GRANULOCYTES NFR BLD: 0 %
LYMPHOCYTES # BLD AUTO: 1 X10E3/UL (ref 0.7–3.1)
LYMPHOCYTES NFR BLD AUTO: 8 %
MCH RBC QN AUTO: 32 PG (ref 26.6–33)
MCHC RBC AUTO-ENTMCNC: 33.7 G/DL (ref 31.5–35.7)
MCV RBC AUTO: 95 FL (ref 79–97)
MONOCYTES # BLD AUTO: 0.4 X10E3/UL (ref 0.1–0.9)
MONOCYTES NFR BLD AUTO: 4 %
NEUTROPHILS # BLD AUTO: 10.7 X10E3/UL (ref 1.4–7)
NEUTROPHILS NFR BLD AUTO: 88 %
PLATELET # BLD AUTO: 220 X10E3/UL (ref 150–379)
RBC # BLD AUTO: 4.31 X10E6/UL (ref 3.77–5.28)
WBC # BLD AUTO: 12.1 X10E3/UL (ref 3.4–10.8)

## 2018-09-10 LAB — IODINE SERPL-MCNC: 51 UG/L (ref 40–92)

## 2018-09-10 NOTE — TELEPHONE ENCOUNTER
Called patient to schedule appointment, no answer, left message for patient to call back   Patient can be scheduled for 9/21at 9:45 am

## 2018-09-10 NOTE — TELEPHONE ENCOUNTER
Patient call back, she was offered an appointment for today 9/10 and patient stated that she can not drive to [facility]   So patient took appointment for 9/21 in [place]

## 2018-09-11 LAB
ARSENIC BLD-MCNC: 5 UG/L (ref 2–23)
CADMIUM BLD-MCNC: NORMAL UG/L (ref 0–1.2)
LEAD BLD-MCNC: NORMAL UG/DL (ref 0–4)
MERCURY BLD-MCNC: NORMAL UG/L (ref 0–14.9)

## 2018-09-21 ENCOUNTER — HOSPITAL ENCOUNTER (OUTPATIENT)
Dept: RADIOLOGY | Facility: HOSPITAL | Age: 28
Discharge: HOME | End: 2018-09-21
Attending: ORTHOPAEDIC SURGERY
Payer: MEDICARE

## 2018-09-21 ENCOUNTER — OFFICE VISIT (OUTPATIENT)
Dept: OBGYN CLINIC | Facility: HOSPITAL | Age: 28
End: 2018-09-21
Payer: MEDICARE

## 2018-09-21 VITALS
BODY MASS INDEX: 28.35 KG/M2 | WEIGHT: 180.6 LBS | DIASTOLIC BLOOD PRESSURE: 83 MMHG | SYSTOLIC BLOOD PRESSURE: 115 MMHG | HEART RATE: 105 BPM | HEIGHT: 67 IN

## 2018-09-21 DIAGNOSIS — M25.531 BILATERAL WRIST PAIN: ICD-10-CM

## 2018-09-21 DIAGNOSIS — M25.532 BILATERAL WRIST PAIN: Primary | ICD-10-CM

## 2018-09-21 DIAGNOSIS — M25.531 BILATERAL WRIST PAIN: Primary | ICD-10-CM

## 2018-09-21 DIAGNOSIS — M25.532 BILATERAL WRIST PAIN: ICD-10-CM

## 2018-09-21 PROCEDURE — 73110 X-RAY EXAM OF WRIST: CPT

## 2018-09-21 PROCEDURE — 99214 OFFICE O/P EST MOD 30 MIN: CPT | Performed by: ORTHOPAEDIC SURGERY

## 2018-09-21 NOTE — H&P
ASSESSMENT/PLAN:    Assessment:   S/P wrist arthrodesis b/l with acquired nickel allergy     Plan:   Hardware removal left wrist removal of ulnar head arthroplasty and conversion to darrach  We will continue to monitor her right side with a possible darrach  Follow Up: After Surgery    To Do Next Visit:    and Sutures out    General Discussions:       Operative Discussions:     Bony Consent: The risks and benefits of the procedure were explained to the patient, which include, but are not limited to: Bleeding, infection, recurrence, pain, scar, malunion, nonunion, damage to tendons, damage to nerves, and damage to blood vessels, and complications related to anesthesia, failure to give desire result, need for more surgery  These risks, along with alternative conservative treatment options, and postoperative protocols were voiced back and understood by the patient  All questions were answered to the patient's satisfaction  The patient agrees to comply with a standard postoperative protocol, and is willing to proceed  Education was provided via written and auditory forms  There were no barriers to learning  Written handouts regarding wound care, incision and scar care, and general preoperative information was provided to the patient  Prior to surgery, the patient may be requested to stop all anti-inflammatory medications  Prophylactic aspirin, Plavix, and Coumadin may be allowed to be continued  Medications including vitamin E , ginkgo, and fish oil are requested to be stopped approximately one week prior to surgery  Hypertensive medications and beta blockers, if taken, should be continued  _____________________________________________________  CHIEF COMPLAINT:  Chief Complaint   Patient presents with    Right Wrist - Follow-up    Left Wrist - Follow-up         SUBJECTIVE:  Pasquale Brown is a 28y o  year old female who presents for follow up regarding S/P wrist arthrodesis b/l    Since last visit, Sven Lincoln has tried therapy, activity modification and bracing without relief  Today there is Pain  Severe  Constant  Sharp, Aching and itching to the bilateral wrist   Pt c/o numbness into her right hand and states that she has burned herself on multiple occasions  Pt states that the pain is keeping her from sleeping and that has not slept in 4 days  Radiation: None  Associated symptoms: Stiffness/LROM   Pt also got dx with fibromyalgia and a nickel allergy since we saw her last      PAST MEDICAL HISTORY:  Past Medical History:   Diagnosis Date    Abnormal Pap smear of cervix     Anemia     Anxiety     Depression     Iron deficiency     Irregular menses     Menorrhagia     Migraines     Neuropathy     Osteopenia     PONV (postoperative nausea and vomiting)     [facility] [address]     [facility]oid [facility] [address]     Scratches     On back, stomach and legs from scratching due to urticaria    Vasculitis ([facility] [address] [facility]        PAST SURGICAL HISTORY:  Past Surgical History:   Procedure Laterality Date    CHOLECYSTECTOMY      Laparoscopic    DILATION AND CURETTAGE OF UTERUS      JOINT REPLACEMENT Bilateral     knees    NM FUSION/GRAFT WRIST JOINT Left 4/4/2017    Procedure: WRIST FUSION / SPLINT APPLICATION ;  Surgeon: Eva Mensah MD;  [facility];  Service: Orthopedics    NM FUSION/GRAFT WRIST JOINT Right 4/10/2018    Procedure: Removal of hardware right wrist with Fusion of Long finger carpometacarpal joint, splint application Right Wrist;  Surgeon: Eva Mensah MD;  [facility];  Service: Orthopedics    NM HYSTEROSCOPY,W/ENDO BX N/A 12/1/2017    Procedure: DILATATION AND CURETTAGE (D&C) WITH HYSTEROSCOPY;  Surgeon: Magda Lindo DO;  [facility];  Service: Gynecology    NM LAP,CHOLECYSTECTOMY N/A 3/14/2017    Procedure: CHOLECYSTECTOMY LAPAROSCOPIC;  Surgeon: Chelly Rao DO;  [facility];  Service: General    REPLACEMENT TOTAL KNEE Bilateral     WRIST SURGERY Right     For arthritis    WRIST SURGERY Left 4/4/2017    Procedure: ARTHROPLASTY WRIST ULNAR HEAD ARTHROPLASTY - INTEGRA SIZE 5 5 MM, 16 HEAD;  Surgeon: Josué Pappas MD;  [facility];  Service:        FAMILY HISTORY:  Family History   Problem Relation Age of Onset    Hypertension Mother    Erica Julian Rheum arthritis Mother     Depression Mother     Anxiety disorder Mother        SOCIAL HISTORY:  Social History   Substance Use Topics    Smoking status: Former Smoker     Packs/day: 0 25     Years: 3 00     Types: Cigarettes     Quit date: 2010    Smokeless tobacco: Never Used    Alcohol use No      Comment: Rare- once or twice yearly       MEDICATIONS:    Current Outpatient Prescriptions:     ALPRAZolam (XANAX) 0 25 mg tablet, Take 0 5 mg by mouth 3 (three) times a day as needed for anxiety  , Disp: , Rfl:     Calcium Carb-Cholecalciferol (CALCIUM 1000 + D PO), Take 1 tablet by mouth daily, Disp: , Rfl:     dicyclomine (BENTYL) 20 mg tablet, Take 1 tablet (20 mg total) by mouth 2 (two) times a day, Disp: 20 tablet, Rfl: 0    diphenoxylate-atropine (LOMOTIL) 2 5-0 025 mg per tablet, Take 1 tablet by mouth 4 (four) times a day as needed for diarrhea for up to 15 doses, Disp: 15 tablet, Rfl: 0    ergocalciferol (VITAMIN D2) 50,000 units, Take 50,000 Units by mouth once a week, Disp: , Rfl:     escitalopram (LEXAPRO) 20 mg tablet, Take 20 mg by mouth every morning  , Disp: , Rfl:     gabapentin (NEURONTIN) 400 mg capsule, 600 mg 2 (two) times a day 800 mg at bed time , Disp: , Rfl: 1    hydrOXYzine HCL (ATARAX) 25 mg tablet, Take 25 mg by mouth 2 (two) times a day, Disp: , Rfl:     norethindrone (AYGESTIN) 5 mg tablet, TAKE 2 TO 4 TABLETS BY MOUTH EVERY DAY AS NEEDED TO CONTROL BLEEDING SYMPTOMS, Disp: 375 tablet, Rfl: 0    ondansetron (ZOFRAN) 4 mg tablet, Take 4 mg by mouth every 8 (eight) hours as needed for nausea or vomiting, Disp: , Rfl:     oxyCODONE (ROXICODONE) 30 MG immediate release tablet, Take 30 mg by mouth every 8 (eight) hours as needed for moderate pain, Disp: , Rfl:     predniSONE 5 mg tablet, Take 10 mg by mouth daily  , Disp: , Rfl:     ranitidine (ZANTAC) 150 mg tablet, Take 150 mg by mouth 2 (two) times a day, Disp: , Rfl:     RiTUXimab (RITUXAN IV), Infuse into a venous catheter Reports that she is not due again for 4-6 months  Previous  Infusions this past September  Unsure of dose , Disp: , Rfl:     SUMAtriptan (IMITREX) 100 mg tablet, Take 100 mg by mouth once as needed for migraine, Disp: , Rfl:     ALLERGIES:  Allergies   Allergen Reactions    Nickel        REVIEW OF SYSTEMS:  Pertinent items are noted in HPI  LABS:  HgA1c: No results found for: HGBA1C  BMP:   Lab Results   Component Value Date    GLUCOSE 135 11/11/2015    CALCIUM 9 3 09/07/2018     09/07/2018    K 4 1 09/07/2018    CO2 28 09/07/2018     09/07/2018    BUN 9 09/07/2018    CREATININE 0 72 09/07/2018           _____________________________________________________  PHYSICAL EXAMINATION:  General: well developed and well nourished, alert, oriented times 3 and appears uncomfortable  Psychiatric: depressed affect  HEENT: Trachea Midline, No torticollis  Cardiovascular: No discernable arrhythmia  Pulmonary: No wheezing or stridor  Skin: healed incisions  Neurovascular: Pulses Intact    MUSCULOSKELETAL EXAMINATION:  LEFT SIDE:  Wrist:  swelling to dorsal wrist, ttp to dorsal wrist,  full composit fist, full pronation and supination, NVI  RIGHT SIDE:  Wrist:  minimal swelling, ttp to dorsal wrist, full composit fist, NVI    _____________________________________________________  STUDIES REVIEWED:  Images were reviewd in PACS: xrays of right wrist show a healed long finger metacarpal fracture and healed fusion  xrays of left wrist show a healed fusion with lucency around the ulnar head replacement          PROCEDURES PERFORMED:  Procedures  No Procedures performed today   Scribe Attestation    I,:   Orquidea Upton am acting as a scribe while in the presence of the attending physician :        I,:   Trey Madrid MD personally performed the services described in this documentation    as scribed in my presence :

## 2018-09-21 NOTE — PROGRESS NOTES
ASSESSMENT/PLAN:    Assessment:   S/P wrist arthrodesis b/l with acquired nickel allergy     Plan:   Hardware removal left wrist removal of ulnar head arthroplasty and conversion to darrach  We will continue to monitor her right side with a possible darrach  Follow Up: After Surgery    To Do Next Visit:    and Sutures out    General Discussions:       Operative Discussions:     Bony Consent: The risks and benefits of the procedure were explained to the patient, which include, but are not limited to: Bleeding, infection, recurrence, pain, scar, malunion, nonunion, damage to tendons, damage to nerves, and damage to blood vessels, and complications related to anesthesia, failure to give desire result, need for more surgery  These risks, along with alternative conservative treatment options, and postoperative protocols were voiced back and understood by the patient  All questions were answered to the patient's satisfaction  The patient agrees to comply with a standard postoperative protocol, and is willing to proceed  Education was provided via written and auditory forms  There were no barriers to learning  Written handouts regarding wound care, incision and scar care, and general preoperative information was provided to the patient  Prior to surgery, the patient may be requested to stop all anti-inflammatory medications  Prophylactic aspirin, Plavix, and Coumadin may be allowed to be continued  Medications including vitamin E , ginkgo, and fish oil are requested to be stopped approximately one week prior to surgery  Hypertensive medications and beta blockers, if taken, should be continued  _____________________________________________________  CHIEF COMPLAINT:  Chief Complaint   Patient presents with    Right Wrist - Follow-up    Left Wrist - Follow-up         SUBJECTIVE:  Canelo Saenz is a 28y o  year old female who presents for follow up regarding S/P wrist arthrodesis b/l    Since last visit, Sven Lincoln has tried therapy, activity modification and bracing without relief  Today there is Pain  Severe  Constant  Sharp, Aching and itching to the bilateral wrist   Pt c/o numbness into her right hand and states that she has burned herself on multiple occasions  Pt states that the pain is keeping her from sleeping and that has not slept in 4 days  Radiation: None  Associated symptoms: Stiffness/LROM   Pt also got dx with fibromyalgia and a nickel allergy since we saw her last      PAST MEDICAL HISTORY:  Past Medical History:   Diagnosis Date    Abnormal Pap smear of cervix     Anemia     Anxiety     Depression     Iron deficiency     Irregular menses     Menorrhagia     Migraines     Neuropathy     Osteopenia     PONV (postoperative nausea and vomiting)     [facility] [address] [facility]     [facility] [address] [facility]     Scratches     On back, stomach and legs from scratching due to urticaria    Vasculitis [facility] [address] [facility]        PAST SURGICAL HISTORY:  Past Surgical History:   Procedure Laterality Date    CHOLECYSTECTOMY      Laparoscopic    DILATION AND CURETTAGE OF UTERUS      JOINT REPLACEMENT Bilateral     knees    [place] FUSION/GRAFT WRIST JOINT Left 4/4/2017    Procedure: WRIST FUSION / SPLINT APPLICATION ;  Surgeon: Eva Mensah MD;  [facility];  Service: Orthopedics    NY FUSION/GRAFT WRIST JOINT Right 4/10/2018    Procedure: Removal of hardware right wrist with Fusion of Long finger carpometacarpal joint, splint application Right Wrist;  Surgeon: Eva Mensah MD;  [facility];  Service: Orthopedics    [place] HYSTEROSCOPY,W/ENDO BX N/A 12/1/2017    Procedure: DILATATION AND CURETTAGE (D&C) WITH HYSTEROSCOPY;  Surgeon: Magda Lindo DO;  [facility];  Service: Gynecology    [place] LAP,CHOLECYSTECTOMY N/A 3/14/2017    Procedure: CHOLECYSTECTOMY LAPAROSCOPIC;  Surgeon: Chelly Rao DO;  [facility];  Service: General    REPLACEMENT TOTAL KNEE Bilateral     WRIST SURGERY Right     For arthritis    WRIST SURGERY Left 4/4/2017    Procedure: ARTHROPLASTY WRIST ULNAR HEAD ARTHROPLASTY - INTEGRA SIZE 5 5 MM, 16 HEAD;  Surgeon: Michelle Kimble MD;  [facility];  Service:        FAMILY HISTORY:  Family History   Problem Relation Age of Onset    Hypertension Mother    [facility] Rheum arthritis Mother     Depression Mother     Anxiety disorder Mother        SOCIAL HISTORY:  Social History   Substance Use Topics    Smoking status: Former Smoker     Packs/day: 0 25     Years: 3 00     Types: Cigarettes     Quit date: 2010    Smokeless tobacco: Never Used    Alcohol use No      Comment: Rare- once or twice yearly       MEDICATIONS:    Current Outpatient Prescriptions:     ALPRAZolam (XANAX) 0 25 mg tablet, Take 0 5 mg by mouth 3 (three) times a day as needed for anxiety  , Disp: , Rfl:     Calcium Carb-Cholecalciferol (CALCIUM 1000 + D PO), Take 1 tablet by mouth daily, Disp: , Rfl:     dicyclomine (BENTYL) 20 mg tablet, Take 1 tablet (20 mg total) by mouth 2 (two) times a day, Disp: 20 tablet, Rfl: 0    diphenoxylate-atropine (LOMOTIL) 2 5-0 025 mg per tablet, Take 1 tablet by mouth 4 (four) times a day as needed for diarrhea for up to 15 doses, Disp: 15 tablet, Rfl: 0    ergocalciferol (VITAMIN D2) 50,000 units, Take 50,000 Units by mouth once a week, Disp: , Rfl:     escitalopram (LEXAPRO) 20 mg tablet, Take 20 mg by mouth every morning  , Disp: , Rfl:     gabapentin (NEURONTIN) 400 mg capsule, 600 mg 2 (two) times a day 800 mg at bed time , Disp: , Rfl: 1    hydrOXYzine HCL (ATARAX) 25 mg tablet, Take 25 mg by mouth 2 (two) times a day, Disp: , Rfl:     norethindrone (AYGESTIN) 5 mg tablet, TAKE 2 TO 4 TABLETS BY MOUTH EVERY DAY AS NEEDED TO CONTROL BLEEDING SYMPTOMS, Disp: 375 tablet, Rfl: 0    ondansetron (ZOFRAN) 4 mg tablet, Take 4 mg by mouth every 8 (eight) hours as needed for nausea or vomiting, Disp: , Rfl:     oxyCODONE (ROXICODONE) 30 MG immediate release tablet, Take 30 mg by mouth every 8 (eight) hours as needed for moderate pain, Disp: , Rfl:     predniSONE 5 mg tablet, Take 10 mg by mouth daily  , Disp: , Rfl:     ranitidine (ZANTAC) 150 mg tablet, Take 150 mg by mouth 2 (two) times a day, Disp: , Rfl:     RiTUXimab (RITUXAN IV), Infuse into a venous catheter Reports that she is not due again for 4-6 months  Previous  Infusions this past September  Unsure of dose , Disp: , Rfl:     SUMAtriptan (IMITREX) 100 mg tablet, Take 100 mg by mouth once as needed for migraine, Disp: , Rfl:     ALLERGIES:  Allergies   Allergen Reactions    Nickel        REVIEW OF SYSTEMS:  Pertinent items are noted in HPI  LABS:  HgA1c: No results found for: HGBA1C  BMP:   Lab Results   Component Value Date    GLUCOSE 135 11/11/2015    CALCIUM 9 3 09/07/2018     09/07/2018    K 4 1 09/07/2018    CO2 28 09/07/2018     09/07/2018    BUN 9 09/07/2018    CREATININE 0 72 09/07/2018           _____________________________________________________  PHYSICAL EXAMINATION:  General: well developed and well nourished, alert, oriented times 3 and appears uncomfortable  Psychiatric: depressed affect  HEENT: Trachea Midline, No torticollis  Cardiovascular: No discernable arrhythmia  Pulmonary: No wheezing or stridor  Skin: healed incisions  Neurovascular: Pulses Intact    MUSCULOSKELETAL EXAMINATION:  LEFT SIDE:  Wrist:  swelling to dorsal wrist, ttp to dorsal wrist,  full composit fist, full pronation and supination, NVI  RIGHT SIDE:  Wrist:  minimal swelling, ttp to dorsal wrist, full composit fist, NVI    _____________________________________________________  STUDIES REVIEWED:  Images were reviewd in PACS: xrays of right wrist show a healed long finger metacarpal fracture and healed fusion  xrays of left wrist show a healed fusion with lucency around the ulnar head replacement          PROCEDURES PERFORMED:  Procedures  No Procedures performed today   Scribe Attestation    I,:   Flaco Nino am acting as a scribe while in the presence of the attending physician :        I,:   Sinai Melgar MD personally performed the services described in this documentation    as scribed in my presence :

## 2018-10-01 ENCOUNTER — OFFICE VISIT (OUTPATIENT)
Dept: GASTROENTEROLOGY | Facility: MEDICAL CENTER | Age: 28
End: 2018-10-01
Payer: MEDICARE

## 2018-10-01 VITALS
TEMPERATURE: 98.5 F | DIASTOLIC BLOOD PRESSURE: 70 MMHG | HEART RATE: 88 BPM | WEIGHT: 176 LBS | HEIGHT: 67 IN | SYSTOLIC BLOOD PRESSURE: 122 MMHG | BODY MASS INDEX: 27.62 KG/M2

## 2018-10-01 DIAGNOSIS — K58.0 IRRITABLE BOWEL SYNDROME WITH DIARRHEA: ICD-10-CM

## 2018-10-01 DIAGNOSIS — E73.9 LACTOSE INTOLERANCE: ICD-10-CM

## 2018-10-01 DIAGNOSIS — R19.4 CHANGE IN BOWEL HABITS: Primary | ICD-10-CM

## 2018-10-01 DIAGNOSIS — R10.84 GENERALIZED ABDOMINAL PAIN: ICD-10-CM

## 2018-10-01 PROCEDURE — 99204 OFFICE O/P NEW MOD 45 MIN: CPT | Performed by: INTERNAL MEDICINE

## 2018-10-01 RX ORDER — SACCHAROMYCES BOULARDII 250 MG
250 CAPSULE ORAL 2 TIMES DAILY
Qty: 60 CAPSULE | Refills: 3 | Status: SHIPPED | OUTPATIENT
Start: 2018-10-01 | End: 2018-10-25

## 2018-10-01 NOTE — LETTER
October 1, 2018     [facility] [address]    Patient: Mary Jo Capellan   YOB: 1990   Date of Visit: 10/1/2018       Dear Dr Pratima Boyd: Thank you for referring Mary Jo Capellan to me for evaluation  Below are my notes for this consultation  If you have questions, please do not hesitate to call me  I look forward to following your patient along with you  Sincerely,        Jannette Aguilar MD        CC: No Recipients  Jannette Aguilar MD  10/1/2018  3:38 PM  Signed  [facility] - Outpatient Consultation  Mary Jo Capellan 28 y o  female MRN: [id number]  Encounter: [id number]          ASSESSMENT AND PLAN:      1  Change in bowel habits    - Giardia, EIA, Ova/Parasite; Future  - Stool Enteric Bacterial Panel by PCR; Future  - H  pylori antigen, stool; Future  - rifaximin (XIFAXAN) 550 mg tablet; Take 1 tablet (550 mg total) by mouth every 8 (eight) hours for 14 days  Dispense: 42 tablet; Refill: 0  - Clostridium difficile toxin by PCR; Future  - Celiac Disease Antibody Profile; Future  - saccharomyces boulardii (FLORASTOR) 250 mg capsule; Take 1 capsule (250 mg total) by mouth 2 (two) times a day for 30 days  Dispense: 60 capsule; Refill: 3  - US liver; Future    2  Generalized abdominal pain    She has generalized abdominal pain associated abdominal bloating and distension  She is lactose intolerant and may have other disaccharide  intolerance  She is not completely lactose-free at this time  She did have history of cholecystectomy last year due to gallstones  Currently overall has symptoms of IBS D  She does have history of rheumatoid arthritis and was previously on Rituxan currently not on any medications  She has had multiple surgery secondary to this  Currently symptoms of diarrhea are bothersome with IBS like symptoms    Differential diagnosis include infectious etiology versus irritable bowel syndrome versus celiac disease versus H pylori  Plan for conservative approach, FODMAP diet, probiotics and trial of rifaximin for IBS D  If symptoms do not improve with this she will need EGD and colonoscopy evaluation for further management     ______________________________________________________________________    HPI:      She is a 67-year-old female presents here for evaluation of IBS D symptoms and generalized abdominal pain associated with dyspepsia and abdominal bloating  No prior EGD and colonoscopy evaluation  She has multiple medical issues including severe rheumatoid arthritis which required multiple surgeries  She also has history of anemia secondary to  related AVM disease  Currently presents here for evaluation of IBS D type symptoms  She has been having worsening diarrhea which has been managed with Lomotil  He has not been recently ruled out for infectious etiology  She also has symptoms of abdominal bloating and distension  She did have previous history of cholecystectomy  Otherwise evaluation with CT scan of abdomen pelvis, inflammatory markers, CBC, TSH have all been within normal limits  She has multiple episodes of diarrhea which are nonbloody up to 4-5 episodes per day  She has improved with Lomotil  No prior history of this in the past     REVIEW OF SYSTEMS:    CONSTITUTIONAL: Denies any fever, chills, rigors, and weight loss  HEENT: No earache or tinnitus  Denies hearing loss or visual disturbances  CARDIOVASCULAR: No chest pain or palpitations  RESPIRATORY: Denies any cough, hemoptysis, shortness of breath or dyspnea on exertion  GASTROINTESTINAL: As noted in the History of Present Illness  GENITOURINARY: No problems with urination  Denies any hematuria or dysuria  NEUROLOGIC: No dizziness or vertigo, denies headaches  MUSCULOSKELETAL: Denies any muscle or joint pain  SKIN: Denies skin rashes or itching     ENDOCRINE: Denies excessive thirst  Denies intolerance to heat or cold   PSYCHOSOCIAL: Denies depression or anxiety  Denies any recent memory loss         Historical Information   Past Medical History:   Diagnosis Date    Abnormal Pap smear of cervix     Anemia     Anxiety     Depression     Iron deficiency     Irregular menses     Menorrhagia     Migraines     Neuropathy     Osteopenia     PONV (postoperative nausea and vomiting)     RA (rheumatoid arthritis) (HCC)     Rheumatoid arthritis (HCC)     Scratches     On back, stomach and legs from scratching due to urticaria    Vasculitis (HCC)      Past Surgical History:   Procedure Laterality Date    CHOLECYSTECTOMY      Laparoscopic    DILATION AND CURETTAGE OF UTERUS      JOINT REPLACEMENT Bilateral     knees    RI FUSION/GRAFT WRIST JOINT Left 4/4/2017    Procedure: WRIST FUSION / SPLINT APPLICATION ;  Surgeon: Bree Portillo MD;  [facility];  Service: Orthopedics    RI FUSION/GRAFT WRIST JOINT Right 4/10/2018    Procedure: Removal of hardware right wrist with Fusion of Long finger carpometacarpal joint, splint application Right Wrist;  Surgeon: Bree Portillo MD;  [facility];  Service: Orthopedics    RI HYSTEROSCOPY,W/ENDO BX N/A 12/1/2017    Procedure: DILATATION AND CURETTAGE (D&C) WITH HYSTEROSCOPY;  Surgeon: Claritza Huang DO;  Location[facility];  Service: Gynecology    RI LAP,CHOLECYSTECTOMY N/A 3/14/2017    Procedure: CHOLECYSTECTOMY LAPAROSCOPIC;  Surgeon: Jennifer Parker DO;  [facility];  Service: General    REPLACEMENT TOTAL KNEE Bilateral     WRIST SURGERY Right     For arthritis    WRIST SURGERY Left 4/4/2017    Procedure: ARTHROPLASTY WRIST ULNAR HEAD ARTHROPLASTY - INTEGRA SIZE 5 5 MM, 16 HEAD;  Surgeon: Bree Portillo MD;  [facility];  Service:      Social History   History   Alcohol Use No     Comment: Rare- once or twice yearly     History   Drug Use No     History   Smoking Status    Former Smoker    Packs/day: 0 25    Years: 3 00    Types: Cigarettes    Quit date: 2010   Smokeless Tobacco    Never Used     Family History   Problem Relation Age of Onset    Hypertension Mother     Rheum arthritis Mother     Depression Mother     Anxiety disorder Mother        Meds/Allergies       Current Outpatient Prescriptions:     ALPRAZolam (XANAX) 0 25 mg tablet    Calcium Carb-Cholecalciferol (CALCIUM 1000 + D PO)    dicyclomine (BENTYL) 20 mg tablet    diphenoxylate-atropine (LOMOTIL) 2 5-0 025 mg per tablet    ergocalciferol (VITAMIN D2) 50,000 units    escitalopram (LEXAPRO) 20 mg tablet    gabapentin (NEURONTIN) 400 mg capsule    hydrOXYzine HCL (ATARAX) 25 mg tablet    norethindrone (AYGESTIN) 5 mg tablet    ondansetron (ZOFRAN) 4 mg tablet    oxyCODONE (ROXICODONE) 30 MG immediate release tablet    predniSONE 5 mg tablet    ranitidine (ZANTAC) 150 mg tablet    rifaximin (XIFAXAN) 550 mg tablet    RiTUXimab (RITUXAN IV)    saccharomyces boulardii (FLORASTOR) 250 mg capsule    SUMAtriptan (IMITREX) 100 mg tablet    Allergies   Allergen Reactions    Nickel            Objective     Blood pressure 122/70, pulse 88, temperature 98 5 °F (36 9 °C), temperature source Tympanic, height 5' 7" (1 702 m), weight 79 8 kg (176 lb), not currently breastfeeding  Body mass index is 27 57 kg/m²  PHYSICAL EXAM:      General Appearance:   Alert, cooperative, no distress   HEENT:   Normocephalic, atraumatic, anicteric      Neck:  Supple, symmetrical, trachea midline   Lungs:   Clear to auscultation bilaterally; no rales, rhonchi or wheezing; respirations unlabored    Heart[de-identified]   Regular rate and rhythm; no murmur, rub, or gallop     Abdomen:   Soft, non-tender, non-distended; normal bowel sounds; no masses, no organomegaly    Genitalia:   Deferred    Rectal:   Deferred    Extremities:  No cyanosis, clubbing or edema    Pulses:  2+ and symmetric    Skin:  No jaundice, rashes, or lesions    Lymph nodes:  No palpable cervical lymphadenopathy        Lab Results:   No visits with results within 1 Day(s) from this visit     Latest known visit with results is:   Appointment on 09/07/2018   Component Date Value    Iron Saturation 09/07/2018 46     TIBC 09/07/2018 295     Iron 09/07/2018 137     Ferritin 09/07/2018 228     Vitamin B-12 09/07/2018 395     Arsenic 09/07/2018 5     Cadmium 09/07/2018 None Detected     Mercury 09/07/2018 None Detected     Lead Blood 09/07/2018 None Detected     Vit D, 25-Hydroxy 09/07/2018 17 7*    Folate 09/07/2018 17 8*    IODINE,SERUM 09/07/2018 51 0     WBC 09/07/2018 11 98*    RBC 09/07/2018 4 42     Hemoglobin 09/07/2018 13 8     Hematocrit 09/07/2018 42 1     MCV 09/07/2018 95     MCH 09/07/2018 31 2     MCHC 09/07/2018 32 8     RDW 09/07/2018 12 0     MPV 09/07/2018 11 9     Platelets 51/87/0268 210     nRBC 09/07/2018 0     Neutrophils Relative 09/07/2018 86*    Immat GRANS % 09/07/2018 1     Lymphocytes Relative 09/07/2018 8*    Monocytes Relative 09/07/2018 4     Eosinophils Relative 09/07/2018 0     Basophils Relative 09/07/2018 1     Neutrophils Absolute 09/07/2018 10 45*    Immature Grans Absolute 09/07/2018 0 06     Lymphocytes Absolute 09/07/2018 0 99     Monocytes Absolute 09/07/2018 0 42     Eosinophils Absolute 09/07/2018 0 00     Basophils Absolute 09/07/2018 0 06     Sed Rate 09/07/2018 6     CRP 09/07/2018 <3 0     IGA 09/07/2018 273 0     IGG 09/07/2018 801 0     IGM 09/07/2018 33 0*    CD19 Abs 09/07/2018 68     CD3 ABS 09/07/2018 831     CD4 ABS 09/07/2018 478     CD8 SUPPRESSOR ABS 09/07/2018 321     CD19+ LYMPHS % 09/07/2018 6 8     CD3 Pos Lymphs % 09/07/2018 83 1     CD4 % 09/07/2018 47 8     CD8  LYMPH % 09/07/2018 32 1     CD4/CD8 Ratio 09/07/2018 1 49     White Blood Cell Count 09/07/2018 12 1*    Red Blood Cell Count 09/07/2018 4 31     Hemoglobin 09/07/2018 13 8     HCT 09/07/2018 40 9     MCV 09/07/2018 95     [facility] 09/07/2018 32 0     MCHC 09/07/2018 33 7     RDW 09/07/2018 12 9     Platelet Count 23/13/6167 220     Neutrophils 09/07/2018 88     Lymphocytes 09/07/2018 8     Monocytes 09/07/2018 4     Eosinophils 09/07/2018 0     Basophils Relative 09/07/2018 0     Neutrophils (Absolute) 09/07/2018 10 7*    Lymphocytes (Absolute) 09/07/2018 1 0     Monocytes (Absolute) 09/07/2018 0 4     Eosinophils (Absolute) 09/07/2018 0 0     Basophils (Absolute) 09/07/2018 0 0     Immature Granulocytes 09/07/2018 0     Immature Granulocytes (A* 09/07/2018 0 0          Radiology Results:   Xr Wrist 3+ Vw Left    Result Date: 9/26/2018  Narrative: LEFT WRIST INDICATION: 65-year-old female, left wrist pain COMPARISON:  9/27/2017 x-rays VIEWS:  XR WRIST 3+ VW LEFT Images: 3 FINDINGS: Stable appearance status post ulnar head resection and replacement  Residual lucency surrounding the post extending proximally within the distal ulnar shaft suggesting loosening is unchanged  Dorsal plate fixation device extends from the distal radial shaft through the level of the mid 3rd metacarpal, unchanged  There is no acute fracture or dislocation  Persistent degenerative changes throughout the wrist Soft tissues are unremarkable  Impression: Stable exam suggesting hardware loosening involving the ulnar head prosthetic replacement  Persistent dorsal fusion plate, unchanged Workstation performed: [id number]     Xr Wrist 3+ Vw Right    Result Date: 9/26/2018  Narrative: RIGHT WRIST INDICATION: 65-year-old female, right wrist pain, follow-up COMPARISON:  5/23/2018 x-rays VIEWS:  XR WRIST 3+ VW RIGHT Images: 3 FINDINGS: Healed proximal 3rd metacarpal fracture  Previous K wires removed  Persistent evidence of radiocarpal fusion  Residual screw tracks evident within the distal radius  There is no acute fracture or dislocation  No lytic or blastic lesions are seen  Soft tissues are unremarkable       Impression: Interval healing of proximal 3rd metacarpal shaft fracture Stable postoperative appearance of the wrist No acute osseous abnormality  Workstation performed: [id number]     Us Pelvis Complete W Transvaginal    Result Date: 9/10/2018  Narrative: PELVIC ULTRASOUND, COMPLETE INDICATION:  28years old  Q27 30: Arteriovenous malformation, site unspecified  COMPARISON: Ultrasound 11/3/2017  CT scan 8/7/2018  TECHNIQUE:   Transabdominal pelvic ultrasound was performed in sagittal and transverse planes with a curvilinear transducer  Additional transvaginal imaging was performed to better evaluate the endometrium and ovaries  Imaging included volumetric sweeps as well as traditional still imaging technique  FINDINGS: UTERUS: The uterus is anteverted in position, measuring 6 6 x 3 1 x 4 2 cm  Contour and echotexture appear normal  The cervix shows no suspicious abnormality  ENDOMETRIUM:  Normal caliber of 4 mm  Homogenous and normal in appearance  OVARIES/ADNEXA: Right ovary:  2 0 x 2 0 x 1 5 cm  No suspicious right ovarian abnormality  Doppler flow within normal limits  Left ovary:  2 6 x 2 1 x 1 8 cm  No suspicious left ovarian abnormality  Doppler flow within normal limits  No suspicious adnexal mass or loculated collections  There is no free fluid       Impression:  Normal   Workstation performed: [id number]

## 2018-10-01 NOTE — PROGRESS NOTES
[facility] - Outpatient Consultation  Christophe Manriquez 28 y o  female MRN: [id number]  Encounter: [id number]          ASSESSMENT AND PLAN:      1  Change in bowel habits    - Giardia, EIA, Ova/Parasite; Future  - Stool Enteric Bacterial Panel by PCR; Future  - H  pylori antigen, stool; Future  - rifaximin (XIFAXAN) 550 mg tablet; Take 1 tablet (550 mg total) by mouth every 8 (eight) hours for 14 days  Dispense: 42 tablet; Refill: 0  - Clostridium difficile toxin by PCR; Future  - Celiac Disease Antibody Profile; Future  - saccharomyces boulardii (FLORASTOR) 250 mg capsule; Take 1 capsule (250 mg total) by mouth 2 (two) times a day for 30 days  Dispense: 60 capsule; Refill: 3  - US liver; Future    2  Generalized abdominal pain    She has generalized abdominal pain associated abdominal bloating and distension  She is lactose intolerant and may have other disaccharide  intolerance  She is not completely lactose-free at this time  She did have history of cholecystectomy last year due to gallstones  Currently overall has symptoms of IBS D  She does have history of rheumatoid arthritis and was previously on Rituxan currently not on any medications  She has had multiple surgery secondary to this  Currently symptoms of diarrhea are bothersome with IBS like symptoms  Differential diagnosis include infectious etiology versus irritable bowel syndrome versus celiac disease versus H pylori  Plan for conservative approach, FODMAP diet, probiotics and trial of rifaximin for IBS D  If symptoms do not improve with this she will need EGD and colonoscopy evaluation for further management     ______________________________________________________________________    HPI:      She is a 26-year-old female presents here for evaluation of IBS D symptoms and generalized abdominal pain associated with dyspepsia and abdominal bloating  No prior EGD and colonoscopy evaluation    She has multiple medical issues including severe rheumatoid arthritis which required multiple surgeries  She also has history of anemia secondary to  related AVM disease  Currently presents here for evaluation of IBS D type symptoms  She has been having worsening diarrhea which has been managed with Lomotil  He has not been recently ruled out for infectious etiology  She also has symptoms of abdominal bloating and distension  She did have previous history of cholecystectomy  Otherwise evaluation with CT scan of abdomen pelvis, inflammatory markers, CBC, TSH have all been within normal limits  She has multiple episodes of diarrhea which are nonbloody up to 4-5 episodes per day  She has improved with Lomotil  No prior history of this in the past     REVIEW OF SYSTEMS:    CONSTITUTIONAL: Denies any fever, chills, rigors, and weight loss  HEENT: No earache or tinnitus  Denies hearing loss or visual disturbances  CARDIOVASCULAR: No chest pain or palpitations  RESPIRATORY: Denies any cough, hemoptysis, shortness of breath or dyspnea on exertion  GASTROINTESTINAL: As noted in the History of Present Illness  GENITOURINARY: No problems with urination  Denies any hematuria or dysuria  NEUROLOGIC: No dizziness or vertigo, denies headaches  MUSCULOSKELETAL: Denies any muscle or joint pain  SKIN: Denies skin rashes or itching  ENDOCRINE: Denies excessive thirst  Denies intolerance to heat or cold  PSYCHOSOCIAL: Denies depression or anxiety  Denies any recent memory loss         Historical Information   Past Medical History:   Diagnosis Date    Abnormal Pap smear of cervix     Anemia     Anxiety     Depression     Iron deficiency     Irregular menses     Menorrhagia     Migraines     Neuropathy     Osteopenia     PONV (postoperative nausea and vomiting)     [facility]     Rheumatoid arthritis ([facility] [address] )     Scratches     On back, stomach and legs from scratching due to urticaria    [facility] Past Surgical History:   Procedure Laterality Date    CHOLECYSTECTOMY      Laparoscopic    DILATION AND CURETTAGE OF UTERUS      JOINT REPLACEMENT Bilateral     knees    CA FUSION/GRAFT WRIST JOINT Left 4/4/2017    Procedure: WRIST FUSION / SPLINT APPLICATION ;  Surgeon: Lexis Kenyon MD;  [facility];  Service: Orthopedics    CA FUSION/GRAFT WRIST JOINT Right 4/10/2018    Procedure: Removal of hardware right wrist with Fusion of Long finger carpometacarpal joint, splint application Right Wrist;  Surgeon: Lexis Kenyon MD;  [facility];  Service: Orthopedics    CA HYSTEROSCOPY,W/ENDO BX N/A 12/1/2017    Procedure: DILATATION AND CURETTAGE (D&C) WITH HYSTEROSCOPY;  Surgeon: Arlyn Joseph DO;  Location[facility];  Service: Gynecology    CA LAP,CHOLECYSTECTOMY N/A 3/14/2017    Procedure: CHOLECYSTECTOMY LAPAROSCOPIC;  Surgeon: Araceli Muñoz DO;  [facility];  Service: General    REPLACEMENT TOTAL KNEE Bilateral     WRIST SURGERY Right     For arthritis    WRIST SURGERY Left 4/4/2017    Procedure: ARTHROPLASTY WRIST ULNAR HEAD ARTHROPLASTY - INTEGRA SIZE 5 5 MM, 16 HEAD;  Surgeon: Lexis Kenyon MD;  [facility];  Service:      Social History   History   Alcohol Use No     Comment: Rare- once or twice yearly     History   Drug Use No     History   Smoking Status    Former Smoker    Packs/day: 0 25    Years: 3 00    Types: Cigarettes    Quit date: 2010   Smokeless Tobacco    Never Used     Family History   Problem Relation Age of Onset    Hypertension Mother     Rheum arthritis Mother     Depression Mother     Anxiety disorder Mother        Meds/Allergies       Current Outpatient Prescriptions:     ALPRAZolam (XANAX) 0 25 mg tablet    Calcium Carb-Cholecalciferol (CALCIUM 1000 + D PO)    dicyclomine (BENTYL) 20 mg tablet    diphenoxylate-atropine (LOMOTIL) 2 5-0 025 mg per tablet    ergocalciferol (VITAMIN D2) 50,000 units   escitalopram (LEXAPRO) 20 mg tablet    gabapentin (NEURONTIN) 400 mg capsule    hydrOXYzine HCL (ATARAX) 25 mg tablet    norethindrone (AYGESTIN) 5 mg tablet    ondansetron (ZOFRAN) 4 mg tablet    oxyCODONE (ROXICODONE) 30 MG immediate release tablet    predniSONE 5 mg tablet    ranitidine (ZANTAC) 150 mg tablet    rifaximin (XIFAXAN) 550 mg tablet    RiTUXimab (RITUXAN IV)    saccharomyces boulardii (FLORASTOR) 250 mg capsule    SUMAtriptan (IMITREX) 100 mg tablet    Allergies   Allergen Reactions    Nickel            Objective     Blood pressure 122/70, pulse 88, temperature 98 5 °F (36 9 °C), temperature source Tympanic, height 5' 7" (1 702 m), weight 79 8 kg (176 lb), not currently breastfeeding  Body mass index is 27 57 kg/m²  PHYSICAL EXAM:      General Appearance:   Alert, cooperative, no distress   HEENT:   Normocephalic, atraumatic, anicteric      Neck:  Supple, symmetrical, trachea midline   Lungs:   Clear to auscultation bilaterally; no rales, rhonchi or wheezing; respirations unlabored    Heart[de-identified]   Regular rate and rhythm; no murmur, rub, or gallop  Abdomen:   Soft, non-tender, non-distended; normal bowel sounds; no masses, no organomegaly    Genitalia:   Deferred    Rectal:   Deferred    Extremities:  No cyanosis, clubbing or edema    Pulses:  2+ and symmetric    Skin:  No jaundice, rashes, or lesions    Lymph nodes:  No palpable cervical lymphadenopathy        Lab Results:   No visits with results within 1 Day(s) from this visit     Latest known visit with results is:   Appointment on 09/07/2018   Component Date Value    Iron Saturation 09/07/2018 46     TIBC 09/07/2018 295     Iron 09/07/2018 137     Ferritin 09/07/2018 228     Vitamin B-12 09/07/2018 395     Arsenic 09/07/2018 5     Cadmium 09/07/2018 None Detected     Mercury 09/07/2018 None Detected     Lead Blood 09/07/2018 None Detected     Vit D, 25-Hydroxy 09/07/2018 17 7*    Folate 09/07/2018 17 8*    IODINE,SERUM 09/07/2018 51 0     WBC 09/07/2018 11 98*    RBC 09/07/2018 4 42     Hemoglobin 09/07/2018 13 8     Hematocrit 09/07/2018 42 1     MCV 09/07/2018 95     MCH 09/07/2018 31 2     MCHC 09/07/2018 32 8     RDW 09/07/2018 12 0     MPV 09/07/2018 11 9     Platelets 25/52/3219 210     nRBC 09/07/2018 0     Neutrophils Relative 09/07/2018 86*    Immat GRANS % 09/07/2018 1     Lymphocytes Relative 09/07/2018 8*    Monocytes Relative 09/07/2018 4     Eosinophils Relative 09/07/2018 0     Basophils Relative 09/07/2018 1     Neutrophils Absolute 09/07/2018 10 45*    Immature Grans Absolute 09/07/2018 0 06     Lymphocytes Absolute 09/07/2018 0 99     Monocytes Absolute 09/07/2018 0 42     Eosinophils Absolute 09/07/2018 0 00     Basophils Absolute 09/07/2018 0 06     Sed Rate 09/07/2018 6     CRP 09/07/2018 <3 0     IGA 09/07/2018 273 0     IGG 09/07/2018 801 0     IGM 09/07/2018 33 0*    CD19 Abs 09/07/2018 68     CD3 ABS 09/07/2018 831     CD4 ABS 09/07/2018 478     CD8 SUPPRESSOR ABS 09/07/2018 321     CD19+ LYMPHS % 09/07/2018 6 8     CD3 Pos Lymphs % 09/07/2018 83 1     CD4 % 09/07/2018 47 8     CD8  LYMPH % 09/07/2018 32 1     CD4/CD8 Ratio 09/07/2018 1 49     White Blood Cell Count 09/07/2018 12 1*    Red Blood Cell Count 09/07/2018 4 31     Hemoglobin 09/07/2018 13 8     HCT 09/07/2018 40 9     MCV 09/07/2018 95     MCH 09/07/2018 32 0     MCHC 09/07/2018 33 7     RDW 09/07/2018 12 9     Platelet Count 35/00/6762 220     Neutrophils 09/07/2018 88     Lymphocytes 09/07/2018 8     Monocytes 09/07/2018 4     Eosinophils 09/07/2018 0     Basophils Relative 09/07/2018 0     Neutrophils (Absolute) 09/07/2018 10 7*    Lymphocytes (Absolute) 09/07/2018 1 0     Monocytes (Absolute) 09/07/2018 0 4     Eosinophils (Absolute) 09/07/2018 0 0     Basophils (Absolute) 09/07/2018 0 0     Immature Granulocytes 09/07/2018 0     Immature Granulocytes (A* 09/07/2018 0 0 Radiology Results:   Xr Wrist 3+ Vw Left    Result Date: 9/26/2018  Narrative: LEFT WRIST INDICATION: 50-year-old female, left wrist pain COMPARISON:  9/27/2017 x-rays VIEWS:  XR WRIST 3+ VW LEFT Images: 3 FINDINGS: Stable appearance status post ulnar head resection and replacement  Residual lucency surrounding the post extending proximally within the distal ulnar shaft suggesting loosening is unchanged  Dorsal plate fixation device extends from the distal radial shaft through the level of the mid 3rd metacarpal, unchanged  There is no acute fracture or dislocation  Persistent degenerative changes throughout the wrist Soft tissues are unremarkable  Impression: Stable exam suggesting hardware loosening involving the ulnar head prosthetic replacement  Persistent dorsal fusion plate, unchanged Workstation performed: [id number]     Xr Wrist 3+ Vw Right    Result Date: 9/26/2018  Narrative: RIGHT WRIST INDICATION: 50-year-old female, right wrist pain, follow-up COMPARISON:  5/23/2018 x-rays VIEWS:  XR WRIST 3+ VW RIGHT Images: 3 FINDINGS: Healed proximal 3rd metacarpal fracture  Previous K wires removed  Persistent evidence of radiocarpal fusion  Residual screw tracks evident within the distal radius  There is no acute fracture or dislocation  No lytic or blastic lesions are seen  Soft tissues are unremarkable  Impression: Interval healing of proximal 3rd metacarpal shaft fracture Stable postoperative appearance of the wrist No acute osseous abnormality  Workstation performed: [id number]     Us Pelvis Complete W Transvaginal    Result Date: 9/10/2018  Narrative: PELVIC ULTRASOUND, COMPLETE INDICATION:  28years old  Q27 30: Arteriovenous malformation, site unspecified  COMPARISON: Ultrasound 11/3/2017  CT scan 8/7/2018  TECHNIQUE:   Transabdominal pelvic ultrasound was performed in sagittal and transverse planes with a curvilinear transducer    Additional transvaginal imaging was performed to better evaluate the endometrium and ovaries  Imaging included volumetric sweeps as well as traditional still imaging technique  FINDINGS: UTERUS: The uterus is anteverted in position, measuring 6 6 x 3 1 x 4 2 cm  Contour and echotexture appear normal  The cervix shows no suspicious abnormality  ENDOMETRIUM:  Normal caliber of 4 mm  Homogenous and normal in appearance  OVARIES/ADNEXA: Right ovary:  2 0 x 2 0 x 1 5 cm  No suspicious right ovarian abnormality  Doppler flow within normal limits  Left ovary:  2 6 x 2 1 x 1 8 cm  No suspicious left ovarian abnormality  Doppler flow within normal limits  No suspicious adnexal mass or loculated collections  There is no free fluid       Impression:  Normal   Workstation performed: [id number]

## 2018-10-01 NOTE — PATIENT INSTRUCTIONS
The patient is scheduled on 10/3/18 for a US of the Liver at [facility])  She was given her labs slips and will return in December for a follow up with Cindy Rodrigez

## 2018-10-03 ENCOUNTER — HOSPITAL ENCOUNTER (OUTPATIENT)
Dept: ULTRASOUND IMAGING | Facility: HOSPITAL | Age: 28
Discharge: HOME | End: 2018-10-03
Attending: INTERNAL MEDICINE
Payer: MEDICARE

## 2018-10-03 ENCOUNTER — TELEPHONE (OUTPATIENT)
Dept: GASTROENTEROLOGY | Facility: AMBULARY SURGERY CENTER | Age: 28
End: 2018-10-03

## 2018-10-03 ENCOUNTER — APPOINTMENT (OUTPATIENT)
Dept: LAB | Facility: HOSPITAL | Age: 28
End: 2018-10-03
Payer: MEDICARE

## 2018-10-03 DIAGNOSIS — Z00.00 ROUTINE GENERAL MEDICAL EXAMINATION AT A HEALTH CARE FACILITY: ICD-10-CM

## 2018-10-03 DIAGNOSIS — R05.9 COUGH: ICD-10-CM

## 2018-10-03 DIAGNOSIS — R53.83 FATIGUE, UNSPECIFIED TYPE: ICD-10-CM

## 2018-10-03 DIAGNOSIS — M25.532 BILATERAL WRIST PAIN: ICD-10-CM

## 2018-10-03 DIAGNOSIS — E55.9 VITAMIN D DEFICIENCY: ICD-10-CM

## 2018-10-03 DIAGNOSIS — M25.531 BILATERAL WRIST PAIN: ICD-10-CM

## 2018-10-03 DIAGNOSIS — R10.9 ABDOMINAL PAIN, UNSPECIFIED ABDOMINAL LOCATION: ICD-10-CM

## 2018-10-03 DIAGNOSIS — R42 DIZZINESS: ICD-10-CM

## 2018-10-03 DIAGNOSIS — R35.1 NOCTURIA: ICD-10-CM

## 2018-10-03 DIAGNOSIS — R11.0 NAUSEA: ICD-10-CM

## 2018-10-03 DIAGNOSIS — M25.50 ARTHRALGIA, UNSPECIFIED JOINT: ICD-10-CM

## 2018-10-03 DIAGNOSIS — R11.2 NAUSEA AND VOMITING, INTRACTABILITY OF VOMITING NOT SPECIFIED, UNSPECIFIED VOMITING TYPE: ICD-10-CM

## 2018-10-03 DIAGNOSIS — R19.4 CHANGE IN BOWEL HABITS: ICD-10-CM

## 2018-10-03 DIAGNOSIS — R31.9 HEMATURIA, UNSPECIFIED TYPE: ICD-10-CM

## 2018-10-03 DIAGNOSIS — D50.9 IRON DEFICIENCY ANEMIA, UNSPECIFIED IRON DEFICIENCY ANEMIA TYPE: ICD-10-CM

## 2018-10-03 DIAGNOSIS — R79.9 ABNORMAL BLOOD CHEMISTRY: ICD-10-CM

## 2018-10-03 DIAGNOSIS — R19.7 DIARRHEA, UNSPECIFIED TYPE: ICD-10-CM

## 2018-10-03 DIAGNOSIS — R21 RASH: ICD-10-CM

## 2018-10-03 DIAGNOSIS — E03.9 HYPOTHYROIDISM, UNSPECIFIED TYPE: ICD-10-CM

## 2018-10-03 DIAGNOSIS — D64.9 ANEMIA, UNSPECIFIED TYPE: ICD-10-CM

## 2018-10-03 DIAGNOSIS — M79.609 PAIN IN EXTREMITY, UNSPECIFIED EXTREMITY: ICD-10-CM

## 2018-10-03 DIAGNOSIS — R82.991 HYPOCITRATURIA: ICD-10-CM

## 2018-10-03 DIAGNOSIS — M54.5 LOW BACK PAIN, UNSPECIFIED BACK PAIN LATERALITY, UNSPECIFIED CHRONICITY, WITH SCIATICA PRESENCE UNSPECIFIED: ICD-10-CM

## 2018-10-03 DIAGNOSIS — R63.4 LOSS OF WEIGHT: ICD-10-CM

## 2018-10-03 LAB
ALBUMIN SERPL BCP-MCNC: 4.5 G/DL (ref 3–5.2)
ALP SERPL-CCNC: 81 U/L (ref 43–122)
ALT SERPL W P-5'-P-CCNC: 26 U/L (ref 9–52)
ANION GAP SERPL CALCULATED.3IONS-SCNC: 10 MMOL/L (ref 5–14)
AST SERPL W P-5'-P-CCNC: 26 U/L (ref 14–36)
BACTERIA UR QL AUTO: ABNORMAL /HPF
BILIRUB SERPL-MCNC: 0.8 MG/DL
BILIRUB UR QL STRIP: NEGATIVE
BUN SERPL-MCNC: 11 MG/DL (ref 5–25)
CALCIUM SERPL-MCNC: 9.5 MG/DL (ref 8.4–10.2)
CHLORIDE SERPL-SCNC: 101 MMOL/L (ref 97–108)
CLARITY UR: CLEAR
CO2 SERPL-SCNC: 30 MMOL/L (ref 22–30)
COLOR UR: ABNORMAL
CREAT SERPL-MCNC: 0.76 MG/DL (ref 0.6–1.2)
ERYTHROCYTE [DISTWIDTH] IN BLOOD BY AUTOMATED COUNT: 13.1 %
GFR SERPL CREATININE-BSD FRML MDRD: 107 ML/MIN/1.73SQ M
GLUCOSE P FAST SERPL-MCNC: 89 MG/DL (ref 70–99)
GLUCOSE UR STRIP-MCNC: NEGATIVE MG/DL
HCT VFR BLD AUTO: 45 % (ref 36–46)
HGB BLD-MCNC: 14.8 G/DL (ref 12–16)
HGB UR QL STRIP.AUTO: 25
KETONES UR STRIP-MCNC: NEGATIVE MG/DL
LEUKOCYTE ESTERASE UR QL STRIP: NEGATIVE
MAGNESIUM SERPL-MCNC: 1.9 MG/DL (ref 1.6–2.3)
MCH RBC QN AUTO: 31.5 PG (ref 26–34)
MCHC RBC AUTO-ENTMCNC: 32.9 G/DL (ref 31–36)
MCV RBC AUTO: 96 FL (ref 80–100)
MUCOUS THREADS UR QL AUTO: ABNORMAL
NITRITE UR QL STRIP: NEGATIVE
NON-SQ EPI CELLS URNS QL MICRO: ABNORMAL /HPF
PH UR STRIP.AUTO: 5 [PH] (ref 4.5–8)
PHOSPHATE SERPL-MCNC: 3.5 MG/DL (ref 2.5–4.8)
PLATELET # BLD AUTO: 197 THOUSANDS/UL (ref 150–450)
PMV BLD AUTO: 9.8 FL (ref 8.9–12.7)
POTASSIUM SERPL-SCNC: 4.1 MMOL/L (ref 3.6–5)
PROT SERPL-MCNC: 7.3 G/DL (ref 5.9–8.4)
PROT UR STRIP-MCNC: NEGATIVE MG/DL
RBC # BLD AUTO: 4.7 MILLION/UL (ref 4–5.2)
RBC #/AREA URNS AUTO: ABNORMAL /HPF
SODIUM SERPL-SCNC: 141 MMOL/L (ref 137–147)
SP GR UR STRIP.AUTO: 1.02 (ref 1–1.04)
TSH SERPL DL<=0.05 MIU/L-ACNC: 1.61 UIU/ML (ref 0.47–4.68)
URATE SERPL-MCNC: 3.9 MG/DL (ref 2.7–7.5)
UROBILINOGEN UA: NEGATIVE MG/DL
WBC # BLD AUTO: 9.9 THOUSAND/UL (ref 4.5–11)
WBC #/AREA URNS AUTO: ABNORMAL /HPF

## 2018-10-03 PROCEDURE — 82784 ASSAY IGA/IGD/IGG/IGM EACH: CPT

## 2018-10-03 PROCEDURE — 83735 ASSAY OF MAGNESIUM: CPT

## 2018-10-03 PROCEDURE — 85027 COMPLETE CBC AUTOMATED: CPT

## 2018-10-03 PROCEDURE — 84443 ASSAY THYROID STIM HORMONE: CPT

## 2018-10-03 PROCEDURE — 80053 COMPREHEN METABOLIC PANEL: CPT

## 2018-10-03 PROCEDURE — 76705 ECHO EXAM OF ABDOMEN: CPT

## 2018-10-03 PROCEDURE — 84100 ASSAY OF PHOSPHORUS: CPT

## 2018-10-03 PROCEDURE — 36415 COLL VENOUS BLD VENIPUNCTURE: CPT

## 2018-10-03 PROCEDURE — 86255 FLUORESCENT ANTIBODY SCREEN: CPT

## 2018-10-03 PROCEDURE — 81001 URINALYSIS AUTO W/SCOPE: CPT | Performed by: FAMILY MEDICINE

## 2018-10-03 PROCEDURE — 83516 IMMUNOASSAY NONANTIBODY: CPT

## 2018-10-03 PROCEDURE — 84550 ASSAY OF BLOOD/URIC ACID: CPT

## 2018-10-03 NOTE — TELEPHONE ENCOUNTER
DR CATES'S PT    [facility] called stating pt will be getting her xifaxan at her local pharmacy  Pt was also aware and is ok with paying the copayment

## 2018-10-04 ENCOUNTER — TELEPHONE (OUTPATIENT)
Dept: GASTROENTEROLOGY | Facility: MEDICAL CENTER | Age: 28
End: 2018-10-04

## 2018-10-04 LAB
ENDOMYSIUM IGA SER QL: NEGATIVE
GLIADIN PEPTIDE IGA SER-ACNC: 1 UNITS (ref 0–19)
GLIADIN PEPTIDE IGG SER-ACNC: 5 UNITS (ref 0–19)
IGA SERPL-MCNC: 280 MG/DL (ref 87–352)
TTG IGA SER-ACNC: <2 U/ML (ref 0–3)
TTG IGG SER-ACNC: <2 U/ML (ref 0–5)

## 2018-10-04 NOTE — TELEPHONE ENCOUNTER
----- Message from Antoinette Dao MD sent at 10/4/2018  9:15 AM EDT -----  Liver has hemangioma which are benign lesion     US otherwise within normal limits

## 2018-10-05 ENCOUNTER — APPOINTMENT (OUTPATIENT)
Dept: LAB | Facility: HOSPITAL | Age: 28
End: 2018-10-05
Attending: INTERNAL MEDICINE
Payer: MEDICARE

## 2018-10-05 DIAGNOSIS — R19.4 CHANGE IN BOWEL HABITS: ICD-10-CM

## 2018-10-05 PROCEDURE — 87338 HPYLORI STOOL AG IA: CPT

## 2018-10-05 PROCEDURE — 87505 NFCT AGENT DETECTION GI: CPT

## 2018-10-05 PROCEDURE — 87329 GIARDIA AG IA: CPT

## 2018-10-05 PROCEDURE — 87177 OVA AND PARASITES SMEARS: CPT

## 2018-10-05 PROCEDURE — 87209 SMEAR COMPLEX STAIN: CPT

## 2018-10-06 LAB
CAMPYLOBACTER DNA SPEC NAA+PROBE: NORMAL
SALMONELLA DNA SPEC QL NAA+PROBE: NORMAL
SHIGA TOXIN STX GENE SPEC NAA+PROBE: NORMAL
SHIGELLA DNA SPEC QL NAA+PROBE: NORMAL

## 2018-10-07 LAB — H PYLORI AG STL QL IA: NEGATIVE

## 2018-10-08 ENCOUNTER — TELEPHONE (OUTPATIENT)
Dept: GASTROENTEROLOGY | Facility: MEDICAL CENTER | Age: 28
End: 2018-10-08

## 2018-10-08 LAB — G LAMBLIA AG STL QL IA: NEGATIVE

## 2018-10-08 NOTE — TELEPHONE ENCOUNTER
----- Message from Rossana Jenkins MD sent at 10/6/2018 10:17 AM EDT -----  Call patient to report normal results

## 2018-10-09 LAB — O+P STL CONC: NORMAL

## 2018-10-10 ENCOUNTER — TELEPHONE (OUTPATIENT)
Dept: OBGYN CLINIC | Facility: HOSPITAL | Age: 28
End: 2018-10-10

## 2018-10-10 ENCOUNTER — TELEPHONE (OUTPATIENT)
Dept: GASTROENTEROLOGY | Facility: AMBULARY SURGERY CENTER | Age: 28
End: 2018-10-10

## 2018-10-10 NOTE — TELEPHONE ENCOUNTER
Patient  Dr Harris    Patient called in saying that someone called her from Ortho today  However there was no message in her chart  If you placed a call out to her please call her back thank you

## 2018-10-10 NOTE — TELEPHONE ENCOUNTER
I called the patient and left a very detailed message including my name, phone number, and message  She needs to see her PCP prior to her surgery with Dr Nitesh Alarcon  I'll call the patient again regarding this

## 2018-10-10 NOTE — TELEPHONE ENCOUNTER
Dr Ugalde's pt called stating that on the script for Xifaxan 550 MG indicates to take 1 tablet every 8 hours for 14 days and she states the doctor told her to take 1 tablet a day for 14 days   Please call pt to clarify dosage [phone]

## 2018-10-12 ENCOUNTER — TELEPHONE (OUTPATIENT)
Dept: GASTROENTEROLOGY | Facility: CLINIC | Age: 28
End: 2018-10-12

## 2018-10-12 DIAGNOSIS — K59.1 FUNCTIONAL DIARRHEA: Primary | ICD-10-CM

## 2018-10-12 RX ORDER — LOPERAMIDE HYDROCHLORIDE 2 MG/1
2 CAPSULE ORAL 4 TIMES DAILY PRN
Qty: 30 CAPSULE | Refills: 0 | Status: ON HOLD | OUTPATIENT
Start: 2018-10-12 | End: 2019-01-18 | Stop reason: ALTCHOICE

## 2018-10-12 RX ORDER — DIPHENOXYLATE HYDROCHLORIDE AND ATROPINE SULFATE 2.5; .025 MG/1; MG/1
1 TABLET ORAL 4 TIMES DAILY PRN
Qty: 15 TABLET | Refills: 0 | Status: SHIPPED | OUTPATIENT
Start: 2018-10-12 | End: 2021-08-30 | Stop reason: SDUPTHER

## 2018-10-12 NOTE — TELEPHONE ENCOUNTER
I called her, she stopped taking the Xifaxan and did take 1 Lomotil which resolved her diarrhea  She would like a refill on the Lomotil and this will be waiting for her the [facility] office, she will come pick it up next week  I sent Imodium to her pharmacy in the meantime and she will consider using this although she notes she does not like to take new medications  In the meantime she will try to follow the low FODMAP diet

## 2018-10-12 NOTE — TELEPHONE ENCOUNTER
Dr Rachell Keller pt      Pt called in stating that since using Xifaxan she has been having a lot of stomach pain and "explosive diarrhea" please call back to discuss   [phone]

## 2018-10-15 ENCOUNTER — ANNUAL EXAM (OUTPATIENT)
Dept: OBGYN CLINIC | Facility: CLINIC | Age: 28
End: 2018-10-15
Payer: MEDICARE

## 2018-10-15 VITALS
HEIGHT: 67 IN | DIASTOLIC BLOOD PRESSURE: 74 MMHG | WEIGHT: 170.8 LBS | BODY MASS INDEX: 26.81 KG/M2 | SYSTOLIC BLOOD PRESSURE: 110 MMHG

## 2018-10-15 DIAGNOSIS — M05.761 RHEUMATOID ARTHRITIS INVOLVING BOTH KNEES WITH POSITIVE RHEUMATOID FACTOR (HCC): ICD-10-CM

## 2018-10-15 DIAGNOSIS — Z01.419 ENCOUNTER FOR ANNUAL ROUTINE GYNECOLOGICAL EXAMINATION: ICD-10-CM

## 2018-10-15 DIAGNOSIS — Z01.419 WOMEN'S ANNUAL ROUTINE GYNECOLOGICAL EXAMINATION: ICD-10-CM

## 2018-10-15 DIAGNOSIS — M05.762 RHEUMATOID ARTHRITIS INVOLVING BOTH KNEES WITH POSITIVE RHEUMATOID FACTOR (HCC): ICD-10-CM

## 2018-10-15 DIAGNOSIS — R10.2 PELVIC PAIN IN FEMALE: ICD-10-CM

## 2018-10-15 DIAGNOSIS — N94.10 DYSPAREUNIA IN FEMALE: ICD-10-CM

## 2018-10-15 DIAGNOSIS — N93.9 ABNORMAL UTERINE BLEEDING: Primary | ICD-10-CM

## 2018-10-15 DIAGNOSIS — N93.9 ABNORMAL UTERINE BLEEDING (AUB): ICD-10-CM

## 2018-10-15 PROCEDURE — G0145 SCR C/V CYTO,THINLAYER,RESCR: HCPCS | Performed by: OBSTETRICS & GYNECOLOGY

## 2018-10-15 PROCEDURE — 99395 PREV VISIT EST AGE 18-39: CPT | Performed by: OBSTETRICS & GYNECOLOGY

## 2018-10-15 RX ORDER — GABAPENTIN 800 MG/1
TABLET ORAL
Refills: 2 | Status: ON HOLD | COMMUNITY
Start: 2018-10-09 | End: 2018-11-13

## 2018-10-15 RX ORDER — ERYTHROMYCIN 5 MG/G
OINTMENT OPHTHALMIC
Refills: 0 | COMMUNITY
Start: 2018-09-24 | End: 2018-10-31

## 2018-10-15 RX ORDER — TOBRAMYCIN 3 MG/ML
SOLUTION/ DROPS OPHTHALMIC
Refills: 0 | COMMUNITY
Start: 2018-09-24 | End: 2018-10-31

## 2018-10-15 RX ORDER — GABAPENTIN 600 MG/1
600 TABLET ORAL 2 TIMES DAILY
Refills: 2 | COMMUNITY
Start: 2018-10-12 | End: 2019-10-07

## 2018-10-15 RX ORDER — ALPRAZOLAM 0.5 MG/1
0.5 TABLET ORAL 3 TIMES DAILY
Refills: 0 | COMMUNITY
Start: 2018-09-24 | End: 2018-10-31

## 2018-10-15 NOTE — PROGRESS NOTES
Assessment    Annual well-woman exam  History of severe rheumatoid arthritis  Status post bilateral knee replacement  Status post bilateral wrist surgery secondary to arthritis  Status post D&C hysteroscopy 2017  Chronic pelvic pain  Dyspareunia  Abnormal uterine bleeding  Patient requesting hysterectomy        Plan   Return for endometrial biopsy   All questions answered  Await pap smear results  Subjective no acute distress     Jake Tafoya is a 33 y o  female  2 para 2 who presents for annual exam   Currently she is on norethindrone 5 mg daily  If she is not on this medication she has irregular painful bleeding heavy at times and passing clots  She did have a D&C hysteroscopy done last year symptoms did seem to improve however she continues to have ongoing pelvic pain symptoms and dyspareunia  Patient is concerned about weight gain and moodiness issues caused by this medication and would like to have a hysterectomy to relieve her pain and abnormal bleeding symptoms  Recommend she return within the next several weeks for endometrial biopsy and possibly schedule hysterectomy  Periods are rare, lasting unknown days  Dysmenorrhea:severe, occurring throughout menses  Cyclic symptoms include irritability, moodiness, pelvic pain and weight gain  No intermenstrual bleeding, spotting, or discharge  The patient reports that there is not domestic violence in her life  Current contraception: oral progesterone-only contraceptive  History of abnormal Pap smear: no  Family history of uterine or ovarian cancer: no  Regular self breast exam: no  History of abnormal mammogram: no  Family history of breast cancer: no  History of abnormal lipids: no  Menstrual History:  OB History      Para Term  AB Living    1              SAB TAB Ectopic Multiple Live Births                      Menarche age: 14  No LMP recorded (lmp unknown)  Review of Systems  Pertinent items are noted in HPI  Objective no acute distress     /74 (BP Location: Right arm, Patient Position: Sitting, Cuff Size: Standard)   Ht 5' 7" (1 702 m)   Wt 77 5 kg (170 lb 12 8 oz)   LMP  (LMP Unknown) Comment: on norethindrone  BMI 26 75 kg/m²     General:   alert and oriented, in no acute distress, alert, appears stated age and cooperative   Heart: regular rate and rhythm, S1, S2 normal, no murmur, click, rub or gallop   Lungs: clear to auscultation bilaterally   Abdomen: soft, non-tender, without masses or organomegaly   Vulva: normal, Bartholin's, Urethra, [place]'s normal, female escutcheon   Vagina: normal mucosa, normal discharge   Cervix: multiparous appearance, no bleeding following Pap, no cervical motion tenderness and no lesions   Uterus: non-tender, normal shape and consistency, Midline uterine pain and tenderness   Adnexa: normal adnexa   Breast exam bilateral breast exam in the sitting supine position with chaperone present no visible palpable breast lesions identified no supraclavicular axillary lymphadenopathy noted no nipple discharge  Reviewed self-breast exam techniques

## 2018-10-18 LAB
LAB AP GYN PRIMARY INTERPRETATION: NORMAL
Lab: NORMAL

## 2018-10-25 ENCOUNTER — PROCEDURE VISIT (OUTPATIENT)
Dept: OBGYN CLINIC | Facility: CLINIC | Age: 28
End: 2018-10-25
Payer: MEDICARE

## 2018-10-25 VITALS
HEIGHT: 67 IN | SYSTOLIC BLOOD PRESSURE: 120 MMHG | BODY MASS INDEX: 27.06 KG/M2 | WEIGHT: 172.4 LBS | DIASTOLIC BLOOD PRESSURE: 78 MMHG

## 2018-10-25 DIAGNOSIS — R10.2 PELVIC PAIN: ICD-10-CM

## 2018-10-25 DIAGNOSIS — N93.9 ABNORMAL UTERINE BLEEDING (AUB): Primary | ICD-10-CM

## 2018-10-25 PROCEDURE — 88305 TISSUE EXAM BY PATHOLOGIST: CPT | Performed by: PATHOLOGY

## 2018-10-25 PROCEDURE — 58100 BIOPSY OF UTERUS LINING: CPT | Performed by: OBSTETRICS & GYNECOLOGY

## 2018-10-25 RX ORDER — AZITHROMYCIN 250 MG/1
TABLET, FILM COATED ORAL
Refills: 0 | COMMUNITY
Start: 2018-10-18 | End: 2018-10-31

## 2018-10-25 NOTE — PROGRESS NOTES
Endometrial biopsy  Date/Time: 10/25/2018 11:42 AM  Performed by: Teddy Obrien by: López Almanzar     Consent:     Consent obtained:  Written    Consent given by:  Patient    Procedural risks discussed:  Bleeding and infection    Patient questions answered: yes      Patient agrees, verbalizes understanding, and wants to proceed: yes      Educational handouts given: yes      Instructions and paperwork completed: yes    Indication:     Indications:  Other disorder of menstruation and other abnormal bleeding from female genital tract    Pre-procedure:     Pre-procedure timeout performed: yes      Premeds:  Ibuprofen  Procedure:     Procedure: endometrial biopsy with Pipelle      A bivalve speculum was placed in the vagina: yes      Cervix cleaned and prepped: yes      A paracervical block was performed: no      An intracervical block was performed: no      The cervix was dilated: no      Uterus sounded: yes      Uterus sound depth (cm):  7    Curettes used:  1    Specimen collected: specimen collected and sent to pathology    Findings:     Uterus size:  6-8 weeks    Cervix: normal      Adnexa: normal

## 2018-10-26 ENCOUNTER — TELEPHONE (OUTPATIENT)
Dept: OBGYN CLINIC | Facility: HOSPITAL | Age: 28
End: 2018-10-26

## 2018-10-31 ENCOUNTER — TELEPHONE (OUTPATIENT)
Dept: OBGYN CLINIC | Facility: HOSPITAL | Age: 28
End: 2018-10-31

## 2018-10-31 NOTE — TELEPHONE ENCOUNTER
I called and spoke with the patient - her bloodwork is completed and she is seeing Dr Neto López today for an appt  I faxed over clearance form & the bloodwork results

## 2018-10-31 NOTE — TELEPHONE ENCOUNTER
Patient is calling because she has questions about her upcoming surgery  Does she need clearance from her PCP?  C/b# [phone]

## 2018-11-05 NOTE — PRE-PROCEDURE INSTRUCTIONS
Pre-Surgery Instructions:   Medication Instructions    Calcium Carb-Cholecalciferol (CALCIUM 1000 + D PO) Instructed patient per Anesthesia Guidelines   dicyclomine (BENTYL) 20 mg tablet Instructed patient per Anesthesia Guidelines   diphenoxylate-atropine (LOMOTIL) 2 5-0 025 mg per tablet Instructed patient per Anesthesia Guidelines   ergocalciferol (VITAMIN D2) 50,000 units Instructed patient per Anesthesia Guidelines   escitalopram (LEXAPRO) 20 mg tablet Instructed patient per Anesthesia Guidelines   gabapentin (NEURONTIN) 400 mg capsule Instructed patient per Anesthesia Guidelines   gabapentin (NEURONTIN) 600 MG tablet Instructed patient per Anesthesia Guidelines   gabapentin (NEURONTIN) 800 mg tablet Instructed patient per Anesthesia Guidelines   loperamide (IMODIUM) 2 mg capsule Instructed patient per Anesthesia Guidelines   norethindrone (AYGESTIN) 5 mg tablet Instructed patient per Anesthesia Guidelines   ondansetron (ZOFRAN) 4 mg tablet Instructed patient per Anesthesia Guidelines   oxyCODONE (ROXICODONE) 30 MG immediate release tablet Instructed patient per Anesthesia Guidelines   predniSONE 5 mg tablet Instructed patient per Anesthesia Guidelines   ranitidine (ZANTAC) 150 mg tablet Instructed patient per Anesthesia Guidelines   SUMAtriptan (IMITREX) 100 mg tablet Instructed patient per Anesthesia Guidelines  REVIEWED  PRINTED SURGICAL INSTRUCTIONS WITH PATIENT , PATIENT VERBALIZED UNDERSTANDING  MEDICATIONS REVIEWED  5HT3 Antagonists Med Class     Continue to take this medication on your normal schedule  If this is an oral medication and you take it in the morning, then you may take this medicine with a sip of water  Antidepressant Med Class     Continue to take this medication on your normal schedule  If this is an oral medication and you take it in the morning, then you may take this medicine with a sip of water    Antiepileptic Med Class     Continue to take this medication on your normal schedule  If this is an oral medication and you take it in the morning, then you may take this medicine with a sip of water  H2 Blocker Med Class     Continue to take this medication on your normal schedule  If this is an oral medication and you take it in the morning, then you may take this medicine with a sip of water  Opioid Med Class     Continue to take this medication on your normal schedule  If this is an oral medication and you take it in the morning, then you may take this medicine with a sip of water  Steroids Med Class     Continue to take this medication on your normal schedule  If this is an oral medication and you take it in the morning, then you may take this medicine with a sip of water

## 2018-11-08 ENCOUNTER — OFFICE VISIT (OUTPATIENT)
Dept: OBGYN CLINIC | Facility: CLINIC | Age: 28
End: 2018-11-08
Payer: MEDICARE

## 2018-11-08 VITALS
HEIGHT: 67 IN | WEIGHT: 171.6 LBS | SYSTOLIC BLOOD PRESSURE: 130 MMHG | DIASTOLIC BLOOD PRESSURE: 84 MMHG | BODY MASS INDEX: 26.93 KG/M2

## 2018-11-08 DIAGNOSIS — N94.10 DYSPAREUNIA, FEMALE: ICD-10-CM

## 2018-11-08 DIAGNOSIS — Z98.890 STATUS POST D&C: ICD-10-CM

## 2018-11-08 DIAGNOSIS — M05.732 RHEUMATOID ARTHRITIS INVOLVING BOTH WRISTS WITH POSITIVE RHEUMATOID FACTOR (HCC): ICD-10-CM

## 2018-11-08 DIAGNOSIS — M05.731 RHEUMATOID ARTHRITIS INVOLVING BOTH WRISTS WITH POSITIVE RHEUMATOID FACTOR (HCC): ICD-10-CM

## 2018-11-08 DIAGNOSIS — N92.1 MENOMETRORRHAGIA: ICD-10-CM

## 2018-11-08 DIAGNOSIS — N93.9 ABNORMAL UTERINE BLEEDING: ICD-10-CM

## 2018-11-08 DIAGNOSIS — R10.2 PELVIC PAIN IN FEMALE: Primary | ICD-10-CM

## 2018-11-08 PROCEDURE — 99213 OFFICE O/P EST LOW 20 MIN: CPT | Performed by: OBSTETRICS & GYNECOLOGY

## 2018-11-08 RX ORDER — ONDANSETRON 4 MG/1
4 TABLET, FILM COATED ORAL EVERY 8 HOURS PRN
COMMUNITY

## 2018-11-08 RX ORDER — DIAZEPAM 5 MG/1
5 TABLET ORAL 2 TIMES DAILY PRN
Refills: 0 | COMMUNITY
Start: 2018-11-06 | End: 2019-10-07

## 2018-11-08 NOTE — H&P
Assessment/Plan:  Pelvic pain  Dyspareunia  Abnormal uterine bleeding  Menometrorrhagia    PMH  Status post D&C  Rheumatoid arthritis, affecting multiple joints  Status post multiple surgeries on her upper extremities and lower extremities secondary to severe rheumatoid arthritis  I BS    Plan   Total laparoscopic hysterectomy with bilateral salpingectomy     Subjective:   Here for test results and schedule surgery     Patient ID: Kiara Augustin is a 33 y o  female  HPI   29-year-old female  2 para 2 recently here for annual exam   Patient has a long history of abnormal uterine bleeding symptoms with menometrorrhagia pelvic pain, dysmenorrhea and dyspareunia  Currently she is managed with norethindrone 5 mg daily  If she comes office medicine she has recurrent bleeding and worsening pelvic pain symptoms  She has also been treated for severe rheumatoid arthritis over the years and has had multiple surgeries on her upper and lower extremities  She did have a D&C hysteroscopy in December of last year symptoms improve somewhat however the bleeding and the pain never completely resolved  Again when she is off her norethindrone the bleeding and dysmenorrhea symptoms are severe  She also complains of cyclic symptoms including irritability and moodiness and and pelvic pain along with weight gain that she attributes to the hormonal suppression  Patient is requesting definitive treatment with hysterectomy  Recent endometrial biopsy was negative for atypia or malignancy  Her recent Pap smear was also within normal limits  Physical exam as noted below operative consent obtained today risks and benefits reviewed in detail  Recommend total laparoscopic hysterectomy with bilateral salpingectomy  We will schedule the surgery for 2018  Review of Systems   All other systems reviewed and are negative        Objective:   No acute distress    /84 (BP Location: Right arm, Patient Position: Sitting, Cuff Size: Standard)   Ht 5' 7" (1 702 m)   Wt 77 8 kg (171 lb 9 6 oz)   LMP  (LMP Unknown) Comment: on norethindrone  BMI 26 88 kg/m²       Physical Exam   Constitutional: She is oriented to person, place, and time  She appears well-developed and well-nourished  HENT:   Head: Normocephalic and atraumatic  Eyes: Pupils are equal, round, and reactive to light  Conjunctivae and EOM are normal    Neck: Normal range of motion  Neck supple  Cardiovascular: Normal rate, regular rhythm and normal heart sounds  Pulmonary/Chest: Effort normal and breath sounds normal    Abdominal: Soft  Bowel sounds are normal    Genitourinary: Vagina normal  No vaginal discharge found  Genitourinary Comments: Pelvic exam  Normal external genitalia  Normal size uterus with midline uterine tenderness  No other adnexal pain or masses noted  No CMT   Musculoskeletal: Normal range of motion  Neurological: She is alert and oriented to person, place, and time  She has normal reflexes  Skin: Skin is warm and dry  Psychiatric: She has a normal mood and affect   Her behavior is normal  Judgment and thought content normal

## 2018-11-08 NOTE — PROGRESS NOTES
Assessment/Plan:  Pelvic pain  Dyspareunia  Abnormal uterine bleeding  Menometrorrhagia    PMH  Status post D&C  Rheumatoid arthritis, affecting multiple joints  Status post multiple surgeries on her upper extremities and lower extremities secondary to severe rheumatoid arthritis  I BS    Plan   Total laparoscopic hysterectomy with bilateral salpingectomy     Subjective:   Here for test results and schedule surgery     Patient ID: Tristen Thurston is a 33 y o  female  HPI   62-year-old female  2 para 2 recently here for annual exam   Patient has a long history of abnormal uterine bleeding symptoms with menometrorrhagia pelvic pain, dysmenorrhea and dyspareunia  Currently she is managed with norethindrone 5 mg daily  If she comes office medicine she has recurrent bleeding and worsening pelvic pain symptoms  She has also been treated for severe rheumatoid arthritis over the years and has had multiple surgeries on her upper and lower extremities  She did have a D&C hysteroscopy in December of last year symptoms improve somewhat however the bleeding and the pain never completely resolved  Again when she is off her norethindrone the bleeding and dysmenorrhea symptoms are severe  She also complains of cyclic symptoms including irritability and moodiness and and pelvic pain along with weight gain that she attributes to the hormonal suppression  Patient is requesting definitive treatment with hysterectomy  Recent endometrial biopsy was negative for atypia or malignancy  Her recent Pap smear was also within normal limits  Physical exam as noted below operative consent obtained today risks and benefits reviewed in detail  Recommend total laparoscopic hysterectomy with bilateral salpingectomy  We will schedule the surgery for 2018  Review of Systems   All other systems reviewed and are negative        Objective:   No acute distress    /84 (BP Location: Right arm, Patient Position: Sitting, Cuff Size: Standard)   Ht 5' 7" (1 702 m)   Wt 77 8 kg (171 lb 9 6 oz)   LMP  (LMP Unknown) Comment: on norethindrone  BMI 26 88 kg/m²      Physical Exam   Constitutional: She is oriented to person, place, and time  She appears well-developed and well-nourished  HENT:   Head: Normocephalic and atraumatic  Eyes: Pupils are equal, round, and reactive to light  Conjunctivae and EOM are normal    Neck: Normal range of motion  Neck supple  Cardiovascular: Normal rate, regular rhythm and normal heart sounds  Pulmonary/Chest: Effort normal and breath sounds normal    Abdominal: Soft  Bowel sounds are normal    Genitourinary: Vagina normal  No vaginal discharge found  Genitourinary Comments: Pelvic exam  Normal external genitalia  Normal size uterus with midline uterine tenderness  No other adnexal pain or masses noted  No CMT   Musculoskeletal: Normal range of motion  Neurological: She is alert and oriented to person, place, and time  She has normal reflexes  Skin: Skin is warm and dry  Psychiatric: She has a normal mood and affect   Her behavior is normal  Judgment and thought content normal

## 2018-11-12 ENCOUNTER — TELEPHONE (OUTPATIENT)
Dept: OBGYN CLINIC | Facility: HOSPITAL | Age: 28
End: 2018-11-12

## 2018-11-12 ENCOUNTER — PREP FOR PROCEDURE (OUTPATIENT)
Dept: OBGYN CLINIC | Facility: CLINIC | Age: 28
End: 2018-11-12

## 2018-11-12 DIAGNOSIS — N93.9 ABNORMAL UTERINE BLEEDING (AUB): Primary | ICD-10-CM

## 2018-11-12 DIAGNOSIS — R10.2 PELVIC PAIN: ICD-10-CM

## 2018-11-12 DIAGNOSIS — Z47.89 AFTERCARE FOLLOWING SURGERY OF THE MUSCULOSKELETAL SYSTEM: Primary | ICD-10-CM

## 2018-11-12 RX ORDER — OXYCODONE HYDROCHLORIDE AND ACETAMINOPHEN 5; 325 MG/1; MG/1
2 TABLET ORAL EVERY 8 HOURS PRN
Qty: 30 TABLET | Refills: 0 | Status: ON HOLD | OUTPATIENT
Start: 2018-11-12 | End: 2019-02-22

## 2018-11-12 NOTE — TELEPHONE ENCOUNTER
Patient is calling asking if the script can be written so that she can pick it up in the [place] office and not [place]?

## 2018-11-12 NOTE — TELEPHONE ENCOUNTER
Patient was called  The PA will send in her script to her listed pharmacy which was confirmed with the patient today

## 2018-11-12 NOTE — TELEPHONE ENCOUNTER
Caller: patient  Call back number: [phone]  Patient's doctor: Dr Barry Rizzo    Patient has surgery on left wrist scheduled 11/13  She is asking if she can have the script for her pain medication now  She is asking this so she can get it filled today since it will require authorization   Please advise

## 2018-11-13 ENCOUNTER — HOSPITAL ENCOUNTER (OUTPATIENT)
Facility: HOSPITAL | Age: 28
Setting detail: OUTPATIENT SURGERY
Discharge: HOME | End: 2018-11-13
Attending: ORTHOPAEDIC SURGERY | Admitting: ORTHOPAEDIC SURGERY
Payer: MEDICARE

## 2018-11-13 ENCOUNTER — ANESTHESIA EVENT (OUTPATIENT)
Dept: PERIOP | Facility: HOSPITAL | Age: 28
End: 2018-11-13
Payer: MEDICARE

## 2018-11-13 ENCOUNTER — ANESTHESIA (OUTPATIENT)
Dept: PERIOP | Facility: HOSPITAL | Age: 28
End: 2018-11-13
Payer: MEDICARE

## 2018-11-13 ENCOUNTER — HOSPITAL ENCOUNTER (OUTPATIENT)
Dept: RADIOLOGY | Facility: HOSPITAL | Age: 28
Setting detail: OUTPATIENT SURGERY
Discharge: HOME | End: 2018-11-13
Payer: MEDICARE

## 2018-11-13 VITALS
HEIGHT: 67 IN | WEIGHT: 172 LBS | OXYGEN SATURATION: 97 % | HEART RATE: 86 BPM | BODY MASS INDEX: 27 KG/M2 | SYSTOLIC BLOOD PRESSURE: 100 MMHG | TEMPERATURE: 99 F | DIASTOLIC BLOOD PRESSURE: 72 MMHG | RESPIRATION RATE: 18 BRPM

## 2018-11-13 DIAGNOSIS — M25.532 BILATERAL WRIST PAIN: ICD-10-CM

## 2018-11-13 DIAGNOSIS — M25.531 BILATERAL WRIST PAIN: ICD-10-CM

## 2018-11-13 DIAGNOSIS — T84.84XA PAINFUL ORTHOPAEDIC HARDWARE (HCC): ICD-10-CM

## 2018-11-13 LAB — EXT PREGNANCY TEST URINE: NEGATIVE

## 2018-11-13 PROCEDURE — 25250 REMOVAL OF WRIST PROSTHESIS: CPT | Performed by: ORTHOPAEDIC SURGERY

## 2018-11-13 PROCEDURE — 87075 CULTR BACTERIA EXCEPT BLOOD: CPT | Performed by: ORTHOPAEDIC SURGERY

## 2018-11-13 PROCEDURE — 87116 MYCOBACTERIA CULTURE: CPT | Performed by: ORTHOPAEDIC SURGERY

## 2018-11-13 PROCEDURE — 87206 SMEAR FLUORESCENT/ACID STAI: CPT | Performed by: ORTHOPAEDIC SURGERY

## 2018-11-13 PROCEDURE — 20680 REMOVAL OF IMPLANT DEEP: CPT | Performed by: ORTHOPAEDIC SURGERY

## 2018-11-13 PROCEDURE — 87176 TISSUE HOMOGENIZATION CULTR: CPT | Performed by: ORTHOPAEDIC SURGERY

## 2018-11-13 PROCEDURE — 87205 SMEAR GRAM STAIN: CPT | Performed by: ORTHOPAEDIC SURGERY

## 2018-11-13 PROCEDURE — 87070 CULTURE OTHR SPECIMN AEROBIC: CPT | Performed by: ORTHOPAEDIC SURGERY

## 2018-11-13 PROCEDURE — 81025 URINE PREGNANCY TEST: CPT | Performed by: ORTHOPAEDIC SURGERY

## 2018-11-13 PROCEDURE — 87102 FUNGUS ISOLATION CULTURE: CPT | Performed by: ORTHOPAEDIC SURGERY

## 2018-11-13 PROCEDURE — 73100 X-RAY EXAM OF WRIST: CPT

## 2018-11-13 RX ORDER — LIDOCAINE HYDROCHLORIDE 10 MG/ML
INJECTION, SOLUTION INFILTRATION; PERINEURAL AS NEEDED
Status: DISCONTINUED | OUTPATIENT
Start: 2018-11-13 | End: 2018-11-13 | Stop reason: SURG

## 2018-11-13 RX ORDER — DEXMEDETOMIDINE HYDROCHLORIDE 100 UG/ML
INJECTION, SOLUTION INTRAVENOUS AS NEEDED
Status: DISCONTINUED | OUTPATIENT
Start: 2018-11-13 | End: 2018-11-13 | Stop reason: SURG

## 2018-11-13 RX ORDER — HYDROMORPHONE HCL/PF 1 MG/ML
0.5 SYRINGE (ML) INJECTION
Status: DISCONTINUED | OUTPATIENT
Start: 2018-11-13 | End: 2018-11-13 | Stop reason: HOSPADM

## 2018-11-13 RX ORDER — METOCLOPRAMIDE HYDROCHLORIDE 5 MG/ML
10 INJECTION INTRAMUSCULAR; INTRAVENOUS ONCE AS NEEDED
Status: DISCONTINUED | OUTPATIENT
Start: 2018-11-13 | End: 2018-11-13 | Stop reason: HOSPADM

## 2018-11-13 RX ORDER — METOCLOPRAMIDE HYDROCHLORIDE 5 MG/ML
INJECTION INTRAMUSCULAR; INTRAVENOUS AS NEEDED
Status: DISCONTINUED | OUTPATIENT
Start: 2018-11-13 | End: 2018-11-13 | Stop reason: SURG

## 2018-11-13 RX ORDER — MIDAZOLAM HYDROCHLORIDE 1 MG/ML
INJECTION INTRAMUSCULAR; INTRAVENOUS AS NEEDED
Status: DISCONTINUED | OUTPATIENT
Start: 2018-11-13 | End: 2018-11-13 | Stop reason: SURG

## 2018-11-13 RX ORDER — PROPOFOL 10 MG/ML
INJECTION, EMULSION INTRAVENOUS AS NEEDED
Status: DISCONTINUED | OUTPATIENT
Start: 2018-11-13 | End: 2018-11-13 | Stop reason: SURG

## 2018-11-13 RX ORDER — FENTANYL CITRATE 50 UG/ML
INJECTION, SOLUTION INTRAMUSCULAR; INTRAVENOUS AS NEEDED
Status: DISCONTINUED | OUTPATIENT
Start: 2018-11-13 | End: 2018-11-13 | Stop reason: SURG

## 2018-11-13 RX ORDER — FENTANYL CITRATE/PF 50 MCG/ML
50 SYRINGE (ML) INJECTION
Status: DISCONTINUED | OUTPATIENT
Start: 2018-11-13 | End: 2018-11-13 | Stop reason: HOSPADM

## 2018-11-13 RX ORDER — ONDANSETRON 2 MG/ML
INJECTION INTRAMUSCULAR; INTRAVENOUS AS NEEDED
Status: DISCONTINUED | OUTPATIENT
Start: 2018-11-13 | End: 2018-11-13 | Stop reason: SURG

## 2018-11-13 RX ORDER — OXYCODONE HYDROCHLORIDE AND ACETAMINOPHEN 5; 325 MG/1; MG/1
2 TABLET ORAL EVERY 4 HOURS PRN
Status: DISCONTINUED | OUTPATIENT
Start: 2018-11-13 | End: 2018-11-13 | Stop reason: HOSPADM

## 2018-11-13 RX ORDER — ONDANSETRON 2 MG/ML
4 INJECTION INTRAMUSCULAR; INTRAVENOUS ONCE AS NEEDED
Status: DISCONTINUED | OUTPATIENT
Start: 2018-11-13 | End: 2018-11-13 | Stop reason: HOSPADM

## 2018-11-13 RX ORDER — ROPIVACAINE HYDROCHLORIDE 5 MG/ML
INJECTION, SOLUTION EPIDURAL; INFILTRATION; PERINEURAL AS NEEDED
Status: DISCONTINUED | OUTPATIENT
Start: 2018-11-13 | End: 2018-11-13 | Stop reason: SURG

## 2018-11-13 RX ORDER — SODIUM CHLORIDE, SODIUM LACTATE, POTASSIUM CHLORIDE, CALCIUM CHLORIDE 600; 310; 30; 20 MG/100ML; MG/100ML; MG/100ML; MG/100ML
125 INJECTION, SOLUTION INTRAVENOUS CONTINUOUS
Status: DISCONTINUED | OUTPATIENT
Start: 2018-11-13 | End: 2018-11-13 | Stop reason: HOSPADM

## 2018-11-13 RX ADMIN — FENTANYL CITRATE 25 MCG: 50 INJECTION, SOLUTION INTRAMUSCULAR; INTRAVENOUS at 11:23

## 2018-11-13 RX ADMIN — SODIUM CHLORIDE, SODIUM LACTATE, POTASSIUM CHLORIDE, AND CALCIUM CHLORIDE: .6; .31; .03; .02 INJECTION, SOLUTION INTRAVENOUS at 10:46

## 2018-11-13 RX ADMIN — DEXAMETHASONE SODIUM PHOSPHATE 10 MG: 10 INJECTION INTRAMUSCULAR; INTRAVENOUS at 11:11

## 2018-11-13 RX ADMIN — MIDAZOLAM 2 MG: 1 INJECTION INTRAMUSCULAR; INTRAVENOUS at 10:46

## 2018-11-13 RX ADMIN — DEXMEDETOMIDINE HYDROCHLORIDE 30 MCG: 100 INJECTION, SOLUTION INTRAVENOUS at 10:45

## 2018-11-13 RX ADMIN — ONDANSETRON 4 MG: 2 INJECTION INTRAMUSCULAR; INTRAVENOUS at 12:04

## 2018-11-13 RX ADMIN — HYDROCORTISONE SODIUM SUCCINATE 100 MG: 100 INJECTION, POWDER, FOR SOLUTION INTRAMUSCULAR; INTRAVENOUS at 12:30

## 2018-11-13 RX ADMIN — FENTANYL CITRATE 25 MCG: 50 INJECTION, SOLUTION INTRAMUSCULAR; INTRAVENOUS at 11:16

## 2018-11-13 RX ADMIN — LIDOCAINE HYDROCHLORIDE 50 MG: 10 INJECTION, SOLUTION INFILTRATION; PERINEURAL at 11:08

## 2018-11-13 RX ADMIN — METOCLOPRAMIDE 5 MG: 5 INJECTION, SOLUTION INTRAMUSCULAR; INTRAVENOUS at 11:16

## 2018-11-13 RX ADMIN — SODIUM CHLORIDE, SODIUM LACTATE, POTASSIUM CHLORIDE, AND CALCIUM CHLORIDE 125 ML/HR: .6; .31; .03; .02 INJECTION, SOLUTION INTRAVENOUS at 10:15

## 2018-11-13 RX ADMIN — ROPIVACAINE HYDROCHLORIDE 30 ML: 5 INJECTION, SOLUTION EPIDURAL; INFILTRATION; PERINEURAL at 10:45

## 2018-11-13 RX ADMIN — PROPOFOL 200 MG: 10 INJECTION, EMULSION INTRAVENOUS at 11:08

## 2018-11-13 RX ADMIN — CEFAZOLIN SODIUM 2000 MG: 2 SOLUTION INTRAVENOUS at 11:14

## 2018-11-13 RX ADMIN — OXYCODONE HYDROCHLORIDE AND ACETAMINOPHEN 2 TABLET: 5; 325 TABLET ORAL at 14:55

## 2018-11-13 NOTE — OP NOTE
OPERATIVE REPORT  PATIENT NAME: Desmond Singer  :  1990  MRN: [id number]  Pt Location: [facility]    SURGERY DATE: 18    Surgeon(s) and Role:     * Mallory William MD - Primary     * Virgie Teran MD - Assisting    Pre-Op Diagnosis:     * Painful orthopaedic hardware ([address] ) Emmalene Pinta    Post-Op Diagnosis Codes:     * Painful orthopaedic hardware ([address] ) Emmalene Pinta    Procedure(s):  REMOVAL OF HARDWARE (wrist fusion plate  AND ulnar head arthroplasty) with conversion to Darrach  (Left)  APPLICATION LONG ARM SUGARTONG SPLINT (Left)    Specimen(s):    Order Name Source Comment Collection Info Order Time   ANAEROBIC CULTURE AND GRAM STAIN Joint, Left Wrist  Collected By: Mallory William MD 2018 12:03 PM   FUNGAL CULTURE Joint, Left Wrist  Collected By: Mallory William MD 2018 12:03 PM   CULTURE, TISSUE AND GRAM STAIN Joint, Left Wrist  Collected By: Mallory William MD 2018 12:03 PM   AFB CULTURE WITH STAIN Joint, Left Wrist  Collected By: Mallory William MD 2018 12:03 PM       Estimated Blood Loss:   10 mL      Anesthesia Type:   General w/ Regional    Operative Indications: The patient has a history of left wrist painful hardware with reaction secondary to nickel allergy and implant osteolysis that was recalcitrant to conservative management  The decision was made to bring the patient to the operating room for removal of hardware left wrist and conversion to Darrach resectional arthroplasty  Risks of the procedure were explained which include, but are not limited to bleeding; infection; damage to nerves, arteries,veins, tendons; scar; pain; need for reoperation; failure to give desired result; and risks of anaesthesia  All questions were answered to satisfaction and they were willing to proceed           Operative Findings:  Loosening around the ulnar head arthroplasty with no evidence of infection  Stable distal radial ulnar joint after removal of implant  Stable wrist fusion with no evidence of infection    Complications:   None    Procedure and Technique:  After the patient, site, and procedure were identified, the patient was brought into the operating room in a supine position  Regional and general anaesthesia were provided  A well padded tourniquet was applied to the extremity, set at 250 mmHg  The  left upper extremity was then prepped and drapped in a normal, sterile, orthopedic fashion  After the patient, site, and procedure were identified attention was turned towards the left wrist   An Esmarch bandage was used to exsanguinate the limb and the tourniquet was inflated to 250 mm of mercury  A dorsal longitudinal incision was then made  We dissected down through skin and subcutaneous tissues elevating full-thickness skin flaps  The previously transposed extensor pollicis longus tendon was found and protected and left transposed through the entirety of the procedure  The extensor retinaculum was then opened and we opened the 2nd, 4th, and 5th dorsal extensor compartment  Care was taken to protect the 6th extensor compartment  At this point, while protecting the extensor tendons, we opened up the fibrinous material overlying the distal radiocarpal and midcarpal fusion plate  There was some fluid noted around this area but no signs of purulence or infection  The fibrinous material was then cleaned off, and all 7 screws removed in their entirety  Wrist fusion plate was then removed in its entirety and fluoroscopic guidance confirmed complete removal of hardware with good solid wrist fusion  No motion was identified at the radiocarpal or midcarpal joints      Attention was then turned towards the ulnar aspect of the wrist   A small arthrotomy was then made overlying the distal aspect of the ulnar head implant extending proximal to the TFCC and along the radial ulnar interval   At this point, a small Hohmann retractor was placed deep to the ulnar head arthroplasty and some clearish fluid was identified and cultured  At this point, the ulnar head arthroplasty was easily removed  There was no evidence of purulence  This point, stress testing of the distal radial ulnar joint commence demonstrating a stable distal radial ulnar joint in full pronation and full supination  The wound was copiously irrigated with sterile saline solution  At this point, we were satisfied with irrigation and the capsulotomy of the distal radioulnar joint was then closed with sutures in an interrupted fashion  The extensor tendons were then replaced in the extensor retinaculum was then repaired leaving the extensor pollicis longus tendon transposed  The tourniquet was then deflated confirming hemostasis and good blood supply into the hand  At the completion of the procedure, hemostasis was obtained with cautery and direct pressure  The wounds were copiously irrigated with sterile solution  The wounds were closed with Vicryl and Prolene  Sterile dressings were applied, including Xeroform, gauze, tweeners, webril, ACE and Sugartong Splint  Please note, all sponge, needle, and instrument counts were correct prior to closure  Loupe magnification was utilized  The patient tolerated the procedure well       I was present for the entire procedure    Patient Disposition:  PACU , hemodynamically stable and extubated and stable    SIGNATURE: Bree Portillo MD  DATE: 11/13/18  TIME: 3:31 PM

## 2018-11-13 NOTE — ANESTHESIA POSTPROCEDURE EVALUATION
Post-Op Assessment Note      CV Status:  Stable    Mental Status:  Alert and awake    Hydration Status:  Euvolemic    PONV Controlled:  Controlled    Airway Patency:  Patent    Post Op Vitals Reviewed: Yes          Staff: CRNA           /71 (11/13/18 1257)    Temp (!) 97 1 °F (36 2 °C) (11/13/18 1257)    Pulse 93 (11/13/18 1257)   Resp 20 (11/13/18 1257)    SpO2 100 % (11/13/18 1257)

## 2018-11-13 NOTE — ANESTHESIA PROCEDURE NOTES
Peripheral Block    Patient location during procedure: holding area  Start time: 11/13/2018 10:45 AM  Reason for block: at surgeon's request and post-op pain management  Staffing  Anesthesiologist: Jordan Hansen  Performed: anesthesiologist   Preanesthetic Checklist  Completed: patient identified, site marked, surgical consent, pre-op evaluation, timeout performed, IV checked, risks and benefits discussed and monitors and equipment checked  Peripheral Block  Patient position: supine  Prep: ChloraPrep  Patient monitoring: continuous pulse ox and frequent blood pressure checks  Block type: supraclavicular  Laterality: left  Injection technique: single-shot  Procedures: ultrasound guided  Ultrasound permanent image saved  Local infiltration: lidocaine  Infiltration strength: 1 %  Dose: 1 mL  Needle  Needle type: Stimuplex   Needle length: 5 cm  Needle localization: ultrasound guidance  Test dose: negative  Assessment  Injection assessment: incremental injection and local visualized surrounding nerve on ultrasound  Paresthesia pain: none  Heart rate change: no  Slow fractionated injection: yes  Post-procedure:  site cleaned  patient tolerated the procedure well with no immediate complications

## 2018-11-13 NOTE — INTERIM OP NOTE
REMOVAL OF HARDWARE (wrist fusion plate  AND ulnar head arthroplasty) with conversion to Darrach , APPLICATION LONG ARM SUGARTONG SPLINT  Postoperative Note  PATIENT NAME: Juan Ramon Su  : 1990  MRN: [id number]  [facility] 04    Surgery Date: 2018    Preop Diagnosis:  Bilateral wrist pain [M25 531, M25 532]    Post-Op Diagnosis Codes:     * Painful orthopaedic hardware (HCC) [T84 84XA]   * ASEPTIC LOOSENING OF IMPLANT SECONDARY TO NICKEL ALLERGY    Procedure(s) (LRB):  REMOVAL OF HARDWARE (wrist fusion plate  AND ulnar head arthroplasty) with conversion to Darrach   (Left)  APPLICATION LONG ARM SUGARTONG SPLINT    Surgeon(s) and Role:     * Leopold Mitts, MD - Primary     * Shauna Plummer MD - Assisting    Specimens:  ID Type Source Tests Collected by Time Destination   A : left wrist joint - synovial fluid Tissue Joint, Left Wrist ANAEROBIC CULTURE AND GRAM STAIN, FUNGAL CULTURE, CULTURE, TISSUE AND GRAM STAIN, AFB CULTURE WITH STAIN Leopold Mitts, MD 2018 1158        Estimated Blood Loss:   10 mL    Anesthesia Type:   General w/ Regional     Findings:    ASEPTIC LOOSENING OF ULNAR HEAD ARTHROPLASTY AND TOTAL WRIST ARTHRODESIS PLATE  Complications:   None    SIGNATURE: Leopold Mitts, MD   DATE: 2018   TIME: 1:08 PM

## 2018-11-13 NOTE — H&P
H&P Exam - Orthopedics   Nicholas Mendoza 33 y o  female MRN: [id number]  Unit/Bed#: [facility] 04    Assessment/Plan   Assessment:  S/P B/L wrist arthrodesis with acquired nickel allergy  Plan:  MATTHIAS Left wrist today with conversion to Darra    History of Present Illness   HPI:  Nicholas Mendoza is a 33 y o  y o  female who presents with continued complaints of b/l wrist pain  She is s/p b/l wrist arthrodesis  She was found to have a nickel allergy on testing  She has tried therapy, bracing and activity modification without relief  Pain is severe and prevents her from sleeping  She states no medical changes since last office visit       Historical Information  Review Of Systems:   · Skin: Normal  · Neuro: See HPI  · Musculoskeletal: See HPI  · 14 point review of systems negative except as stated above     Past Medical History:   Past Medical History:   Diagnosis Date    Abnormal Pap smear of cervix     Anemia     Anxiety     Depression     IBS (irritable bowel syndrome)     Iron deficiency     Irregular menses     Menorrhagia     Migraines     Neuropathy     Osteopenia     PONV (postoperative nausea and vomiting)     [facility]     Rheumatoid [facility] [address] [facility]     Scratches     On back, stomach and legs from scratching due to urticaria    Vasculitis ([facility])        Past Surgical History:   Past Surgical History:   Procedure Laterality Date    CHOLECYSTECTOMY      Laparoscopic    DILATION AND CURETTAGE OF UTERUS      JOINT REPLACEMENT Bilateral     knees    NC FUSION/GRAFT WRIST JOINT Left 4/4/2017    Procedure: WRIST FUSION / [address] ;  Surgeon: Edison Valdez MD;  Location: [facility];  Service: Orthopedics    NC FUSION/GRAFT WRIST JOINT Right 4/10/2018    Procedure: Removal of hardware right wrist with Fusion of Long finger carpometacarpal joint, splint application Right Wrist;  Surgeon: Edison Valdez MD;  Location: [facility];  Service: Orthopedics    NC HYSTEROSCOPY,W/ENDO BX N/A 12/1/2017    Procedure: DILATATION AND CURETTAGE (D&C) WITH HYSTEROSCOPY;  Surgeon: Valentino Milo, DO;  Location: [facility];  Service: Gynecology    NM LAP,CHOLECYSTECTOMY N/A 3/14/2017    Procedure: CHOLECYSTECTOMY LAPAROSCOPIC;  Surgeon: Elsie Yip DO;  Location: [facility];  Service: General    REPLACEMENT TOTAL KNEE Bilateral     WRIST SURGERY Right     For arthritis    WRIST SURGERY Left 4/4/2017    Procedure: ARTHROPLASTY WRIST ULNAR HEAD ARTHROPLASTY - INTEGRA SIZE 5 5 MM, 16 HEAD;  Surgeon: Lilia Calderón MD;  Location: [facility];  Service:        Family History:  Family history reviewed and non-contributory  Family History   Problem Relation Age of Onset    Hypertension Mother     Rheum arthritis Mother     Depression Mother     Anxiety disorder Mother        Social History:  Social History     Social History    Marital status:      Spouse name: N/A    Number of children: N/A    Years of education: N/A     Social History Main Topics    Smoking status: Former Smoker     Packs/day: 0 25     Years: 3 00     Types: Cigarettes     Quit date: 2010    Smokeless tobacco: Never Used    Alcohol use No      Comment: Rare- once or twice yearly    Drug use: No    Sexual activity: Not Asked     Other Topics Concern    None     Social History Narrative    None       Allergies:    Allergies   Allergen Reactions    Nickel            Labs:    0  Lab Value Date/Time   HCT 45 0 10/03/2018 0941   HCT 42 1 09/07/2018 1308   HCT 40 9 09/07/2018 1308   HCT 44 2 09/07/2018 0941   HCT 38 4 11/11/2015 1300   HCT 34 6 (L) 10/27/2015 1257   HCT 37 3 04/02/2015 1332   HGB 14 8 10/03/2018 0941   HGB 13 8 09/07/2018 1308   HGB 13 8 09/07/2018 1308   HGB 14 9 09/07/2018 0941   HGB 11 9 11/11/2015 1300   HGB 10 8 (L) 10/27/2015 1257   HGB 11 6 09/12/2015 1709   HGB 11 9 04/02/2015 1332   INR 1 07 06/10/2014 1210   WBC 9 90 10/03/2018 0941   WBC 11 98 (H) 09/07/2018 1308 WBC 12 1 (H) 09/07/2018 1308   WBC 9 10 09/07/2018 0941   WBC 9 97 11/11/2015 1300   WBC 10 94 (H) 10/27/2015 1257   WBC 8 96 04/02/2015 1332   ESR 6 09/07/2018 1308   ESR 20 11/11/2015 1300   CRP <3 0 09/07/2018 1308   CRP 53 9 (H) 11/11/2015 1300       Meds:    Current Facility-Administered Medications:     ceFAZolin (ANCEF) IVPB (premix) 2,000 mg, 2,000 mg, Intravenous, Once, Leanne Reynolds MD    lactated ringers infusion, 125 mL/hr, Intravenous, Continuous, Porfirio Johnson MD    Blood Culture:   No results found for: BLOODCX    Wound Culture:   No results found for: WOUNDCULT    Ins and Outs:  No intake/output data recorded  Physical Exam  /85   Pulse 102   Temp 100 5 °F (38 1 °C) (Tympanic Core)   Resp 20   SpO2 96%   Gen: Alert and oriented to person, place, time  HEENT: EOMI, eyes clear, moist mucus membranes, hearing intact  Respiratory: Bilateral chest rise  No audible wheezing found  Cardiovascular: Regular Rate and Rhythm  Abdomen: soft nontender/nondistended  Ortho Exam: Patient with edema to the dorsal wrist and ttp  She can make a full composite fist and has full pronation/supination    Neuro Exam: NORMAN CARRASCO    Lab Results: Reviewed  Imaging: Reviewed

## 2018-11-13 NOTE — ANESTHESIA PREPROCEDURE EVALUATION
Review of Systems/Medical History  Patient summary reviewed  Chart reviewed  History of anesthetic complications (jaw pain after prior intubation) PONV    Cardiovascular  Negative cardio ROS Exercise tolerance (METS): good,     Pulmonary  Negative pulmonary ROS        GI/Hepatic    GERD well controlled,             Endo/Other  History of thyroid disease , hypothyroidism,      GYN  Not currently pregnant ,          Hematology  Anemia ,     Musculoskeletal  Rheumatoid arthritis Severity: severe,   Comment: Takes Prednisone regularly Arthritis     Neurology    Headaches,    Psychology   Anxiety, Depression ,              Physical Exam    Airway    Mallampati score: II  TM Distance: >3 FB  Neck ROM: full     Dental       Cardiovascular  Comment: Negative ROS,     Pulmonary      Other Findings        Anesthesia Plan  ASA Score- 2     Anesthesia Type- general and regional with ASA Monitors  Additional Monitors:   Airway Plan: LMA  Comment: SC Block for post op pain per surgeon request        Plan Factors-    Induction- intravenous  Postoperative Plan-     Informed Consent- Anesthetic plan and risks discussed with patient  I personally reviewed this patient with the CRNA  Discussed and agreed on the Anesthesia Plan with the CRNA  Dara Soulier

## 2018-11-13 NOTE — DISCHARGE INSTRUCTIONS
Post Operative Instructions    You have had surgery on your arm today, please read and follow the information below:  · Elevate your hand above your elbow during the next 24-48 hours to help with swelling  · Place your hand and arm over your head with motion at your shoulder three times a day  · Do not apply any cream/ointment/oil to your incisions including antibiotics  · Do not soak your hands in standing water (dishwater, tubs, Jacuzzi's, pools, etc ) until given permission (typically 2-3 weeks after injury)    Call the office if you notice any:  · Increased numbness or tingling of your hand or fingers that is not relieved with elevation  · Increasing pain that is not controlled with medication  · Difficulty chewing, breathing, swallowing  · Chest pains or shortness of breath  · Fever over 101 4 degrees  Bandage: Do NOT remove bandage until follow-up appointment  Motion: Move fingers into a fist 5 times a day, DO NOT move any splinted fingers  Weight bearing status: The operated extremity should be non-weight bearing until further notice  Ice: Ice for 10 minutes every hour as needed for swelling x 24 hours  Sling: Sling for comfort for 2-3 days  Pain medication:   Naproxen 220 mg two times a day   Tylenol Extended Release 650 mg every 8 hours  PERCOCET 1-2 TABS EVERY 8 HOURS AS NEEDED FOR PAIN     Follow-up Appointment: 7-10 days  Please call the office if you have any questions or concerns regarding your post-operative care

## 2018-11-16 LAB
BACTERIA SPEC ANAEROBE CULT: NO GROWTH
BACTERIA TISS AEROBE CULT: NO GROWTH
GRAM STN SPEC: NORMAL
GRAM STN SPEC: NORMAL

## 2018-11-21 ENCOUNTER — TELEPHONE (OUTPATIENT)
Dept: OBGYN CLINIC | Facility: HOSPITAL | Age: 28
End: 2018-11-21

## 2018-11-21 DIAGNOSIS — Z47.89 AFTERCARE FOLLOWING SURGERY OF THE MUSCULOSKELETAL SYSTEM: ICD-10-CM

## 2018-11-21 RX ORDER — OXYCODONE HYDROCHLORIDE AND ACETAMINOPHEN 5; 325 MG/1; MG/1
2 TABLET ORAL EVERY 8 HOURS PRN
Qty: 30 TABLET | Refills: 0 | Status: CANCELLED | OUTPATIENT
Start: 2018-11-21

## 2018-11-21 NOTE — TELEPHONE ENCOUNTER
Patient given message below and verbalized understanding  Patient will call office if discoloration or increased swelling arises

## 2018-11-21 NOTE — TELEPHONE ENCOUNTER
I spoke with patient and advised that she should loosen ace wrap a bit, ice 20 min on 20 min off  She has been taking the Percocet 1-2 tabs every 8 hrs  She is out of this medication and is requesting refill  She is unable to take NSAIDS

## 2018-11-21 NOTE — TELEPHONE ENCOUNTER
Caller: Patient   C/B # [phone]  Dr Israel Swanson    Patient is calling because eshe is in a lot of pain  She states she is feeling pain from her shoulder down to her fingertips  She wanted to advise of the pain and would like a refill of her Oxycodone, she has one pill left    Thanks

## 2018-11-21 NOTE — TELEPHONE ENCOUNTER
Spoke with KM and since she gets pain meds from a pain management doctor, he suggests we defer to them to help with pain control  I agree with the bandage adjustment and icing, can we check in on her later today to see how she is? And let me know if she has any warning signs (discoloration, n/t, excessive swelling ,etc) Thanks!

## 2018-11-26 ENCOUNTER — OFFICE VISIT (OUTPATIENT)
Dept: OBGYN CLINIC | Facility: HOSPITAL | Age: 28
End: 2018-11-26

## 2018-11-26 VITALS
HEART RATE: 99 BPM | HEIGHT: 67 IN | WEIGHT: 177.8 LBS | DIASTOLIC BLOOD PRESSURE: 81 MMHG | BODY MASS INDEX: 27.91 KG/M2 | SYSTOLIC BLOOD PRESSURE: 133 MMHG

## 2018-11-26 DIAGNOSIS — M25.532 PAIN IN LEFT WRIST: Primary | ICD-10-CM

## 2018-11-26 PROCEDURE — 99024 POSTOP FOLLOW-UP VISIT: CPT | Performed by: PHYSICIAN ASSISTANT

## 2018-11-26 NOTE — TELEPHONE ENCOUNTER
Reggie Thomas did see patient today for splint check and states she redid it just like we had it  Reggie Thomas said she did not want it back on but she explained she'd have to speak to you about that   She has f/u on 11/30

## 2018-11-26 NOTE — TELEPHONE ENCOUNTER
Deborah Henrry wants to let you know that she is still experiencing a lot of pain in her left arm with tingling and burning sensations  Best contact # [phone]    Thank you

## 2018-11-27 ENCOUNTER — TELEPHONE (OUTPATIENT)
Dept: OBGYN CLINIC | Facility: HOSPITAL | Age: 28
End: 2018-11-27

## 2018-11-27 NOTE — TELEPHONE ENCOUNTER
Also, of note, when I spoke to Alexandra she states that she was told by some of the front staff that it appeared as though patient may have been taking off her splint as she was leaving

## 2018-11-30 ENCOUNTER — HOSPITAL ENCOUNTER (OUTPATIENT)
Dept: RADIOLOGY | Facility: HOSPITAL | Age: 28
Discharge: HOME | End: 2018-11-30
Attending: ORTHOPAEDIC SURGERY
Payer: MEDICARE

## 2018-11-30 ENCOUNTER — OFFICE VISIT (OUTPATIENT)
Dept: OCCUPATIONAL THERAPY | Facility: HOSPITAL | Age: 28
End: 2018-11-30
Attending: ORTHOPAEDIC SURGERY
Payer: MEDICARE

## 2018-11-30 ENCOUNTER — OFFICE VISIT (OUTPATIENT)
Dept: OBGYN CLINIC | Facility: HOSPITAL | Age: 28
End: 2018-11-30

## 2018-11-30 VITALS
WEIGHT: 177 LBS | BODY MASS INDEX: 27.78 KG/M2 | HEART RATE: 93 BPM | DIASTOLIC BLOOD PRESSURE: 84 MMHG | HEIGHT: 67 IN | SYSTOLIC BLOOD PRESSURE: 119 MMHG

## 2018-11-30 DIAGNOSIS — Z48.89 AFTERCARE FOLLOWING SURGERY: ICD-10-CM

## 2018-11-30 DIAGNOSIS — Z48.89 AFTERCARE FOLLOWING SURGERY: Primary | ICD-10-CM

## 2018-11-30 PROCEDURE — 99024 POSTOP FOLLOW-UP VISIT: CPT | Performed by: ORTHOPAEDIC SURGERY

## 2018-11-30 PROCEDURE — G8992 OTHER PT/OT  D/C STATUS: HCPCS | Performed by: OCCUPATIONAL THERAPIST

## 2018-11-30 PROCEDURE — G8990 OTHER PT/OT CURRENT STATUS: HCPCS | Performed by: OCCUPATIONAL THERAPIST

## 2018-11-30 PROCEDURE — 73110 X-RAY EXAM OF WRIST: CPT

## 2018-11-30 PROCEDURE — L3906 WHO W/O JOINTS CF: HCPCS | Performed by: OCCUPATIONAL THERAPIST

## 2018-11-30 PROCEDURE — G8991 OTHER PT/OT GOAL STATUS: HCPCS | Performed by: OCCUPATIONAL THERAPIST

## 2018-11-30 NOTE — PROGRESS NOTES
Assessment:   PO hardware removal 11/13/18    Plan:   Patient instructed not to supination or pronation the wrist   Discussed immobilization with cast vs splint, discussed risks and benefits of both with the patient today  Patient would like to treat with a splint  Patient would like to be placed in a splint  Explained the splint rather than cast will affect her outcome  Explained to the patient she MUST keep the splint on 24/7  She is not allowed to removed the splint at all  Patient states understanding and wishes to proceed with splinting  Patient will see hand therapy for splint fabrication  Patient is scheduled for a partial hysterectomy the end of Dec 2018  She was instructed to have her physician call to discuss arm place operatively  Follow Up:  4 wks    To Do Next Visit:  X-rays of the  left  wrist      CHIEF COMPLAINT:  Chief Complaint   Patient presents with    Left Hand - Post-op         SUBJECTIVE:  Juanjose Méndez is a 33y o  year old female who presents for follow up PO left wrist hardware removal performed 11/13/18  Patient complains of continued pain post operatively  She states has been wearing her splint as instructed  She presented wearing her splint today       PHYSICAL EXAMINATION:  General: well developed and well nourished, alert, oriented times 3 and appears comfortable  Psychiatric: Normal    MUSCULOSKELETAL EXAMINATION:  Incision: Clean, dry, intact  Range of Motion: As expected  Neurovascular status: Neuro intact, good cap refill   Patient has difficulty making a full composite fist along with elbow flexion      STUDIES REVIEWED:  Images were reviewd in PACS: left wrist x-rays: s/p hardware removal, well aligned distal radius on the lateral view, stable fusion PA view      PROCEDURES PERFORMED:  Procedures  No Procedures performed today       Scribe Attestation    I,:   Salvatore Poole am acting as a scribe while in the presence of the attending physician :        I,: Wood Mckinley MD personally performed the services described in this documentation    as scribed in my presence :

## 2018-11-30 NOTE — PROGRESS NOTES
Splint     Diagnosis:   1  Aftercare following surgery  Ambulatory referral to PT/OT hand therapy     Indication: Motion Blocking    Location: Left  wrist  Supplies: Custom Fit Orthotic and Skin coverage   Splint type: Shimon  Wearing Schedule: Remove with Protected Technique Only as Needed  Describe Position: FA neutral    Precautions: Universal (skin contact/breakdown)    Patient or Caregiver expresses understanding of wearing Schedule and Precautions? Yes  Patient or Caregiver able to don/doff orthotic independently? Yes    Written orders provided to patient?  Yes  Orders Obtained: Written  Orders Obtained from: Dr Alyssa Piedra      Return for evaluation and treatment No

## 2018-12-12 ENCOUNTER — TELEPHONE (OUTPATIENT)
Dept: OBGYN CLINIC | Facility: CLINIC | Age: 28
End: 2018-12-12

## 2018-12-17 ENCOUNTER — OFFICE VISIT (OUTPATIENT)
Dept: GASTROENTEROLOGY | Facility: MEDICAL CENTER | Age: 28
End: 2018-12-17
Payer: MEDICARE

## 2018-12-17 VITALS
SYSTOLIC BLOOD PRESSURE: 116 MMHG | TEMPERATURE: 98.4 F | HEART RATE: 105 BPM | DIASTOLIC BLOOD PRESSURE: 78 MMHG | BODY MASS INDEX: 28.88 KG/M2 | WEIGHT: 184 LBS | HEIGHT: 67 IN

## 2018-12-17 DIAGNOSIS — K59.1 FUNCTIONAL DIARRHEA: ICD-10-CM

## 2018-12-17 DIAGNOSIS — R11.2 NON-INTRACTABLE VOMITING WITH NAUSEA, UNSPECIFIED VOMITING TYPE: ICD-10-CM

## 2018-12-17 DIAGNOSIS — R10.84 GENERALIZED ABDOMINAL PAIN: Primary | ICD-10-CM

## 2018-12-17 LAB — FUNGUS SPEC CULT: NORMAL

## 2018-12-17 PROCEDURE — 99214 OFFICE O/P EST MOD 30 MIN: CPT | Performed by: PHYSICIAN ASSISTANT

## 2018-12-17 NOTE — PROGRESS NOTES
Assessment/Plan:     Diagnoses and all orders for this visit:  Functional diarrhea/ Generalized abdominal pain  She has generalized abdominal pain and diarrhea and likely has irritable bowel syndrome with diarrhea however she has never had a colonoscopy  Therefore would recommend 1 at this time to rule out inflammatory bowel disease as well as microscopic colitis  Patient is agreeable  Risks and benefits were discussed  -     Case request operating room: ESOPHAGOGASTRODUODENOSCOPY (EGD), COLONOSCOPY; Standing  -     Case request operating room: ESOPHAGOGASTRODUODENOSCOPY (EGD), COLONOSCOPY    Non-intractable vomiting with nausea, unspecified vomiting type  She states she also has episodes of nausea and vomiting  Therefore would also recommend upper endoscopy to rule out peptic ulcer disease or gastritis  -     Case request operating room: ESOPHAGOGASTRODUODENOSCOPY (EGD), COLONOSCOPY; Standing  -     Case request operating room: ESOPHAGOGASTRODUODENOSCOPY (EGD), COLONOSCOPY    Will see her back after procedure to discuss all results  Subjective:      Patient ID: Robbie Saenz is a 33 y o  female  HPI     This is a follow-up for abdominal pain and diarrhea  Patient states she continues with generalized abdominal pain and bloating  At least twice a week she states she will have explosive diarrhea with multiple episodes  She was checked for O&P, Giardia, H pylori, stool culture and celiac all of which were negative  She states she has been trying to eat gluten free and has noticed maybe some improvement in her symptoms however states it is very expensive and she can't do it all the time  She did try Imodium which did not help  She also was given a trial of Xifaxan which she states made things worse  She does take Lomotil when she has severe diarrhea which she states works well for her  She states sometimes her pain becomes so severe that she is nauseated and vomits     She was also prescribed Bentyl with some relief  She has never had an endoscopy or colonoscopy  She did have a right upper quadrant ultrasound which showed a hemangioma and status post cholecystectomy but otherwise within normal limits  She had a CT scan on August which also was within normal limits      Patient Active Problem List   Diagnosis    Rheumatoid arthritis ([facility] [address] [facility]    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)    Abnormal uterine bleeding (AUB)    Anemia    AVM (arteriovenous malformation)    Chronic bilateral thoracic back pain    Closed fracture of base of metacarpal bone other than first metacarpal    Depression    Dyspareunia, female    Unspecified fracture of unspecified wrist and hand, sequela    Fracture of coccyx ([facility] [address] )    Hereditary and idiopathic peripheral neuropathy    Legal termination of pregnancy    Elbow disorder    Menometrorrhagia    Closed fracture of lunate (semilunar) bone of wrist    Osteoarthritis of knee    Rupture of tendon of hand    History of artificial joint    Severe dysmenorrhea    Urge incontinence of urine    S/P dilation and curettage    Pain in right wrist    Bilateral wrist pain    Generalized abdominal pain    Change in bowel habits    Irritable bowel syndrome with diarrhea    Lactose intolerance    Pelvic pain    Non-intractable vomiting with nausea    Functional diarrhea     Allergies   Allergen Reactions    Nickel      Current Outpatient Prescriptions on File Prior to Visit   Medication Sig    Calcium Carb-Cholecalciferol (CALCIUM 1000 + D PO) Take 1 tablet by mouth daily    diazepam (VALIUM) 5 mg tablet Take 5 mg by mouth 2 (two) times a day    dicyclomine (BENTYL) 20 mg tablet Take 1 tablet (20 mg total) by mouth 2 (two) times a day (Patient taking differently: Take 20 mg by mouth 2 (two) times a day as needed  )    diphenoxylate-atropine (LOMOTIL) 2 5-0 025 mg per tablet Take 1 tablet by mouth 4 (four) times a day as needed for diarrhea for up to 15 doses    ergocalciferol (VITAMIN D2) 50,000 units Take 50,000 Units by mouth once a week    escitalopram (LEXAPRO) 20 mg tablet Take 20 mg by mouth every morning      gabapentin (NEURONTIN) 400 mg capsule 600 mg 2 (two) times a day 800 mg at bed time     gabapentin (NEURONTIN) 600 MG tablet 2 (two) times a day      loperamide (IMODIUM) 2 mg capsule Take 1 capsule (2 mg total) by mouth 4 (four) times a day as needed for diarrhea    ondansetron (ZOFRAN) 4 mg tablet Take 4 mg by mouth every 8 (eight) hours as needed for nausea or vomiting    ondansetron (ZOFRAN) 4 mg tablet Take 4 mg by mouth    oxyCODONE (ROXICODONE) 30 MG immediate release tablet Take 30 mg by mouth every 8 (eight) hours as needed for moderate pain    oxyCODONE-acetaminophen (PERCOCET) 5-325 mg per tablet Take 2 tablets by mouth every 8 (eight) hours as needed for moderate pain Max Daily Amount: 6 tablets    predniSONE 5 mg tablet Take 10 mg by mouth daily      ranitidine (ZANTAC) 150 mg tablet Take 150 mg by mouth 2 (two) times a day    SUMAtriptan (IMITREX) 100 mg tablet Take 100 mg by mouth once as needed for migraine    norethindrone (AYGESTIN) 5 mg tablet Take 1 tablet (5 mg total) by mouth daily for 30 days     No current facility-administered medications on file prior to visit        Family History   Problem Relation Age of Onset    Hypertension Mother    Bianca Riis Rheum arthritis Mother     Depression Mother     Anxiety disorder Mother      Past Medical History:   Diagnosis Date    Abnormal Pap smear of cervix     Anemia     Anxiety     Depression     IBS (irritable bowel syndrome)     Iron deficiency     Irregular menses     Menorrhagia     Migraines     Neuropathy     Osteopenia     PONV (postoperative nausea and vomiting)     RA [facility]rheumatoid [facility]) [facility] [address] [facility]     Rheumatoid arthritis ([facility] [address] )     Scratches     On back, stomach and legs from scratching due to urticaria    Vasculitis [facility])      Social History     Social History    Marital status:      Spouse name: N/A    Number of children: N/A    Years of education: N/A     Social History Main Topics    Smoking status: Former Smoker     Packs/day: 0 25     Years: 3 00     Types: Cigarettes     Quit date: 2010    Smokeless tobacco: Never Used    Alcohol use No      Comment: Rare- once or twice yearly    Drug use: No    Sexual activity: Not on file     Other Topics Concern    Not on file     Social History Narrative    No narrative on file     Past Surgical History:   Procedure Laterality Date    CAST APPLICATION Left 72/98/8010    Procedure: APPLICATION LONG ARM Sarah Bale;  Surgeon: Lexis Kenyon MD;  [facility];  Service: Orthopedics    CHOLECYSTECTOMY      Laparoscopic    DILATION AND CURETTAGE OF UTERUS      JOINT REPLACEMENT Bilateral     knees    IN FUSION/GRAFT WRIST JOINT Left 4/4/2017    Procedure: WRIST FUSION / [address] ;  Surgeon: Lexis Kenyon MD;  [facility];  Service: Orthopedics    IN FUSION/GRAFT WRIST JOINT Right 4/10/2018    Procedure: Removal of hardware right wrist with Fusion of Long finger carpometacarpal joint, splint application Right Wrist;  Surgeon: Lexis Kenyon MD;  [facility];  Service: Orthopedics    IN HYSTEROSCOPY,W/ENDO BX N/A 12/1/2017    Procedure: DILATATION AND CURETTAGE (D&C) WITH HYSTEROSCOPY;  Surgeon: Arlyn Joseph DO;  [facility];  Service: Gynecology    IN LAP,CHOLECYSTECTOMY N/A 3/14/2017    Procedure: CHOLECYSTECTOMY LAPAROSCOPIC;  Surgeon: Araceli Muñoz DO;  [facility];  Service: General    IN REMOVAL DEEP IMPLANT Left 11/13/2018    Procedure: REMOVAL OF HARDWARE (wrist fusion plate  AND ulnar head arthroplasty) with conversion to Darrach ;  Surgeon: Lexis Kenyon MD;  [facility];  Service: Orthopedics    REPLACEMENT TOTAL KNEE Bilateral     WRIST SURGERY Right     For arthritis    WRIST SURGERY Left 4/4/2017    Procedure: ARTHROPLASTY WRIST ULNAR HEAD ARTHROPLASTY - INTEGRA SIZE 5 5 MM, 16 HEAD;  Surgeon: Ese Olmedo MD;  [facility];  Service:          Review of Systems   All other systems reviewed and are negative  Objective:      /78 (BP Location: Left arm, Patient Position: Sitting, Cuff Size: Adult)   Pulse 105   Temp 98 4 °F (36 9 °C) (Tympanic)   Ht 5' 7" (1 702 m)   Wt 83 5 kg (184 lb)   BMI 28 82 kg/m²          Physical Exam   Constitutional: She is oriented to person, place, and time  She appears well-developed and well-nourished  No distress  HENT:   Head: Normocephalic and atraumatic  Eyes: Conjunctivae and EOM are normal    Neck: Normal range of motion  Cardiovascular: Normal rate and regular rhythm  Pulmonary/Chest: Effort normal and breath sounds normal    Abdominal: Soft  Bowel sounds are normal  She exhibits no distension  There is no tenderness  Musculoskeletal:   Wrist brace present, walks with a limp   Neurological: She is alert and oriented to person, place, and time  Skin: Skin is warm and dry  Psychiatric: She has a normal mood and affect   Her behavior is normal

## 2018-12-17 NOTE — LETTER
December 17, 2018     Mack Burns, DO  [address] [facility]  [address]    Patient: Juan Ramon Su   YOB: 1990   Date of Visit: 12/17/2018       Dear Dr Carr Devoid: Thank you for referring Juan Ramon Su to me for evaluation  Below are my notes for this consultation  If you have questions, please do not hesitate to call me  I look forward to following your patient along with you  Sincerely,        Farzana Mckinney PA-C        CC: No Recipients  Farzana Mckinney [place]  12/17/2018  3:26 PM  Sign at close encounter  Assessment/Plan:     Diagnoses and all orders for this visit:  Functional diarrhea/ Generalized abdominal pain  She has generalized abdominal pain and diarrhea and likely has irritable bowel syndrome with diarrhea however she has never had a colonoscopy  Therefore would recommend 1 at this time to rule out inflammatory bowel disease as well as microscopic colitis  Patient is agreeable  Risks and benefits were discussed  -     Case request operating room: ESOPHAGOGASTRODUODENOSCOPY (EGD), COLONOSCOPY; Standing  -     Case request operating room: ESOPHAGOGASTRODUODENOSCOPY (EGD), COLONOSCOPY    Non-intractable vomiting with nausea, unspecified vomiting type  She states she also has episodes of nausea and vomiting  Therefore would also recommend upper endoscopy to rule out peptic ulcer disease or gastritis  -     Case request operating room: ESOPHAGOGASTRODUODENOSCOPY (EGD), COLONOSCOPY; Standing  -     Case request operating room: ESOPHAGOGASTRODUODENOSCOPY (EGD), COLONOSCOPY    Will see her back after procedure to discuss all results  Subjective:      Patient ID: Juan Ramon Su is a 33 y o  female  HPI     This is a follow-up for abdominal pain and diarrhea  Patient states she continues with generalized abdominal pain and bloating  At least twice a week she states she will have explosive diarrhea with multiple episodes    She was checked for O&P, Giardia, H pylori, stool culture and celiac all of which were negative  She states she has been trying to eat gluten free and has noticed maybe some improvement in her symptoms however states it is very expensive and she can't do it all the time  She did try Imodium which did not help  She also was given a trial of Xifaxan which she states made things worse  She does take Lomotil when she has severe diarrhea which she states works well for her  She states sometimes her pain becomes so severe that she is nauseated and vomits  She was also prescribed Bentyl with some relief  She has never had an endoscopy or colonoscopy  She did have a right upper quadrant ultrasound which showed a hemangioma and status post cholecystectomy but otherwise within normal limits  She had a CT scan on August which also was within normal limits      Patient Active Problem List   Diagnosis    [facility]    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)    Abnormal uterine bleeding (AUB)    Anemia    AVM (arteriovenous malformation)    Chronic bilateral thoracic back pain    Closed fracture of base of metacarpal bone other than first metacarpal    Depression    Dyspareunia, female    Unspecified fracture of unspecified wrist and hand, sequela    Fracture of coccyx ([facility]    Hereditary and idiopathic peripheral neuropathy    Legal termination of pregnancy    Elbow disorder    Menometrorrhagia    Closed fracture of lunate (semilunar) bone of wrist    Osteoarthritis of knee    Rupture of tendon of hand    History of artificial joint    Severe dysmenorrhea    Urge incontinence of urine    S/P dilation and curettage    Pain in right wrist    Bilateral wrist pain    Generalized abdominal pain    Change in bowel habits    Irritable bowel syndrome with diarrhea    Lactose intolerance    Pelvic pain    Non-intractable vomiting with nausea    Functional diarrhea     Allergies   Allergen Reactions    Nickel      Current Outpatient Prescriptions on File Prior to Visit   Medication Sig    Calcium Carb-Cholecalciferol (CALCIUM 1000 + D PO) Take 1 tablet by mouth daily    diazepam (VALIUM) 5 mg tablet Take 5 mg by mouth 2 (two) times a day    dicyclomine (BENTYL) 20 mg tablet Take 1 tablet (20 mg total) by mouth 2 (two) times a day (Patient taking differently: Take 20 mg by mouth 2 (two) times a day as needed  )    diphenoxylate-atropine (LOMOTIL) 2 5-0 025 mg per tablet Take 1 tablet by mouth 4 (four) times a day as needed for diarrhea for up to 15 doses    ergocalciferol (VITAMIN D2) 50,000 units Take 50,000 Units by mouth once a week    escitalopram (LEXAPRO) 20 mg tablet Take 20 mg by mouth every morning      gabapentin (NEURONTIN) 400 mg capsule 600 mg 2 (two) times a day 800 mg at bed time     gabapentin (NEURONTIN) 600 MG tablet 2 (two) times a day      loperamide (IMODIUM) 2 mg capsule Take 1 capsule (2 mg total) by mouth 4 (four) times a day as needed for diarrhea    ondansetron (ZOFRAN) 4 mg tablet Take 4 mg by mouth every 8 (eight) hours as needed for nausea or vomiting    ondansetron (ZOFRAN) 4 mg tablet Take 4 mg by mouth    oxyCODONE (ROXICODONE) 30 MG immediate release tablet Take 30 mg by mouth every 8 (eight) hours as needed for moderate pain    oxyCODONE-acetaminophen (PERCOCET) 5-325 mg per tablet Take 2 tablets by mouth every 8 (eight) hours as needed for moderate pain Max Daily Amount: 6 tablets    predniSONE 5 mg tablet Take 10 mg by mouth daily      ranitidine (ZANTAC) 150 mg tablet Take 150 mg by mouth 2 (two) times a day    SUMAtriptan (IMITREX) 100 mg tablet Take 100 mg by mouth once as needed for migraine    norethindrone (AYGESTIN) 5 mg tablet Take 1 tablet (5 mg total) by mouth daily for 30 days     No current facility-administered medications on file prior to visit        Family History   Problem Relation Age of Onset    Hypertension Mother     Rheum arthritis Mother     Depression Mother     Anxiety disorder Mother      Past Medical History:   Diagnosis Date    Abnormal Pap smear of cervix     Anemia     Anxiety     Depression     IBS (irritable bowel syndrome)     Iron deficiency     Irregular menses     Menorrhagia     Migraines     Neuropathy     Osteopenia     PONV (postoperative nausea and vomiting)     RA [facility]) [facility] [address] )     [email or web address]     Scratches     On back, stomach and legs from scratching due to urticaria    Vasculitis ([place] [address] )      Social History     Social History    Marital status:      Spouse name: N/A    Number of children: N/A    Years of education: N/A     Social History Main Topics    Smoking status: Former Smoker     Packs/day: 0 25     Years: 3 00     Types: Cigarettes     Quit date: 2010    Smokeless tobacco: Never Used    Alcohol use No      Comment: Rare- once or twice yearly    Drug use: No    Sexual activity: Not on file     Other Topics Concern    Not on file     Social History Narrative    No narrative on file     Past Surgical History:   Procedure Laterality Date    CAST APPLICATION Left 66/32/0468    Procedure: APPLICATION LONG ARM Thornton Townsend;  Surgeon: Andrea Rios MD;  [facility];  Service: Orthopedics    CHOLECYSTECTOMY      Laparoscopic    DILATION AND CURETTAGE OF UTERUS      JOINT REPLACEMENT Bilateral     knees    IL FUSION/GRAFT WRIST JOINT Left 4/4/2017    Procedure: WRIST FUSION / Marvene Ing ;  Surgeon: Andrea Rios MD;  [facility];  Service: Orthopedics    IL FUSION/GRAFT WRIST JOINT Right 4/10/2018    Procedure: Removal of hardware right wrist with Fusion of Long finger carpometacarpal joint, splint application Right Wrist;  Surgeon: Andrea Rios MD;  [facility];  Service: Orthopedics    IL HYSTEROSCOPY,W/ENDO BX N/A 12/1/2017    Procedure: DILATATION AND CURETTAGE (D&C) WITH HYSTEROSCOPY;  Surgeon: Arlyn Joseph DO;  [facility];  Service: Gynecology    VT LAP,CHOLECYSTECTOMY N/A 3/14/2017    Procedure: CHOLECYSTECTOMY LAPAROSCOPIC;  Surgeon: Araceli Muñoz DO;  [facility];  Service: General    VT REMOVAL DEEP IMPLANT Left 11/13/2018    Procedure: REMOVAL OF HARDWARE (wrist fusion plate  AND ulnar head arthroplasty) with conversion to Darrach ;  Surgeon: Lexis Kenyon MD;  [facility];  Service: Orthopedics    REPLACEMENT TOTAL KNEE Bilateral     WRIST SURGERY Right     For arthritis    WRIST SURGERY Left 4/4/2017    Procedure: ARTHROPLASTY WRIST ULNAR HEAD ARTHROPLASTY - INTEGRA SIZE 5 5 MM, 16 HEAD;  Surgeon: Lexis Kenyon MD;  [facility];  Service:          Review of Systems   All other systems reviewed and are negative  Objective:      /78 (BP Location: Left arm, Patient Position: Sitting, Cuff Size: Adult)   Pulse 105   Temp 98 4 °F (36 9 °C) (Tympanic)   Ht 5' 7" (1 702 m)   Wt 83 5 kg (184 lb)   BMI 28 82 kg/m²           Physical Exam   Constitutional: She is oriented to person, place, and time  She appears well-developed and well-nourished  No distress  HENT:   Head: Normocephalic and atraumatic  Eyes: Conjunctivae and EOM are normal    Neck: Normal range of motion  Cardiovascular: Normal rate and regular rhythm  Pulmonary/Chest: Effort normal and breath sounds normal    Abdominal: Soft  Bowel sounds are normal  She exhibits no distension  There is no tenderness  Musculoskeletal:   Wrist brace present, walks with a limp   Neurological: She is alert and oriented to person, place, and time  Skin: Skin is warm and dry  Psychiatric: She has a normal mood and affect   Her behavior is normal

## 2018-12-17 NOTE — PATIENT INSTRUCTIONS
The patient is scheduled at [facility]  On 2/22/19 with Dr Lion Ludwig  A suprep sample was given and prep was gone over with by the MA   She is aware she will be called the day prior with an arrival time

## 2018-12-18 ENCOUNTER — TELEPHONE (OUTPATIENT)
Dept: OBGYN CLINIC | Facility: CLINIC | Age: 28
End: 2018-12-18

## 2018-12-18 NOTE — TELEPHONE ENCOUNTER
----- Message from Mihaela Bravo RN sent at 12/18/2018  8:24 AM EST -----  Regarding: Pre-Auth From [facility]  1990     Total Laparoscopic Hysterectomy  # [id number]        Abnormal uterine bleeding (AUB) [N93 9]  Pelvic pain [R10 2]

## 2018-12-18 NOTE — TELEPHONE ENCOUNTER
Per Dr Pedro Pablo Odell Text pt called last week to cancel her surgery due to having another issue  I canceled her case thru the OR  Per [place] text Dr Honorio Mccray need's the pt to come in for a follow up to reschedule after January 17th  Can you please call the pt can schedule her for a follow up as I do not have access yet to [facility] scheduling       Thank you   Layne Arroyo

## 2018-12-28 ENCOUNTER — HOSPITAL ENCOUNTER (OUTPATIENT)
Dept: RADIOLOGY | Facility: HOSPITAL | Age: 28
Discharge: HOME | End: 2018-12-28
Attending: ORTHOPAEDIC SURGERY
Payer: MEDICARE

## 2018-12-28 ENCOUNTER — OFFICE VISIT (OUTPATIENT)
Dept: OBGYN CLINIC | Facility: HOSPITAL | Age: 28
End: 2018-12-28

## 2018-12-28 VITALS
HEIGHT: 67 IN | BODY MASS INDEX: 28.82 KG/M2 | HEART RATE: 102 BPM | SYSTOLIC BLOOD PRESSURE: 109 MMHG | DIASTOLIC BLOOD PRESSURE: 71 MMHG

## 2018-12-28 DIAGNOSIS — Z48.89 AFTERCARE FOLLOWING SURGERY: Primary | ICD-10-CM

## 2018-12-28 DIAGNOSIS — Z48.89 AFTERCARE FOLLOWING SURGERY: ICD-10-CM

## 2018-12-28 PROCEDURE — 73110 X-RAY EXAM OF WRIST: CPT

## 2018-12-28 PROCEDURE — 99024 POSTOP FOLLOW-UP VISIT: CPT | Performed by: ORTHOPAEDIC SURGERY

## 2018-12-28 RX ORDER — OXYCODONE HYDROCHLORIDE 30 MG/1
TABLET, FILM COATED, EXTENDED RELEASE ORAL
Refills: 0 | COMMUNITY
Start: 2018-12-14 | End: 2019-01-14

## 2018-12-28 RX ORDER — OXYCODONE HYDROCHLORIDE 15 MG/1
TABLET ORAL EVERY 6 HOURS PRN
Refills: 0 | COMMUNITY
Start: 2018-12-14

## 2018-12-28 RX ORDER — GABAPENTIN 800 MG/1
800 TABLET ORAL 4 TIMES DAILY
Refills: 2 | COMMUNITY
Start: 2018-12-06

## 2018-12-28 NOTE — PROGRESS NOTES
ASSESSMENT/PLAN:    Assessment:   Status post removal hardware and conversion to Darrach, left side on 11/13/18    Plan:   Discontinue splint  Hand therapy for improved supination and pronation left wrist  Activities to her tolerance slowly increase utilization of left wrist   We discussed the eventual potential need for a Rosa on the right  Follow Up:  2  month(s)    To Do Next Visit:       General Discussions:       Operative Discussions:         _____________________________________________________  CHIEF COMPLAINT:  Chief Complaint   Patient presents with    Left Wrist - Post-op         SUBJECTIVE:  Xochilt Gonzalez is a 33y o  year old female who presents for follow up Status post removal hardware and conversion to Darrach, left side on 11/13/18  She is currently in the muner splint per OT  Pain is fairly well controlled, she has some ongoing discomfort at the ulnar aspect distal wrist at procedure site  Skin is well healing, no associated symptoms     PAST MEDICAL HISTORY:  Past Medical History:   Diagnosis Date    Abnormal Pap smear of cervix     Anemia     Anxiety     Depression     IBS (irritable bowel syndrome)     Iron deficiency     Irregular menses     Menorrhagia     Migraines     Neuropathy     Osteopenia     PONV (postoperative nausea and vomiting)     RA (rheumatoid arthritis) ([address]     Rheumatoid arthritis [facility] [address] [facility]     Scratches     On back, stomach and legs from scratching due to urticaria    Vasculitis ([facility] [address] )        PAST SURGICAL HISTORY:  Past Surgical History:   Procedure Laterality Date    CAST APPLICATION Left 12/20/0202    Procedure: APPLICATION LONG ARM Alethia Phalen;  Surgeon: Francisca Monge MD;  [facility];  Service: Orthopedics    CHOLECYSTECTOMY      Laparoscopic    DILATION AND CURETTAGE OF UTERUS      JOINT REPLACEMENT Bilateral     knees    MO FUSION/GRAFT WRIST JOINT Left 4/4/2017    Procedure: WRIST FUSION / Anila Marine ;  Surgeon: Olga Mera MD;  [facility];  Service: Orthopedics    MN FUSION/GRAFT WRIST JOINT Right 4/10/2018    Procedure: Removal of hardware right wrist with Fusion of Long finger carpometacarpal joint, splint application Right Wrist;  Surgeon: Olga Mera MD;  [facility];  Service: Orthopedics    MN HYSTEROSCOPY,W/ENDO BX N/A 12/1/2017    Procedure: DILATATION AND CURETTAGE (D&C) WITH HYSTEROSCOPY;  Surgeon: Evan Soni DO;  [facility];  Service: Gynecology    MN LAP,CHOLECYSTECTOMY N/A 3/14/2017    Procedure: CHOLECYSTECTOMY LAPAROSCOPIC;  Surgeon: Melissa Leigh DO;  [facility];  Service: General    MN REMOVAL DEEP IMPLANT Left 11/13/2018    Procedure: REMOVAL OF HARDWARE (wrist fusion plate  AND ulnar head arthroplasty) with conversion to Darrach ;  Surgeon: Olga Mera MD;  [facility];  Service: Orthopedics    REPLACEMENT TOTAL KNEE Bilateral     WRIST SURGERY Right     For arthritis    WRIST SURGERY Left 4/4/2017    Procedure: ARTHROPLASTY WRIST ULNAR HEAD ARTHROPLASTY - INTEGRA SIZE 5 5 MM, 16 HEAD;  Surgeon: Olga Mera MD;  [facility];  Service:        FAMILY HISTORY:  Family History   Problem Relation Age of Onset    Hypertension Mother    [employer] Rheum arthritis Mother     Depression Mother     Anxiety disorder Mother        SOCIAL HISTORY:  Social History   Substance Use Topics    Smoking status: Former Smoker     Packs/day: 0 25     Years: 3 00     Types: Cigarettes     Quit date: 2010    Smokeless tobacco: Never Used    Alcohol use No      Comment: Rare- once or twice yearly       MEDICATIONS:    Current Outpatient Prescriptions:     Calcium Carb-Cholecalciferol (CALCIUM 1000 + D PO), Take 1 tablet by mouth daily, Disp: , Rfl:     diazepam (VALIUM) 5 mg tablet, Take 5 mg by mouth 2 (two) times a day, Disp: , Rfl: 0    dicyclomine (BENTYL) 20 mg tablet, Take 1 tablet (20 mg total) by mouth 2 (two) times a day (Patient taking differently: Take 20 mg by mouth 2 (two) times a day as needed  ), Disp: 20 tablet, Rfl: 0    diphenoxylate-atropine (LOMOTIL) 2 5-0 025 mg per tablet, Take 1 tablet by mouth 4 (four) times a day as needed for diarrhea for up to 15 doses, Disp: 15 tablet, Rfl: 0    ergocalciferol (VITAMIN D2) 50,000 units, Take 50,000 Units by mouth once a week, Disp: , Rfl:     escitalopram (LEXAPRO) 20 mg tablet, Take 20 mg by mouth every morning  , Disp: , Rfl:     gabapentin (NEURONTIN) 600 MG tablet, 2 (two) times a day  , Disp: , Rfl: 2    gabapentin (NEURONTIN) 800 mg tablet, TK 1 T PO  AT NIGHT  FOR NEUROPATHY, Disp: , Rfl: 2    loperamide (IMODIUM) 2 mg capsule, Take 1 capsule (2 mg total) by mouth 4 (four) times a day as needed for diarrhea, Disp: 30 capsule, Rfl: 0    norethindrone (AYGESTIN) 5 mg tablet, , Disp: , Rfl:     ondansetron (ZOFRAN) 4 mg tablet, Take 4 mg by mouth, Disp: , Rfl:     oxyCODONE (ROXICODONE) 30 MG immediate release tablet, Take 30 mg by mouth every 8 (eight) hours as needed for moderate pain, Disp: , Rfl:     OXYCONTIN 30 MG T12A, TK 1 T PO Q 12 H, Disp: , Rfl: 0    predniSONE 5 mg tablet, Take 10 mg by mouth daily  , Disp: , Rfl:     ranitidine (ZANTAC) 150 mg tablet, Take 150 mg by mouth 2 (two) times a day, Disp: , Rfl:     SUMAtriptan (IMITREX) 100 mg tablet, Take 100 mg by mouth once as needed for migraine, Disp: , Rfl:     gabapentin (NEURONTIN) 400 mg capsule, 600 mg 2 (two) times a day 800 mg at bed time , Disp: , Rfl: 1    norethindrone (AYGESTIN) 5 mg tablet, Take 1 tablet (5 mg total) by mouth daily for 30 days, Disp: 30 tablet, Rfl: 2    oxyCODONE (ROXICODONE) 15 mg immediate release tablet, TK 1 T PO Q 6 H PRF BREAKTHROUGH PAIN, Disp: , Rfl: 0    oxyCODONE-acetaminophen (PERCOCET) 5-325 mg per tablet, Take 2 tablets by mouth every 8 (eight) hours as needed for moderate pain Max Daily Amount: 6 tablets (Patient not taking: Reported on 12/28/2018 ), Disp: 30 tablet, Rfl: 0    ALLERGIES:  Allergies   Allergen Reactions    Nickel        REVIEW OF SYSTEMS:  Pertinent items are noted in HPI  A comprehensive review of systems was negative  LABS:  HgA1c: No results found for: HGBA1C  BMP:   Lab Results   Component Value Date    GLUCOSE 135 11/11/2015    CALCIUM 9 5 10/03/2018     (L) 11/11/2015    K 4 1 10/03/2018    CO2 30 10/03/2018     10/03/2018    BUN 11 10/03/2018    CREATININE 0 76 10/03/2018           _____________________________________________________  PHYSICAL EXAMINATION:  General: well developed and well nourished, alert, oriented times 3 and appears comfortable  Psychiatric: Normal  HEENT: Trachea Midline, No torticollis  Cardiovascular: No discernable arrhythmia  Pulmonary: No wheezing or stridor  Skin: No masses, erthema, lacerations, fluctation, ulcerations, well healing skin incision  Neurovascular: Sensation Intact to the Median, Ulnar, Radial Nerve, Motor Intact to the Median, Ulnar, Radial Nerve and Pulses Intact    MUSCULOSKELETAL EXAMINATION:  Left side:  - well healing incision site  - good pronation, limited supination - improves with passive ROM  - stable DRUJ   - palpable distal pulses    _____________________________________________________  STUDIES REVIEWED:  Images were reviewd in PACS: XR L wrist show stabple appearance s/p darrach procedure, lateral XR shows well aligned distal radius/ulna      PROCEDURES PERFORMED:  Procedures  No Procedures performed today      I interviewed, took the history and examined the patient  I discuss the case with the resident and reviewed the resident's note  I supervised the resident and I agree with the resident management plan as it was presented to me  I was present in the clinic and examined the patient

## 2018-12-31 LAB
MYCOBACTERIUM SPEC CULT: NORMAL
RHODAMINE-AURAMINE STN SPEC: NORMAL
RHODAMINE-AURAMINE STN SPEC: NORMAL

## 2019-01-14 ENCOUNTER — APPOINTMENT (OUTPATIENT)
Dept: LAB | Facility: HOSPITAL | Age: 29
End: 2019-01-14
Attending: OBSTETRICS & GYNECOLOGY
Payer: MEDICARE

## 2019-01-14 ENCOUNTER — OFFICE VISIT (OUTPATIENT)
Dept: OBGYN CLINIC | Facility: CLINIC | Age: 29
End: 2019-01-14
Payer: MEDICARE

## 2019-01-14 VITALS
WEIGHT: 177.4 LBS | SYSTOLIC BLOOD PRESSURE: 102 MMHG | BODY MASS INDEX: 27.84 KG/M2 | DIASTOLIC BLOOD PRESSURE: 64 MMHG | HEIGHT: 67 IN

## 2019-01-14 DIAGNOSIS — R10.2 PELVIC PAIN: ICD-10-CM

## 2019-01-14 DIAGNOSIS — R10.2 PELVIC PAIN IN FEMALE: ICD-10-CM

## 2019-01-14 DIAGNOSIS — M05.731 RHEUMATOID ARTHRITIS INVOLVING BOTH WRISTS WITH POSITIVE RHEUMATOID FACTOR (HCC): ICD-10-CM

## 2019-01-14 DIAGNOSIS — Z01.818 PREOPERATIVE EXAM FOR GYNECOLOGIC SURGERY: Primary | ICD-10-CM

## 2019-01-14 DIAGNOSIS — N93.9 ABNORMAL UTERINE BLEEDING (AUB): ICD-10-CM

## 2019-01-14 DIAGNOSIS — M05.732 RHEUMATOID ARTHRITIS INVOLVING BOTH WRISTS WITH POSITIVE RHEUMATOID FACTOR (HCC): ICD-10-CM

## 2019-01-14 DIAGNOSIS — Z90.49 STATUS POST CHOLECYSTECTOMY: ICD-10-CM

## 2019-01-14 DIAGNOSIS — Z01.818 PRE-OP TESTING: ICD-10-CM

## 2019-01-14 DIAGNOSIS — Z98.890 STATUS POST D&C: ICD-10-CM

## 2019-01-14 LAB
ABO GROUP BLD: NORMAL
ANION GAP SERPL CALCULATED.3IONS-SCNC: 8 MMOL/L (ref 4–13)
BASOPHILS # BLD AUTO: 0.04 THOUSANDS/ΜL (ref 0–0.1)
BASOPHILS NFR BLD AUTO: 1 % (ref 0–1)
BLD GP AB SCN SERPL QL: NEGATIVE
BUN SERPL-MCNC: 14 MG/DL (ref 5–25)
CALCIUM SERPL-MCNC: 8.3 MG/DL (ref 8.3–10.1)
CHLORIDE SERPL-SCNC: 102 MMOL/L (ref 100–108)
CO2 SERPL-SCNC: 29 MMOL/L (ref 21–32)
CREAT SERPL-MCNC: 0.82 MG/DL (ref 0.6–1.3)
EOSINOPHIL # BLD AUTO: 0.06 THOUSAND/ΜL (ref 0–0.61)
EOSINOPHIL NFR BLD AUTO: 1 % (ref 0–6)
ERYTHROCYTE [DISTWIDTH] IN BLOOD BY AUTOMATED COUNT: 12.1 % (ref 11.6–15.1)
EST. AVERAGE GLUCOSE BLD GHB EST-MCNC: 91 MG/DL
GFR SERPL CREATININE-BSD FRML MDRD: 97 ML/MIN/1.73SQ M
GLUCOSE SERPL-MCNC: 107 MG/DL (ref 65–140)
HBA1C MFR BLD: 4.8 % (ref 4.2–6.3)
HCT VFR BLD AUTO: 41.8 % (ref 34.8–46.1)
HGB BLD-MCNC: 13.3 G/DL (ref 11.5–15.4)
IMM GRANULOCYTES # BLD AUTO: 0.04 THOUSAND/UL (ref 0–0.2)
IMM GRANULOCYTES NFR BLD AUTO: 1 % (ref 0–2)
LYMPHOCYTES # BLD AUTO: 3.18 THOUSANDS/ΜL (ref 0.6–4.47)
LYMPHOCYTES NFR BLD AUTO: 38 % (ref 14–44)
MCH RBC QN AUTO: 31.1 PG (ref 26.8–34.3)
MCHC RBC AUTO-ENTMCNC: 31.8 G/DL (ref 31.4–37.4)
MCV RBC AUTO: 98 FL (ref 82–98)
MONOCYTES # BLD AUTO: 0.52 THOUSAND/ΜL (ref 0.17–1.22)
MONOCYTES NFR BLD AUTO: 6 % (ref 4–12)
NEUTROPHILS # BLD AUTO: 4.49 THOUSANDS/ΜL (ref 1.85–7.62)
NEUTS SEG NFR BLD AUTO: 53 % (ref 43–75)
NRBC BLD AUTO-RTO: 0 /100 WBCS
PLATELET # BLD AUTO: 176 THOUSANDS/UL (ref 149–390)
PMV BLD AUTO: 12 FL (ref 8.9–12.7)
POTASSIUM SERPL-SCNC: 4.3 MMOL/L (ref 3.5–5.3)
RBC # BLD AUTO: 4.27 MILLION/UL (ref 3.81–5.12)
RH BLD: NEGATIVE
SODIUM SERPL-SCNC: 139 MMOL/L (ref 136–145)
SPECIMEN EXPIRATION DATE: NORMAL
WBC # BLD AUTO: 8.33 THOUSAND/UL (ref 4.31–10.16)

## 2019-01-14 PROCEDURE — 86901 BLOOD TYPING SEROLOGIC RH(D): CPT

## 2019-01-14 PROCEDURE — 80048 BASIC METABOLIC PNL TOTAL CA: CPT

## 2019-01-14 PROCEDURE — 85025 COMPLETE CBC W/AUTO DIFF WBC: CPT

## 2019-01-14 PROCEDURE — 99213 OFFICE O/P EST LOW 20 MIN: CPT | Performed by: OBSTETRICS & GYNECOLOGY

## 2019-01-14 PROCEDURE — 86900 BLOOD TYPING SEROLOGIC ABO: CPT

## 2019-01-14 PROCEDURE — 36415 COLL VENOUS BLD VENIPUNCTURE: CPT

## 2019-01-14 PROCEDURE — 86850 RBC ANTIBODY SCREEN: CPT

## 2019-01-14 PROCEDURE — 83036 HEMOGLOBIN GLYCOSYLATED A1C: CPT

## 2019-01-14 NOTE — PROGRESS NOTES
Assessment/Plan:  Chronic pelvic pain  Abnormal uterine bleeding  Menometrorrhagia  Dyspareunia  Dysmenorrhea    PMH  Status post D&C  Rheumatoid arthritis  Status post multiple orthopedic procedures on both wrists and knees  IBS    Plan  T LH with bilateral salpingectomy and cystoscopy    Subjective:   Here for preoperative physical exam     Patient ID: Leola Jackson is a 33 y o  female  HPI   50-year-old female  2 para 2 recently here for annual exam   She had a long history of abnormal uterine bleeding and symptoms with menometrorrhagia pelvic pain dysmenorrhea and dyspareunia  Currently she is managed with norethindrone 5 mg daily and still bleeds intermittently  At her most recent office visit with recurrent bleeding symptoms and worsening pain  She has been treated for severe rheumatoid arthritis over the years and has had multiple orthopedic procedures on hands wrists and knees  She did have a D&C hysteroscopy in December of last year pathology was benign, her bleeding symptoms improve slightly however pain symptoms never completely resolved  Patient is requesting definitive procedure with hysterectomy  Recent endometrial biopsy was negative for atypia or malignancy  Pap smear was also within limits  Operative consent been previously obtained  Procedure risks benefits have been reviewed detail patient agrees and accepts like to proceed  Recommend total laparoscopic hysterectomy with bilateral salpingectomy  Surgery scheduled for 2019  Review of Systems   Constitutional: Positive for fatigue  HENT: Negative  Eyes: Negative  Respiratory: Negative  Cardiovascular: Negative  Gastrointestinal: Negative  Endocrine: Negative  Genitourinary: Positive for dyspareunia, menstrual problem and pelvic pain  Musculoskeletal: Positive for arthralgias, back pain and myalgias  Skin: Negative  Allergic/Immunologic: Negative  Neurological: Negative  Hematological: Negative  Psychiatric/Behavioral: Negative  Objective:  No acute distress  /64 (BP Location: Left arm, Patient Position: Sitting, Cuff Size: Standard)   Ht 5' 7" (1 702 m)   Wt 80 5 kg (177 lb 6 4 oz)   LMP  (LMP Unknown) Comment: irregular  BMI 27 78 kg/m²      Physical Exam   Constitutional: She is oriented to person, place, and time  She appears well-developed and well-nourished  HENT:   Head: Normocephalic and atraumatic  Eyes: Pupils are equal, round, and reactive to light  EOM are normal    Neck: Normal range of motion  Neck supple  Cardiovascular: Normal rate and regular rhythm  Pulmonary/Chest: Effort normal and breath sounds normal    Abdominal: Soft  Bowel sounds are normal    Genitourinary:   Genitourinary Comments: Pelvic exam  Normal external genitalia  Normal size uterus  Midline uterine pain and tenderness  No pelvic masses  No CMT  No vaginal bleeding or discharge noted   Musculoskeletal:   Generalized joint pain and stiffness recent left wrist surgery healing normally   Neurological: She is alert and oriented to person, place, and time  Skin: Skin is warm and dry  Psychiatric: She has a normal mood and affect   Her behavior is normal  Judgment and thought content normal

## 2019-01-14 NOTE — PRE-PROCEDURE INSTRUCTIONS
Pre-Surgery Instructions:   Medication Instructions    Calcium Carb-Cholecalciferol (CALCIUM 1000 + D PO) Instructed patient per Anesthesia Guidelines   diazepam (VALIUM) 5 mg tablet Instructed patient per Anesthesia Guidelines   dicyclomine (BENTYL) 20 mg tablet Instructed patient per Anesthesia Guidelines   diphenoxylate-atropine (LOMOTIL) 2 5-0 025 mg per tablet Instructed patient per Anesthesia Guidelines   ergocalciferol (VITAMIN D2) 50,000 units Instructed patient per Anesthesia Guidelines   escitalopram (LEXAPRO) 20 mg tablet Instructed patient per Anesthesia Guidelines   gabapentin (NEURONTIN) 600 MG tablet Instructed patient per Anesthesia Guidelines   gabapentin (NEURONTIN) 800 mg tablet Instructed patient per Anesthesia Guidelines   loperamide (IMODIUM) 2 mg capsule Instructed patient per Anesthesia Guidelines   norethindrone (AYGESTIN) 5 mg tablet Instructed patient per Anesthesia Guidelines   norethindrone (AYGESTIN) 5 mg tablet Instructed patient per Anesthesia Guidelines   ondansetron (ZOFRAN) 4 mg tablet Instructed patient per Anesthesia Guidelines   oxyCODONE (ROXICODONE) 15 mg immediate release tablet Instructed patient per Anesthesia Guidelines   oxyCODONE-acetaminophen (PERCOCET) 5-325 mg per tablet Instructed patient per Anesthesia Guidelines   predniSONE 5 mg tablet Instructed patient per Anesthesia Guidelines   ranitidine (ZANTAC) 150 mg tablet Instructed patient per Anesthesia Guidelines   SUMAtriptan (IMITREX) 100 mg tablet Instructed patient per Anesthesia Guidelines  Per anesthesia patient was told to stop NSAIDS and supplements and on DOS with sip of water may take Zantac, Prednisone if OK with surgeon, Gabapentin, and a pain pill  Instructed on use of Chlorhexidine for preoperative bathing per hospital protocol

## 2019-01-14 NOTE — H&P
Assessment/Plan:  Chronic pelvic pain  Abnormal uterine bleeding  Menometrorrhagia  Dyspareunia  Dysmenorrhea    PMH  Status post D&C  Rheumatoid arthritis  Status post multiple orthopedic procedures on both wrists and knees  IBS    Plan  T LH with bilateral salpingectomy and cystoscopy    Subjective:   Here for preoperative physical exam     Patient ID: Tammi Pina is a 33 y o  female  HPI   79-year-old female  2 para 2 recently here for annual exam   She had a long history of abnormal uterine bleeding and symptoms with menometrorrhagia pelvic pain dysmenorrhea and dyspareunia  Currently she is managed with norethindrone 5 mg daily and still bleeds intermittently  At her most recent office visit with recurrent bleeding symptoms and worsening pain  She has been treated for severe rheumatoid arthritis over the years and has had multiple orthopedic procedures on hands wrists and knees  She did have a D&C hysteroscopy in December of last year pathology was benign, her bleeding symptoms improve slightly however pain symptoms never completely resolved  Patient is requesting definitive procedure with hysterectomy  Recent endometrial biopsy was negative for atypia or malignancy  Pap smear was also within limits  Operative consent been previously obtained  Procedure risks benefits have been reviewed detail patient agrees and accepts like to proceed  Recommend total laparoscopic hysterectomy with bilateral salpingectomy  Surgery scheduled for 2019  Review of Systems   Constitutional: Positive for fatigue  HENT: Negative  Eyes: Negative  Respiratory: Negative  Cardiovascular: Negative  Gastrointestinal: Negative  Endocrine: Negative  Genitourinary: Positive for dyspareunia, menstrual problem and pelvic pain  Musculoskeletal: Positive for arthralgias, back pain and myalgias  Skin: Negative  Allergic/Immunologic: Negative  Neurological: Negative  Hematological: Negative  Psychiatric/Behavioral: Negative  Objective:  No acute distress  /64 (BP Location: Left arm, Patient Position: Sitting, Cuff Size: Standard)   Ht 5' 7" (1 702 m)   Wt 80 5 kg (177 lb 6 4 oz)   LMP  (LMP Unknown) Comment: irregular  BMI 27 78 kg/m²       Physical Exam   Constitutional: She is oriented to person, place, and time  She appears well-developed and well-nourished  HENT:   Head: Normocephalic and atraumatic  Eyes: Pupils are equal, round, and reactive to light  EOM are normal    Neck: Normal range of motion  Neck supple  Cardiovascular: Normal rate and regular rhythm  Pulmonary/Chest: Effort normal and breath sounds normal    Abdominal: Soft  Bowel sounds are normal    Genitourinary:   Genitourinary Comments: Pelvic exam  Normal external genitalia  Normal size uterus  Midline uterine pain and tenderness  No pelvic masses  No CMT  No vaginal bleeding or discharge noted   Musculoskeletal:   Generalized joint pain and stiffness recent left wrist surgery healing normally   Neurological: She is alert and oriented to person, place, and time  Skin: Skin is warm and dry  Psychiatric: She has a normal mood and affect   Her behavior is normal  Judgment and thought content normal

## 2019-01-17 ENCOUNTER — ANESTHESIA EVENT (OUTPATIENT)
Dept: PERIOP | Facility: HOSPITAL | Age: 29
End: 2019-01-17
Payer: MEDICARE

## 2019-01-18 ENCOUNTER — HOSPITAL ENCOUNTER (OUTPATIENT)
Facility: HOSPITAL | Age: 29
Setting detail: OUTPATIENT SURGERY
Discharge: HOME | End: 2019-01-18
Attending: OBSTETRICS & GYNECOLOGY | Admitting: OBSTETRICS & GYNECOLOGY
Payer: MEDICARE

## 2019-01-18 ENCOUNTER — ANESTHESIA (OUTPATIENT)
Dept: PERIOP | Facility: HOSPITAL | Age: 29
End: 2019-01-18
Payer: MEDICARE

## 2019-01-18 VITALS
BODY MASS INDEX: 28.25 KG/M2 | WEIGHT: 180 LBS | SYSTOLIC BLOOD PRESSURE: 110 MMHG | DIASTOLIC BLOOD PRESSURE: 75 MMHG | HEIGHT: 67 IN | HEART RATE: 107 BPM | OXYGEN SATURATION: 97 % | RESPIRATION RATE: 14 BRPM | TEMPERATURE: 98.7 F

## 2019-01-18 DIAGNOSIS — N93.9 ABNORMAL UTERINE BLEEDING (AUB): ICD-10-CM

## 2019-01-18 DIAGNOSIS — N94.6 SEVERE DYSMENORRHEA: ICD-10-CM

## 2019-01-18 DIAGNOSIS — R10.2 PELVIC PAIN: ICD-10-CM

## 2019-01-18 DIAGNOSIS — N94.10 DYSPAREUNIA IN FEMALE: ICD-10-CM

## 2019-01-18 DIAGNOSIS — N92.6 ABNORMAL MENSES: ICD-10-CM

## 2019-01-18 DIAGNOSIS — Z90.710 STATUS POST LAPAROSCOPIC HYSTERECTOMY: Primary | ICD-10-CM

## 2019-01-18 LAB
ABO GROUP BLD: NORMAL
BLD GP AB SCN SERPL QL: NEGATIVE
EXT PREGNANCY TEST URINE: NEGATIVE
GLUCOSE SERPL-MCNC: 81 MG/DL (ref 65–140)
RH BLD: NEGATIVE
SPECIMEN EXPIRATION DATE: NORMAL

## 2019-01-18 PROCEDURE — 86900 BLOOD TYPING SEROLOGIC ABO: CPT | Performed by: ANESTHESIOLOGY

## 2019-01-18 PROCEDURE — 82948 REAGENT STRIP/BLOOD GLUCOSE: CPT

## 2019-01-18 PROCEDURE — 88307 TISSUE EXAM BY PATHOLOGIST: CPT | Performed by: PATHOLOGY

## 2019-01-18 PROCEDURE — 86901 BLOOD TYPING SEROLOGIC RH(D): CPT | Performed by: ANESTHESIOLOGY

## 2019-01-18 PROCEDURE — 58571 TLH W/T/O 250 G OR LESS: CPT | Performed by: OBSTETRICS & GYNECOLOGY

## 2019-01-18 PROCEDURE — 86850 RBC ANTIBODY SCREEN: CPT | Performed by: ANESTHESIOLOGY

## 2019-01-18 PROCEDURE — 81025 URINE PREGNANCY TEST: CPT | Performed by: ANESTHESIOLOGY

## 2019-01-18 RX ORDER — HYDROMORPHONE HCL/PF 1 MG/ML
0.5 SYRINGE (ML) INJECTION
Status: COMPLETED | OUTPATIENT
Start: 2019-01-18 | End: 2019-01-18

## 2019-01-18 RX ORDER — KETOROLAC TROMETHAMINE 30 MG/ML
INJECTION, SOLUTION INTRAMUSCULAR; INTRAVENOUS AS NEEDED
Status: DISCONTINUED | OUTPATIENT
Start: 2019-01-18 | End: 2019-01-18 | Stop reason: SURG

## 2019-01-18 RX ORDER — SODIUM CHLORIDE 9 MG/ML
INJECTION, SOLUTION INTRAVENOUS AS NEEDED
Status: DISCONTINUED | OUTPATIENT
Start: 2019-01-18 | End: 2019-01-18 | Stop reason: HOSPADM

## 2019-01-18 RX ORDER — GLYCOPYRROLATE 0.2 MG/ML
INJECTION INTRAMUSCULAR; INTRAVENOUS AS NEEDED
Status: DISCONTINUED | OUTPATIENT
Start: 2019-01-18 | End: 2019-01-18 | Stop reason: SURG

## 2019-01-18 RX ORDER — CEFAZOLIN SODIUM 2 G/50ML
2000 SOLUTION INTRAVENOUS ONCE
Status: COMPLETED | OUTPATIENT
Start: 2019-01-18 | End: 2019-01-18

## 2019-01-18 RX ORDER — ROCURONIUM BROMIDE 10 MG/ML
INJECTION, SOLUTION INTRAVENOUS AS NEEDED
Status: DISCONTINUED | OUTPATIENT
Start: 2019-01-18 | End: 2019-01-18 | Stop reason: SURG

## 2019-01-18 RX ORDER — NEOSTIGMINE METHYLSULFATE 1 MG/ML
INJECTION INTRAVENOUS AS NEEDED
Status: DISCONTINUED | OUTPATIENT
Start: 2019-01-18 | End: 2019-01-18 | Stop reason: SURG

## 2019-01-18 RX ORDER — DOCUSATE SODIUM 100 MG/1
100 CAPSULE, LIQUID FILLED ORAL 2 TIMES DAILY
Qty: 60 CAPSULE | Refills: 0 | Status: SHIPPED | OUTPATIENT
Start: 2019-01-18 | End: 2019-10-07

## 2019-01-18 RX ORDER — OXYCODONE HYDROCHLORIDE 10 MG/1
10 TABLET ORAL EVERY 6 HOURS PRN
Qty: 15 TABLET | Refills: 0 | Status: SHIPPED | OUTPATIENT
Start: 2019-01-18 | End: 2019-10-07

## 2019-01-18 RX ORDER — MIDAZOLAM HYDROCHLORIDE 1 MG/ML
INJECTION INTRAMUSCULAR; INTRAVENOUS AS NEEDED
Status: DISCONTINUED | OUTPATIENT
Start: 2019-01-18 | End: 2019-01-18 | Stop reason: SURG

## 2019-01-18 RX ORDER — MAGNESIUM HYDROXIDE 1200 MG/15ML
LIQUID ORAL AS NEEDED
Status: DISCONTINUED | OUTPATIENT
Start: 2019-01-18 | End: 2019-01-18 | Stop reason: HOSPADM

## 2019-01-18 RX ORDER — BUPIVACAINE HYDROCHLORIDE 5 MG/ML
INJECTION, SOLUTION PERINEURAL AS NEEDED
Status: DISCONTINUED | OUTPATIENT
Start: 2019-01-18 | End: 2019-01-18 | Stop reason: HOSPADM

## 2019-01-18 RX ORDER — ONDANSETRON 2 MG/ML
4 INJECTION INTRAMUSCULAR; INTRAVENOUS ONCE AS NEEDED
Status: COMPLETED | OUTPATIENT
Start: 2019-01-18 | End: 2019-01-18

## 2019-01-18 RX ORDER — LIDOCAINE HYDROCHLORIDE 10 MG/ML
INJECTION, SOLUTION INFILTRATION; PERINEURAL AS NEEDED
Status: DISCONTINUED | OUTPATIENT
Start: 2019-01-18 | End: 2019-01-18 | Stop reason: SURG

## 2019-01-18 RX ORDER — HYDROMORPHONE HCL/PF 1 MG/ML
SYRINGE (ML) INJECTION AS NEEDED
Status: DISCONTINUED | OUTPATIENT
Start: 2019-01-18 | End: 2019-01-18 | Stop reason: SURG

## 2019-01-18 RX ORDER — PROPOFOL 10 MG/ML
INJECTION, EMULSION INTRAVENOUS AS NEEDED
Status: DISCONTINUED | OUTPATIENT
Start: 2019-01-18 | End: 2019-01-18 | Stop reason: SURG

## 2019-01-18 RX ORDER — SODIUM CHLORIDE 9 MG/ML
INJECTION, SOLUTION INTRAVENOUS CONTINUOUS PRN
Status: DISCONTINUED | OUTPATIENT
Start: 2019-01-18 | End: 2019-01-18 | Stop reason: SURG

## 2019-01-18 RX ORDER — DOCUSATE SODIUM 100 MG/1
100 CAPSULE, LIQUID FILLED ORAL 2 TIMES DAILY
Status: DISCONTINUED | OUTPATIENT
Start: 2019-01-18 | End: 2019-01-18 | Stop reason: HOSPADM

## 2019-01-18 RX ORDER — ONDANSETRON 2 MG/ML
INJECTION INTRAMUSCULAR; INTRAVENOUS AS NEEDED
Status: DISCONTINUED | OUTPATIENT
Start: 2019-01-18 | End: 2019-01-18 | Stop reason: SURG

## 2019-01-18 RX ORDER — FENTANYL CITRATE 50 UG/ML
INJECTION, SOLUTION INTRAMUSCULAR; INTRAVENOUS AS NEEDED
Status: DISCONTINUED | OUTPATIENT
Start: 2019-01-18 | End: 2019-01-18 | Stop reason: SURG

## 2019-01-18 RX ORDER — ONDANSETRON 2 MG/ML
4 INJECTION INTRAMUSCULAR; INTRAVENOUS EVERY 6 HOURS PRN
Status: DISCONTINUED | OUTPATIENT
Start: 2019-01-18 | End: 2019-01-18 | Stop reason: HOSPADM

## 2019-01-18 RX ORDER — SODIUM CHLORIDE 9 MG/ML
125 INJECTION, SOLUTION INTRAVENOUS CONTINUOUS
Status: DISCONTINUED | OUTPATIENT
Start: 2019-01-18 | End: 2019-01-18 | Stop reason: HOSPADM

## 2019-01-18 RX ORDER — OXYCODONE HYDROCHLORIDE 5 MG/1
10 TABLET ORAL EVERY 4 HOURS PRN
Status: DISCONTINUED | OUTPATIENT
Start: 2019-01-18 | End: 2019-01-18 | Stop reason: HOSPADM

## 2019-01-18 RX ORDER — ACETAMINOPHEN 325 MG/1
650 TABLET ORAL EVERY 4 HOURS PRN
Status: DISCONTINUED | OUTPATIENT
Start: 2019-01-18 | End: 2019-01-18 | Stop reason: HOSPADM

## 2019-01-18 RX ORDER — OXYCODONE HYDROCHLORIDE 5 MG/1
5 TABLET ORAL EVERY 4 HOURS PRN
Status: DISCONTINUED | OUTPATIENT
Start: 2019-01-18 | End: 2019-01-18 | Stop reason: HOSPADM

## 2019-01-18 RX ADMIN — HYDROMORPHONE HYDROCHLORIDE 0.5 MG: 1 INJECTION, SOLUTION INTRAMUSCULAR; INTRAVENOUS; SUBCUTANEOUS at 14:31

## 2019-01-18 RX ADMIN — METRONIDAZOLE 500 MG: 500 INJECTION, SOLUTION INTRAVENOUS at 12:09

## 2019-01-18 RX ADMIN — DEXAMETHASONE SODIUM PHOSPHATE 4 MG: 10 INJECTION INTRAMUSCULAR; INTRAVENOUS at 11:43

## 2019-01-18 RX ADMIN — FENTANYL CITRATE 50 MCG: 50 INJECTION, SOLUTION INTRAMUSCULAR; INTRAVENOUS at 12:51

## 2019-01-18 RX ADMIN — MIDAZOLAM 2 MG: 1 INJECTION INTRAMUSCULAR; INTRAVENOUS at 11:32

## 2019-01-18 RX ADMIN — NEOSTIGMINE METHYLSULFATE 3 MG: 1 INJECTION INTRAVENOUS at 13:53

## 2019-01-18 RX ADMIN — LIDOCAINE HYDROCHLORIDE 50 MG: 10 INJECTION, SOLUTION INFILTRATION; PERINEURAL at 11:43

## 2019-01-18 RX ADMIN — KETOROLAC TROMETHAMINE 30 MG: 30 INJECTION, SOLUTION INTRAMUSCULAR at 13:41

## 2019-01-18 RX ADMIN — SODIUM CHLORIDE 125 ML/HR: 0.9 INJECTION, SOLUTION INTRAVENOUS at 09:53

## 2019-01-18 RX ADMIN — ROCURONIUM BROMIDE 50 MG: 10 INJECTION INTRAVENOUS at 11:43

## 2019-01-18 RX ADMIN — HYDROMORPHONE HYDROCHLORIDE 0.5 MG: 1 INJECTION, SOLUTION INTRAMUSCULAR; INTRAVENOUS; SUBCUTANEOUS at 15:03

## 2019-01-18 RX ADMIN — HYDROMORPHONE HYDROCHLORIDE 0.5 MG: 1 INJECTION, SOLUTION INTRAMUSCULAR; INTRAVENOUS; SUBCUTANEOUS at 13:10

## 2019-01-18 RX ADMIN — FENTANYL CITRATE 50 MCG: 50 INJECTION, SOLUTION INTRAMUSCULAR; INTRAVENOUS at 12:10

## 2019-01-18 RX ADMIN — OXYCODONE HYDROCHLORIDE 10 MG: 5 TABLET ORAL at 16:13

## 2019-01-18 RX ADMIN — FENTANYL CITRATE 100 MCG: 50 INJECTION, SOLUTION INTRAMUSCULAR; INTRAVENOUS at 11:43

## 2019-01-18 RX ADMIN — GLYCOPYRROLATE 0.6 MG: 0.2 INJECTION, SOLUTION INTRAMUSCULAR; INTRAVENOUS at 13:52

## 2019-01-18 RX ADMIN — ONDANSETRON 4 MG: 2 INJECTION INTRAMUSCULAR; INTRAVENOUS at 13:41

## 2019-01-18 RX ADMIN — ONDANSETRON 4 MG: 2 INJECTION INTRAMUSCULAR; INTRAVENOUS at 14:28

## 2019-01-18 RX ADMIN — FENTANYL CITRATE 100 MCG: 50 INJECTION, SOLUTION INTRAMUSCULAR; INTRAVENOUS at 11:51

## 2019-01-18 RX ADMIN — ROCURONIUM BROMIDE 10 MG: 10 INJECTION INTRAVENOUS at 13:25

## 2019-01-18 RX ADMIN — PROPOFOL 200 MG: 10 INJECTION, EMULSION INTRAVENOUS at 11:43

## 2019-01-18 RX ADMIN — SODIUM CHLORIDE: 0.9 INJECTION, SOLUTION INTRAVENOUS at 13:52

## 2019-01-18 RX ADMIN — HYDROMORPHONE HYDROCHLORIDE 0.5 MG: 1 INJECTION, SOLUTION INTRAMUSCULAR; INTRAVENOUS; SUBCUTANEOUS at 13:36

## 2019-01-18 RX ADMIN — HYDROMORPHONE HYDROCHLORIDE 0.5 MG: 1 INJECTION, SOLUTION INTRAMUSCULAR; INTRAVENOUS; SUBCUTANEOUS at 14:46

## 2019-01-18 RX ADMIN — HYDROMORPHONE HYDROCHLORIDE 0.5 MG: 1 INJECTION, SOLUTION INTRAMUSCULAR; INTRAVENOUS; SUBCUTANEOUS at 12:09

## 2019-01-18 RX ADMIN — CEFAZOLIN SODIUM 2000 MG: 2 SOLUTION INTRAVENOUS at 11:51

## 2019-01-18 RX ADMIN — ROCURONIUM BROMIDE 10 MG: 10 INJECTION INTRAVENOUS at 12:43

## 2019-01-18 RX ADMIN — ACETAMINOPHEN 650 MG: 325 TABLET, FILM COATED ORAL at 16:23

## 2019-01-18 RX ADMIN — ROCURONIUM BROMIDE 10 MG: 10 INJECTION INTRAVENOUS at 12:23

## 2019-01-18 RX ADMIN — SODIUM CHLORIDE: 0.9 INJECTION, SOLUTION INTRAVENOUS at 12:21

## 2019-01-18 RX ADMIN — HYDROMORPHONE HYDROCHLORIDE 0.5 MG: 1 INJECTION, SOLUTION INTRAMUSCULAR; INTRAVENOUS; SUBCUTANEOUS at 14:21

## 2019-01-18 RX ADMIN — SODIUM CHLORIDE: 0.9 INJECTION, SOLUTION INTRAVENOUS at 11:48

## 2019-01-18 NOTE — ANESTHESIA PREPROCEDURE EVALUATION
Review of Systems/Medical History  Patient summary reviewed  Chart reviewed  History of anesthetic complications PONV    Cardiovascular  Negative cardio ROS Exercise tolerance (METS): good,     Pulmonary  Negative pulmonary ROS        GI/Hepatic    GERD well controlled,        Negative  ROS        Endo/Other    Comment: Nondisplaced Fracture Of Lunate Bone Of Right Wrist  Chronic steroid use: 10mg prednisone daily, took it today    GYN       Hematology  Anemia ,     Musculoskeletal  Rheumatoid arthritis Severity: moderate,   Arthritis (rheumatoid arthritis)     Neurology    Headaches,    Psychology   Anxiety, Depression , being treated for depression,          O        IPhysical Exam    Airway  Comment: Bilateral TMJ pain  Limited extension due to pain  Lips are severely chapped  Mallampati score: I  TM Distance: >3 FB  Neck ROM: limited     Dental   No notable dental hx     Cardiovascular  Comment: Negative ROS, Rhythm: regular, Rate: normal, Cardiovascular exam normal    Pulmonary  Pulmonary exam normal Breath sounds clear to auscultation,     Other Findings        Anesthesia Plan  ASA Score- 2     Anesthesia Type- general with ASA Monitors  Additional Monitors:   Airway Plan: ETT  Comment:     Plan Factors-    Induction- intravenous  Postoperative Plan- Plan for postoperative opioid use  Informed Consent- Anesthetic plan and risks discussed with patient

## 2019-01-18 NOTE — H&P (VIEW-ONLY)
Assessment/Plan:  Chronic pelvic pain  Abnormal uterine bleeding  Menometrorrhagia  Dyspareunia  Dysmenorrhea    PMH  Status post D&C  Rheumatoid arthritis  Status post multiple orthopedic procedures on both wrists and knees  IBS    Plan  T LH with bilateral salpingectomy and cystoscopy    Subjective:   Here for preoperative physical exam     Patient ID: Tristen Thurston is a 33 y o  female  HPI   71-year-old female  2 para 2 recently here for annual exam   She had a long history of abnormal uterine bleeding and symptoms with menometrorrhagia pelvic pain dysmenorrhea and dyspareunia  Currently she is managed with norethindrone 5 mg daily and still bleeds intermittently  At her most recent office visit with recurrent bleeding symptoms and worsening pain  She has been treated for severe rheumatoid arthritis over the years and has had multiple orthopedic procedures on hands wrists and knees  She did have a D&C hysteroscopy in December of last year pathology was benign, her bleeding symptoms improve slightly however pain symptoms never completely resolved  Patient is requesting definitive procedure with hysterectomy  Recent endometrial biopsy was negative for atypia or malignancy  Pap smear was also within limits  Operative consent been previously obtained  Procedure risks benefits have been reviewed detail patient agrees and accepts like to proceed  Recommend total laparoscopic hysterectomy with bilateral salpingectomy  Surgery scheduled for 2019  Review of Systems   Constitutional: Positive for fatigue  HENT: Negative  Eyes: Negative  Respiratory: Negative  Cardiovascular: Negative  Gastrointestinal: Negative  Endocrine: Negative  Genitourinary: Positive for dyspareunia, menstrual problem and pelvic pain  Musculoskeletal: Positive for arthralgias, back pain and myalgias  Skin: Negative  Allergic/Immunologic: Negative  Neurological: Negative  Hematological: Negative  Psychiatric/Behavioral: Negative  Objective:  No acute distress  /64 (BP Location: Left arm, Patient Position: Sitting, Cuff Size: Standard)   Ht 5' 7" (1 702 m)   Wt 80 5 kg (177 lb 6 4 oz)   LMP  (LMP Unknown) Comment: irregular  BMI 27 78 kg/m²      Physical Exam   Constitutional: She is oriented to person, place, and time  She appears well-developed and well-nourished  HENT:   Head: Normocephalic and atraumatic  Eyes: Pupils are equal, round, and reactive to light  EOM are normal    Neck: Normal range of motion  Neck supple  Cardiovascular: Normal rate and regular rhythm  Pulmonary/Chest: Effort normal and breath sounds normal    Abdominal: Soft  Bowel sounds are normal    Genitourinary:   Genitourinary Comments: Pelvic exam  Normal external genitalia  Normal size uterus  Midline uterine pain and tenderness  No pelvic masses  No CMT  No vaginal bleeding or discharge noted   Musculoskeletal:   Generalized joint pain and stiffness recent left wrist surgery healing normally   Neurological: She is alert and oriented to person, place, and time  Skin: Skin is warm and dry  Psychiatric: She has a normal mood and affect   Her behavior is normal  Judgment and thought content normal

## 2019-01-18 NOTE — OP NOTE
OPERATIVE REPORT  PATIENT NAME: Harper Schwartz    :  1990  MRN: [id number]  Pt Location: [facility]OM 04    SURGERY DATE: 2019    Surgeon(s) and Role:     * MUSTAPHA Fine DO - Primary     * Brigitte Clay MD - Assisting    Preop Diagnosis:  Abnormal menses [N92 6]  Pelvic pain [R10 2]  Dyspareunia in female [N94 10]    Post-Op Diagnosis Codes:     * Abnormal menses [N92 6]     * Pelvic pain [R10 2]     * Dyspareunia in female [N94 10]    Procedure(s) (LRB):  HYSTERECTOMY LAPAROSCOPIC TOTAL (L[facility]) Bilateral salpingectomy, cystoscopy (N/A)    Specimen(s):  ID Type Source Tests Collected by Time Destination   1 : uterus, cervix, b/l fallopian tubes Tissue Uterus TISSUE EXAM MUSTAPHA Fine DO 2019 1305        Estimated Blood Loss:   50 mL    Drains:  NG/OG/Enteral Tube Orogastric 18 Fr Right mouth (Active)   Number of days: 0       [REMOVED] Urethral Catheter Non-latex 16 Fr  (Removed)   Number of days: 0       Anesthesia Type:   General    Operative Indications:  Abnormal menses [N92 6]  Pelvic pain [R10 2]  Dyspareunia in female [N80 11]    40-year-old  with long history of abnormal uterine bleeding with dysmenorrhea and dyspareunia  Abnormal uterine bleeding secondary to heavy menstrual bleeding requiring norethindrone 5 mg daily and a longstanding chronic anemia secondary to her menometrorrhagia  Patient presents today for scheduled definitive surgical management of her abnormal uterine bleeding via total laparoscopic hysterectomy and bilateral salpingectomy  Operative Findings:  1  External genitalia grossly normal in appearance  No lacerations, no lesions, no ulcerations visualized  Bimanual exam revealed uterus of small size, freely mobile, without abnormal contours  No adnexal masses or fullness palpated bilaterally  2  Laparoscopic evaluation of the peritoneal cavity revealed no signs of injury to bowel or vasculature upon entrance into the peritoneal cavity    Uterus, bilateral ovaries, bilateral fallopian tubes grossly normal in appearance  There was notable engorgement of vascular within the pelvic sidewalls bilaterally  3  Cystoscopic evaluation revealed bilateral ureteral jets  No signs of injury to the bladder dome or trigone area within the bladder cavity  Complications:   None    Procedure and Technique:  The patient was taken to the operating room where she was properly identified and general anesthesia was obtained without difficulty  The patient was given prophylactic intravenous antibiotics  A Temi hugger was placed to maintain control of core body temperature  The patient was prepped and draped in the usual sterile manner in the dorsal lithotomy position using the stirrups  Care was taken to avoid excessive flexion or extension of the patient's hips and knees and pressure on her extremities  A time out was performed and the above information confirmed  A taylor catheter was inserted into the bladder and a weighted speculum was placed into the vagina  The weighted speculum was placed into the vagina, and the anterior portion of the cervix was grasped with a single tooth tenaculum  The uterus sounded to 7cm  Stay sutures were placed on the cervix on the lateral aspects, and the Luzma manipulator was placed  The single tooth tenaculum and the weighted speculum were then removed from the vagina  The Luzma tip and vaginal cuff occluder were inflated  Surgeons gloves were then changed, and attention was then turned to the abdomen  A small incision was then made infraumbilically, and a 30YS trocar and sleeve were inserted through the incision into the peritoneum without difficulty under direct visualization  Laparoscopic visualization confirmed the intraperitoneal insertion of the port  Pneumoperitoneum was then maintained using carbon dioxide      Subsequently, after infiltrating the areas with local, two additional small incisions were made in both the right and left lower quadrants, approximately 3 cm above and 3 cm medial to the anterior superior iliac spine  Two ports (5mm, 5mm) were introduced under direct visualization  The patient was placed in trendelenburg, and attention was then turned to the uterine fundus, which was grasped at the level of the fallopian tube  The Ligasure electrocautery dissecting instrument was then used to coagulate and cut the round ligament with subsequent coagulation and cutting of the left utero-ovarian ligament  The mesosalpinx was then coagulated and cut using the Ligasure dissecting instrument  The broad ligament was then coagulated and cut to the level of the uterine arteries  The Ligasure was then used to coagulate the uterine artery along the left  A bladder flap was then created and dissected approximately half of the way across the uterus at the level of the lower uterine segment using the monopolar laparoscopic scissors  The bladder was pushed down  The instruments were then switched in the ports and using the Ligasure, the right fallopian tube was coagulated and cut  We continued using the Ligasure to coagulate and cut the round ligament and then coagulated and cut down the right utero-ovarian ligament  The broad ligament was then coagulated and cut to the level of the uterine arteries  The Ligasure was used to coagulate the uterine artery on the right  A bladder flap was then completed using the monopolar laparoscopic scissors coming across the uterus to meet the incision from the left side  The cardinal ligaments were then cauterized and cut used the Ligasure dissecting instrument  At this point, the uterus was visualized and noted to be dusky in color   The colpotomy was performed with the monopolar laparoscopic scissors    Once the colpotomy was completed, the attention was then turned back to the perineum where the vaginal cuff occluder was deflated, and the uterus, cervix, and fallopian tubes were removed vaginally  A sterile glove with a lap sponge was placed in the vagina to maintain pneumoperitoneum  At this point, attention was then turned back to the abdomen where a stratifix suture was placed into the abdomen through the 10 port, and the vaginal cuff was closed laparoscopically in a running fashion with 2 back stitches placed  The suture was cut, and the needle and suture was removed from the body  The abdomen and pelvis was visualized, irrigated, and good hemostasis was noted  Good hemostasis was once again noted  Pneumoperitoneum was then evacuated and the trocars were removed  The trochar incisions were closed using 4-0 monocryl and band-aids were placed  The taylor was removed and cystoscopy was performed  Bilateral ureteral jets were visualized, as was the bladder dome  No damage to the bladder was visualized on cystoscopy  The bladder was drained and the cystoscope was removed  All instruments were then removed from the vagina  The patient tolerated the procedure well and was transferred to the recovery room in stable condition  All needle, sponge, and instrument counts were noted to be correct x 2 at the end of the procedure  Dr Feliciano Lyons was present and participated in al key portions of the procedure        Patient Disposition:  PACU  and extubated and stable    SIGNATURE: Lisa Lara MD  DATE: January 18, 2019  TIME: 2:23 PM

## 2019-01-18 NOTE — DISCHARGE INSTRUCTIONS
Hysterectomy Discharge Instructions    WHAT YOU NEED TO KNOW:   A hysterectomy is surgery to remove your uterus  Your ovaries, fallopian tubes, cervix, or part of your vagina may also need to be removed  The organs and tissue that will be removed depends on your medical condition  After a hysterectomy, you will not be able to become pregnant  If your ovaries are removed, you will go through menopause if you have not already  DISCHARGE INSTRUCTIONS:   Contact your doctor at the number above if:   · You have a fever over 101o  · You have nausea or are vomiting that does not improve after a light meal    · Your pain is getting worse, even after you take medicine  · You feel pain or burning when you urinate, or you have trouble urinating  · You have pus or a foul-smelling odor coming from your vagina  · Your wound is red, swollen, or draining pus  · You see new or an increased amount of bright red blood coming from your vagina or your incisions  · You have questions or concerns about your condition or care  Seek care immediately:   · Your arm or leg feels warm, tender, and painful  It may look swollen and red  · You have increasing abdominal or pelvic pain  · You have heavy vaginal bleeding that fills 1 or more sanitary pads in 1 hour  Call 911 for any of the following:   · You feel lightheaded, short of breath, and have chest pain  · You cough up blood  Medicines: You may need any of the following:  · Prescription medicine may be given  You may receive a prescription for pain medication or be advised to use over the counter (OTC) pain medication such as acetaminophen (Tylenol) or ibuprofen (Advil, Motrin)  Ask your healthcare provider how to take this medicine safely  · NSAIDs , such as ibuprofen, help decrease swelling, pain, and fever  NSAIDs can cause stomach bleeding or kidney problems in certain people   If you take blood thinner medicine, always ask your healthcare provider if NSAIDs are safe for you  Always read the medicine label and follow directions  · Stool softeners help treat or prevent constipation  · Take your medicine as directed  Contact your healthcare provider if you think your medicine is not helping or if you have side effects  Tell him or her if you are allergic to any medicine  Keep a list of the medicines, vitamins, and herbs you take  Include the amounts, and when and why you take them  Bring the list or the pill bottles to follow-up visits  Carry your medicine list with you in case of an emergency  Activity:   · Rest as needed  Get up and move around as directed to help prevent blood clots  Start with short walks and slowly increase the distance every day  Limit the number of times you climb stairs to 2 times each day for the first week  Plan most of your daily activities on one level of your home  · Do not lift objects heavier than 10 pounds for 6 weeks  Avoid strenuous activity for 2 weeks  · Do not strain during bowel movements  High-fiber foods and extra liquids can help you prevent constipation  Examples of high-fiber foods are fruit and bran  Prune juice and water are good liquids to drink  · Do not have sex, use tampons, or douche for up to 8 weeks  You may shower as soon as the day after surgery  Tub baths can be taken starting 2 weeks after surgery  Do not go into pools or hot tubs until cleared by your doctor  · Ask when it is safe for you to drive  It is generally safe to drive after 2 weeks and when no longer taking prescription pain medication  · Ask when you may return to work and to other regular activities  Wound care: Care for your abdominal incisions as directed  Carefully wash around the wound with soap and water  If you have Hibiclens or medicated soap that you were instructed to use before surgery, you may use that to wash with for up to 2 days after surgery    If not, any mild non-scented, non-abrasive soap is safe   It is okay to let the soap and water run over your incision  Do not scrub your incision  Dry the area and put on new, clean bandages as directed  Change your bandages when they get wet or dirty  If you have strips of medical tape, let them fall off on their own  It may take 7 to 14 days for them to fall off  Check your incision every day for redness, swelling, or pus  Deep breathing: Take deep breaths and cough 10 times each hour  This will decrease your risk for a lung infection  Take a deep breath and hold it for as long as you can  Let the air out and then cough strongly  Deep breaths help open your airway  You may be given an incentive spirometer to help you take deep breaths  Put the plastic piece in your mouth and take a slow, deep breath, then let the air out and cough  Repeat these steps 10 times every hour  Get support: This surgery may be life-changing for you and your family  You will no longer be able to get pregnant  Sudden changes in the levels of your hormones may occur and cause mood swings and depression  You may feel angry, sad, or frightened, or cry frequently and unexpectedly  These feelings are normal  Talk to your healthcare provider about where you can get support  You can also ask if hormone replacement medicine is right for you  Follow up with your healthcare provider or gynecologist as directed: You may need to return to have stitches removed, and for other tests  Write down your questions so you remember to ask them during your visits  © 2017 [employer] Information is for End User's use only and may not be sold, redistributed or otherwise used for commercial purposes  All illustrations and images included in CareNotes® are the copyrighted property of [employer]  or Dhruv Holley  The above information is an  only  It is not intended as medical advice for individual conditions or treatments   Talk to your doctor, nurse or pharmacist before following any medical regimen to see if it is safe and effective for you

## 2019-02-06 ENCOUNTER — OFFICE VISIT (OUTPATIENT)
Dept: OBGYN CLINIC | Facility: CLINIC | Age: 29
End: 2019-02-06
Payer: MEDICARE

## 2019-02-06 VITALS
WEIGHT: 178 LBS | BODY MASS INDEX: 27.94 KG/M2 | DIASTOLIC BLOOD PRESSURE: 62 MMHG | SYSTOLIC BLOOD PRESSURE: 104 MMHG | HEIGHT: 67 IN

## 2019-02-06 DIAGNOSIS — Z90.710 S/P LAPAROSCOPIC HYSTERECTOMY: ICD-10-CM

## 2019-02-06 DIAGNOSIS — Z98.890 POSTOPERATIVE STATE: Primary | ICD-10-CM

## 2019-02-06 DIAGNOSIS — M05.79 RHEUMATOID ARTHRITIS INVOLVING MULTIPLE SITES WITH POSITIVE RHEUMATOID FACTOR (HCC): ICD-10-CM

## 2019-02-06 DIAGNOSIS — Z90.79 STATUS POST BILATERAL SALPINGECTOMY: ICD-10-CM

## 2019-02-06 PROCEDURE — 99024 POSTOP FOLLOW-UP VISIT: CPT | Performed by: OBSTETRICS & GYNECOLOGY

## 2019-02-06 NOTE — PROGRESS NOTES
Assessment/Plan:    Diagnoses and all orders for this visit:    Postoperative state    S/P laparoscopic hysterectomy    Status post bilateral salpingectomy    Rheumatoid arthritis involving multiple sites with positive rheumatoid factor (HCC)    Other orders  -     OXYCONTIN 20 MG 12 hr tablet; Earliest Fill Date: 2/4/19         Subjective:  2 week incision check     Patient ID: Kezia Burroughs is a 33 y o  female  HPI   Status post total laparoscopic hysterectomy with bilateral salpingectomy approximately 2 weeks ago  Patient reports she is doing well  She is tolerating regular diet  Not having any problem with her bowels or bladder  Does have occasional crampy type pain denies vaginal bleeding  Recommend continue pelvic rest, no vaginal intercourse, nothing in the vagina  Patient will gradually increase her activity, walking primarily, no heavy lifting  I did review surgical photographs with patient and ongoing plan of care  Review of Systems   Genitourinary: Positive for pelvic pain  Negative for vaginal bleeding and vaginal discharge  All other systems reviewed and are negative  Objective:  /62 5 foot 7 inch 170 lb, no acute distress     Physical Exam   Abdominal:   Umbilical a lower abdominal incisions appear to be healing normally without signs of infection or inflammation

## 2019-02-21 ENCOUNTER — ANESTHESIA EVENT (OUTPATIENT)
Dept: GASTROENTEROLOGY | Facility: MEDICAL CENTER | Age: 29
End: 2019-02-21
Payer: MEDICARE

## 2019-02-22 ENCOUNTER — HOSPITAL ENCOUNTER (OUTPATIENT)
Facility: MEDICAL CENTER | Age: 29
Setting detail: OUTPATIENT SURGERY
Discharge: HOME | End: 2019-02-22
Attending: INTERNAL MEDICINE | Admitting: INTERNAL MEDICINE
Payer: MEDICARE

## 2019-02-22 ENCOUNTER — ANESTHESIA (OUTPATIENT)
Dept: GASTROENTEROLOGY | Facility: MEDICAL CENTER | Age: 29
End: 2019-02-22
Payer: MEDICARE

## 2019-02-22 VITALS
WEIGHT: 178 LBS | HEART RATE: 79 BPM | HEIGHT: 67 IN | SYSTOLIC BLOOD PRESSURE: 127 MMHG | TEMPERATURE: 98.1 F | BODY MASS INDEX: 27.94 KG/M2 | OXYGEN SATURATION: 100 % | DIASTOLIC BLOOD PRESSURE: 82 MMHG | RESPIRATION RATE: 16 BRPM

## 2019-02-22 DIAGNOSIS — R11.2 NON-INTRACTABLE VOMITING WITH NAUSEA, UNSPECIFIED VOMITING TYPE: ICD-10-CM

## 2019-02-22 DIAGNOSIS — R10.84 GENERALIZED ABDOMINAL PAIN: ICD-10-CM

## 2019-02-22 LAB — EXT PREGNANCY TEST URINE: NEGATIVE

## 2019-02-22 PROCEDURE — 88305 TISSUE EXAM BY PATHOLOGIST: CPT | Performed by: PATHOLOGY

## 2019-02-22 PROCEDURE — 45380 COLONOSCOPY AND BIOPSY: CPT | Performed by: INTERNAL MEDICINE

## 2019-02-22 PROCEDURE — 43239 EGD BIOPSY SINGLE/MULTIPLE: CPT | Performed by: INTERNAL MEDICINE

## 2019-02-22 PROCEDURE — 88342 IMHCHEM/IMCYTCHM 1ST ANTB: CPT | Performed by: PATHOLOGY

## 2019-02-22 PROCEDURE — 81025 URINE PREGNANCY TEST: CPT | Performed by: ANESTHESIOLOGY

## 2019-02-22 RX ORDER — RANITIDINE 150 MG/1
TABLET ORAL
Qty: 180 TABLET | Refills: 8 | Status: SHIPPED | OUTPATIENT
Start: 2019-02-22 | End: 2019-10-07

## 2019-02-22 RX ORDER — ONDANSETRON 2 MG/ML
4 INJECTION INTRAMUSCULAR; INTRAVENOUS ONCE AS NEEDED
Status: COMPLETED | OUTPATIENT
Start: 2019-02-22 | End: 2019-02-22

## 2019-02-22 RX ORDER — LIDOCAINE HYDROCHLORIDE 20 MG/ML
INJECTION, SOLUTION EPIDURAL; INFILTRATION; INTRACAUDAL; PERINEURAL AS NEEDED
Status: DISCONTINUED | OUTPATIENT
Start: 2019-02-22 | End: 2019-02-22 | Stop reason: SURG

## 2019-02-22 RX ORDER — RANITIDINE 150 MG/1
150 TABLET ORAL 2 TIMES DAILY
Qty: 60 TABLET | Refills: 8 | Status: SHIPPED | OUTPATIENT
Start: 2019-02-22 | End: 2019-02-22 | Stop reason: SDUPTHER

## 2019-02-22 RX ORDER — SODIUM CHLORIDE 9 MG/ML
125 INJECTION, SOLUTION INTRAVENOUS CONTINUOUS
Status: DISCONTINUED | OUTPATIENT
Start: 2019-02-22 | End: 2019-02-22 | Stop reason: HOSPADM

## 2019-02-22 RX ORDER — PROPOFOL 10 MG/ML
INJECTION, EMULSION INTRAVENOUS AS NEEDED
Status: DISCONTINUED | OUTPATIENT
Start: 2019-02-22 | End: 2019-02-22 | Stop reason: SURG

## 2019-02-22 RX ADMIN — PROPOFOL 50 MG: 10 INJECTION, EMULSION INTRAVENOUS at 08:56

## 2019-02-22 RX ADMIN — PROPOFOL 50 MG: 10 INJECTION, EMULSION INTRAVENOUS at 08:43

## 2019-02-22 RX ADMIN — PROPOFOL 50 MG: 10 INJECTION, EMULSION INTRAVENOUS at 08:40

## 2019-02-22 RX ADMIN — PROPOFOL 50 MG: 10 INJECTION, EMULSION INTRAVENOUS at 08:42

## 2019-02-22 RX ADMIN — PROPOFOL 200 MG: 10 INJECTION, EMULSION INTRAVENOUS at 08:39

## 2019-02-22 RX ADMIN — PROPOFOL 100 MG: 10 INJECTION, EMULSION INTRAVENOUS at 08:49

## 2019-02-22 RX ADMIN — PROPOFOL 50 MG: 10 INJECTION, EMULSION INTRAVENOUS at 08:48

## 2019-02-22 RX ADMIN — ONDANSETRON 4 MG: 2 INJECTION INTRAMUSCULAR; INTRAVENOUS at 09:41

## 2019-02-22 RX ADMIN — LIDOCAINE HYDROCHLORIDE 3 ML: 20 INJECTION, SOLUTION EPIDURAL; INFILTRATION; INTRACAUDAL; PERINEURAL at 08:39

## 2019-02-22 RX ADMIN — PROPOFOL 50 MG: 10 INJECTION, EMULSION INTRAVENOUS at 08:53

## 2019-02-22 RX ADMIN — SODIUM CHLORIDE 125 ML/HR: 0.9 INJECTION, SOLUTION INTRAVENOUS at 08:03

## 2019-02-22 RX ADMIN — PROPOFOL 50 MG: 10 INJECTION, EMULSION INTRAVENOUS at 08:51

## 2019-02-22 NOTE — H&P
History and Physical - [facility]  Azael Choudhury 33 y o  female MRN: [id number]                  HPI: Azael Choudhury is a 33y o  year old female who presents for EGD and colonoscopy for change in bowel habits  REVIEW OF SYSTEMS: Per the HPI, and otherwise unremarkable      Historical Information   Past Medical History:   Diagnosis Date    Abnormal Pap smear of cervix     Anemia     Anxiety     Back pain     Depression     GERD (gastroesophageal reflux disease)     IBS (irritable bowel syndrome)     Iron deficiency     Irregular menses     Menorrhagia     Migraines     Neuropathy     Obesity     Osteoarthritis of back     Osteopenia     PONV (postoperative nausea and vomiting)     RA (rheumatoid arthritis) ([address] )     Scratches     On back and shoulder from scratching due to urticaria    Vasculitis ([address] )     Wears glasses      Past Surgical History:   Procedure Laterality Date    CAST APPLICATION Left 62/97/3689    Procedure: APPLICATION LONG ARM Dinah Neighbors;  Surgeon: Pratima Vera MD;  [facility];  Service: Orthopedics    CHOLECYSTECTOMY      Laparoscopic    DILATION AND CURETTAGE OF UTERUS      JOINT REPLACEMENT Bilateral     knees    MN FUSION/GRAFT WRIST JOINT Left 4/4/2017    Procedure: WRIST FUSION / Haze Rubins ;  Surgeon: Pratima Vera MD;  [facility];  Service: Orthopedics    MN FUSION/GRAFT WRIST JOINT Right 4/10/2018    Procedure: Removal of hardware right wrist with Fusion of Long finger carpometacarpal joint, splint application Right Wrist;  Surgeon: Pratima Vera MD;  [facility];  Service: Orthopedics    MN HYSTEROSCOPY,W/ENDO BX N/A 12/1/2017    Procedure: DILATATION AND CURETTAGE (D&C) WITH HYSTEROSCOPY;  Surgeon: Vadim Camarillo DO;  [facility];  Service: Gynecology    MN LAP,CHOLECYSTECTOMY N/A 3/14/2017    Procedure: CHOLECYSTECTOMY LAPAROSCOPIC;  Surgeon: Timo Norman DO; [facility];  Service: General    MD LAPAROSCOPY W TOT HYSTERECTUTERUS <=250 GRAM  W TUBE/OVARY N/A 2019    Procedure: HYSTERECTOMY LAPAROSCOPIC TOTAL ([address]) Bilateral salpingectomy, cystoscopy;  Surgeon: Rashaad Ramirez DO;  [facility];  Service: Gynecology    MD REMOVAL DEEP IMPLANT Left 2018    Procedure: REMOVAL OF HARDWARE (wrist fusion plate  AND ulnar head arthroplasty) with conversion to [place] ;  Surgeon: Acacia Green MD;  [facility];  Service: Orthopedics    WRIST SURGERY Right     For arthritis    WRIST SURGERY Left 2017    Procedure: ARTHROPLASTY WRIST ULNAR HEAD ARTHROPLASTY - INTEGRA SIZE 5 5 MM, 16 HEAD;  Surgeon: Acacia Green MD;  [facility];  Service:      Social History   Social History     Substance and Sexual Activity   Alcohol Use Yes    Comment: Only a couple of times a year     Social History     Substance and Sexual Activity   Drug Use No     Social History     Tobacco Use   Smoking Status Former Smoker    Packs/day: 0 25    Years: 3 00    Pack years: 0 75    Types: Cigarettes    Last attempt to quit:     Years since quittin 1   Smokeless Tobacco Never Used     Family History   Problem Relation Age of Onset    Hypertension Mother     Rheum arthritis Mother     Depression Mother     Anxiety disorder Mother        Meds/Allergies     Medications Prior to Admission   Medication    Calcium Carb-Cholecalciferol (CALCIUM 1000 + D PO)    diazepam (VALIUM) 5 mg tablet    dicyclomine (BENTYL) 20 mg tablet    diphenoxylate-atropine (LOMOTIL) 2 5-0 025 mg per tablet    docusate sodium (COLACE) 100 mg capsule    ergocalciferol (VITAMIN D2) 50,000 units    escitalopram (LEXAPRO) 20 mg tablet    gabapentin (NEURONTIN) 600 MG tablet    gabapentin (NEURONTIN) 800 mg tablet    norethindrone (AYGESTIN) 5 mg tablet    norethindrone (AYGESTIN) 5 mg tablet    ondansetron (ZOFRAN) 4 mg tablet    oxyCODONE (ROXICODONE) 10 MG TABS    oxyCODONE (ROXICODONE) 15 mg immediate release tablet    oxyCODONE-acetaminophen (PERCOCET) 5-325 mg per tablet    OXYCONTIN 20 MG 12 hr tablet    predniSONE 5 mg tablet    ranitidine (ZANTAC) 150 mg tablet    SUMAtriptan (IMITREX) 100 mg tablet       Allergies   Allergen Reactions    Nickel Rash       Objective     not currently breastfeeding  PHYSICAL EXAM    Gen: NAD  CV: RRR  CHEST: Clear  ABD: soft, NT/ND  EXT: no edema      ASSESSMENT/PLAN:  This is a 33y o  year old female here for EGD and colonoscopy, and she is stable and optimized for her procedure

## 2019-02-22 NOTE — OP NOTE
OPERATIVE REPORT  PATIENT NAME: Pema Rowe    :  1990  MRN: [id number]  Pt Location: [facility] GI ROOM 01    SURGERY DATE: 2019    Surgeon(s) and Role:     * Esau Lorenzo MD - Primary    Preop Diagnosis:  Generalized abdominal pain [R10 84]  Non-intractable vomiting with nausea, unspecified vomiting type [R11 2]    Post-Op Diagnosis Codes:     * Generalized abdominal pain [R10 84]     * Non-intractable vomiting with nausea, unspecified vomiting type [R11 2]    Procedure(s) (LRB):  ESOPHAGOGASTRODUODENOSCOPY (EGD) (N/A)  COLONOSCOPY (N/A)    Specimen(s):  ID Type Source Tests Collected by Time Destination   1 : cold bx forcep r/o celiac Tissue Duodenum TISSUE Sanam Crawford MD 2019  8:41 AM    2 : gastric cold bx forcep r/o H pylori Tissue Stomach TISSUE EXAM Esau Lorenzo MD 2019  8:43 AM    3 : random colon bx's r/o microscopic colitis Tissue Colon TISSUE Sanam Crawford MD 2019  9:00 AM        Estimated Blood Loss:   Minimal    Drains:  NG/OG/Enteral Tube Orogastric 18 Fr Right mouth (Active)   Number of days: 35       Anesthesia Type:   IV Sedation with Anesthesia    Operative Indications:  Generalized abdominal pain [R10 84]  Non-intractable vomiting with nausea, unspecified vomiting type [R11 2]  Colonoscopy Procedure Note    Procedure: Colonoscopy    Sedation: Monitored anesthesia care, check anesthesia records      ASA Class: 2    INDICATIONS:  Change in bowel habits    POST-OP DIAGNOSIS: See the impression below    Procedure Details     Prior colonoscopy: No prior colonoscopy  Informed consent was obtained for the procedure, including sedation  Risks of perforation, hemorrhage, adverse drug reaction and aspiration were discussed  The patient was placed in the left lateral decubitus position    Based on the pre-procedure assessment, including review of the patient's medical history, medications, allergies, and review of systems, she had been deemed to be an appropriate candidate for conscious sedation; she was therefore sedated with the medications listed below  The patient was monitored continuously with telemetry, pulse oximetry, blood pressure monitoring, and direct observations  A rectal examination was performed  The colonoscope was inserted into the rectum and advanced under direct vision to the cecum/terminal ileum, which was identified by the ileocecal valve and appendiceal orifice  The quality of the colonic preparation was good  A careful inspection was made as the colonoscope was withdrawn, including a retroflexed view of the rectum; findings and interventions are described below  Findings:  Normal  Random biopsies taken for microscopic colitis  Terminal ileum appeared to be normal           Complications: None; patient tolerated the procedure well  Impression:    Normal    Recommendations: Follow-up biopsy results  Repeat colonoscopy at 49 unless clinically indicated earlier  COMPLICATIONS:  None; patient tolerated the procedure well      SPECIMENS:    ID Type Source Tests Collected by Time Destination   1 : cold bx forcep r/o celiac Tissue Duodenum TISSUE EXAM Tommie Holstein, MD 2/22/2019  8:41 AM    2 : gastric cold bx forcep r/o H pylori Tissue Stomach TISSUE EXAM Tommie Holstein, MD 2/22/2019  8:43 AM    3 : random colon bx's r/o microscopic colitis Tissue Colon TISSUE EXAM Tommie Holstein, MD 2/22/2019  9:00 AM        ESTIMATED BLOOD LOSS:  Minimal nm  SIGNATURE: Tommie Holstein, MD  DATE: February 22, 2019  TIME: 9:02 AM

## 2019-02-22 NOTE — ANESTHESIA PREPROCEDURE EVALUATION
Review of Systems/Medical History  Patient summary reviewed  Chart reviewed  History of anesthetic complications PONV    Cardiovascular  Negative cardio ROS Exercise tolerance (METS): good,     Pulmonary  Negative pulmonary ROS        GI/Hepatic    GERD well controlled, Bowel prep       Negative  ROS        Endo/Other    Comment: Chronic steroid use: 10mg prednisone daily    GYN    Hysterectomy,        Hematology  Anemia ,     Musculoskeletal  Rheumatoid arthritis Severity: moderate,   Arthritis (rheumatoid arthritis)     Neurology    Headaches,    Psychology   Anxiety, Depression , being treated for depression,          O        IPhysical Exam    Airway  Comment: Bilateral TMJ pain  Limited extension due to pain  Chapped lips    Mallampati score: I  TM Distance: >3 FB  Neck ROM: limited     Dental   No notable dental hx     Cardiovascular  Comment: Negative ROS, Rhythm: regular, Rate: normal, Cardiovascular exam normal    Pulmonary  Pulmonary exam normal Breath sounds clear to auscultation,     Other Findings        Anesthesia Plan  ASA Score- 2     Anesthesia Type- IV sedation with anesthesia with ASA Monitors  Additional Monitors:   Airway Plan:     Comment:     Plan Factors-  Patient did not smoke on day of surgery  Induction- intravenous  Postoperative Plan-     Informed Consent- Anesthetic plan and risks discussed with patient

## 2019-02-22 NOTE — ANESTHESIA POSTPROCEDURE EVALUATION
Post-Op Assessment Note    CV Status:  Stable  Pain Score: 0    Pain management: adequate     Mental Status:  Alert and awake   Hydration Status:  Euvolemic   PONV Controlled:  Controlled   Airway Patency:  Patent   Post Op Vitals Reviewed: Yes      Staff: Anesthesiologist   Comments: PONV - resolved with one dose of zofran IV             BP      Temp      Pulse     Resp      SpO2

## 2019-02-22 NOTE — DISCHARGE INSTR - AVS FIRST PAGE
OPERATIVE REPORT  PATIENT NAME: Kiara Augustin    :  1990  MRN: [id number]  Pt Location: [facility] GI ROOM 01    SURGERY DATE: 2019    Surgeon(s) and Role:     * Faye Alvarado MD - Primary    Preop Diagnosis:  Generalized abdominal pain [R10 84]  Non-intractable vomiting with nausea, unspecified vomiting type [R11 2]    Post-Op Diagnosis Codes:     * Generalized abdominal pain [R10 84]     * Non-intractable vomiting with nausea, unspecified vomiting type [R11 2]    Procedure(s) (LRB):  ESOPHAGOGASTRODUODENOSCOPY (EGD) (N/A)  COLONOSCOPY (N/A)    Specimen(s):  ID Type Source Tests Collected by Time Destination   1 : cold bx forcep r/o celiac Tissue Duodenum TISSUE Darrel Stewart MD 2019 [id number]        Estimated Blood Loss:   Minimal    Drains:  NG/OG/Enteral Tube Orogastric 18 Fr Right mouth (Active)   Number of days: 35       Anesthesia Type:   IV Sedation with Anesthesia    Operative Indications:  Generalized abdominal pain [R10 84]  Non-intractable vomiting with nausea, unspecified vomiting type [R11 2]    ESOPHAGOGASTRODUODENOSCOPY    PROCEDURE: EGD    SEDATION: Monitored anesthesia care, check anesthesia records    ASA Class: 2    INDICATIONS:  Dyspepsia    CONSENT:  Informed consent was obtained for the procedure, including sedation after explaining the risks and benefits of the procedure  Risks including but not limited to bleeding, perforation, infection, and missed lesion  PREPARATION:   Telemetry, pulse oximetry, blood pressure were monitored throughout the procedure  Patient was identified by myself both verbally and by visual inspection of ID band  DESCRIPTION:   Patient was placed in the left lateral decubitus position and was sedated with the above medication  The gastroscope was introduced in to the oropharynx and the esophagus was intubated under direct visualization  Scope was passed down the esophagus up to 2nd part of the duodenum   A careful inspection was made as the gastroscope was withdrawn, including a retroflexed view of the stomach; findings and interventions are described below  FINDINGS:    #1  Esophagus-normal  #2  Stomach- normal status post biopsy for H pylori  #3  Duodenum- normal status post biopsy for celiac disease         IMPRESSIONS:      Normal    RECOMMENDATIONS:     Follow-up biopsy results  Of note patient is avoiding gluten at this time  Previous celiac disease serologies were within normal limits  Continue FODMAP diet  Symptoms may be related to gluten intolerance    COMPLICATIONS:  None; patient tolerated the procedure well      SPECIMENS:    ID Type Source Tests Collected by Time Destination   1 : cold bx forcep r/o celiac Tissue Duodenum TISSUE Wei Albert MD 2019 [id number]        ESTIMATED BLOOD LOSS:  Minimal    SIGNATURE: Wendy Chen MD  DATE: 2019  TIME: 8:43 AM      OPERATIVE REPORT  PATIENT NAME: Jonh Stephens    :  1990  MRN: [id number]  Pt Location: [facility] GI ROOM 01    SURGERY DATE: 2019    Surgeon(s) and Role:     * Wendy Chen MD - Primary    Preop Diagnosis:  Generalized abdominal pain [R10 84]  Non-intractable vomiting with nausea, unspecified vomiting type [R11 2]    Post-Op Diagnosis Codes:     * Generalized abdominal pain [R10 84]     * Non-intractable vomiting with nausea, unspecified vomiting type [R11 2]    Procedure(s) (LRB):  ESOPHAGOGASTRODUODENOSCOPY (EGD) (N/A)  COLONOSCOPY (N/A)    Specimen(s):  ID Type Source Tests Collected by Time Destination   1 : cold bx forcep r/o celiac Tissue Duodenum TISSUE EXAM Wendy Chen MD 2019  8:41 AM    2 : gastric cold bx forcep r/o H pylori Tissue Stomach TISSUE EXAM Wendy Chen MD 2019  8:43 AM    3 : random colon bx's r/o microscopic colitis Tissue Colon TISSUE Wei Albert MD 2019  9:00 AM        Estimated Blood Loss:   Minimal    Drains:  NG/OG/Enteral Tube Orogastric 18 Fr Right mouth (Active)   Number of days: 35 Anesthesia Type:   IV Sedation with Anesthesia    Operative Indications:  Generalized abdominal pain [R10 84]  Non-intractable vomiting with nausea, unspecified vomiting type [R11 2]  Colonoscopy Procedure Note    Procedure: Colonoscopy    Sedation: Monitored anesthesia care, check anesthesia records      ASA Class: 2    INDICATIONS:  Change in bowel habits    POST-OP DIAGNOSIS: See the impression below    Procedure Details     Prior colonoscopy: No prior colonoscopy  Informed consent was obtained for the procedure, including sedation  Risks of perforation, hemorrhage, adverse drug reaction and aspiration were discussed  The patient was placed in the left lateral decubitus position  Based on the pre-procedure assessment, including review of the patient's medical history, medications, allergies, and review of systems, she had been deemed to be an appropriate candidate for conscious sedation; she was therefore sedated with the medications listed below  The patient was monitored continuously with telemetry, pulse oximetry, blood pressure monitoring, and direct observations  A rectal examination was performed  The colonoscope was inserted into the rectum and advanced under direct vision to the cecum/terminal ileum, which was identified by the ileocecal valve and appendiceal orifice  The quality of the colonic preparation was good  A careful inspection was made as the colonoscope was withdrawn, including a retroflexed view of the rectum; findings and interventions are described below  Findings:  Normal  Random biopsies taken for microscopic colitis  Terminal ileum appeared to be normal           Complications: None; patient tolerated the procedure well  Impression:    Normal    Recommendations: Follow-up biopsy results  Repeat colonoscopy at 49 unless clinically indicated earlier  COMPLICATIONS:  None; patient tolerated the procedure well      SPECIMENS:    ID Type Source Tests Collected by Time Destination   1 : cold bx forcep r/o celiac Tissue Duodenum TISSUE EXAM Jannette Aguilar MD 2/22/2019  8:41 AM    2 : gastric cold bx forcep r/o H pylori Tissue Stomach TISSUE EXAM Jannette Aguilar MD 2/22/2019  8:43 AM    3 : random colon bx's r/o microscopic colitis Tissue Colon TISSUE EXAM Jannette Aguilar MD 2/22/2019  9:00 AM        ESTIMATED BLOOD LOSS:  Minimal nm  SIGNATURE: Jannette Aguilar MD  DATE: February 22, 2019  TIME: 9:02 AM

## 2019-02-22 NOTE — DISCHARGE INSTRUCTIONS

## 2019-02-22 NOTE — OP NOTE
OPERATIVE REPORT  PATIENT NAME: Jocelyne Lopez    :  1990  MRN: [id number]  Pt Location: [facility] GI ROOM 01    SURGERY DATE: 2019    Surgeon(s) and Role:     * Katey Skelton MD - Primary    Preop Diagnosis:  Generalized abdominal pain [R10 84]  Non-intractable vomiting with nausea, unspecified vomiting type [R11 2]    Post-Op Diagnosis Codes:     * Generalized abdominal pain [R10 84]     * Non-intractable vomiting with nausea, unspecified vomiting type [R11 2]    Procedure(s) (LRB):  ESOPHAGOGASTRODUODENOSCOPY (EGD) (N/A)  COLONOSCOPY (N/A)    Specimen(s):  ID Type Source Tests Collected by Time Destination   1 : cold bx forcep r/o celiac Tissue Duodenum TISSUE Zeenat Smith MD 2019 6986        Estimated Blood Loss:   Minimal    Drains:  NG/OG/Enteral Tube Orogastric 18 Fr Right mouth (Active)   Number of days: 35       Anesthesia Type:   IV Sedation with Anesthesia    Operative Indications:  Generalized abdominal pain [R10 84]  Non-intractable vomiting with nausea, unspecified vomiting type [R11 2]    ESOPHAGOGASTRODUODENOSCOPY    PROCEDURE: EGD    SEDATION: Monitored anesthesia care, check anesthesia records    ASA Class: 2    INDICATIONS:  Dyspepsia    CONSENT:  Informed consent was obtained for the procedure, including sedation after explaining the risks and benefits of the procedure  Risks including but not limited to bleeding, perforation, infection, and missed lesion  PREPARATION:   Telemetry, pulse oximetry, blood pressure were monitored throughout the procedure  Patient was identified by myself both verbally and by visual inspection of ID band  DESCRIPTION:   Patient was placed in the left lateral decubitus position and was sedated with the above medication  The gastroscope was introduced in to the oropharynx and the esophagus was intubated under direct visualization  Scope was passed down the esophagus up to 2nd part of the duodenum   A careful inspection was made as the gastroscope was withdrawn, including a retroflexed view of the stomach; findings and interventions are described below  FINDINGS:    #1  Esophagus-normal  #2  Stomach- normal status post biopsy for H pylori  #3  Duodenum- normal status post biopsy for celiac disease         IMPRESSIONS:      Normal    RECOMMENDATIONS:     Follow-up biopsy results  Of note patient is avoiding gluten at this time  Previous celiac disease serologies were within normal limits  Continue FODMAP diet  Symptoms may be related to gluten intolerance    COMPLICATIONS:  None; patient tolerated the procedure well      SPECIMENS:    ID Type Source Tests Collected by Time Destination   1 : cold bx forcep r/o celiac Tissue Duodenum TISSUE EXAM Dee Dee Gordon MD 2/22/2019 0841        ESTIMATED BLOOD LOSS:  Minimal    SIGNATURE: Dee Dee Gordon MD  DATE: February 22, 2019  TIME: 8:43 AM

## 2019-02-26 ENCOUNTER — TELEPHONE (OUTPATIENT)
Dept: GASTROENTEROLOGY | Facility: MEDICAL CENTER | Age: 29
End: 2019-02-26

## 2019-02-26 NOTE — TELEPHONE ENCOUNTER
----- Message from Tommie Holstein, MD sent at 2/26/2019  9:10 AM EST -----  H pylori is positive please let the pt know  Please have our PA or nursing team help with treatment  Also remind  Them to make sure eradication have taken place    Remain biopsies were negative

## 2019-03-01 ENCOUNTER — TELEPHONE (OUTPATIENT)
Dept: OBGYN CLINIC | Facility: HOSPITAL | Age: 29
End: 2019-03-01

## 2019-03-01 NOTE — TELEPHONE ENCOUNTER
Caller: patient  Call back number: [phone]  Patient's doctor: Dr Jose Angel Mcgee    Patient called and canceled her appointment for today stating both of her wrists hurt too bad to come to this  This is her 3rd post op from surgery on 11/13  Patient is scheduled for the next available on 4/3  She is asking to speak to the MA asking to be seen sooner

## 2019-03-05 NOTE — TELEPHONE ENCOUNTER
Called patient, no answer, left message for patient to call back, she can be offered the 10:30 slot that is on hold for tomorrow, Wednesday 3/6

## 2019-03-07 ENCOUNTER — OFFICE VISIT (OUTPATIENT)
Dept: OBGYN CLINIC | Facility: CLINIC | Age: 29
End: 2019-03-07
Payer: MEDICARE

## 2019-03-07 VITALS
WEIGHT: 177 LBS | HEIGHT: 67 IN | SYSTOLIC BLOOD PRESSURE: 122 MMHG | BODY MASS INDEX: 27.78 KG/M2 | DIASTOLIC BLOOD PRESSURE: 82 MMHG

## 2019-03-07 DIAGNOSIS — Z98.890 POSTOPERATIVE STATE: Primary | ICD-10-CM

## 2019-03-07 DIAGNOSIS — M05.779 RHEUMATOID ARTHRITIS INVOLVING ANKLE WITH POSITIVE RHEUMATOID FACTOR, UNSPECIFIED LATERALITY (HCC): ICD-10-CM

## 2019-03-07 DIAGNOSIS — Z90.710 STATUS POST LAPAROSCOPIC HYSTERECTOMY: ICD-10-CM

## 2019-03-07 DIAGNOSIS — Z90.79 STATUS POST BILATERAL SALPINGECTOMY: ICD-10-CM

## 2019-03-07 PROCEDURE — 99024 POSTOP FOLLOW-UP VISIT: CPT | Performed by: OBSTETRICS & GYNECOLOGY

## 2019-03-07 RX ORDER — CHOLECALCIFEROL (VITAMIN D3) 125 MCG
TABLET ORAL
COMMUNITY
End: 2019-10-07

## 2019-03-07 RX ORDER — ESCITALOPRAM OXALATE 20 MG/1
TABLET ORAL
COMMUNITY
End: 2019-10-07

## 2019-03-07 RX ORDER — BUTALBITAL, ACETAMINOPHEN AND CAFFEINE 300; 40; 50 MG/1; MG/1; MG/1
CAPSULE ORAL
Status: ON HOLD | COMMUNITY
End: 2019-10-08 | Stop reason: CLARIF

## 2019-03-07 RX ORDER — ONDANSETRON 4 MG/1
TABLET, FILM COATED ORAL
COMMUNITY
End: 2019-10-07

## 2019-03-07 RX ORDER — GABAPENTIN 100 MG/1
100 CAPSULE ORAL
COMMUNITY
Start: 2017-04-03 | End: 2019-10-07

## 2019-03-07 RX ORDER — OXYCODONE HYDROCHLORIDE AND ACETAMINOPHEN 5; 325 MG/1; MG/1
TABLET ORAL
COMMUNITY
End: 2019-10-07

## 2019-03-07 RX ORDER — PREDNISONE 1 MG/1
TABLET ORAL
COMMUNITY
End: 2019-10-07

## 2019-03-07 RX ORDER — MORPHINE SULFATE 100 MG/1
TABLET ORAL
COMMUNITY
End: 2019-10-07

## 2019-03-07 NOTE — PROGRESS NOTES
Assessment    Postoperative state  Status post total laparoscopic hysterectomy with bilateral salpingectomy   Doing well postoperatively  Operative findings again reviewed  Pathology report discussed  Plan     1  Continue any current medications  2  Wound care discussed  3  Activity restrictions: none  4  Anticipated return to work: not applicable  5  Follow up: 3 Months for follow-up care and management  Subjective here for postoperative visit     Adam Henry is a 33 y o  female who presents to the clinic 6 weeks status post  operative visit  status post total laparoscopic hysterectomy with bilateral salpingectomy for pelvic pain  Eating a regular diet without difficulty  Bowel movements are normal  The patient is not having any pain  Review of Systems  Pertinent items are noted in HPI  Objective     /82 (BP Location: Right arm, Patient Position: Sitting, Cuff Size: Standard)   Ht 5' 7" (1 702 m)   Wt 80 3 kg (177 lb)   LMP  (LMP Unknown) Comment: irregular  BMI 27 72 kg/m²   General:  alert and oriented, in no acute distress   Abdomen: soft, bowel sounds active, non-tender   Incision:   healing well, no drainage, no erythema, no hernia, no seroma, no swelling, no dehiscence, incision well approximated      Pelvic exam vaginal cuff is intact and healing normally    No pelvic masses identified

## 2019-03-15 ENCOUNTER — TELEPHONE (OUTPATIENT)
Dept: GASTROENTEROLOGY | Facility: MEDICAL CENTER | Age: 29
End: 2019-03-15

## 2019-03-15 NOTE — TELEPHONE ENCOUNTER
Patient called the office for result, I stated that she was positive for H  Pylori and one of our providers will send over treatment  I reminded her that she can not drink alcohol while on this 14 days treatment  After the 14 days she most wait a month and stop all PPIs for 14 days and then repeat the Stool study

## 2019-03-18 ENCOUNTER — TELEPHONE (OUTPATIENT)
Dept: GASTROENTEROLOGY | Facility: MEDICAL CENTER | Age: 29
End: 2019-03-18

## 2019-03-18 DIAGNOSIS — A04.8 H. PYLORI INFECTION: ICD-10-CM

## 2019-03-18 DIAGNOSIS — A04.8 H. PYLORI INFECTION: Primary | ICD-10-CM

## 2019-03-18 RX ORDER — METRONIDAZOLE 250 MG/1
250 TABLET ORAL 4 TIMES DAILY
Qty: 56 TABLET | Refills: 0 | Status: SHIPPED | OUTPATIENT
Start: 2019-03-18 | End: 2019-04-01

## 2019-03-18 RX ORDER — OMEPRAZOLE 40 MG/1
CAPSULE, DELAYED RELEASE ORAL
Qty: 180 CAPSULE | Refills: 0 | OUTPATIENT
Start: 2019-03-18

## 2019-03-18 RX ORDER — TETRACYCLINE HYDROCHLORIDE 250 MG/1
250 CAPSULE ORAL 4 TIMES DAILY
Qty: 56 CAPSULE | Refills: 0 | Status: SHIPPED | OUTPATIENT
Start: 2019-03-18 | End: 2019-04-01

## 2019-03-18 RX ORDER — BISMUTH SUBSALICYLATE 262 MG/1
524 TABLET, CHEWABLE ORAL
Qty: 112 TABLET | Refills: 0 | Status: SHIPPED | OUTPATIENT
Start: 2019-03-18 | End: 2019-04-01

## 2019-03-18 RX ORDER — OMEPRAZOLE 40 MG/1
40 CAPSULE, DELAYED RELEASE ORAL 2 TIMES DAILY
Qty: 28 CAPSULE | Refills: 0 | Status: SHIPPED | OUTPATIENT
Start: 2019-03-18 | End: 2019-10-07

## 2019-03-18 NOTE — TELEPHONE ENCOUNTER
Called patient to let her know that the treatment was sent over to her pharmacy, made sure that she knows to get the H pylori stool test done 1 month after completing the antibiotics, off of all PPIs and H2 blockers for at least 2 weeks prior to the test to ensure eradication of the bacteria   Patient scheduled for F/U April 29th at 10am  Patient stated she understood

## 2019-07-19 ENCOUNTER — OFFICE VISIT (OUTPATIENT)
Dept: OBGYN CLINIC | Facility: HOSPITAL | Age: 29
End: 2019-07-19
Payer: MEDICARE

## 2019-07-19 ENCOUNTER — PREP FOR PROCEDURE (OUTPATIENT)
Dept: OBGYN CLINIC | Facility: HOSPITAL | Age: 29
End: 2019-07-19

## 2019-07-19 ENCOUNTER — HOSPITAL ENCOUNTER (OUTPATIENT)
Dept: RADIOLOGY | Facility: HOSPITAL | Age: 29
Discharge: HOME | End: 2019-07-19
Attending: ORTHOPAEDIC SURGERY
Payer: MEDICARE

## 2019-07-19 VITALS
DIASTOLIC BLOOD PRESSURE: 85 MMHG | SYSTOLIC BLOOD PRESSURE: 126 MMHG | BODY MASS INDEX: 27.69 KG/M2 | HEART RATE: 80 BPM | HEIGHT: 67 IN | WEIGHT: 176.4 LBS

## 2019-07-19 DIAGNOSIS — M25.531 PAIN IN RIGHT WRIST: ICD-10-CM

## 2019-07-19 DIAGNOSIS — M25.532 PAIN IN LEFT WRIST: Primary | ICD-10-CM

## 2019-07-19 DIAGNOSIS — M25.532 PAIN IN LEFT WRIST: ICD-10-CM

## 2019-07-19 DIAGNOSIS — M05.731 RHEUMATOID ARTHRITIS INVOLVING RIGHT WRIST WITH POSITIVE RHEUMATOID FACTOR (HCC): ICD-10-CM

## 2019-07-19 PROCEDURE — 99214 OFFICE O/P EST MOD 30 MIN: CPT | Performed by: ORTHOPAEDIC SURGERY

## 2019-07-19 PROCEDURE — 73110 X-RAY EXAM OF WRIST: CPT

## 2019-07-19 RX ORDER — CEFAZOLIN SODIUM 2 G/50ML
2000 SOLUTION INTRAVENOUS ONCE
Status: CANCELLED | OUTPATIENT
Start: 2019-07-19 | End: 2019-07-19

## 2019-07-19 NOTE — PROGRESS NOTES
ASSESSMENT/PLAN:    Assessment:   Right wrist rheumatoid arthritis    Plan:   The patient would like to proceed with a Darrach procedure on her right wrist at this time we did discuss potentially starting her infusions with her rheumatologist again due to the rapid progression of her rheumatoid arthritis  The patient does not want to proceed with these infusions at this time due to the side effects and would like to proceed with surgical intervention on her right wrist and then we will see her back after surgery  Follow Up: After Surgery    To Do Next Visit:    and Sutures out    General Discussions:       Operative Discussions:     Bony Consent: The risks and benefits of the procedure were explained to the patient, which include, but are not limited to: Bleeding, infection, recurrence, pain, scar, malunion, nonunion, damage to tendons, damage to nerves, and damage to blood vessels, and complications related to anesthesia, failure to give desire result, need for more surgery  These risks, along with alternative conservative treatment options, and postoperative protocols were voiced back and understood by the patient  All questions were answered to the patient's satisfaction  The patient agrees to comply with a standard postoperative protocol, and is willing to proceed  Education was provided via written and auditory forms  There were no barriers to learning  Written handouts regarding wound care, incision and scar care, and general preoperative information was provided to the patient  Prior to surgery, the patient may be requested to stop all anti-inflammatory medications  Prophylactic aspirin, Plavix, and Coumadin may be allowed to be continued  Medications including vitamin E , ginkgo, and fish oil are requested to be stopped approximately one week prior to surgery    Hypertensive medications and beta blockers, if taken, should be continued  _____________________________________________________  CHIEF COMPLAINT:  Chief Complaint   Patient presents with    Left Hand - Follow-up, Pain, Swelling    Right Hand - Pain, Swelling         SUBJECTIVE:  Fani Sparks is a 33 y o  female who presents with Pain  Severe  Constant  Sharp and Aching to the bilateral wrist and hand  This started  7 month(s) ago as Chronic  Radiation: Yes to the  wrist and hand  Previous Treatments:  Patient had a tear procedure on her left wrist in December of 2018 which provided her with relief of her wrist pain however she is still complaining of swelling into her right wrist   Associated symptoms: No Complaints    PAST MEDICAL HISTORY:  Past Medical History:   Diagnosis Date    Abnormal Pap smear of cervix     Anemia     Anxiety     Back pain     Depression     GERD (gastroesophageal reflux disease)     IBS (irritable bowel syndrome)     Iron deficiency     Irregular menses     Menorrhagia     Migraines     Neuropathy     Obesity     Osteoarthritis of back     Osteopenia     PONV (postoperative nausea and vomiting)     RA (rheumatoid arthritis) ([address]     Scratches     On back and shoulder from scratching due to urticaria    Vasculitis ([address] )     Wears glasses        PAST SURGICAL HISTORY:  Past Surgical History:   Procedure Laterality Date    CAST APPLICATION Left 76/89/7098    Procedure: APPLICATION LONG ARM August Shade;  Surgeon: Qi Fernandez MD;  Location:  MAIN OR;  Service: Orthopedics    CHOLECYSTECTOMY      Laparoscopic    DILATION AND CURETTAGE OF UTERUS      HYSTERECTOMY      JOINT REPLACEMENT Bilateral     knees    SC COLONOSCOPY FLX DX W/COLLJ SPEC WHEN PFRMD N/A 2/22/2019    Procedure: COLONOSCOPY;  Surgeon: Jerome Travis MD;  Location: [facility] GI LAB; Service: Gastroenterology    SC ESOPHAGOGASTRODUODENOSCOPY TRANSORAL DIAGNOSTIC N/A 2/22/2019    Procedure: ESOPHAGOGASTRODUODENOSCOPY (EGD);   Surgeon: Jona Kaminski Mikayla Graves MD;  Location: [facility] GI LAB;   Service: Gastroenterology    FL FUSION/GRAFT WRIST JOINT Left 2017    Procedure: WRIST FUSION / SPLINT APPLICATION ;  Surgeon: Piedad Calderón MD;  [facility]: [facility];  Service: Orthopedics    FL FUSION/GRAFT WRIST JOINT Right 4/10/2018    Procedure: Removal of hardware right wrist with Fusion of Long finger carpometacarpal joint, splint application Right Wrist;  Surgeon: Piedad Calderón MD;  Location: [facility];  Service: Orthopedics    FL HYSTEROSCOPY,W/ENDO BX N/A 2017    Procedure: DILATATION AND CURETTAGE (D&C) WITH HYSTEROSCOPY;  Surgeon: Elham Deleon DO;  Location: [facility];  Service: Gynecology    FL LAP,CHOLECYSTECTOMY N/A 3/14/2017    Procedure: CHOLECYSTECTOMY LAPAROSCOPIC;  Surgeon: Tianna Mesa DO;  [facility];  Service: General    FL LAPAROSCOPY W TOT HYSTERECTUTERUS <=250 GRAM  W TUBE/OVARY N/A 2019    Procedure: HYSTERECTOMY LAPAROSCOPIC TOTAL ([address]) Bilateral salpingectomy, cystoscopy;  Surgeon: Elham Deleon DO;  [facility];  Service: Gynecology    FL REMOVAL DEEP IMPLANT Left 2018    Procedure: REMOVAL OF HARDWARE (wrist fusion plate  AND ulnar head arthroplasty) with conversion to Darrach ;  Surgeon: Piedad Calderón MD;  [facility];  Service: Orthopedics    WRIST SURGERY Right     For arthritis    WRIST SURGERY Left 2017    Procedure: ARTHROPLASTY WRIST ULNAR HEAD ARTHROPLASTY - INTEGRA SIZE 5 5 MM, 16 HEAD;  Surgeon: Piedad Calderón MD;  [facility];  Service:        FAMILY HISTORY:  Family History   Problem Relation Age of Onset    Hypertension Mother     Rheum arthritis Mother     Depression Mother     Anxiety disorder Mother        SOCIAL HISTORY:  Social History     Tobacco Use    Smoking status: Former Smoker     Packs/day: 0 25     Years: 3 00     Pack years: 0 75     Types: Cigarettes     Last attempt to quit: 2010     Years since quittin 5    Smokeless tobacco: Never Used   Substance Use Topics    Alcohol use: Not Currently     Comment: Only a couple of times a year    Drug use: No       MEDICATIONS:    Current Outpatient Medications:     Butalbital-APAP-Caffeine (FIORICET) -40 MG CAPS, Fioricet, Disp: , Rfl:     Calcium Carb-Cholecalciferol (CALCIUM 1000 + D PO), Take 1 tablet by mouth daily, Disp: , Rfl:     Calcium Carbonate-Vitamin D (CALCIUM 500 + D) 500-125 MG-UNIT TABS, Calcium 500 + D, Disp: , Rfl:     diazepam (VALIUM) 5 mg tablet, Take 5 mg by mouth 2 (two) times a day as needed  , Disp: , Rfl: 0    dicyclomine (BENTYL) 20 mg tablet, Take 1 tablet (20 mg total) by mouth 2 (two) times a day (Patient taking differently: Take 20 mg by mouth 2 (two) times a day as needed  ), Disp: 20 tablet, Rfl: 0    diphenoxylate-atropine (LOMOTIL) 2 5-0 025 mg per tablet, Take 1 tablet by mouth 4 (four) times a day as needed for diarrhea for up to 15 doses, Disp: 15 tablet, Rfl: 0    Ergocalciferol (VITAMIN D2) 2000 units TABS, Vitamin D2 50,000 unit capsule, Disp: , Rfl:     ergocalciferol (VITAMIN D2) 50,000 units, Take 50,000 Units by mouth once a week, Disp: , Rfl:     escitalopram (LEXAPRO) 20 mg tablet, Take 20 mg by mouth every morning  , Disp: , Rfl:     escitalopram (LEXAPRO) 20 mg tablet, escitalopram 20 mg tablet, Disp: , Rfl:     gabapentin (NEURONTIN) 100 mg capsule, Take 100 mg by mouth, Disp: , Rfl:     gabapentin (NEURONTIN) 600 MG tablet, Take 600 mg by mouth 2 (two) times a day  , Disp: , Rfl: 2    gabapentin (NEURONTIN) 800 mg tablet, TK 1 T PO  AT NIGHT  FOR NEUROPATHY, Disp: , Rfl: 2    morphine (MS CONTIN) 100 mg 12 hr tablet, MS Contin, Disp: , Rfl:     norethindrone (AYGESTIN) 5 mg tablet, , Disp: , Rfl:     ondansetron (ZOFRAN) 4 mg tablet, Take 4 mg by mouth every 8 (eight) hours as needed  , Disp: , Rfl:     ondansetron (ZOFRAN) 4 mg tablet, ondansetron HCl 4 mg tablet, Disp: , Rfl:     oxyCODONE (ROXICODONE) 10 MG TABS, Take 1 tablet (10 mg total) by mouth every 6 (six) hours as needed for moderate pain or severe pain for up to 15 doses Max Daily Amount: 40 mg, Disp: 15 tablet, Rfl: 0    oxyCODONE (ROXICODONE) 15 mg immediate release tablet, TK 1 T PO Q 6 H PRF BREAKTHROUGH PAIN, Disp: , Rfl: 0    oxyCODONE-acetaminophen (PERCOCET) 5-325 mg per tablet, oxycodone-acetaminophen 5 mg-325 mg tablet, Disp: , Rfl:     OXYCONTIN 20 MG 12 hr tablet, , Disp: , Rfl: 0    predniSONE 5 mg tablet, Take 7 5 mg by mouth daily  , Disp: , Rfl:     predniSONE 5 mg tablet, prednisone 5 mg tablet, Disp: , Rfl:     ranitidine (ZANTAC) 150 mg tablet, TAKE 1 TABLET(150 MG) BY MOUTH TWICE DAILY, Disp: 180 tablet, Rfl: 8    SUMAtriptan (IMITREX) 100 mg tablet, Take 100 mg by mouth once as needed for migraine, Disp: , Rfl:     docusate sodium (COLACE) 100 mg capsule, Take 1 capsule (100 mg total) by mouth 2 (two) times a day for 30 days, Disp: 60 capsule, Rfl: 0    norethindrone (AYGESTIN) 5 mg tablet, Take 1 tablet (5 mg total) by mouth daily for 30 days, Disp: 30 tablet, Rfl: 2    omeprazole (PriLOSEC) 40 MG capsule, Take 1 capsule (40 mg total) by mouth 2 (two) times a day for 14 days, Disp: 28 capsule, Rfl: 0    ALLERGIES:  Allergies   Allergen Reactions    Nickel Rash       REVIEW OF SYSTEMS:  Pertinent items are noted in HPI      LABS:  HgA1c:   Lab Results   Component Value Date    HGBA1C 4 8 01/14/2019     BMP:   Lab Results   Component Value Date    GLUCOSE 135 11/11/2015    CALCIUM 8 3 01/14/2019     (L) 11/11/2015    K 4 3 01/14/2019    CO2 29 01/14/2019     01/14/2019    BUN 14 01/14/2019    CREATININE 0 82 01/14/2019         _____________________________________________________  PHYSICAL EXAMINATION:  Vital signs: /85   Pulse 80   Ht 5' 7" (1 702 m)   Wt 80 kg (176 lb 6 4 oz)   LMP  (LMP Unknown) Comment: irregular  BMI 27 63 kg/m²   General: well developed and well nourished, alert, oriented times 3 and appears comfortable  Psychiatric: Normal  HEENT: Trachea Midline, No torticollis  Cardiovascular: No discernable arrhythmia  Pulmonary: No wheezing or stridor  Skin: No Masses  Neurovascular: Sensation Intact to the Median, Ulnar, Radial Nerve, Motor Intact to the Median, Ulnar, Radial Nerve and Pulses Intact    MUSCULOSKELETAL EXAMINATION:  LEFT SIDE:  Swelling to ulnar aspect of left hand, global tenderness to hand and wrist, NVI, full finger ROM, full wrist ROM  RIGHT SIDE: swelling to ulnar aspect of wrist, tenderness over ulnar aspect of wrist, full finger ROM, limited wrist supination due to pain, NVI    _____________________________________________________  STUDIES REVIEWED:  Images were reviewd in PACS: xray of right wrist on 7/19/19 demonstrates erosion of her ulnar head and rapid progression of her rheumatoid arthritis throughout her hand and wrist    Xray of left wrist on 07/19/2019 demonstrates rapid progression of rheumatoid arthritis throughout her hand and wrist  As well as a previous arely procedure         PROCEDURES PERFORMED:  Procedures  No Procedures performed today   Scribe Attestation    I,:   Sherlyn Perez am acting as a scribe while in the presence of the attending physician :        I,:   Enrique Dan MD personally performed the services described in this documentation    as scribed in my presence :

## 2019-07-24 ENCOUNTER — PREP FOR PROCEDURE (OUTPATIENT)
Dept: OBGYN CLINIC | Facility: HOSPITAL | Age: 29
End: 2019-07-24

## 2019-07-24 DIAGNOSIS — M25.532 PAIN IN LEFT WRIST: Primary | ICD-10-CM

## 2019-09-20 ENCOUNTER — TELEPHONE (OUTPATIENT)
Dept: GASTROENTEROLOGY | Facility: MEDICAL CENTER | Age: 29
End: 2019-09-20

## 2019-09-20 DIAGNOSIS — R10.9 ABDOMINAL PAIN: ICD-10-CM

## 2019-09-20 RX ORDER — DICYCLOMINE HCL 20 MG
20 TABLET ORAL 4 TIMES DAILY PRN
Qty: 120 TABLET | Refills: 1 | Status: SHIPPED | OUTPATIENT
Start: 2019-09-20 | End: 2020-04-21 | Stop reason: SDUPTHER

## 2019-09-20 NOTE — TELEPHONE ENCOUNTER
Refill Request for Medication: Bentyl    Dose: 20mg    Quantity: 20    Pharmacy: [facility]    Also patient would like to speak to someone about another tests due to stomach problems she has   Patient would like a call back at [phone]

## 2019-09-23 NOTE — TELEPHONE ENCOUNTER
Please send her a letter letting her know we have been trying to contact her regarding her concerns   Thank you

## 2019-09-28 ENCOUNTER — HOSPITAL ENCOUNTER (EMERGENCY)
Facility: HOSPITAL | Age: 29
Discharge: HOME | End: 2019-09-28
Attending: EMERGENCY MEDICINE
Payer: MEDICARE

## 2019-09-28 VITALS
RESPIRATION RATE: 18 BRPM | WEIGHT: 202.6 LBS | DIASTOLIC BLOOD PRESSURE: 86 MMHG | SYSTOLIC BLOOD PRESSURE: 122 MMHG | BODY MASS INDEX: 31.73 KG/M2 | HEART RATE: 102 BPM | TEMPERATURE: 98.1 F | OXYGEN SATURATION: 100 %

## 2019-09-28 DIAGNOSIS — F41.9 ANXIETY: ICD-10-CM

## 2019-09-28 DIAGNOSIS — K13.70 ORAL MUCOSAL LESION: Primary | ICD-10-CM

## 2019-09-28 PROCEDURE — 96372 THER/PROPH/DIAG INJ SC/IM: CPT

## 2019-09-28 PROCEDURE — 99282 EMERGENCY DEPT VISIT SF MDM: CPT

## 2019-09-28 PROCEDURE — 99284 EMERGENCY DEPT VISIT MOD MDM: CPT | Performed by: NURSE PRACTITIONER

## 2019-09-28 RX ORDER — FAMOTIDINE 10 MG
10 TABLET ORAL 2 TIMES DAILY
Qty: 10 TABLET | Refills: 0 | Status: SHIPPED | OUTPATIENT
Start: 2019-09-28 | End: 2019-10-07

## 2019-09-28 RX ORDER — METHYLPREDNISOLONE SODIUM SUCCINATE 125 MG/2ML
60 INJECTION, POWDER, LYOPHILIZED, FOR SOLUTION INTRAMUSCULAR; INTRAVENOUS ONCE
Status: COMPLETED | OUTPATIENT
Start: 2019-09-28 | End: 2019-09-28

## 2019-09-28 RX ORDER — FAMOTIDINE 20 MG/1
20 TABLET, FILM COATED ORAL ONCE
Status: COMPLETED | OUTPATIENT
Start: 2019-09-28 | End: 2019-09-28

## 2019-09-28 RX ORDER — AMOXICILLIN 500 MG/1
500 CAPSULE ORAL 3 TIMES DAILY
Qty: 21 CAPSULE | Refills: 0 | Status: SHIPPED | OUTPATIENT
Start: 2019-09-28 | End: 2019-10-05

## 2019-09-28 RX ORDER — AMOXICILLIN 250 MG/1
500 CAPSULE ORAL ONCE
Status: COMPLETED | OUTPATIENT
Start: 2019-09-28 | End: 2019-09-28

## 2019-09-28 RX ADMIN — METHYLPREDNISOLONE SODIUM SUCCINATE 60 MG: 125 INJECTION, POWDER, FOR SOLUTION INTRAMUSCULAR; INTRAVENOUS at 04:10

## 2019-09-28 RX ADMIN — FAMOTIDINE 20 MG: 20 TABLET ORAL at 04:10

## 2019-09-28 RX ADMIN — AMOXICILLIN 500 MG: 250 CAPSULE ORAL at 04:10

## 2019-09-28 NOTE — DISCHARGE INSTRUCTIONS
You are to take the amoxicillin for a mouth infection  Stop picking at your mouth  Get a medicated chap stick for your lips  Drink water  Follow up with your PCP

## 2019-09-28 NOTE — ED PROVIDER NOTES
History  Chief Complaint   Patient presents with    Mouth Lesions     pt reports she has bad anxiety and often picks at things including the inside of her mouth, pt reports today she was picking at the lesions with alcohol on tweezers "because im prone to infection" and then noticed some facial swelling, took benadryl 50mg PO at 2am, pt reports she feels like it helped the lesions, denies airway involvment, denies any SOB or wheezng     This is a 33year old female who has anxiety and states was picking the inside of her mouth and lips with a tweezer that she had cleaned off with alcohol prior to doing so  She states that she now feels like her face was swelling so took some benadryl  She states she feels like her throat is closing  She denies any SOB or wheezing  She drove here to the ED  Pt states she feels like she is having an allergic reaction       History provided by:  Medical records and patient   used: No        Prior to Admission Medications   Prescriptions Last Dose Informant Patient Reported? Taking?    Butalbital-APAP-Caffeine (FIORICET) -40 MG CAPS Not Taking at Unknown time  Yes No   Sig: Fioricet   Calcium Carb-Cholecalciferol (CALCIUM 1000 + D PO) 9/27/2019 at Unknown time Self Yes Yes   Sig: Take 1 tablet by mouth daily   Calcium Carbonate-Vitamin D (CALCIUM 500 + D) 500-125 MG-UNIT TABS   Yes No   Sig: Calcium 500 + D   Ergocalciferol (VITAMIN D2) 2000 units TABS 9/27/2019 at Unknown time  Yes Yes   Sig: Vitamin D2 50,000 unit capsule   OXYCONTIN 20 MG 12 hr tablet 9/27/2019 at Unknown time  Yes Yes   SUMAtriptan (IMITREX) 100 mg tablet Unknown at Unknown time Self Yes No   Sig: Take 100 mg by mouth once as needed for migraine   diazepam (VALIUM) 5 mg tablet Not Taking at Unknown time Self Yes No   Sig: Take 5 mg by mouth 2 (two) times a day as needed     dicyclomine (BENTYL) 20 mg tablet Past Month at Unknown time  No Yes   Sig: Take 1 tablet (20 mg total) by mouth 4 (four) times a day as needed (prn)   diphenoxylate-atropine (LOMOTIL) 2 5-0 025 mg per tablet Past Week at Unknown time Self No Yes   Sig: Take 1 tablet by mouth 4 (four) times a day as needed for diarrhea for up to 15 doses   docusate sodium (COLACE) 100 mg capsule   No No   Sig: Take 1 capsule (100 mg total) by mouth 2 (two) times a day for 30 days   ergocalciferol (VITAMIN D2) 50,000 units 9/27/2019 at Unknown time Self Yes Yes   Sig: Take 50,000 Units by mouth once a week   escitalopram (LEXAPRO) 20 mg tablet  Self Yes No   Sig: Take 20 mg by mouth every morning     escitalopram (LEXAPRO) 20 mg tablet 9/27/2019 at Unknown time  Yes Yes   Sig: escitalopram 20 mg tablet   gabapentin (NEURONTIN) 100 mg capsule   Yes No   Sig: Take 100 mg by mouth   gabapentin (NEURONTIN) 600 MG tablet  Self Yes No   Sig: Take 600 mg by mouth 2 (two) times a day     gabapentin (NEURONTIN) 800 mg tablet 9/27/2019 at Unknown time Self Yes Yes   Sig: TK 1 T PO  AT NIGHT  FOR NEUROPATHY   morphine (MS CONTIN) 100 mg 12 hr tablet Not Taking at Unknown time  Yes No   Sig: MS Contin   norethindrone (AYGESTIN) 5 mg tablet   No No   Sig: Take 1 tablet (5 mg total) by mouth daily for 30 days   norethindrone (AYGESTIN) 5 mg tablet Not Taking at Unknown time Self Yes No   omeprazole (PriLOSEC) 40 MG capsule   No No   Sig: Take 1 capsule (40 mg total) by mouth 2 (two) times a day for 14 days   ondansetron (ZOFRAN) 4 mg tablet  Self Yes No   Sig: Take 4 mg by mouth every 8 (eight) hours as needed     ondansetron (ZOFRAN) 4 mg tablet Past Month at Unknown time  Yes Yes   Sig: ondansetron HCl 4 mg tablet   oxyCODONE (ROXICODONE) 10 MG TABS Not Taking at Unknown time  No No   Sig: Take 1 tablet (10 mg total) by mouth every 6 (six) hours as needed for moderate pain or severe pain for up to 15 doses Max Daily Amount: 40 mg   Patient not taking: Reported on 9/28/2019   oxyCODONE (ROXICODONE) 15 mg immediate release tablet 9/27/2019 at Unknown time Self Yes Yes   Sig: TK 1 T PO Q 6 H PRF BREAKTHROUGH PAIN   oxyCODONE-acetaminophen (PERCOCET) 5-325 mg per tablet Unknown at Unknown time  Yes No   Sig: oxycodone-acetaminophen 5 mg-325 mg tablet   predniSONE 5 mg tablet 9/27/2019 at Unknown time Self Yes Yes   Sig: Take 7 5 mg by mouth daily     predniSONE 5 mg tablet   Yes No   Sig: prednisone 5 mg tablet   ranitidine (ZANTAC) 150 mg tablet Not Taking at Unknown time  No No   Sig: TAKE 1 TABLET(150 MG) BY MOUTH TWICE DAILY   Patient not taking: Reported on 9/28/2019      Facility-Administered Medications: None       Past Medical History:   Diagnosis Date    Abnormal Pap smear of cervix     Anemia     Anxiety     Back pain     Depression     GERD (gastroesophageal reflux disease)     IBS (irritable bowel syndrome)     Iron deficiency     Irregular menses     Menorrhagia     Migraines     Neuropathy     Obesity     Osteoarthritis of back     Osteopenia     PONV (postoperative nausea and vomiting)     RA (rheumatoid arthritis) ([address]     Scratches     On back and shoulder from scratching due to urticaria    Vasculitis ([address] )     Wears glasses        Past Surgical History:   Procedure Laterality Date    CAST APPLICATION Left 44/66/4676    Procedure: APPLICATION LONG ARM Jorge Boop;  Surgeon: Juana Paredes MD;  Location:  MAIN OR;  Service: Orthopedics    CHOLECYSTECTOMY      Laparoscopic    DILATION AND CURETTAGE OF UTERUS      HYSTERECTOMY      JOINT REPLACEMENT Bilateral     knees    LA COLONOSCOPY FLX DX W/COLLJ SPEC WHEN PFRMD N/A 2/22/2019    Procedure: COLONOSCOPY;  Surgeon: Michael Mcrae MD;  Location: [facility]; Service: Gastroenterology    LA ESOPHAGOGASTRODUODENOSCOPY TRANSORAL DIAGNOSTIC N/A 2/22/2019    Procedure: ESOPHAGOGASTRODUODENOSCOPY (EGD); Surgeon: Michael Mcrae MD;  Location: [facility];   Service: Gastroenterology    LA FUSION/GRAFT WRIST JOINT Left 4/4/2017    Procedure: WRIST FUSION / Junita Flock APPLICATION ;  Surgeon: Swetha Lopez MD;  [facility];  Service: Orthopedics    DC FUSION/GRAFT WRIST JOINT Right 4/10/2018    Procedure: Removal of hardware right wrist with Fusion of Long finger carpometacarpal joint, splint application Right Wrist;  Surgeon: Swetha Lopez MD;  [facility];  Service: Orthopedics    DC HYSTEROSCOPY,W/ENDO BX N/A 2017    Procedure: DILATATION AND CURETTAGE (D&C) WITH HYSTEROSCOPY;  Surgeon: Tucker Carpio DO;  [facility];  Service: Gynecology    DC LAP,CHOLECYSTECTOMY N/A 3/14/2017    Procedure: CHOLECYSTECTOMY LAPAROSCOPIC;  Surgeon: Amol Smith DO;  [facility];  Service: General    DC LAPAROSCOPY W TOT HYSTERECTUTERUS <=250 GRAM  W TUBE/OVARY N/A 2019    Procedure: HYSTERECTOMY LAPAROSCOPIC TOTAL ([address]) Bilateral salpingectomy, cystoscopy;  Surgeon: Tucker Carpio DO;  [facility];  Service: Gynecology    DC REMOVAL DEEP IMPLANT Left 2018    Procedure: REMOVAL OF HARDWARE (wrist fusion plate  AND ulnar head arthroplasty) with conversion to [place] ;  Surgeon: Swetha Lopez MD;  [facility];  Service: Orthopedics    WRIST SURGERY Right     For arthritis    WRIST SURGERY Left 2017    Procedure: ARTHROPLASTY WRIST ULNAR HEAD ARTHROPLASTY - INTEGRA SIZE 5 5 MM, 16 HEAD;  Surgeon: Swetha Lopez MD;  [facility];  Service:        Family History   Problem Relation Age of Onset    Hypertension Mother     Rheum arthritis Mother     Depression Mother     Anxiety disorder Mother      I have reviewed and agree with the history as documented      Social History     Tobacco Use    Smoking status: Former Smoker     Packs/day: 0 25     Years: 3 00     Pack years: 0 75     Types: Cigarettes     Last attempt to quit:      Years since quittin 7    Smokeless tobacco: Never Used   Substance Use Topics    Alcohol use: Not Currently     Comment: Only a couple of times a year    Drug use: No        Review of Systems   Constitutional: Negative  HENT: Positive for mouth sores and trouble swallowing  Eyes: Negative  Respiratory: Negative  Cardiovascular: Negative  Gastrointestinal: Negative  Endocrine: Negative  Genitourinary: Negative  Musculoskeletal: Negative  Skin: Negative  Allergic/Immunologic: Negative  Neurological: Negative  Hematological: Negative  Psychiatric/Behavioral: Negative  Physical Exam  Physical Exam   Constitutional: She is oriented to person, place, and time  She appears well-developed and well-nourished  No distress  HENT:   Head: Normocephalic and atraumatic  Pt has skin peeling from the inside of her left cheek  Her lips are dry cracked with dried blood  No oral erythema  Uvula midline    Eyes: Pupils are equal, round, and reactive to light  EOM are normal    Neck: Normal range of motion  Neck supple  Cardiovascular: Normal rate, regular rhythm and normal heart sounds  Pulmonary/Chest: Effort normal and breath sounds normal    Abdominal: Soft  Bowel sounds are normal  She exhibits no distension  There is no tenderness  Musculoskeletal: Normal range of motion  Neurological: She is alert and oriented to person, place, and time  Skin: Skin is warm and dry  Capillary refill takes less than 2 seconds  She is not diaphoretic  Psychiatric: She has a normal mood and affect  Her behavior is normal  Judgment and thought content normal    Nursing note and vitals reviewed        Vital Signs  ED Triage Vitals   Temperature Pulse Respirations Blood Pressure SpO2   09/28/19 0328 09/28/19 0332 09/28/19 0328 09/28/19 0328 09/28/19 0328   98 1 °F (36 7 °C) 102 18 122/86 100 %      Temp src Heart Rate Source Patient Position - Orthostatic VS BP Location FiO2 (%)   -- 09/28/19 0328 09/28/19 0328 09/28/19 0328 --    Monitor Sitting Left arm       Pain Score       09/28/19 0328       No Pain           Vitals:    09/28/19 0328 09/28/19 0332   BP: 122/86    Pulse:  102   Patient Position - Orthostatic VS: Sitting          Visual Acuity      ED Medications  Medications   amoxicillin (AMOXIL) capsule 500 mg (500 mg Oral Given 9/28/19 0410)   famotidine (PEPCID) tablet 20 mg (20 mg Oral Given 9/28/19 0410)   methylPREDNISolone sodium succinate (Solu-MEDROL) injection 60 mg (60 mg Intramuscular Given 9/28/19 0410)       Diagnostic Studies  Results Reviewed     None                 No orders to display              Procedures  Procedures       ED Course                               MDM  Number of Diagnoses or Management Options  Diagnosis management comments: Mouth picking due to nervous condition  Mouth infection    Plan  Solumedrol IM  pepcid po  Amoxicillin po           Amount and/or Complexity of Data Reviewed  Review and summarize past medical records: yes        Disposition  Final diagnoses:   Anxiety   Oral mucosal lesion     Time reflects when diagnosis was documented in both MDM as applicable and the Disposition within this note     Time User Action Codes Description Comment    9/28/2019  4:11 AM Isabelle Zarate [F41 9] Anxiety     9/28/2019  4:11 AM Alexus Adams Add [K13 70] Oral mucosal lesion     9/28/2019  4:11 AM Giulia Haddad [F41 9] Anxiety     9/28/2019  4:11 AM Alexus Adams Modify [K13 70] Oral mucosal lesion       ED Disposition     ED Disposition Condition Date/Time Comment    Discharge Stable Sat Sep 28, 2019  4:11 AM General Dries discharge to home/self care  Follow-up Information     Follow up With Specialties Details Why Contact Info Additional Information    Aurora Baca DO Family Medicine Schedule an appointment as soon as possible for a visit in 2 days  [address] [id number] [facility] Emergency [facility]  If symptoms worsen [address] [address]  [place] [address] ED, [address]  , [place], [place], [address]          Discharge Medication List as of 9/28/2019  4:14 AM      START taking these medications    Details   amoxicillin (AMOXIL) 500 mg capsule Take 1 capsule (500 mg total) by mouth 3 (three) times a day for 7 days, Starting Sat 9/28/2019, Until Sat 10/5/2019, Print      famotidine (PEPCID) 10 mg tablet Take 1 tablet (10 mg total) by mouth 2 (two) times a day for 5 days, Starting Sat 9/28/2019, Until Thu 10/3/2019, Print         CONTINUE these medications which have NOT CHANGED    Details   Calcium Carb-Cholecalciferol (CALCIUM 1000 + D PO) Take 1 tablet by mouth daily, Historical Med      dicyclomine (BENTYL) 20 mg tablet Take 1 tablet (20 mg total) by mouth 4 (four) times a day as needed (prn), Starting Fri 9/20/2019, Print      diphenoxylate-atropine (LOMOTIL) 2 5-0 025 mg per tablet Take 1 tablet by mouth 4 (four) times a day as needed for diarrhea for up to 15 doses, Starting Fri 10/12/2018, Print      Ergocalciferol (VITAMIN D2) 2000 units TABS Vitamin D2 50,000 unit capsule, Historical Med      ergocalciferol (VITAMIN D2) 50,000 units Take 50,000 Units by mouth once a week, Historical Med      !! escitalopram (LEXAPRO) 20 mg tablet escitalopram 20 mg tablet, Historical Med      !! gabapentin (NEURONTIN) 800 mg tablet TK 1 T PO  AT NIGHT  FOR NEUROPATHY, Historical Med      !! ondansetron (ZOFRAN) 4 mg tablet ondansetron HCl 4 mg tablet, Historical Med      !! oxyCODONE (ROXICODONE) 15 mg immediate release tablet TK 1 T PO Q 6 H PRF BREAKTHROUGH PAIN, Historical Med      OXYCONTIN 20 MG 12 hr tablet Starting Mon 2/4/2019, Historical Med      !! predniSONE 5 mg tablet Take 7 5 mg by mouth daily  , Historical Med      Butalbital-APAP-Caffeine (FIORICET) -40 MG CAPS Fioricet, Historical Med      Calcium Carbonate-Vitamin D (CALCIUM 500 + D) 500-125 MG-UNIT TABS Calcium 500 + D, Historical Med      diazepam (VALIUM) 5 mg tablet Take 5 mg by mouth 2 (two) times a day as needed  , Starting Tue 11/6/2018, Historical Med      docusate sodium (COLACE) 100 mg capsule Take 1 capsule (100 mg total) by mouth 2 (two) times a day for 30 days, Starting Fri 1/18/2019, Until Sun 2/17/2019, Print      !! escitalopram (LEXAPRO) 20 mg tablet Take 20 mg by mouth every morning  , Historical Med      gabapentin (NEURONTIN) 100 mg capsule Take 100 mg by mouth, Starting Mon 4/3/2017, Historical Med      !! gabapentin (NEURONTIN) 600 MG tablet Take 600 mg by mouth 2 (two) times a day  , Starting Fri 10/12/2018, Historical Med      morphine (MS CONTIN) 100 mg 12 hr tablet MS Contin, Historical Med      norethindrone (AYGESTIN) 5 mg tablet Starting Mon 12/24/2018, Historical Med      omeprazole (PriLOSEC) 40 MG capsule Take 1 capsule (40 mg total) by mouth 2 (two) times a day for 14 days, Starting Mon 3/18/2019, Until Mon 4/1/2019, Normal      !! ondansetron (ZOFRAN) 4 mg tablet Take 4 mg by mouth every 8 (eight) hours as needed  , Historical Med      !! oxyCODONE (ROXICODONE) 10 MG TABS Take 1 tablet (10 mg total) by mouth every 6 (six) hours as needed for moderate pain or severe pain for up to 15 doses Max Daily Amount: 40 mg, Starting Fri 1/18/2019, Print      oxyCODONE-acetaminophen (PERCOCET) 5-325 mg per tablet oxycodone-acetaminophen 5 mg-325 mg tablet, Historical Med      !! predniSONE 5 mg tablet prednisone 5 mg tablet, Historical Med      ranitidine (ZANTAC) 150 mg tablet TAKE 1 TABLET(150 MG) BY MOUTH TWICE DAILY, Normal      SUMAtriptan (IMITREX) 100 mg tablet Take 100 mg by mouth once as needed for migraine, Historical Med       !! - Potential duplicate medications found  Please discuss with provider  No discharge procedures on file      ED Provider  Electronically Signed by           Eulalio Solano  09/28/19 9217

## 2019-10-02 ENCOUNTER — PREP FOR PROCEDURE (OUTPATIENT)
Dept: OBGYN CLINIC | Facility: HOSPITAL | Age: 29
End: 2019-10-02

## 2019-10-04 ENCOUNTER — APPOINTMENT (OUTPATIENT)
Dept: LAB | Facility: HOSPITAL | Age: 29
End: 2019-10-04
Payer: MEDICARE

## 2019-10-04 DIAGNOSIS — M25.532 PAIN IN LEFT WRIST: ICD-10-CM

## 2019-10-04 LAB
BASOPHILS # BLD AUTO: 0.1 THOUSANDS/ΜL (ref 0–0.1)
BASOPHILS NFR BLD AUTO: 1 % (ref 0–1)
EOSINOPHIL # BLD AUTO: 0 THOUSAND/ΜL (ref 0–0.4)
EOSINOPHIL NFR BLD AUTO: 0 % (ref 0–6)
ERYTHROCYTE [DISTWIDTH] IN BLOOD BY AUTOMATED COUNT: 13.3 %
HCT VFR BLD AUTO: 39.5 % (ref 36–46)
HGB BLD-MCNC: 13.4 G/DL (ref 12–16)
LYMPHOCYTES # BLD AUTO: 2.4 THOUSANDS/ΜL (ref 0.5–4)
LYMPHOCYTES NFR BLD AUTO: 28 % (ref 25–45)
MCH RBC QN AUTO: 32.6 PG (ref 26–34)
MCHC RBC AUTO-ENTMCNC: 33.8 G/DL (ref 31–36)
MCV RBC AUTO: 96 FL (ref 80–100)
MONOCYTES # BLD AUTO: 0.4 THOUSAND/ΜL (ref 0.2–0.9)
MONOCYTES NFR BLD AUTO: 4 % (ref 1–10)
NEUTROPHILS # BLD AUTO: 5.8 THOUSANDS/ΜL (ref 1.8–7.8)
NEUTS SEG NFR BLD AUTO: 67 % (ref 45–65)
PLATELET # BLD AUTO: 208 THOUSANDS/UL (ref 150–450)
PMV BLD AUTO: 8.9 FL (ref 8.9–12.7)
RBC # BLD AUTO: 4.1 MILLION/UL (ref 4–5.2)
WBC # BLD AUTO: 8.6 THOUSAND/UL (ref 4.5–11)

## 2019-10-04 PROCEDURE — 85025 COMPLETE CBC W/AUTO DIFF WBC: CPT

## 2019-10-04 PROCEDURE — 36415 COLL VENOUS BLD VENIPUNCTURE: CPT

## 2019-10-07 RX ORDER — RANITIDINE 150 MG/1
150 CAPSULE ORAL 2 TIMES DAILY
COMMUNITY
End: 2021-10-25

## 2019-10-07 RX ORDER — ALPRAZOLAM 0.5 MG/1
TABLET ORAL AS NEEDED
COMMUNITY

## 2019-10-07 NOTE — PRE-PROCEDURE INSTRUCTIONS
Pre-Surgery Instructions:   Medication Instructions    ALPRAZolam (XANAX) 0 5 mg tablet Instructed patient per Anesthesia Guidelines   Calcium Carb-Cholecalciferol (CALCIUM 1000 + D PO) Instructed patient per Anesthesia Guidelines   dicyclomine (BENTYL) 20 mg tablet Instructed patient per Anesthesia Guidelines   diphenoxylate-atropine (LOMOTIL) 2 5-0 025 mg per tablet Instructed patient per anesthesia guidelines    ergocalciferol (VITAMIN D2) 50,000 units Instructed patient per Anesthesia Guidelines   escitalopram (LEXAPRO) 20 mg tablet Instructed patient per Anesthesia Guidelines   gabapentin (NEURONTIN) 800 mg tablet Instructed patient per Anesthesia Guidelines   ondansetron (ZOFRAN) 4 mg tablet Instructed patient per Anesthesia Guidelines   oxyCODONE (ROXICODONE) 15 mg immediate release tablet Instructed patient per Anesthesia Guidelines   predniSONE 5 mg tablet Instructed patient per Anesthesia Guidelines   ranitidine (ZANTAC) 150 MG capsule Instructed patient per Anesthesia Guidelines   SUMAtriptan (IMITREX) 100 mg tablet Instructed patient per Anesthesia Guidelines  Spoke to pt  Medication list reviewed & instructed  As of 10/7 pt to stop calcium & vit D  Instructed on tylenol or oxycodone only  Am DOS pt ok to take zantac, prednisone, gabapentin, lexapro with 1-2 sips of water  Oxycodone & xanax ok if need  Showering instructions per surgeon office  All instructions verbally understood by patient  No further questions  5HT3 Antagonists Med Class     Continue to take this medication on your normal schedule  If this is an oral medication and you take it in the morning, then you may take this medicine with a sip of water  Antidepressant Med Class     Continue to take this medication on your normal schedule  If this is an oral medication and you take it in the morning, then you may take this medicine with a sip of water    Antiepileptic Med Class     Continue to take this medication on your normal schedule  If this is an oral medication and you take it in the morning, then you may take this medicine with a sip of water  Benzodiazepine antagonist Med Class     If this medication is needed please continue to take on your normal schedule  If you take it in the morning, then you may take this medicine with a sip of water  H2 Blocker Med Class     Continue to take this medication on your normal schedule  If this is an oral medication and you take it in the morning, then you may take this medicine with a sip of water  Opioid Med Class     Continue to take this medication on your normal schedule  If this is an oral medication and you take it in the morning, then you may take this medicine with a sip of water  Steroids Med Class     Continue to take this medication on your normal schedule  If this is an oral medication and you take it in the morning, then you may take this medicine with a sip of water

## 2019-10-08 ENCOUNTER — ANESTHESIA EVENT (OUTPATIENT)
Dept: PERIOP | Facility: HOSPITAL | Age: 29
End: 2019-10-08
Payer: MEDICARE

## 2019-10-08 ENCOUNTER — ANESTHESIA (OUTPATIENT)
Dept: PERIOP | Facility: HOSPITAL | Age: 29
End: 2019-10-08
Payer: MEDICARE

## 2019-10-08 ENCOUNTER — HOSPITAL ENCOUNTER (OUTPATIENT)
Dept: RADIOLOGY | Facility: HOSPITAL | Age: 29
Setting detail: OUTPATIENT SURGERY
Discharge: HOME | End: 2019-10-08
Payer: MEDICARE

## 2019-10-08 ENCOUNTER — HOSPITAL ENCOUNTER (OUTPATIENT)
Facility: HOSPITAL | Age: 29
Setting detail: OUTPATIENT SURGERY
Discharge: HOME | End: 2019-10-08
Attending: ORTHOPAEDIC SURGERY | Admitting: ORTHOPAEDIC SURGERY
Payer: MEDICARE

## 2019-10-08 VITALS
RESPIRATION RATE: 16 BRPM | TEMPERATURE: 97.1 F | DIASTOLIC BLOOD PRESSURE: 71 MMHG | OXYGEN SATURATION: 96 % | HEART RATE: 90 BPM | SYSTOLIC BLOOD PRESSURE: 109 MMHG

## 2019-10-08 DIAGNOSIS — M05.731 RHEUMATOID ARTHRITIS INVOLVING RIGHT WRIST WITH POSITIVE RHEUMATOID FACTOR (HCC): Primary | ICD-10-CM

## 2019-10-08 DIAGNOSIS — M05.731 RHEUMATOID ARTHRITIS INVOLVING RIGHT WRIST WITH POSITIVE RHEUMATOID FACTOR (HCC): ICD-10-CM

## 2019-10-08 PROCEDURE — 25240 PARTIAL REMOVAL OF ULNA: CPT | Performed by: ORTHOPAEDIC SURGERY

## 2019-10-08 PROCEDURE — 99024 POSTOP FOLLOW-UP VISIT: CPT | Performed by: ORTHOPAEDIC SURGERY

## 2019-10-08 PROCEDURE — 25337 RECONSTRUCT ULNA/RADIOULNAR: CPT | Performed by: ORTHOPAEDIC SURGERY

## 2019-10-08 PROCEDURE — 25240 PARTIAL REMOVAL OF ULNA: CPT | Performed by: PHYSICIAN ASSISTANT

## 2019-10-08 PROCEDURE — 25337 RECONSTRUCT ULNA/RADIOULNAR: CPT | Performed by: PHYSICIAN ASSISTANT

## 2019-10-08 RX ORDER — HYDROMORPHONE HCL/PF 1 MG/ML
0.5 SYRINGE (ML) INJECTION
Status: COMPLETED | OUTPATIENT
Start: 2019-10-08 | End: 2019-10-08

## 2019-10-08 RX ORDER — CEFAZOLIN SODIUM 2 G/50ML
2000 SOLUTION INTRAVENOUS ONCE
Status: COMPLETED | OUTPATIENT
Start: 2019-10-08 | End: 2019-10-08

## 2019-10-08 RX ORDER — OXYCODONE HYDROCHLORIDE AND ACETAMINOPHEN 5; 325 MG/1; MG/1
2 TABLET ORAL EVERY 6 HOURS PRN
Status: DISCONTINUED | OUTPATIENT
Start: 2019-10-08 | End: 2019-10-08 | Stop reason: HOSPADM

## 2019-10-08 RX ORDER — NAPROXEN SODIUM 220 MG
220 TABLET ORAL 2 TIMES DAILY WITH MEALS
Qty: 10 TABLET | Refills: 0 | Status: CANCELLED | OUTPATIENT
Start: 2019-10-08 | End: 2019-10-13

## 2019-10-08 RX ORDER — ACETAMINOPHEN 325 MG/1
975 TABLET ORAL ONCE AS NEEDED
Status: COMPLETED | OUTPATIENT
Start: 2019-10-08 | End: 2019-10-08

## 2019-10-08 RX ORDER — FENTANYL CITRATE 50 UG/ML
INJECTION, SOLUTION INTRAMUSCULAR; INTRAVENOUS AS NEEDED
Status: DISCONTINUED | OUTPATIENT
Start: 2019-10-08 | End: 2019-10-08 | Stop reason: SURG

## 2019-10-08 RX ORDER — ONDANSETRON 2 MG/ML
4 INJECTION INTRAMUSCULAR; INTRAVENOUS ONCE AS NEEDED
Status: DISCONTINUED | OUTPATIENT
Start: 2019-10-08 | End: 2019-10-08 | Stop reason: HOSPADM

## 2019-10-08 RX ORDER — OXYCODONE AND ACETAMINOPHEN 10; 325 MG/1; MG/1
1 TABLET ORAL EVERY 6 HOURS PRN
Qty: 20 TABLET | Refills: 0 | Status: SHIPPED | OUTPATIENT
Start: 2019-10-08 | End: 2021-10-25

## 2019-10-08 RX ORDER — SENNOSIDES 8.6 MG
650 CAPSULE ORAL EVERY 8 HOURS
Qty: 15 TABLET | Refills: 0 | Status: SHIPPED | OUTPATIENT
Start: 2019-10-08 | End: 2019-10-13

## 2019-10-08 RX ORDER — LIDOCAINE HYDROCHLORIDE 10 MG/ML
INJECTION, SOLUTION INFILTRATION; PERINEURAL AS NEEDED
Status: DISCONTINUED | OUTPATIENT
Start: 2019-10-08 | End: 2019-10-08 | Stop reason: SURG

## 2019-10-08 RX ORDER — MIDAZOLAM HYDROCHLORIDE 1 MG/ML
INJECTION INTRAMUSCULAR; INTRAVENOUS AS NEEDED
Status: DISCONTINUED | OUTPATIENT
Start: 2019-10-08 | End: 2019-10-08 | Stop reason: SURG

## 2019-10-08 RX ORDER — ONDANSETRON 2 MG/ML
INJECTION INTRAMUSCULAR; INTRAVENOUS AS NEEDED
Status: DISCONTINUED | OUTPATIENT
Start: 2019-10-08 | End: 2019-10-08 | Stop reason: SURG

## 2019-10-08 RX ORDER — SODIUM CHLORIDE, SODIUM LACTATE, POTASSIUM CHLORIDE, CALCIUM CHLORIDE 600; 310; 30; 20 MG/100ML; MG/100ML; MG/100ML; MG/100ML
125 INJECTION, SOLUTION INTRAVENOUS CONTINUOUS
Status: DISCONTINUED | OUTPATIENT
Start: 2019-10-08 | End: 2019-10-08 | Stop reason: HOSPADM

## 2019-10-08 RX ORDER — SODIUM CHLORIDE, SODIUM LACTATE, POTASSIUM CHLORIDE, CALCIUM CHLORIDE 600; 310; 30; 20 MG/100ML; MG/100ML; MG/100ML; MG/100ML
INJECTION, SOLUTION INTRAVENOUS CONTINUOUS PRN
Status: DISCONTINUED | OUTPATIENT
Start: 2019-10-08 | End: 2019-10-08 | Stop reason: SURG

## 2019-10-08 RX ORDER — FENTANYL CITRATE/PF 50 MCG/ML
25 SYRINGE (ML) INJECTION
Status: COMPLETED | OUTPATIENT
Start: 2019-10-08 | End: 2019-10-08

## 2019-10-08 RX ORDER — PROPOFOL 10 MG/ML
INJECTION, EMULSION INTRAVENOUS AS NEEDED
Status: DISCONTINUED | OUTPATIENT
Start: 2019-10-08 | End: 2019-10-08 | Stop reason: SURG

## 2019-10-08 RX ORDER — MAGNESIUM HYDROXIDE 1200 MG/15ML
LIQUID ORAL AS NEEDED
Status: DISCONTINUED | OUTPATIENT
Start: 2019-10-08 | End: 2019-10-08 | Stop reason: HOSPADM

## 2019-10-08 RX ADMIN — FENTANYL CITRATE 25 MCG: 50 INJECTION INTRAMUSCULAR; INTRAVENOUS at 14:24

## 2019-10-08 RX ADMIN — FENTANYL CITRATE 25 MCG: 50 INJECTION INTRAMUSCULAR; INTRAVENOUS at 14:00

## 2019-10-08 RX ADMIN — SODIUM CHLORIDE, SODIUM LACTATE, POTASSIUM CHLORIDE, AND CALCIUM CHLORIDE: .6; .31; .03; .02 INJECTION, SOLUTION INTRAVENOUS at 12:19

## 2019-10-08 RX ADMIN — PROPOFOL 100 MG: 10 INJECTION, EMULSION INTRAVENOUS at 12:22

## 2019-10-08 RX ADMIN — FENTANYL CITRATE 50 MCG: 50 INJECTION, SOLUTION INTRAMUSCULAR; INTRAVENOUS at 12:55

## 2019-10-08 RX ADMIN — ACETAMINOPHEN 975 MG: 325 TABLET ORAL at 11:10

## 2019-10-08 RX ADMIN — FENTANYL CITRATE 25 MCG: 50 INJECTION INTRAMUSCULAR; INTRAVENOUS at 14:11

## 2019-10-08 RX ADMIN — FENTANYL CITRATE 25 MCG: 50 INJECTION, SOLUTION INTRAMUSCULAR; INTRAVENOUS at 12:33

## 2019-10-08 RX ADMIN — OXYCODONE HYDROCHLORIDE AND ACETAMINOPHEN 2 TABLET: 5; 325 TABLET ORAL at 16:05

## 2019-10-08 RX ADMIN — ONDANSETRON 4 MG: 2 INJECTION INTRAMUSCULAR; INTRAVENOUS at 13:07

## 2019-10-08 RX ADMIN — HYDROMORPHONE HYDROCHLORIDE 0.5 MG: 1 INJECTION, SOLUTION INTRAMUSCULAR; INTRAVENOUS; SUBCUTANEOUS at 15:01

## 2019-10-08 RX ADMIN — FENTANYL CITRATE 50 MCG: 50 INJECTION, SOLUTION INTRAMUSCULAR; INTRAVENOUS at 12:22

## 2019-10-08 RX ADMIN — FENTANYL CITRATE 25 MCG: 50 INJECTION, SOLUTION INTRAMUSCULAR; INTRAVENOUS at 13:07

## 2019-10-08 RX ADMIN — FENTANYL CITRATE 25 MCG: 50 INJECTION, SOLUTION INTRAMUSCULAR; INTRAVENOUS at 12:43

## 2019-10-08 RX ADMIN — FENTANYL CITRATE 25 MCG: 50 INJECTION, SOLUTION INTRAMUSCULAR; INTRAVENOUS at 13:04

## 2019-10-08 RX ADMIN — MIDAZOLAM 2 MG: 1 INJECTION INTRAMUSCULAR; INTRAVENOUS at 12:19

## 2019-10-08 RX ADMIN — SODIUM CHLORIDE, SODIUM LACTATE, POTASSIUM CHLORIDE, AND CALCIUM CHLORIDE 125 ML/HR: .6; .31; .03; .02 INJECTION, SOLUTION INTRAVENOUS at 15:18

## 2019-10-08 RX ADMIN — PROPOFOL 50 MG: 10 INJECTION, EMULSION INTRAVENOUS at 12:24

## 2019-10-08 RX ADMIN — CEFAZOLIN SODIUM 2000 MG: 2 SOLUTION INTRAVENOUS at 12:19

## 2019-10-08 RX ADMIN — PROPOFOL 50 MG: 10 INJECTION, EMULSION INTRAVENOUS at 12:23

## 2019-10-08 RX ADMIN — LIDOCAINE HYDROCHLORIDE 50 MG: 10 INJECTION, SOLUTION INFILTRATION; PERINEURAL at 12:22

## 2019-10-08 RX ADMIN — HYDROCORTISONE SODIUM SUCCINATE 50 MG: 100 INJECTION, POWDER, FOR SOLUTION INTRAMUSCULAR; INTRAVENOUS at 12:27

## 2019-10-08 RX ADMIN — FENTANYL CITRATE 25 MCG: 50 INJECTION INTRAMUSCULAR; INTRAVENOUS at 14:16

## 2019-10-08 RX ADMIN — HYDROMORPHONE HYDROCHLORIDE 0.5 MG: 1 INJECTION, SOLUTION INTRAMUSCULAR; INTRAVENOUS; SUBCUTANEOUS at 14:39

## 2019-10-08 RX ADMIN — HYDROMORPHONE HYDROCHLORIDE 0.5 MG: 1 INJECTION, SOLUTION INTRAMUSCULAR; INTRAVENOUS; SUBCUTANEOUS at 15:15

## 2019-10-08 RX ADMIN — HYDROMORPHONE HYDROCHLORIDE 0.5 MG: 1 INJECTION, SOLUTION INTRAMUSCULAR; INTRAVENOUS; SUBCUTANEOUS at 14:49

## 2019-10-08 NOTE — DISCHARGE INSTRUCTIONS
Post Operative Instructions    You have had surgery on your arm today, please read and follow the information below:  · Elevate your hand above your elbow during the next 24-48 hours to help with swelling  · Place your hand and arm over your head with motion at your shoulder three times a day  · Do not apply any cream/ointment/oil to your incisions including antibiotics  · Do not soak your hands in standing water (dishwater, tubs, Jacuzzi's, pools, etc ) until given permission (typically 2-3 weeks after injury)    Call the office if you notice any:  · Increased numbness or tingling of your hand or fingers that is not relieved with elevation  · Increasing pain that is not controlled with medication  · Difficulty chewing, breathing, swallowing  · Chest pains or shortness of breath  · Fever over 101 4 degrees  Bandage: Do NOT remove bandage until follow-up appointment  Motion: Move fingers into a fist 5 times a day, DO NOT move any splinted fingers  Weight bearing status: The operated extremity should be non-weight bearing until further notice  Ice: Ice for 10 minutes every hour as needed for swelling x 24 hours  Sling: No sling necessary  Medications:   Tylenol Extended Release 650 mg every 8 hours  Percocet 1 tab every 6 hours AS NEEDED for pain     Follow-up Appointment: 7-10 days  Please call the office if you have any questions or concerns regarding your post-operative care

## 2019-10-08 NOTE — ANESTHESIA POSTPROCEDURE EVALUATION
Post-Op Assessment Note    CV Status:  Stable    Pain management: adequate     Mental Status:  Alert and awake   Hydration Status:  Euvolemic   PONV Controlled:  Controlled   Airway Patency:  Patent   Post Op Vitals Reviewed: Yes      Staff: CRNA           /92 (10/08/19 1357)    Temp 97 5 °F (36 4 °C) (10/08/19 1357)    Pulse (!) 110 (10/08/19 1357)   Resp 20 (10/08/19 1357)    SpO2 99 % (10/08/19 1357)

## 2019-10-08 NOTE — H&P
H&P Exam - Orthopedics   Keke Salmeron 26 y o  female MRN: [id number]  Unit/Bed#: [facility] 06    Assessment/Plan   Assessment:  R wrist rheumatoid arthritis  Plan:  Resection of the distal ulna of the right wrist today under general anesthesia    History of Present Illness   HPI:  Keke Salmeron is a 26 y o  female who presents with rheumatoid arthritis of the right wrist with significant pain, and distal radial ulnar joint instability, patient has an allergy to hardware, as such, we will undergo a distal ulnar resection with possible stabilization  Historical Information  Review Of Systems:   · Skin: Normal  · Neuro: See HPI  · Musculoskeletal: See HPI  · 14 point review of systems negative except as stated above     Past Medical History:   Past Medical History:   Diagnosis Date    Abnormal Pap smear of cervix     Anemia     Anxiety     Back pain     Depression     GERD (gastroesophageal reflux disease)     IBS (irritable bowel syndrome)     Iron deficiency     Irregular menses     Menorrhagia     Migraines     Neuropathy     Obesity     Osteoarthritis of back     Osteopenia     PONV (postoperative nausea and vomiting)     RA (rheumatoid arthritis) ([address] )     Scratches     On back and shoulder from scratching due to urticaria    Vasculitis ([address] )     Wears glasses        Past Surgical History:   Past Surgical History:   Procedure Laterality Date    CAST APPLICATION Left 39/01/1039    Procedure: APPLICATION LONG ARM Judie Ball;  Surgeon: Pavel Turk MD;  [facility];  Service: Orthopedics    CHOLECYSTECTOMY      Laparoscopic    DILATION AND CURETTAGE OF UTERUS      HYSTERECTOMY      JOINT REPLACEMENT Bilateral     knees    FL COLONOSCOPY FLX DX W/COLLJ SPEC WHEN PFRMD N/A 2/22/2019    Procedure: COLONOSCOPY;  Surgeon: Elsa Navarro MD;  Location: [facility];   Service: Gastroenterology    FL ESOPHAGOGASTRODUODENOSCOPY TRANSORAL DIAGNOSTIC N/A 2/22/2019    Procedure: ESOPHAGOGASTRODUODENOSCOPY (EGD); Surgeon: Solomon Sage MD;  Location: [facility];   Service: Gastroenterology    CA FUSION/GRAFT WRIST JOINT Left 4/4/2017    Procedure: WRIST FUSION / SPLINT APPLICATION ;  Surgeon: Derrick Zamorano MD;  [facility];  Service: Orthopedics    CA FUSION/GRAFT WRIST JOINT Right 4/10/2018    Procedure: Removal of hardware right wrist with Fusion of Long finger carpometacarpal joint, splint application Right Wrist;  Surgeon: Derrick Zamorano MD;  [facility];  Service: Orthopedics    CA HYSTEROSCOPY,W/ENDO BX N/A 12/1/2017    Procedure: DILATATION AND CURETTAGE (D&C) WITH HYSTEROSCOPY;  Surgeon: Estela Pacheco DO;  [facility];  Service: Gynecology    CA LAP,CHOLECYSTECTOMY N/A 3/14/2017    Procedure: CHOLECYSTECTOMY LAPAROSCOPIC;  Surgeon: Carrington Lawler DO;  [facility];  Service: General    CA LAPAROSCOPY W TOT HYSTERECTUTERUS <=250 GRAM  W TUBE/OVARY N/A 1/18/2019    Procedure: HYSTERECTOMY LAPAROSCOPIC TOTAL ([address]) Bilateral salpingectomy, cystoscopy;  Surgeon: Estela Pacheco DO;  [facility];  Service: Gynecology    CA REMOVAL DEEP IMPLANT Left 11/13/2018    Procedure: REMOVAL OF HARDWARE (wrist fusion plate  AND ulnar head arthroplasty) with conversion to Darrach ;  Surgeon: Derrick Zamorano MD;  [facility];  Service: Orthopedics    WRIST SURGERY Right     For arthritis    WRIST SURGERY Left 4/4/2017    Procedure: ARTHROPLASTY WRIST ULNAR HEAD ARTHROPLASTY - INTEGRA SIZE 5 5 MM, 16 HEAD;  Surgeon: Derrick Zamorano MD;  [facility];  Service:        Family History:  Family history reviewed and non-contributory  Family History   Problem Relation Age of Onset    Hypertension Mother     Rheum arthritis Mother     Depression Mother     Anxiety disorder Mother        Social History:  Social History     Socioeconomic History    Marital status:      Spouse name: None    Number of children: None    Years of education: None    Highest education level: None   Occupational History    None   Social Needs    Financial resource strain: None    Food insecurity:     Worry: None     Inability: None    Transportation needs:     Medical: None     Non-medical: None   Tobacco Use    Smoking status: Former Smoker     Packs/day: 0 25     Years: 3 00     Pack years: 0 75     Types: Cigarettes     Last attempt to quit:      Years since quittin 7    Smokeless tobacco: Never Used   Substance and Sexual Activity    Alcohol use: Yes     Frequency: Monthly or less     Comment: Only a couple of times a year    Drug use: No    Sexual activity: Not Currently     Birth control/protection: Other   Lifestyle    Physical activity:     Days per week: None     Minutes per session: None    Stress: None   Relationships    Social connections:     Talks on phone: None     Gets together: None     Attends [employer] service: None     Active member of club or organization: None     Attends meetings of clubs or organizations: None     Relationship status: None    Intimate partner violence:     Fear of current or ex partner: None     Emotionally abused: None     Physically abused: None     Forced sexual activity: None   Other Topics Concern    None   Social History Narrative    None       Allergies:    Allergies   Allergen Reactions    Nickel Rash    Tylenol With Codeine #3 [Acetaminophen-Codeine] Hives, Itching and Facial Swelling     Can take oxycodone           Labs:  0   Lab Value Date/Time    HCT 39 5 10/04/2019 1645    HCT 41 8 2019 1205    HCT 45 0 10/03/2018 0941    HCT 38 4 2015 1300    HCT 34 6 (L) 10/27/2015 1257    HCT 37 3 2015 1332    HGB 13 4 10/04/2019 1645    HGB 13 3 2019 1205    HGB 14 8 10/03/2018 0941    HGB 11 9 2015 1300    HGB 10 8 (L) 10/27/2015 1257    HGB 11 6 2015 1709    HGB 11 9 2015 1332    INR 1 07 06/10/2014 1210    WBC 8 60 10/04/2019 1645    WBC 8 33 01/14/2019 1205    WBC 9 90 10/03/2018 0941    WBC 9 97 11/11/2015 1300    WBC 10 94 (H) 10/27/2015 1257    WBC 8 96 04/02/2015 1332    ESR 6 09/07/2018 1308    ESR 20 11/11/2015 1300    CRP <3 0 09/07/2018 1308    CRP 53 9 (H) 11/11/2015 1300       Meds:    Current Facility-Administered Medications:     ceFAZolin (ANCEF) IVPB (premix) 2,000 mg, 2,000 mg, Intravenous, Once, Gary Reis MD    Blood Culture:   No results found for: BLOODCX    Wound Culture:   No results found for: WOUNDCULT    Ins and Outs:  No intake/output data recorded  Physical Exam  /88   Pulse (!) 106   Temp 98 7 °F (37 1 °C) (Tympanic)   Resp 20   LMP  (LMP Unknown) Comment: irregular  SpO2 96%   /88   Pulse (!) 106   Temp 98 7 °F (37 1 °C) (Tympanic)   Resp 20   LMP  (LMP Unknown) Comment: irregular  SpO2 96%   Gen: Alert and oriented to person, place, time  HEENT: EOMI, eyes clear, moist mucus membranes, hearing intact  Respiratory: Bilateral chest rise  No audible wheezing found  Cardiovascular: Regular Rate and Rhythm  Abdomen: soft nontender/nondistended  Ortho Exam:  Right wrist with prominent ulnar head to the right side  Patient is tenderness to the distal radial ulnar joint, consistent arthritic change into the distal radial ulnar joint  Neuro Exam:   The patient is neurovascularly intact in the median, ulnar, and radial nerve distribution  There is normal sensation and good capillary refill within the digits  2+ pulses              Lab Results: Reviewed  Imaging: Reviewed

## 2019-10-08 NOTE — ANESTHESIA PREPROCEDURE EVALUATION
Review of Systems/Medical History  Patient summary reviewed  Chart reviewed  History of anesthetic complications PONV    Cardiovascular  Exercise tolerance (METS): >4,     Pulmonary  Negative pulmonary ROS        GI/Hepatic    GERD well controlled,        Negative  ROS        Endo/Other  Negative endo/other ROS      GYN  Negative gynecology ROS          Hematology  Anemia ,     Musculoskeletal  Rheumatoid arthritis Severity: severe,   Arthritis     Neurology    Headaches,    Psychology   Anxiety, Depression ,              Physical Exam    Airway    Mallampati score: III  TM Distance: >3 FB  Neck ROM: full     Dental       Cardiovascular  Rhythm: regular, Rate: normal, Cardiovascular exam normal    Pulmonary  Pulmonary exam normal Breath sounds clear to auscultation,     Other Findings        Anesthesia Plan  ASA Score- 2     Anesthesia Type- general and regional with ASA Monitors  Additional Monitors:   Airway Plan: LMA  Plan Factors- Patient instructed to abstain from smoking on day of procedure  Patient did not smoke on day of surgery  Induction- intravenous  Postoperative Plan- Plan for postoperative opioid use  Informed Consent- Anesthetic plan and risks discussed with patient  I personally reviewed this patient with the CRNA  Discussed and agreed on the Anesthesia Plan with the CRNA  [facility]

## 2019-10-08 NOTE — OP NOTE
OPERATIVE REPORT  PATIENT NAME: [facility]  :  1990  MRN: [id number]  Pt [facility]    SURGERY DATE: 10/08/19    Surgeon(s) and Role:     * Ashlee Rendon MD - Primary     * JACQUELINE Sharma - Assisting    Pre-Op Diagnosis:  Rheumatoid arthritis involving right wrist with positive rheumatoid factor ([facility] [address] ) [M05 731]    Post-Op Diagnosis Codes:     * 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) [M05 731]    Procedure(s):  EXCISION DISTAL ULNA (DARRACH PROCEDURE) right (Right)  APPLICATION LONG ARM SUGAR TONG SPLINT (Right)  TRANSFER TENDON EXTENSOR CARPI ULNARIS AT THE WRIST (Right)    Specimen(s):  * No orders in the log *    Estimated Blood Loss:   Minimal      Anesthesia Type:   General    Operative Indications: The patient has a history of UNCONTROLLED RHEUMATOID ARTHRITIS that was recalcitrant to conservative management  The decision was made to bring the patient to the operating room for  Right wrist resection of the distal ulna ( Rosa) with possible stabilization  Risks of the procedure were explained which include, but are not limited to bleeding; infection; damage to nerves, arteries,veins, tendons; scar; pain; need for reoperation; failure to give desired result; and risks of anaesthesia  All questions were answered to satisfaction and they were willing to proceed  Operative Findings:   complete destruction ulnar head of the right wrist with instability of the distal radial ulnar joint    Complications:   None    Procedure and Technique:  After the patient, site, and procedure were identified, the patient was brought into the operating room in a supine position  General anaesthesia and local medication were provided  A well padded tourniquet was applied to the extremity, set at 250 mmHg  The  right upper extremity was then prepped and drapped in a normal, sterile, orthopedic fashion       after the patient, site, and procedure were identified attention was turned towards the right wrist   An Esmarch bandage was used to exsanguinate the limb and the tourniquet was inflated to 250 mmHg  An incision along the ulnar aspect of the wrist was then made  We dissected down through the skin and subcutaneous tissues protecting superficial neurovascular structures  The traversing and dorsal cutaneous branch of the ulnar nerve was identified and protected  We opened up the interval between the flexor and extensor carpi ulnaris tendons  We identified the ulnar head  We carefully meticulously dissected around the periosteum of the ulnar head both volarly and dorsally with care taken to protect the tendinous structures  At this point, utilizing a small microsagittal saw, the ulnar head was removed  There was complete obliteration of the articular surface of the ulna to the radius  Once the ulnar head was removed, we tested stability, the  Distal radial ulnar joint was unstable with significant motion in both  Supination and pronation  At this point, the decision was made to stabilize this  The extensor carpi ulnaris tendon was identified, and a distally based 5 mm portion of the extensor carpi ulnaris tendon was then harvested  This was utilized for tendon transfer  We opened up the medullary canal of the ulna, utilizing a small drill, a drill hole was then made  The ECU tendon was then transferred with a distal based attachment point through the medullary canal, out the drill hole, and sutured back upon itself  This provided excellent stabilization and pronation, supination, and neutral position  At this point, the wound was copiously irrigated with sterile saline solution and the periosteum was closed including the capsule of the distal radial ulnar joint  The tourniquet was then deflated  The wound was once again copiously irrigated  At the completion of the procedure, hemostasis was obtained with cautery and direct pressure  The wounds were copiously irrigated with sterile solution  The wounds were closed with Vicryl and Prolene  Sterile dressings were applied, including Xeroform, gauze, tweeners, webril, ACE and Sugartong Splint  Please note, all sponge, needle, and instrument counts were correct prior to closure  Loupe magnification was utilized  The patient tolerated the procedure well       I was present for the entire procedure, A qualified resident physician was not available and A physician assistant was required during the procedure for retraction tissue handling,dissection and suturing    Patient Disposition:  PACU , hemodynamically stable and extubated and stable    SIGNATURE: Billie Nicholas MD  DATE: 10/08/19  TIME: 1:50 PM

## 2019-10-18 ENCOUNTER — OFFICE VISIT (OUTPATIENT)
Dept: OCCUPATIONAL THERAPY | Facility: HOSPITAL | Age: 29
End: 2019-10-18
Attending: ORTHOPAEDIC SURGERY
Payer: MEDICARE

## 2019-10-18 ENCOUNTER — OFFICE VISIT (OUTPATIENT)
Dept: OBGYN CLINIC | Facility: HOSPITAL | Age: 29
End: 2019-10-18

## 2019-10-18 DIAGNOSIS — M05.731 RHEUMATOID ARTHRITIS INVOLVING RIGHT WRIST WITH POSITIVE RHEUMATOID FACTOR (HCC): Primary | ICD-10-CM

## 2019-10-18 DIAGNOSIS — Z51.89 AFTERCARE: ICD-10-CM

## 2019-10-18 PROCEDURE — L3763 EWHO RIGID W/O JNTS CF: HCPCS | Performed by: OCCUPATIONAL THERAPIST

## 2019-10-18 PROCEDURE — 99024 POSTOP FOLLOW-UP VISIT: CPT | Performed by: ORTHOPAEDIC SURGERY

## 2019-10-18 NOTE — PROGRESS NOTES
Orthosis    Diagnosis:   1  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)       Indication: Motion Blocking    Location: Right  elbow and wrist  Supplies: Custom Fit Orthotic and Skin coverage   Orthosis type: [place]  Wearing Schedule: Remove with Protected Technique Only as Needed  Describe Position: neutral    Precautions: Universal (skin contact/breakdown) and ulnar shortening     Patient or Caregiver expresses understanding of wearing Schedule and Precautions? Yes  Patient or Caregiver able to don/doff orthotic independently? Yes    Written orders provided to patient?  Yes  Orders Obtained: Written  Orders Obtained from: Dr Josiah Howard    Return for evaluation and treatment Not at this time

## 2019-10-18 NOTE — PROGRESS NOTES
Assessment:   Excision of the distal ulnar transfer tendon extensor carpi ulnaris at the wrist, 10/8/19  Plan:   - Referred to OT for a Monroe brace      Follow Up:  4  week(s)    To Do Next Visit:  Re- evaluate the RIGHT wrist      CHIEF COMPLAINT:  Chief Complaint   Patient presents with    Right Wrist - Post-op         SUBJECTIVE:  Peg Johnson is a 26 y o  female who presents for follow up after Excision of the distal ulnar transfer tendon extensor carpi ulnaris at the wrist, 10/8/19  Today patient has Pain  Mild  Intermittant  Dull and Aching         PHYSICAL EXAMINATION:  Vital signs: LMP  (LMP Unknown) Comment: irregular  General: well developed and well nourished, alert, oriented times 3 and appears comfortable  Psychiatric: Normal    MUSCULOSKELETAL EXAMINATION:  Incision: Clean, dry, intact  Range of Motion: As expected, Limited due to pain, Limited due to stiffness and full composite fist possible  Neurovascular status: Neuro intact, good cap refill    STUDIES REVIEWED:  No Studies to review       PROCEDURES PERFORMED:  Procedures  No Procedures performed today

## 2019-11-22 ENCOUNTER — APPOINTMENT (OUTPATIENT)
Dept: LAB | Facility: HOSPITAL | Age: 29
End: 2019-11-22
Payer: MEDICARE

## 2019-11-22 ENCOUNTER — OFFICE VISIT (OUTPATIENT)
Dept: OBGYN CLINIC | Facility: HOSPITAL | Age: 29
End: 2019-11-22

## 2019-11-22 ENCOUNTER — TRANSCRIBE ORDERS (OUTPATIENT)
Dept: LAB | Facility: HOSPITAL | Age: 29
End: 2019-11-22

## 2019-11-22 VITALS
BODY MASS INDEX: 33.43 KG/M2 | HEIGHT: 67 IN | DIASTOLIC BLOOD PRESSURE: 74 MMHG | SYSTOLIC BLOOD PRESSURE: 120 MMHG | WEIGHT: 213 LBS | HEART RATE: 76 BPM

## 2019-11-22 DIAGNOSIS — E03.9 MYXEDEMA HEART DISEASE: ICD-10-CM

## 2019-11-22 DIAGNOSIS — R63.5 ABNORMAL WEIGHT GAIN: ICD-10-CM

## 2019-11-22 DIAGNOSIS — E88.81 DYSMETABOLIC SYNDROME X: ICD-10-CM

## 2019-11-22 DIAGNOSIS — E78.41 ELEVATED LIPOPROTEIN A LEVEL: ICD-10-CM

## 2019-11-22 DIAGNOSIS — I51.9 MYXEDEMA HEART DISEASE: ICD-10-CM

## 2019-11-22 DIAGNOSIS — R73.01 IMPAIRED FASTING GLUCOSE: ICD-10-CM

## 2019-11-22 DIAGNOSIS — R06.02 SHORTNESS OF BREATH: ICD-10-CM

## 2019-11-22 DIAGNOSIS — Z47.89 AFTERCARE FOLLOWING SURGERY OF THE MUSCULOSKELETAL SYSTEM: Primary | ICD-10-CM

## 2019-11-22 DIAGNOSIS — R73.01 IMPAIRED FASTING GLUCOSE: Primary | ICD-10-CM

## 2019-11-22 DIAGNOSIS — R51.9 FACIAL PAIN: ICD-10-CM

## 2019-11-22 LAB
25(OH)D3 SERPL-MCNC: 25.2 NG/ML (ref 30–100)
ALBUMIN SERPL BCP-MCNC: 3.8 G/DL (ref 3.5–5)
ALP SERPL-CCNC: 91 U/L (ref 46–116)
ALT SERPL W P-5'-P-CCNC: 18 U/L (ref 12–78)
ANION GAP SERPL CALCULATED.3IONS-SCNC: 5 MMOL/L (ref 4–13)
AST SERPL W P-5'-P-CCNC: 13 U/L (ref 5–45)
BACTERIA UR QL AUTO: ABNORMAL /HPF
BASOPHILS # BLD AUTO: 0.05 THOUSANDS/ΜL (ref 0–0.1)
BASOPHILS NFR BLD AUTO: 1 % (ref 0–1)
BILIRUB SERPL-MCNC: 0.44 MG/DL (ref 0.2–1)
BILIRUB UR QL STRIP: NEGATIVE
BUN SERPL-MCNC: 8 MG/DL (ref 5–25)
CALCIUM SERPL-MCNC: 9.6 MG/DL (ref 8.3–10.1)
CHLORIDE SERPL-SCNC: 106 MMOL/L (ref 100–108)
CLARITY UR: CLEAR
CO2 SERPL-SCNC: 25 MMOL/L (ref 21–32)
COLOR UR: YELLOW
CREAT SERPL-MCNC: 0.61 MG/DL (ref 0.6–1.3)
EOSINOPHIL # BLD AUTO: 0 THOUSAND/ΜL (ref 0–0.61)
EOSINOPHIL NFR BLD AUTO: 0 % (ref 0–6)
ERYTHROCYTE [DISTWIDTH] IN BLOOD BY AUTOMATED COUNT: 12.4 % (ref 11.6–15.1)
FOLATE SERPL-MCNC: >20 NG/ML (ref 3.1–17.5)
GFR SERPL CREATININE-BSD FRML MDRD: 122 ML/MIN/1.73SQ M
GLUCOSE SERPL-MCNC: 92 MG/DL (ref 65–140)
GLUCOSE UR STRIP-MCNC: NEGATIVE MG/DL
HCT VFR BLD AUTO: 43 % (ref 34.8–46.1)
HGB BLD-MCNC: 13.8 G/DL (ref 11.5–15.4)
HGB UR QL STRIP.AUTO: NEGATIVE
HYALINE CASTS #/AREA URNS LPF: ABNORMAL /LPF
IMM GRANULOCYTES # BLD AUTO: 0.09 THOUSAND/UL (ref 0–0.2)
IMM GRANULOCYTES NFR BLD AUTO: 1 % (ref 0–2)
IRON SATN MFR SERPL: 17 %
IRON SERPL-MCNC: 60 UG/DL (ref 50–170)
KETONES UR STRIP-MCNC: NEGATIVE MG/DL
LEUKOCYTE ESTERASE UR QL STRIP: ABNORMAL
LYMPHOCYTES # BLD AUTO: 1.15 THOUSANDS/ΜL (ref 0.6–4.47)
LYMPHOCYTES NFR BLD AUTO: 11 % (ref 14–44)
MAGNESIUM SERPL-MCNC: 2.1 MG/DL (ref 1.6–2.6)
MCH RBC QN AUTO: 32.1 PG (ref 26.8–34.3)
MCHC RBC AUTO-ENTMCNC: 32.1 G/DL (ref 31.4–37.4)
MCV RBC AUTO: 100 FL (ref 82–98)
MONOCYTES # BLD AUTO: 0.4 THOUSAND/ΜL (ref 0.17–1.22)
MONOCYTES NFR BLD AUTO: 4 % (ref 4–12)
NEUTROPHILS # BLD AUTO: 9.31 THOUSANDS/ΜL (ref 1.85–7.62)
NEUTS SEG NFR BLD AUTO: 83 % (ref 43–75)
NITRITE UR QL STRIP: NEGATIVE
NON-SQ EPI CELLS URNS QL MICRO: ABNORMAL /HPF
NRBC BLD AUTO-RTO: 0 /100 WBCS
PH UR STRIP.AUTO: 7 [PH]
PHOSPHATE SERPL-MCNC: 2.5 MG/DL (ref 2.7–4.5)
PLATELET # BLD AUTO: 238 THOUSANDS/UL (ref 149–390)
PMV BLD AUTO: 11.4 FL (ref 8.9–12.7)
POTASSIUM SERPL-SCNC: 4.2 MMOL/L (ref 3.5–5.3)
PROT SERPL-MCNC: 7.6 G/DL (ref 6.4–8.2)
PROT UR STRIP-MCNC: NEGATIVE MG/DL
RBC # BLD AUTO: 4.3 MILLION/UL (ref 3.81–5.12)
RBC #/AREA URNS AUTO: ABNORMAL /HPF
SODIUM SERPL-SCNC: 136 MMOL/L (ref 136–145)
SP GR UR STRIP.AUTO: 1.01 (ref 1–1.03)
TIBC SERPL-MCNC: 345 UG/DL (ref 250–450)
TSH SERPL DL<=0.05 MIU/L-ACNC: 0.41 UIU/ML (ref 0.36–3.74)
UROBILINOGEN UR QL STRIP.AUTO: 0.2 E.U./DL
VIT B12 SERPL-MCNC: 412 PG/ML (ref 100–900)
WBC # BLD AUTO: 11 THOUSAND/UL (ref 4.31–10.16)
WBC #/AREA URNS AUTO: ABNORMAL /HPF

## 2019-11-22 PROCEDURE — 83735 ASSAY OF MAGNESIUM: CPT

## 2019-11-22 PROCEDURE — 85025 COMPLETE CBC W/AUTO DIFF WBC: CPT

## 2019-11-22 PROCEDURE — 36415 COLL VENOUS BLD VENIPUNCTURE: CPT

## 2019-11-22 PROCEDURE — 84443 ASSAY THYROID STIM HORMONE: CPT

## 2019-11-22 PROCEDURE — 82607 VITAMIN B-12: CPT

## 2019-11-22 PROCEDURE — 99024 POSTOP FOLLOW-UP VISIT: CPT | Performed by: ORTHOPAEDIC SURGERY

## 2019-11-22 PROCEDURE — 80053 COMPREHEN METABOLIC PANEL: CPT

## 2019-11-22 PROCEDURE — 82746 ASSAY OF FOLIC ACID SERUM: CPT

## 2019-11-22 PROCEDURE — 82306 VITAMIN D 25 HYDROXY: CPT

## 2019-11-22 PROCEDURE — 83540 ASSAY OF IRON: CPT

## 2019-11-22 PROCEDURE — 81001 URINALYSIS AUTO W/SCOPE: CPT

## 2019-11-22 PROCEDURE — 83550 IRON BINDING TEST: CPT

## 2019-11-22 PROCEDURE — 84100 ASSAY OF PHOSPHORUS: CPT

## 2019-11-22 NOTE — PROGRESS NOTES
Assessment:   Excision of the distal ulnar transfer tendon extensor carpi ulnaris at the wrist, 10/8/19  Plan:   Patient may dc use of her muenster splint at this time  She has encouraged to work on ROM and strengthening of her right wrist  No formal restrictions  Follow Up:  6  week(s)    To Do Next Visit:         CHIEF COMPLAINT:  Chief Complaint   Patient presents with    Right Wrist - Post-op         SUBJECTIVE:   Mirela Kamara is a 26 y o  female who presents for follow up after Excision of the distal ulnar transfer tendon extensor carpi ulnaris at the wrist, 10/8/19  Today patient has No Complaints  Patient states that she is feeling well  She has been in her muenster splint since her last visit  She states that her pain is improved since her last visit         PHYSICAL EXAMINATION:  Vital signs: /74   Pulse 76   Ht 5' 7" (1 702 m)   Wt 96 6 kg (213 lb)   LMP  (LMP Unknown) Comment: irregular  BMI 33 36 kg/m²   General: well developed and well nourished, alert, oriented times 3 and appears comfortable  Psychiatric: Normal    MUSCULOSKELETAL EXAMINATION:  Incision: healed  Range of Motion: full composite fist possible and full pronation and supination, no instability  Neurovascular status: Neuro intact, good cap refill  Activity Restrictions: No restrictions         STUDIES REVIEWED:  No Studies to review      PROCEDURES PERFORMED:  Procedures  No Procedures performed today   Scribe Attestation    I,:   Nallely Goins am acting as a scribe while in the presence of the attending physician :        I,:   Spenser Carlos MD personally performed the services described in this documentation    as scribed in my presence :

## 2020-03-13 ENCOUNTER — APPOINTMENT (OUTPATIENT)
Dept: LAB | Facility: HOSPITAL | Age: 30
End: 2020-03-13
Payer: MEDICARE

## 2020-03-13 ENCOUNTER — TRANSCRIBE ORDERS (OUTPATIENT)
Dept: ADMINISTRATIVE | Facility: HOSPITAL | Age: 30
End: 2020-03-13

## 2020-03-13 DIAGNOSIS — M54.50 LOW BACK PAIN, UNSPECIFIED BACK PAIN LATERALITY, UNSPECIFIED CHRONICITY, UNSPECIFIED WHETHER SCIATICA PRESENT: ICD-10-CM

## 2020-03-13 DIAGNOSIS — M25.50 PAIN IN JOINT, MULTIPLE SITES: Primary | ICD-10-CM

## 2020-03-13 DIAGNOSIS — E88.81 DYSMETABOLIC SYNDROME X: ICD-10-CM

## 2020-03-13 DIAGNOSIS — F41.9 ANXIETY: ICD-10-CM

## 2020-03-13 DIAGNOSIS — F32.A VASCULAR DEMENTIA WITH DEPRESSED MOOD (HCC): ICD-10-CM

## 2020-03-13 DIAGNOSIS — F01.50 VASCULAR DEMENTIA WITH DEPRESSED MOOD (HCC): ICD-10-CM

## 2020-03-13 DIAGNOSIS — E28.39 RESISTANT OVARY SYNDROME: ICD-10-CM

## 2020-03-13 DIAGNOSIS — M25.50 PAIN IN JOINT, MULTIPLE SITES: ICD-10-CM

## 2020-03-13 DIAGNOSIS — E03.9 MYXEDEMA HEART DISEASE: ICD-10-CM

## 2020-03-13 DIAGNOSIS — I51.9 MYXEDEMA HEART DISEASE: ICD-10-CM

## 2020-03-13 DIAGNOSIS — R73.01 IMPAIRED FASTING GLUCOSE: ICD-10-CM

## 2020-03-13 DIAGNOSIS — R79.9 ABNORMAL BLOOD CHEMISTRY: ICD-10-CM

## 2020-03-13 DIAGNOSIS — R53.83 FATIGUE, UNSPECIFIED TYPE: ICD-10-CM

## 2020-03-13 DIAGNOSIS — E55.9 AVITAMINOSIS D: ICD-10-CM

## 2020-03-13 DIAGNOSIS — F39 MILD MOOD DISORDER (HCC): ICD-10-CM

## 2020-03-13 DIAGNOSIS — E78.5 HYPERLIPIDEMIA, UNSPECIFIED HYPERLIPIDEMIA TYPE: ICD-10-CM

## 2020-03-13 DIAGNOSIS — E78.1 PURE HYPERGLYCERIDEMIA: ICD-10-CM

## 2020-03-13 LAB
ANION GAP SERPL CALCULATED.3IONS-SCNC: 7 MMOL/L (ref 5–14)
BASOPHILS # BLD AUTO: 0.1 THOUSANDS/ΜL (ref 0–0.1)
BASOPHILS NFR BLD AUTO: 1 % (ref 0–1)
BUN SERPL-MCNC: 8 MG/DL (ref 5–25)
CALCIUM SERPL-MCNC: 9.3 MG/DL (ref 8.4–10.2)
CHLORIDE SERPL-SCNC: 105 MMOL/L (ref 97–108)
CO2 SERPL-SCNC: 26 MMOL/L (ref 22–30)
CREAT SERPL-MCNC: 0.52 MG/DL (ref 0.6–1.2)
EOSINOPHIL # BLD AUTO: 0 THOUSAND/ΜL (ref 0–0.4)
EOSINOPHIL NFR BLD AUTO: 0 % (ref 0–6)
ERYTHROCYTE [DISTWIDTH] IN BLOOD BY AUTOMATED COUNT: 14.3 %
GFR SERPL CREATININE-BSD FRML MDRD: 129 ML/MIN/1.73SQ M
GLUCOSE P FAST SERPL-MCNC: 80 MG/DL (ref 70–99)
HCT VFR BLD AUTO: 42.5 % (ref 36–46)
HGB BLD-MCNC: 14.3 G/DL (ref 12–16)
LYMPHOCYTES # BLD AUTO: 3.4 THOUSANDS/ΜL (ref 0.5–4)
LYMPHOCYTES NFR BLD AUTO: 31 % (ref 25–45)
MCH RBC QN AUTO: 32.7 PG (ref 26–34)
MCHC RBC AUTO-ENTMCNC: 33.5 G/DL (ref 31–36)
MCV RBC AUTO: 98 FL (ref 80–100)
MONOCYTES # BLD AUTO: 0.6 THOUSAND/ΜL (ref 0.2–0.9)
MONOCYTES NFR BLD AUTO: 6 % (ref 1–10)
NEUTROPHILS # BLD AUTO: 6.9 THOUSANDS/ΜL (ref 1.8–7.8)
NEUTS SEG NFR BLD AUTO: 63 % (ref 45–65)
PHOSPHATE SERPL-MCNC: 2.7 MG/DL (ref 2.5–4.8)
PLATELET # BLD AUTO: 247 THOUSANDS/UL (ref 150–450)
PMV BLD AUTO: 9.6 FL (ref 8.9–12.7)
POTASSIUM SERPL-SCNC: 3.6 MMOL/L (ref 3.6–5)
RBC # BLD AUTO: 4.36 MILLION/UL (ref 4–5.2)
SODIUM SERPL-SCNC: 138 MMOL/L (ref 137–147)
WBC # BLD AUTO: 11 THOUSAND/UL (ref 4.5–11)

## 2020-03-13 PROCEDURE — 85025 COMPLETE CBC W/AUTO DIFF WBC: CPT

## 2020-03-13 PROCEDURE — 36415 COLL VENOUS BLD VENIPUNCTURE: CPT

## 2020-03-13 PROCEDURE — 84100 ASSAY OF PHOSPHORUS: CPT

## 2020-03-13 PROCEDURE — 80048 BASIC METABOLIC PNL TOTAL CA: CPT

## 2020-04-21 DIAGNOSIS — R10.9 ABDOMINAL PAIN: ICD-10-CM

## 2020-04-21 RX ORDER — DICYCLOMINE HCL 20 MG
20 TABLET ORAL 4 TIMES DAILY PRN
Qty: 120 TABLET | Refills: 3 | Status: SHIPPED | OUTPATIENT
Start: 2020-04-21 | End: 2021-08-30 | Stop reason: SDUPTHER

## 2020-06-26 ENCOUNTER — APPOINTMENT (OUTPATIENT)
Dept: LAB | Facility: HOSPITAL | Age: 30
End: 2020-06-26
Payer: MEDICARE

## 2020-06-26 ENCOUNTER — TRANSCRIBE ORDERS (OUTPATIENT)
Dept: LAB | Facility: HOSPITAL | Age: 30
End: 2020-06-26

## 2020-06-26 DIAGNOSIS — R68.82 DECREASED LIBIDO: ICD-10-CM

## 2020-06-26 DIAGNOSIS — E88.81 DYSMETABOLIC SYNDROME X: ICD-10-CM

## 2020-06-26 DIAGNOSIS — Z02.83 DRUG SCREENING IN ATHLETES: ICD-10-CM

## 2020-06-26 DIAGNOSIS — R42 DIZZINESS AND GIDDINESS: ICD-10-CM

## 2020-06-26 DIAGNOSIS — E55.9 AVITAMINOSIS D: ICD-10-CM

## 2020-06-26 DIAGNOSIS — E28.39 RESISTANT OVARY SYNDROME: ICD-10-CM

## 2020-06-26 DIAGNOSIS — R73.01 IMPAIRED FASTING GLUCOSE: Primary | ICD-10-CM

## 2020-06-26 DIAGNOSIS — R79.9 ABNORMAL BLOOD CHEMISTRY: ICD-10-CM

## 2020-06-26 DIAGNOSIS — M25.50 PAIN IN JOINT, MULTIPLE SITES: ICD-10-CM

## 2020-06-26 DIAGNOSIS — E78.41 ELEVATED LIPOPROTEIN A LEVEL: ICD-10-CM

## 2020-06-26 DIAGNOSIS — R63.4 LOSS OF WEIGHT: ICD-10-CM

## 2020-06-26 DIAGNOSIS — Z02.89 OTHER GENERAL MEDICAL EXAMINATION FOR ADMINISTRATIVE PURPOSES: ICD-10-CM

## 2020-06-26 DIAGNOSIS — R51.9 FACIAL PAIN: ICD-10-CM

## 2020-06-26 DIAGNOSIS — Z79.899 ENCOUNTER FOR LONG-TERM (CURRENT) USE OF OTHER MEDICATIONS: ICD-10-CM

## 2020-06-26 DIAGNOSIS — R19.7 DIARRHEA OF PRESUMED INFECTIOUS ORIGIN: ICD-10-CM

## 2020-06-26 DIAGNOSIS — R73.01 IMPAIRED FASTING GLUCOSE: ICD-10-CM

## 2020-06-26 LAB
25(OH)D3 SERPL-MCNC: 24.7 NG/ML (ref 30–100)
ANION GAP SERPL CALCULATED.3IONS-SCNC: 6 MMOL/L (ref 5–14)
BUN SERPL-MCNC: 6 MG/DL (ref 5–25)
CALCIUM SERPL-MCNC: 9.5 MG/DL (ref 8.4–10.2)
CHLORIDE SERPL-SCNC: 100 MMOL/L (ref 97–108)
CO2 SERPL-SCNC: 29 MMOL/L (ref 22–30)
CREAT SERPL-MCNC: 0.49 MG/DL (ref 0.6–1.2)
GFR SERPL CREATININE-BSD FRML MDRD: 131 ML/MIN/1.73SQ M
GLUCOSE P FAST SERPL-MCNC: 93 MG/DL (ref 70–99)
MAGNESIUM SERPL-MCNC: 2 MG/DL (ref 1.6–2.3)
POTASSIUM SERPL-SCNC: 3.6 MMOL/L (ref 3.6–5)
SODIUM SERPL-SCNC: 135 MMOL/L (ref 137–147)
TSH SERPL DL<=0.05 MIU/L-ACNC: 1.1 UIU/ML (ref 0.47–4.68)

## 2020-06-26 PROCEDURE — 84443 ASSAY THYROID STIM HORMONE: CPT

## 2020-06-26 PROCEDURE — 80048 BASIC METABOLIC PNL TOTAL CA: CPT

## 2020-06-26 PROCEDURE — 80307 DRUG TEST PRSMV CHEM ANLYZR: CPT

## 2020-06-26 PROCEDURE — 83735 ASSAY OF MAGNESIUM: CPT

## 2020-06-26 PROCEDURE — 82306 VITAMIN D 25 HYDROXY: CPT

## 2020-06-26 PROCEDURE — 36415 COLL VENOUS BLD VENIPUNCTURE: CPT

## 2020-06-28 LAB — ETHANOL UR-MCNC: NEGATIVE %

## 2020-07-02 NOTE — PROGRESS NOTES
Assessment/Plan   Diagnoses and all orders for this visit:    Pain in left wrist    -  Splint checked and replaced  -  Displays drug seeking behavior       -  She understands that no narcotics will be prescribed today  -  Follow up with your surgeon for post operative care as scheduled  Subjective   Patient ID: Jocelyne Lopez is a 33 y o  female  Vitals:    11/26/18 1543   BP: 133/81   Pulse: 80     27yo female comes in for a cast check  She is s/p removal of hardware on 11/13/18 by Dr Kriss Mccarthy and was c/o increased pain  No numbness  She requests narcotics  This has already been addressed by Dr Kriss Mccarthy  She requests to have the splint removed and not replaced at all  I explained this decision will be up to her surgeon            The following portions of the patient's history were reviewed and updated as appropriate: allergies, current medications, past family history, past medical history, past social history, past surgical history and problem list     Review of Systems  Ortho Exam  Past Medical History:   Diagnosis Date    Abnormal Pap smear of cervix     Anemia     Anxiety     Depression     IBS (irritable bowel syndrome)     Iron deficiency     Irregular menses     Menorrhagia     Migraines     Neuropathy     Osteopenia     PONV (postoperative nausea and vomiting)     RA (rheumatoid arthritis) ([address] )     Rheumatoid arthritis ([address] )     Scratches     On back, stomach and legs from scratching due to urticaria    Vasculitis ([address] )      Past Surgical History:   Procedure Laterality Date    CAST APPLICATION Left 78/06/5441    Procedure: APPLICATION [facility];  Surgeon: Jearldine Goodell, MD;  [facility];  Service: Orthopedics    CHOLECYSTECTOMY      Laparoscopic    DILATION AND CURETTAGE OF UTERUS      JOINT REPLACEMENT Bilateral     knees    GA FUSION/GRAFT WRIST JOINT Left 4/4/2017    Procedure: WRIST FUSION / SPLINT APPLICATION ;  Surgeon: Chip Means Per Zander, NP rocephin 1gm given left glut.  Patient tolerated injection well.  Patient left clinic in  Satisfactory condition.   Alyssa Piedra MD;  [facility];  Service: Orthopedics    WA FUSION/GRAFT WRIST JOINT Right 4/10/2018    Procedure: Removal of hardware right wrist with Fusion of Long finger carpometacarpal joint, splint application Right Wrist;  Surgeon: Lalo Caceres MD;  [facility];  Service: Orthopedics    WA HYSTEROSCOPY,W/ENDO BX N/A 12/1/2017    Procedure: DILATATION AND CURETTAGE (D&C) WITH HYSTEROSCOPY;  Surgeon: Melanie Laguerre DO;  [facility];  Service: Gynecology    WA LAP,CHOLECYSTECTOMY N/A 3/14/2017    Procedure: CHOLECYSTECTOMY LAPAROSCOPIC;  Surgeon: Toya Seaman DO;  [facility];  Service: General    WA REMOVAL DEEP IMPLANT Left 11/13/2018    Procedure: REMOVAL OF HARDWARE (wrist fusion plate  AND ulnar head arthroplasty) with conversion to Darrach ;  Surgeon: Lalo Caceres MD;  [facility];  Service: Orthopedics    REPLACEMENT TOTAL KNEE Bilateral     WRIST SURGERY Right     For arthritis    WRIST SURGERY Left 4/4/2017    Procedure: ARTHROPLASTY WRIST ULNAR HEAD ARTHROPLASTY - INTEGRA SIZE 5 5 MM, 16 HEAD;  Surgeon: Lalo Caceres MD;  [facility];  Service:      Family History   Problem Relation Age of Onset    Hypertension Mother     Rheum arthritis Mother     Depression Mother     Anxiety disorder Mother      Social History     Occupational History    Not on file  Social History Main Topics    Smoking status: Former Smoker     Packs/day: 0 25     Years: 3 00     Types: Cigarettes     Quit date: 2010    Smokeless tobacco: Never Used    Alcohol use No      Comment: Rare- once or twice yearly    Drug use: No    Sexual activity: Not on file       Review of Systems   Constitutional: Negative  HENT: Negative  Eyes: Negative  Respiratory: Negative  Cardiovascular: Negative  Gastrointestinal: Negative  Endocrine: Negative  Genitourinary: Negative  Musculoskeletal: As below      Allergic/Immunologic: Negative  Neurological: Negative  Hematological: Negative  Psychiatric/Behavioral: Negative  Objective   Physical Exam    · Constitutional: Awake, Alert, Oriented  · Eyes: EOMI  · Psych: Mood and affect appropriate  · Heart: regular rate and rhythm  · Lungs: No audible wheezing  · Abdomen: soft  · Lymph: no lymphedema   left wrist:  - Appearance   Incision intact with sutures  No sign of infection   - The sugar tong splint was removed  This alleviated some of the pain but not all  A new sugar tong splint was placed  She was instructed to keep it splinted until her follow up visit with Dr Verna Barry  (Per the receptionists, she started taking this off on her way out the door)

## 2020-07-08 LAB
AMPHETAMINES UR QL SCN: NEGATIVE NG/ML
BARBITURATES UR QL SCN: NEGATIVE NG/ML
BENZODIAZ UR QL: NEGATIVE NG/ML
BZE UR QL: NEGATIVE NG/ML
CANNABINOIDS UR QL SCN: POSITIVE
METHADONE UR QL SCN: NEGATIVE NG/ML
OPIATES UR QL: NEGATIVE NG/ML
PCP UR QL: NEGATIVE NG/ML
PROPOXYPH UR QL SCN: NEGATIVE NG/ML

## 2020-08-04 ENCOUNTER — TRANSCRIBE ORDERS (OUTPATIENT)
Dept: ADMINISTRATIVE | Facility: HOSPITAL | Age: 30
End: 2020-08-04

## 2020-08-04 DIAGNOSIS — M79.671 RIGHT FOOT PAIN: Primary | ICD-10-CM

## 2020-08-06 ENCOUNTER — HOSPITAL ENCOUNTER (OUTPATIENT)
Dept: MRI IMAGING | Facility: HOSPITAL | Age: 30
Discharge: HOME | End: 2020-08-06
Attending: ORTHOPAEDIC SURGERY
Payer: MEDICARE

## 2020-08-06 DIAGNOSIS — M79.671 RIGHT FOOT PAIN: ICD-10-CM

## 2020-08-06 PROCEDURE — G1004 CDSM NDSC: HCPCS

## 2020-08-06 PROCEDURE — 73718 MRI LOWER EXTREMITY W/O DYE: CPT

## 2020-09-02 ENCOUNTER — TRANSCRIBE ORDERS (OUTPATIENT)
Dept: ADMINISTRATIVE | Facility: HOSPITAL | Age: 30
End: 2020-09-02

## 2020-09-02 DIAGNOSIS — M25.572 LEFT ANKLE PAIN, UNSPECIFIED CHRONICITY: Primary | ICD-10-CM

## 2020-09-04 ENCOUNTER — HOSPITAL ENCOUNTER (OUTPATIENT)
Dept: MRI IMAGING | Facility: HOSPITAL | Age: 30
Discharge: HOME | End: 2020-09-04
Attending: ORTHOPAEDIC SURGERY
Payer: MEDICARE

## 2020-09-04 DIAGNOSIS — M25.572 LEFT ANKLE PAIN, UNSPECIFIED CHRONICITY: ICD-10-CM

## 2020-09-04 PROCEDURE — G1004 CDSM NDSC: HCPCS

## 2020-09-04 PROCEDURE — 73721 MRI JNT OF LWR EXTRE W/O DYE: CPT

## 2021-05-28 ENCOUNTER — OFFICE VISIT (OUTPATIENT)
Dept: DENTISTRY | Facility: CLINIC | Age: 31
End: 2021-05-28

## 2021-05-28 VITALS
HEART RATE: 106 BPM | WEIGHT: 220 LBS | TEMPERATURE: 98.1 F | SYSTOLIC BLOOD PRESSURE: 113 MMHG | BODY MASS INDEX: 34.46 KG/M2 | DIASTOLIC BLOOD PRESSURE: 77 MMHG

## 2021-05-28 DIAGNOSIS — Z01.20 ENCOUNTER FOR DENTAL EXAMINATION: Primary | ICD-10-CM

## 2021-05-28 DIAGNOSIS — K02.61 DENTAL CARIES ON SMOOTH SURFACE LIMITED TO ENAMEL: ICD-10-CM

## 2021-05-28 PROCEDURE — D0150 COMPREHENSIVE ORAL EVALUATION - NEW OR ESTABLISHED PATIENT: HCPCS | Performed by: DENTIST

## 2021-05-28 PROCEDURE — D0210 INTRAORAL - COMPLETE SERIES OF RADIOGRAPHIC IMAGES: HCPCS

## 2021-05-28 NOTE — PROGRESS NOTES
Patient presents for new patient  visit  Medical history updated in patient electronic medical record-  Parent denies any recent exposures for the family to coronavirus positive individuals, negative fever, negative sore throat, negative coughing, negative loss of taste or smell, no diarrhea or GI issues reported  COMP EXAM, FMX  REVIEWED MED HX:  Pt has history of double knee replacement 07/2014  CHIEF CONCERN: pt reports tooth painUR  Pt points to #6  Pain started last month and occurs occasionally  Pt saw previous dentist 1-2 years ago  Previous office recommended full exts/FUD/FLD  PAIN SCALE: 4  ASA CLASS: II  PLAQUE: moderate   CALCULUS: mod calculus  BLEEDING:   STAIN : light -mod  ORAL HYGIENE:   poor    PERIO: no probe exam completed today  Soft tissue exam:  soft tissue exam was normal  ExtraOral exam:   Extraoral exam was normal    Dr Kaiden Beltran exam=   Reviewed with patient clinical and radiographic findings and parent verbalized understanding  All questions and concerns addressed  Rampant decay generalized  Large caries in almost all teeth  Most all teeth unsavable  #7, 8, 10, 13, 23-25 possibly save but prognosis is poor   to review medical history to verify if any medical clearance needed prior to extractions  Dr josie Kessler Ana antibiotic  ( [address] [place] [address])     REFERRALS: no referrals provided    * called Dr Lissy Weiner office /orthopedic surgeon  ( [phone])  re: premedication needed?  double knee replacement done 2014  Office stated no premed required after 2 years  Sent fax to  for verification  * ( gave  fax'd clearance form/copy of completed sent fax to scan into pt's chart)    Next Visit: Extract #6 ( pt having pain)  Finalize tx plan and discuss immediate dentures

## 2021-06-18 ENCOUNTER — HOSPITAL ENCOUNTER (OUTPATIENT)
Dept: RADIOLOGY | Facility: HOSPITAL | Age: 31
Discharge: HOME | End: 2021-06-18
Payer: MEDICARE

## 2021-06-18 ENCOUNTER — OFFICE VISIT (OUTPATIENT)
Dept: OBGYN CLINIC | Facility: HOSPITAL | Age: 31
End: 2021-06-18
Payer: MEDICARE

## 2021-06-18 VITALS
HEIGHT: 67 IN | BODY MASS INDEX: 35.16 KG/M2 | HEART RATE: 84 BPM | SYSTOLIC BLOOD PRESSURE: 105 MMHG | WEIGHT: 224 LBS | DIASTOLIC BLOOD PRESSURE: 74 MMHG

## 2021-06-18 DIAGNOSIS — M79.644 THUMB PAIN, RIGHT: Primary | ICD-10-CM

## 2021-06-18 DIAGNOSIS — M25.532 PAIN IN LEFT WRIST: ICD-10-CM

## 2021-06-18 DIAGNOSIS — M05.731 RHEUMATOID ARTHRITIS INVOLVING RIGHT WRIST WITH POSITIVE RHEUMATOID FACTOR (HCC): ICD-10-CM

## 2021-06-18 PROCEDURE — 99213 OFFICE O/P EST LOW 20 MIN: CPT | Performed by: ORTHOPAEDIC SURGERY

## 2021-06-18 PROCEDURE — 73110 X-RAY EXAM OF WRIST: CPT

## 2021-06-18 NOTE — PROGRESS NOTES
ASSESSMENT/PLAN:    Assessment:   Synovitis of the right hand    Plan:   Patient to call her rheumatologist about increasing her prednisone dosage  Follow Up:  PRN      _____________________________________________________  CHIEF COMPLAINT:  Chief Complaint   Patient presents with    Right Wrist - Follow-up         SUBJECTIVE:  Glo Abdi is a 31 y o  female who presents for follow up  Patient states that she has pain inflammation knuckles of her right hand  She denies any trauma  She denies any signs of infection  She states that overall her wrist and hands do feel better since her previous evaluation  She denies any other new or acute         PAST MEDICAL HISTORY:  Past Medical History:   Diagnosis Date    Abnormal Pap smear of cervix     Anemia     Anxiety     Back pain     Depression     GERD (gastroesophageal reflux disease)     IBS (irritable bowel syndrome)     Iron deficiency     Irregular menses     Menorrhagia     Migraines     Neuropathy     Obesity     Osteoarthritis of back     Osteopenia     PONV (postoperative nausea and vomiting)     RA (rheumatoid arthritis) ([address] )     Scratches     On back and shoulder from scratching due to urticaria    Vasculitis ([address] )     Wears glasses        PAST SURGICAL HISTORY:  Past Surgical History:   Procedure Laterality Date    CAST APPLICATION Left 63/51/4472    Procedure: APPLICATION LONG ARM Remus Carry;  Surgeon: Manan Mazariegos MD;  [facility];  Service: Orthopedics    CAST APPLICATION Right 81/6/6038    Procedure: APPLICATION LONG ARM SUGAR TONG SPLINT;  Surgeon: Manan Mazariegos MD;  [facility];  Service: Orthopedics    CHOLECYSTECTOMY      Laparoscopic    DILATION AND CURETTAGE OF UTERUS      HYSTERECTOMY      JOINT REPLACEMENT Bilateral     knees    NV COLONOSCOPY FLX DX W/COLLJ SPEC WHEN PFRMD N/A 2/22/2019    Procedure: COLONOSCOPY;  Surgeon: Margarita Greer MD;  Location: [facility]; Service: Gastroenterology    SD ESOPHAGOGASTRODUODENOSCOPY TRANSORAL DIAGNOSTIC N/A 2/22/2019    Procedure: ESOPHAGOGASTRODUODENOSCOPY (EGD); Surgeon: Yasir Medina MD;  Location: [facility];   Service: Gastroenterology    SD EXCIS DISTAL ULNA,PART/COMPLETE Right 10/8/2019    Procedure: EXCISION DISTAL ULNA ([place] right;  Surgeon: John Kebede MD;  [facility];  Service: Orthopedics    SD FUSION/GRAFT WRIST JOINT Left 4/4/2017    Procedure: WRIST FUSION / [address] ;  Surgeon: John Kebede MD;  [facility];  Service: Orthopedics    SD FUSION/GRAFT WRIST JOINT Right 4/10/2018    Procedure: Removal of hardware right wrist with Fusion of Long finger carpometacarpal joint, splint application Right Wrist;  Surgeon: John Kebede MD;  [facility];  Service: Orthopedics    SD HYSTEROSCOPY,W/ENDO BX N/A 12/1/2017    Procedure: DILATATION AND CURETTAGE (D&C) WITH HYSTEROSCOPY;  Surgeon: Amelia Song DO;  [facility];  Service: Gynecology    SD LAP,CHOLECYSTECTOMY N/A 3/14/2017    Procedure: CHOLECYSTECTOMY LAPAROSCOPIC;  Surgeon: Yanira Blevins DO;  [facility];  Service: General    SD LAPAROSCOPY W TOT HYSTERECTUTERUS <=250 GRAM  W TUBE/OVARY N/A 1/18/2019    Procedure: HYSTERECTOMY LAPAROSCOPIC TOTAL ([address]) Bilateral salpingectomy, cystoscopy;  Surgeon: Amelia Song DO;  [facility];  Service: Gynecology    SD REMOVAL DEEP IMPLANT Left 11/13/2018    Procedure: REMOVAL OF HARDWARE (wrist fusion plate  AND ulnar head arthroplasty) with conversion to [place] ;  Surgeon: John Kebede MD;  [facility];  Service: Orthopedics    REPLACEMENT TOTAL KNEE BILATERAL      WISDOM TOOTH EXTRACTION      WRIST SURGERY Right     For arthritis    WRIST SURGERY Left 4/4/2017    Procedure: ARTHROPLASTY WRIST ULNAR HEAD ARTHROPLASTY - INTEGRA SIZE 5 5 MM, 16 HEAD;  Surgeon: John Kebede MD;  [facility];  Service:     WRIST TENDON TRANSFER Right 10/8/2019    Procedure: TRANSFER TENDON EXTENSOR CARPI ULNARIS AT THE WRIST;  Surgeon: Ese Olmedo MD;  [facility];  Service: Orthopedics       FAMILY HISTORY:  Family History   Problem Relation Age of Onset    Hypertension Mother    Khan Rheum arthritis Mother     Depression Mother     Anxiety disorder Mother        SOCIAL HISTORY:  Social History     Tobacco Use    Smoking status: Former Smoker     Packs/day: 0 25     Years: 3 00     Pack years: 0 75     Types: Cigarettes     Quit date:      Years since quittin 4    Smokeless tobacco: Never Used   Vaping Use    Vaping Use: Never used   Substance Use Topics    Alcohol use: Yes     Comment: Only a couple of times a year    Drug use: No       MEDICATIONS:    Current Outpatient Medications:     ALPRAZolam (XANAX) 0 5 mg tablet, Take by mouth as needed for anxiety, Disp: , Rfl:     budesonide-formoterol (SYMBICORT) 160-4 5 mcg/act inhaler, Inhale 2 puffs 2 (two) times a day Rinse mouth after use , Disp: , Rfl:     Calcium Carb-Cholecalciferol (CALCIUM 1000 + D PO), Take 1 tablet by mouth daily, Disp: , Rfl:     dicyclomine (BENTYL) 20 mg tablet, Take 1 tablet (20 mg total) by mouth 4 (four) times a day as needed (prn), Disp: 120 tablet, Rfl: 3    diphenoxylate-atropine (LOMOTIL) 2 5-0 025 mg per tablet, Take 1 tablet by mouth 4 (four) times a day as needed for diarrhea for up to 15 doses, Disp: 15 tablet, Rfl: 0    ergocalciferol (VITAMIN D2) 50,000 units, Take 50,000 Units by mouth once a week, Disp: , Rfl:     escitalopram (LEXAPRO) 20 mg tablet, Take 20 mg by mouth every morning  , Disp: , Rfl:     fluticasone (FLONASE) 50 mcg/act nasal spray, 2 sprays into each nostril daily, Disp: 48 g, Rfl: 3    gabapentin (NEURONTIN) 800 mg tablet, Take 800 mg by mouth 4 (four) times a day , Disp: , Rfl: 2    loperamide (IMODIUM) 2 mg capsule, Take 2 mg by mouth 4 (four) times a day as needed for diarrhea, Disp: , Rfl:     Multiple Vitamin (multivitamin) tablet, Take 1 tablet by mouth daily, Disp: , Rfl:     mupirocin (BACTROBAN) 2 % ointment, Apply topically 3 (three) times a day, Disp: 22 g, Rfl: 3    omeprazole (PriLOSEC) 10 mg delayed release capsule, Take 10 mg by mouth daily, Disp: , Rfl:     ondansetron (ZOFRAN) 4 mg tablet, Take 4 mg by mouth every 8 (eight) hours as needed  , Disp: , Rfl:     oxyCODONE (ROXICODONE) 15 mg immediate release tablet, every 6 (six) hours as needed , Disp: , Rfl: 0    oxyCODONE-acetaminophen (PERCOCET)  mg per tablet, Take 1 tablet by mouth every 6 (six) hours as needed for moderate pain for up to 20 dosesMax Daily Amount: 4 tablets (Patient not taking: Reported on 10/26/2020), Disp: 20 tablet, Rfl: 0    potassium chloride (KLOR-CON) 20 mEq packet, Take 250 mEq by mouth 2 (two) times a day, Disp: , Rfl:     predniSONE 5 mg tablet, Take 20 mg by mouth daily 15-20 mg daily, Disp: , Rfl:     pregabalin (LYRICA) 100 mg capsule, Take one tablet twice a day and 2 tablets at bedtime, Disp: , Rfl:     Probiotic Product (PROBIOTIC-10 PO), Take by mouth, Disp: , Rfl:     ranitidine (ZANTAC) 150 MG capsule, Take 150 mg by mouth 2 (two) times a day, Disp: , Rfl:     SUMAtriptan (IMITREX) 100 mg tablet, Take 100 mg by mouth once as needed for migraine, Disp: , Rfl:     ALLERGIES:  Allergies   Allergen Reactions    Nickel Rash    Tylenol With Codeine #3 [Acetaminophen-Codeine] Hives, Itching and Facial Swelling     Can take oxycodone       REVIEW OF SYSTEMS:  Pertinent items are noted in HPI  A comprehensive review of systems was negative      LABS:  HgA1c:   Lab Results   Component Value Date    HGBA1C 5 2 11/05/2020     BMP:   Lab Results   Component Value Date    GLUCOSE 135 11/11/2015    CALCIUM 9 5 06/26/2020     (L) 11/11/2015    K 3 6 06/26/2020    CO2 29 06/26/2020     06/26/2020    BUN 6 06/26/2020    CREATININE 0 49 (L) 06/26/2020 _____________________________________________________  PHYSICAL EXAMINATION:  Vital signs: /74   Pulse 84   Ht 5' 7" (1 702 m)   Wt 102 kg (224 lb)   LMP  (LMP Unknown) Comment: irregular  BMI 35 08 kg/m²   General: well developed and well nourished, alert, oriented times 3 and appears comfortable  Psychiatric: Normal  HEENT: Trachea Midline, No torticollis  Cardiovascular: No discernable arrhythmia  Pulmonary: No wheezing or stridor  Abdomen: No rebound or guarding  Extremities: No peripheral edema  Skin: No masses, erythema, lacerations, fluctation, ulcerations  Neurovascular: Sensation Intact to the Median, Ulnar, Radial Nerve, Motor Intact to the Median, Ulnar, Radial Nerve and Pulses Intact    MUSCULOSKELETAL EXAMINATION:  LEFT SIDE:  Minimal swelling in the hands  Extensor tendons are tracking appropriately  RIGHT SIDE:  Slight swelling in the hand, most notably over the knuckles  Her incision site is well healed  She does not have any active signs infection  Extensor tendons are gliding appropriately  Patient has nodules at the index, long, and ring finger A1 pulleys  There is no active triggering on exam   Patient does have synovitis at   The MCP joints of the index, ring, long, and small fingers         _____________________________________________________  STUDIES REVIEWED:  Images were reviewed in PACS by   [facility] and demonstrate:   Stable appearance of bilateral wrists status post wrist fusions with removal of hardware      PROCEDURES PERFORMED:  Procedures  No Procedures performed today    Scribe Attestation    I,:  Teri Frank PA-C am acting as a scribe while in the presence of the attending physician :       I,:  Manan Mazariegos MD personally performed the services described in this documentation    as scribed in my presence :           Scribe Attestation    I,:  Teri Frank PA-C am acting as a scribe while in the presence of the attending physician : I,:  Allan Delgado MD personally performed the services described in this documentation    as scribed in my presence :

## 2021-06-24 ENCOUNTER — OFFICE VISIT (OUTPATIENT)
Dept: DENTISTRY | Facility: CLINIC | Age: 31
End: 2021-06-24

## 2021-06-24 VITALS — SYSTOLIC BLOOD PRESSURE: 126 MMHG | DIASTOLIC BLOOD PRESSURE: 84 MMHG | TEMPERATURE: 97 F | HEART RATE: 92 BPM

## 2021-06-24 DIAGNOSIS — K02.61 DENTAL CARIES ON SMOOTH SURFACE LIMITED TO ENAMEL: Primary | ICD-10-CM

## 2021-06-24 PROCEDURE — D7210 EXTRACTION, ERUPTED TOOTH REQUIRING REMOVAL OF BONE AND/OR SECTIONING OF TOOTH, AND INCLUDING ELEVATION OF MUCOPERIOSTEAL FLAP IF INDICATED: HCPCS | Performed by: DENTIST

## 2021-06-24 NOTE — PROGRESS NOTES
30 yo F pt, ASA II, presents for #6 ext  No changes to medical history and vitals recorded  Pt reports 6/10 constant throbbing pain #6 area  Pt reports intermittent generalized pain  Pt wants full mouth extractions and dentures  Pt has one more day left of 300mg Clindamycin     Pt opted for #6 extraction today due to constant throbbing pain  Consent form signed and all questions answered     Procedure:  - 20% topical benzocaine applied for 1 min  - 68 mg 4% Sharri 1:100,000 epi via buccal and palatal infiltrations  - Flap elevated with #9 periosteal elevator, #15 blade   - #6 mobilized with straight elevators  - Surgical handpiece used to remove buccal none  - #6 delivered with upper lopez forceps  [place] and root delivered separately  - Two 4-0 chromic gut sutures in place  - Pt biting firmly on gauze    Written and verbal post op instructions given to pt including 1  Take OTC pain medications (Tylenol) within one hour of procedure 2  Soft food diet for the next several days 3  Complete antibiotics course 4  No smoking     NV: Ext #2,3,4 in [facility] day   Complete full mouth extractions with Tayla Daniel with Dr Doris Ritchie

## 2021-08-30 ENCOUNTER — OFFICE VISIT (OUTPATIENT)
Dept: GASTROENTEROLOGY | Facility: CLINIC | Age: 31
End: 2021-08-30
Payer: MEDICARE

## 2021-08-30 VITALS
BODY MASS INDEX: 33.4 KG/M2 | HEIGHT: 67 IN | SYSTOLIC BLOOD PRESSURE: 110 MMHG | TEMPERATURE: 98.6 F | WEIGHT: 212.8 LBS | HEART RATE: 98 BPM | DIASTOLIC BLOOD PRESSURE: 80 MMHG

## 2021-08-30 DIAGNOSIS — A04.8 H. PYLORI INFECTION: ICD-10-CM

## 2021-08-30 DIAGNOSIS — K58.0 IRRITABLE BOWEL SYNDROME WITH DIARRHEA: Primary | ICD-10-CM

## 2021-08-30 PROCEDURE — 99213 OFFICE O/P EST LOW 20 MIN: CPT | Performed by: PHYSICIAN ASSISTANT

## 2021-08-30 RX ORDER — DICYCLOMINE HCL 20 MG
20 TABLET ORAL 4 TIMES DAILY PRN
Qty: 120 TABLET | Refills: 3 | Status: SHIPPED | OUTPATIENT
Start: 2021-08-30

## 2021-08-30 RX ORDER — DIPHENOXYLATE HYDROCHLORIDE AND ATROPINE SULFATE 2.5; .025 MG/1; MG/1
2 TABLET ORAL 4 TIMES DAILY PRN
Qty: 120 TABLET | Refills: 3 | Status: SHIPPED | OUTPATIENT
Start: 2021-08-30

## 2021-08-30 NOTE — PATIENT INSTRUCTIONS
GERD (Gastroesophageal Reflux Disease)   AMBULATORY CARE:   Gastroesophageal reflux disease (GERD)  is reflux that occurs more than twice a week for a few weeks  Reflux means acid and food in the stomach back up into the esophagus  It usually causes heartburn and other symptoms  GERD can cause other health problems over time if it is not treated  Signs and symptoms:   · Heartburn (burning pain in your chest)    · Pain after meals that spreads to your neck, jaw, or shoulder    · Pain that gets better when you change positions    · Bitter or acid taste in your mouth    · A dry cough    · Trouble swallowing or pain with swallowing    · Hoarseness or a sore throat    · Burping or hiccups    · Feeling full soon after you start eating    Call your local emergency number (911 in the [facility] [facility]) if:   · You have severe chest pain and sudden trouble breathing  Seek care immediately if:   · You have trouble breathing after you vomit  · You have trouble swallowing, or pain with swallowing  · Your bowel movements are black, bloody, or tarry-looking  · Your vomit looks like coffee grounds or has blood in it  Call your doctor or gastroenterologist if:   · You feel full and cannot burp or vomit  · You vomit large amounts, or you vomit often  · You are losing weight without trying  · Your symptoms get worse or do not improve with treatment  · You have questions or concerns about your condition or care  Treatment for GERD:   · Medicines  are used to decrease stomach acid  Medicine may also be used to help your lower esophageal sphincter and stomach contract (tighten) more  · Surgery  is done to wrap the upper part of the stomach around the esophageal sphincter  This will strengthen the sphincter and prevent reflux  Manage GERD:       · Do not have foods or drinks that may increase heartburn    These include chocolate, peppermint, fried or fatty foods, drinks that contain caffeine, or carbonated drinks (soda)  Other foods include spicy foods, onions, tomatoes, and tomato-based foods  Do not have foods or drinks that can irritate your esophagus, such as citrus fruits, juices, and alcohol  · Do not eat large meals  When you eat a lot of food at one time, your stomach needs more acid to digest it  Eat 6 small meals each day instead of 3 large meals, and eat slowly  Do not eat meals 2 to 3 hours before bedtime  · Elevate the head of your bed  Place 6-inch blocks under the head of your bed frame  You may also use more than one pillow under your head and shoulders while you sleep  · Maintain a healthy weight  If you are overweight, weight loss may help relieve symptoms of GERD  · Do not smoke  Smoking weakens the lower esophageal sphincter and increases the risk of GERD  Ask your healthcare provider for information if you currently smoke and need help to quit  E-cigarettes or smokeless tobacco still contain nicotine  Talk to your healthcare provider before you use these products  · Do not wear clothing that is tight around your waist   Tight clothing can put pressure on your stomach and cause or worsen GERD symptoms  Follow up with your doctor or gastroenterologist as directed:  Write down your questions so you remember to ask them during your visits  © Copyright [employer] 2021 Information is for End User's use only and may not be sold, redistributed or otherwise used for commercial purposes  All illustrations and images included in CareNotes® are the copyrighted property of [employer]  or Christian Haddad  The above information is an  only  It is not intended as medical advice for individual conditions or treatments  Talk to your doctor, nurse or pharmacist before following any medical regimen to see if it is safe and effective for you

## 2021-08-30 NOTE — LETTER
August 30, 2021     Pina Garcia, [address]    Patient: Ghislaine Low   YOB: 1990   Date of Visit: 8/30/2021       Dear Dr Goldy Pacheco: Thank you for referring Ghislaine Low to me for evaluation  Below are my notes for this consultation  If you have questions, please do not hesitate to call me  I look forward to following your patient along with you  Sincerely,        Zulema Cooper PA-C        CC: No Recipients  Zulema Cooper PA-C  8/30/2021 10:43 AM  Sign when Signing Visit  [facility] - Outpatient Follow-up Note  Ghislaine Low 31 y o  female MRN: [id number]  Encounter: [id number]          ASSESSMENT AND PLAN:        1  Irritable bowel syndrome with diarrhea   Her chronic abdominal pain and diarrhea is likely secondary to diarrhea predominant IBS  Previous EGD and colonoscopy negative for celiac disease, IBD, microscopic colitis  Fortunately her diarrhea is controlled with a combination of Imodium and Lomotil  I explained she can take these medications up to 4 times a day as needed  She can also take Bentyl as needed for abdominal cramping  She will continue lactose-free and gluten free diet  - diphenoxylate-atropine (LOMOTIL) 2 5-0 025 mg per tablet; Take 2 tablets by mouth 4 (four) times a day as needed for diarrhea  Dispense: 120 tablet; Refill: 3  - dicyclomine (BENTYL) 20 mg tablet; Take 1 tablet (20 mg total) by mouth 4 (four) times a day as needed (prn)  Dispense: 120 tablet; Refill: 3    2  H  pylori infection   her gastric biopsies were positive for H pylori in 2019  She was apparently treated with a course of antibiotics, but eradication was not confirmed  I recommend giving a stool sample to confirm eradication  She was instructed to stop omeprazole for 2-4 weeks before giving her stool sample   If biopsies are still positive for H pylori, would re-treat with a different course of antibiotics     - H  pylori antigen, stool; Future    Follow-up in 3 months  ______________________________________________________________________    SUBJECTIVE:   69-year-old female with rheumatoid arthritis presenting for follow-up of diarrhea, abdominal pain, nausea, and vomiting  She was last seen in the office in 2018 and underwent EGD and colonoscopy in 2019  Both scopes appeared normal however gastric biopsies were positive for H pylori  Our office called her several times and sent a letter  She followed up with a doctor (? which one) who prescribed antibiotics and recommended she get a stool test done to confirm eradication, but this was not done  She reports multiple chronic symptoms including nausea, poor appetite, epigastric cramping pain, and diarrhea  She has taken Bentyl, Imodium, and Lomotil in the past with some relief of symptoms  She took a course of Xifaxan in the past which did relieve her symptoms but she has since been unable to get insurance coverage for another course  REVIEW OF SYSTEMS IS OTHERWISE NEGATIVE        Historical Information   Past Medical History:   Diagnosis Date    Abnormal Pap smear of cervix     Anemia     Anxiety     Back pain     Depression     GERD (gastroesophageal reflux disease)     IBS (irritable bowel syndrome)     Iron deficiency     Irregular menses     Menorrhagia     Migraines     Neuropathy     Obesity     Osteoarthritis of back     Osteopenia     PONV (postoperative nausea and vomiting)     RA (rheumatoid arthritis) ([address] )     Scratches     On back and shoulder from scratching due to urticaria    Vasculitis ([address] )     Wears glasses      Past Surgical History:   Procedure Laterality Date    CAST APPLICATION Left 54/54/9395    Procedure: APPLICATION LONG ARM Dmitriy Winkler;  Surgeon: Daisy Mallory MD;  [facility];  Service: Orthopedics    CAST APPLICATION Right 69/6/2679    Procedure: APPLICATION LONG ARM MORGAN ORELLANA SPLINT;  Surgeon: Oksana Martin MD;  [facility];  Service: Orthopedics    CHOLECYSTECTOMY      Laparoscopic    COLONOSCOPY      DILATION AND CURETTAGE OF UTERUS      HYSTERECTOMY      JOINT REPLACEMENT Bilateral     knees    MA COLONOSCOPY FLX DX W/COLLJ SPEC WHEN PFRMD N/A 2/22/2019    Procedure: COLONOSCOPY;  Surgeon: Leander Abdi MD;  Location: [facility]; Service: Gastroenterology    MA ESOPHAGOGASTRODUODENOSCOPY TRANSORAL DIAGNOSTIC N/A 2/22/2019    Procedure: ESOPHAGOGASTRODUODENOSCOPY (EGD); Surgeon: Leander Abdi MD;  Location: [facility];   Service: Gastroenterology    MA EXCIS DISTAL ULNA,PART/COMPLETE Right 10/8/2019    Procedure: EXCISION DISTAL ULNA ([place]) right;  Surgeon: Oksana Martin MD;  [facility];  Service: Orthopedics    MA FUSION/GRAFT WRIST JOINT Left 4/4/2017    Procedure: WRIST FUSION / [address] ;  Surgeon: Oksana Martin MD;  [facility];  Service: Orthopedics    MA FUSION/GRAFT WRIST JOINT Right 4/10/2018    Procedure: Removal of hardware right wrist with Fusion of Long finger carpometacarpal joint, splint application Right Wrist;  Surgeon: Oksana Martin MD;  [facility];  Service: Orthopedics    MA HYSTEROSCOPY,W/ENDO BX N/A 12/1/2017    Procedure: DILATATION AND CURETTAGE (D&C) WITH HYSTEROSCOPY;  Surgeon: Valeri Peng DO;  [facility];  Service: Gynecology    MA LAP,CHOLECYSTECTOMY N/A 3/14/2017    Procedure: CHOLECYSTECTOMY LAPAROSCOPIC;  Surgeon: J Carlos Ochoa DO;  [facility];  Service: General    MA LAPAROSCOPY W TOT HYSTERECTUTERUS <=250 GRAM  W TUBE/OVARY N/A 1/18/2019    Procedure: HYSTERECTOMY LAPAROSCOPIC TOTAL ([address]) Bilateral salpingectomy, cystoscopy;  Surgeon: Valeri Peng DO;  [facility];  Service: Gynecology    MA REMOVAL DEEP IMPLANT Left 11/13/2018    Procedure: REMOVAL OF HARDWARE (wrist fusion plate  AND ulnar head arthroplasty) [facility] ;  Surgeon: Lilly Cunningham MD;  [facility];  Service: Orthopedics    REPLACEMENT TOTAL KNEE BILATERAL      UPPER GASTROINTESTINAL ENDOSCOPY      WISDOM TOOTH EXTRACTION      WRIST SURGERY Right     For arthritis    WRIST SURGERY Left 2017    Procedure: ARTHROPLASTY WRIST ULNAR HEAD ARTHROPLASTY - INTEGRA SIZE 5 5 MM, 16 HEAD;  Surgeon: Lilly Cunningham MD;  [facility];  Service:     WRIST TENDON TRANSFER Right 10/8/2019    Procedure: TRANSFER TENDON EXTENSOR CARPI ULNARIS AT THE WRIST;  Surgeon: Lilly Cunningham MD;  [facility];  Service: Orthopedics     Social History   Social History     Substance and Sexual Activity   Alcohol Use Yes    Comment: Only a couple of times a year     Social History     Substance and Sexual Activity   Drug Use No     Social History     Tobacco Use   Smoking Status Former Smoker    Packs/day: 0 25    Years: 3 00    Pack years: 0 75    Types: Cigarettes    Quit date:     Years since quittin 6   Smokeless Tobacco Never Used     Family History   Problem Relation Age of Onset    Hypertension Mother     Rheum arthritis Mother     Depression Mother     Anxiety disorder Mother        Meds/Allergies       Current Outpatient Medications:     ALPRAZolam (XANAX) 0 5 mg tablet    budesonide-formoterol (SYMBICORT) 160-4 5 mcg/act inhaler    Calcium Carb-Cholecalciferol (CALCIUM 1000 + D PO)    dicyclomine (BENTYL) 20 mg tablet    diphenoxylate-atropine (LOMOTIL) 2 5-0 025 mg per tablet    ergocalciferol (VITAMIN D2) 50,000 units    escitalopram (LEXAPRO) 20 mg tablet    fluticasone (FLONASE) 50 mcg/act nasal spray    gabapentin (NEURONTIN) 800 mg tablet    loperamide (IMODIUM) 2 mg capsule    Multiple Vitamin (multivitamin) tablet    mupirocin (BACTROBAN) 2 % ointment    omeprazole (PriLOSEC) 10 mg delayed release capsule    ondansetron (ZOFRAN) 4 mg tablet    oxyCODONE (ROXICODONE) 15 mg immediate release tablet    oxyCODONE-acetaminophen (PERCOCET)  mg per tablet    potassium chloride (KLOR-CON) 20 mEq packet    predniSONE 5 mg tablet    pregabalin (LYRICA) 100 mg capsule    Probiotic Product (PROBIOTIC-10 PO)    ranitidine (ZANTAC) 150 MG capsule    SUMAtriptan (IMITREX) 100 mg tablet    Allergies   Allergen Reactions    Nickel Rash    Tylenol With Codeine #3 [Acetaminophen-Codeine] Hives, Itching and Facial Swelling     Can take oxycodone           Objective     Blood pressure 110/80, pulse 98, temperature 98 6 °F (37 °C), temperature source Tympanic, height 5' 7" (1 702 m), weight 96 5 kg (212 lb 12 8 oz), not currently breastfeeding  Body mass index is 33 33 kg/m²  PHYSICAL EXAM:      General Appearance:   Alert, cooperative, no distress   HEENT:   Normocephalic, atraumatic, anicteric      Neck:  Supple, symmetrical, trachea midline   Lungs:   Clear to auscultation bilaterally; no rales, rhonchi or wheezing; respirations unlabored    Heart[de-identified]   Regular rate and rhythm; no murmur, rub, or gallop  Abdomen:    Nondistended  Normal bowel sounds  Soft  Mild generalized tenderness to palpation  No rebound or guarding  Genitalia:   Deferred    Rectal:   Deferred    Extremities:  No cyanosis, clubbing or edema    Pulses:  2+ and symmetric    Skin:  No jaundice, rashes, or lesions    Lymph nodes:  No palpable cervical lymphadenopathy        Lab Results:   No visits with results within 1 Day(s) from this visit  Latest known visit with results is:   Appointment on 06/26/2020   Component Date Value    Sodium 06/26/2020 135*    Potassium 06/26/2020 3 6     Chloride 06/26/2020 100     CO2 06/26/2020 29     ANION GAP 06/26/2020 6     BUN 06/26/2020 6     Creatinine 06/26/2020 0 49*    Glucose, Fasting 06/26/2020 93     Calcium 06/26/2020 9 5     eGFR 06/26/2020 131     Magnesium 06/26/2020 2 0     TSH 3RD GENERATON 06/26/2020 1  100     Vit D, 25-Hydroxy 06/26/2020 24 7*         Radiology Results:   No results found

## 2021-08-30 NOTE — PROGRESS NOTES
[facility] - Outpatient Follow-up Note  Sherine Macario 31 y o  female MRN: [id number]  Encounter: [id number]          ASSESSMENT AND PLAN:        1  Irritable bowel syndrome with diarrhea   Her chronic abdominal pain and diarrhea is likely secondary to diarrhea predominant IBS  Previous EGD and colonoscopy negative for celiac disease, IBD, microscopic colitis  Fortunately her diarrhea is controlled with a combination of Imodium and Lomotil  I explained she can take these medications up to 4 times a day as needed  She can also take Bentyl as needed for abdominal cramping  She will continue lactose-free and gluten free diet  - diphenoxylate-atropine (LOMOTIL) 2 5-0 025 mg per tablet; Take 2 tablets by mouth 4 (four) times a day as needed for diarrhea  Dispense: 120 tablet; Refill: 3  - dicyclomine (BENTYL) 20 mg tablet; Take 1 tablet (20 mg total) by mouth 4 (four) times a day as needed (prn)  Dispense: 120 tablet; Refill: 3    2  H  pylori infection   her gastric biopsies were positive for H pylori in 2019  She was apparently treated with a course of antibiotics, but eradication was not confirmed  I recommend giving a stool sample to confirm eradication  She was instructed to stop omeprazole for 2-4 weeks before giving her stool sample  If biopsies are still positive for H pylori, would re-treat with a different course of antibiotics     - H  pylori antigen, stool; Future    Follow-up in 3 months  ______________________________________________________________________    SUBJECTIVE:   69-year-old female with rheumatoid arthritis presenting for follow-up of diarrhea, abdominal pain, nausea, and vomiting  She was last seen in the office in 2018 and underwent EGD and colonoscopy in 2019  Both scopes appeared normal however gastric biopsies were positive for H pylori  Our office called her several times and sent a letter   She followed up with a doctor (? which one) who prescribed antibiotics and recommended she get a stool test done to confirm eradication, but this was not done  She reports multiple chronic symptoms including nausea, poor appetite, epigastric cramping pain, and diarrhea  She has taken Bentyl, Imodium, and Lomotil in the past with some relief of symptoms  She took a course of Xifaxan in the past which did relieve her symptoms but she has since been unable to get insurance coverage for another course  REVIEW OF SYSTEMS IS OTHERWISE NEGATIVE  Historical Information   Past Medical History:   Diagnosis Date    Abnormal Pap smear of cervix     Anemia     Anxiety     Back pain     Depression     GERD (gastroesophageal reflux disease)     IBS (irritable bowel syndrome)     Iron deficiency     Irregular menses     Menorrhagia     Migraines     Neuropathy     Obesity     Osteoarthritis of back     Osteopenia     PONV (postoperative nausea and vomiting)     RA (rheumatoid arthritis) ([address] )     Scratches     On back and shoulder from scratching due to urticaria    Vasculitis ([address] )     Wears glasses      Past Surgical History:   Procedure Laterality Date    CAST APPLICATION Left 02/63/8912    Procedure: APPLICATION LONG ARM August Shade;  Surgeon: Qi Fernandez MD;  [facility];  Service: Orthopedics    CAST APPLICATION Right 48/3/1481    Procedure: APPLICATION LONG ARM SUGAR TONG SPLINT;  Surgeon: Qi Fernandez MD;  [facility];  Service: Orthopedics    CHOLECYSTECTOMY      Laparoscopic    COLONOSCOPY      DILATION AND CURETTAGE OF UTERUS      HYSTERECTOMY      JOINT REPLACEMENT Bilateral     knees    ME COLONOSCOPY FLX DX W/COLLJ SPEC WHEN PFRMD N/A 2/22/2019    Procedure: COLONOSCOPY;  Surgeon: Jerome Travis MD;  Location: [facility]; Service: Gastroenterology    ME ESOPHAGOGASTRODUODENOSCOPY TRANSORAL DIAGNOSTIC N/A 2/22/2019    Procedure: ESOPHAGOGASTRODUODENOSCOPY (EGD);   Surgeon: Jerome Travis MD; Location: [facility];   Service: Gastroenterology    [place] EXCIS DISTAL ULNA,PART/COMPLETE Right 10/8/2019    Procedure: EXCISION DISTAL ULNA ([place]) right;  Surgeon: Zachary Greer MD;  [facility];  Service: Orthopedics    [place] FUSION/GRAFT WRIST JOINT Left 4/4/2017    Procedure: WRIST FUSION / [address] ;  Surgeon: Zachary Greer MD;  [facility];  Service: Orthopedics    NY FUSION/GRAFT WRIST JOINT Right 4/10/2018    Procedure: Removal of hardware right wrist with Fusion of Long finger carpometacarpal joint, splint application Right Wrist;  Surgeon: Zachary Greer MD;  [facility];  Service: Orthopedics    NY HYSTEROSCOPY,W/ENDO BX N/A 12/1/2017    Procedure: DILATATION AND CURETTAGE (D&C) WITH HYSTEROSCOPY;  Surgeon: Sandra Deluca DO;  [facility];  Service: Gynecology    NY LAP,CHOLECYSTECTOMY N/A 3/14/2017    Procedure: CHOLECYSTECTOMY LAPAROSCOPIC;  Surgeon: Axel Allen DO;  [facility];  Service: General    NY LAPAROSCOPY W TOT HYSTERECTUTERUS <=250 GRAM  W TUBE/OVARY N/A 1/18/2019    Procedure: HYSTERECTOMY LAPAROSCOPIC TOTAL ([address]) Bilateral salpingectomy, cystoscopy;  Surgeon: Sandra Deluca DO;  [facility];  Service: Gynecology    [place] REMOVAL DEEP IMPLANT Left 11/13/2018    Procedure: REMOVAL OF HARDWARE (wrist fusion plate  AND ulnar head arthroplasty) with conversion to [place] ;  Surgeon: Zachary Greer MD;  [facility];  Service: Orthopedics    REPLACEMENT TOTAL KNEE BILATERAL      UPPER GASTROINTESTINAL ENDOSCOPY      WISDOM TOOTH EXTRACTION      WRIST SURGERY Right     For arthritis    WRIST SURGERY Left 4/4/2017    Procedure: ARTHROPLASTY WRIST ULNAR HEAD ARTHROPLASTY - INTEGRA SIZE 5 5 MM, 16 HEAD;  Surgeon: Zachary Greer MD;  [facility];  Service:     WRIST TENDON TRANSFER Right 10/8/2019    Procedure: TRANSFER TENDON EXTENSOR CARPI ULNARIS AT THE WRIST; Surgeon: Neel Schilling MD;  [facility];  Service: Orthopedics     Social History   Social History     Substance and Sexual Activity   Alcohol Use Yes    Comment: Only a couple of times a year     Social History     Substance and Sexual Activity   Drug Use No     Social History     Tobacco Use   Smoking Status Former Smoker    Packs/day: 0 25    Years: 3 00    Pack years: 0 75    Types: Cigarettes    Quit date:     Years since quittin 6   Smokeless Tobacco Never Used     Family History   Problem Relation Age of Onset    Hypertension Mother     Rheum arthritis Mother     Depression Mother     Anxiety disorder Mother        Meds/Allergies       Current Outpatient Medications:     ALPRAZolam (XANAX) 0 5 mg tablet    budesonide-formoterol (SYMBICORT) 160-4 5 mcg/act inhaler    Calcium Carb-Cholecalciferol (CALCIUM 1000 + D PO)    dicyclomine (BENTYL) 20 mg tablet    diphenoxylate-atropine (LOMOTIL) 2 5-0 025 mg per tablet    ergocalciferol (VITAMIN D2) 50,000 units    escitalopram (LEXAPRO) 20 mg tablet    fluticasone (FLONASE) 50 mcg/act nasal spray    gabapentin (NEURONTIN) 800 mg tablet    loperamide (IMODIUM) 2 mg capsule    Multiple Vitamin (multivitamin) tablet    mupirocin (BACTROBAN) 2 % ointment    omeprazole (PriLOSEC) 10 mg delayed release capsule    ondansetron (ZOFRAN) 4 mg tablet    oxyCODONE (ROXICODONE) 15 mg immediate release tablet    oxyCODONE-acetaminophen (PERCOCET)  mg per tablet    potassium chloride (KLOR-CON) 20 mEq packet    predniSONE 5 mg tablet    pregabalin (LYRICA) 100 mg capsule    Probiotic Product (PROBIOTIC-10 PO)    ranitidine (ZANTAC) 150 MG capsule    SUMAtriptan (IMITREX) 100 mg tablet    Allergies   Allergen Reactions    Nickel Rash    Tylenol With Codeine #3 [Acetaminophen-Codeine] Hives, Itching and Facial Swelling     Can take oxycodone           Objective     Blood pressure 110/80, pulse 98, temperature 98 6 °F (37 °C), temperature source Tympanic, height 5' 7" (1 702 m), weight 96 5 kg (212 lb 12 8 oz), not currently breastfeeding  Body mass index is 33 33 kg/m²  PHYSICAL EXAM:      General Appearance:   Alert, cooperative, no distress   HEENT:   Normocephalic, atraumatic, anicteric      Neck:  Supple, symmetrical, trachea midline   Lungs:   Clear to auscultation bilaterally; no rales, rhonchi or wheezing; respirations unlabored    Heart[de-identified]   Regular rate and rhythm; no murmur, rub, or gallop  Abdomen:    Nondistended  Normal bowel sounds  Soft  Mild generalized tenderness to palpation  No rebound or guarding  Genitalia:   Deferred    Rectal:   Deferred    Extremities:  No cyanosis, clubbing or edema    Pulses:  2+ and symmetric    Skin:  No jaundice, rashes, or lesions    Lymph nodes:  No palpable cervical lymphadenopathy        Lab Results:   No visits with results within 1 Day(s) from this visit  Latest known visit with results is:   Appointment on 06/26/2020   Component Date Value    Sodium 06/26/2020 135*    Potassium 06/26/2020 3 6     Chloride 06/26/2020 100     CO2 06/26/2020 29     ANION GAP 06/26/2020 6     BUN 06/26/2020 6     Creatinine 06/26/2020 0 49*    Glucose, Fasting 06/26/2020 93     Calcium 06/26/2020 9 5     eGFR 06/26/2020 131     Magnesium 06/26/2020 2 0     TSH 3RD GENERATON 06/26/2020 1  100     Vit D, 25-Hydroxy 06/26/2020 24 7*         Radiology Results:   No results found

## 2021-09-07 ENCOUNTER — TELEPHONE (OUTPATIENT)
Dept: GASTROENTEROLOGY | Facility: CLINIC | Age: 31
End: 2021-09-07

## 2021-09-07 NOTE — TELEPHONE ENCOUNTER
Patients GI provider:  Dr Ophelia Luis    Number to return call: [phone]    Reason for call: Pt calling to speak to someone about her gluten allergy       Scheduled procedure/appointment date if applicable: Appt 37/9/71

## 2021-09-07 NOTE — TELEPHONE ENCOUNTER
[place] PA-C  You 1 hour ago (11:57 AM)     I do not recommend any specific food allergy testing  Ultimately, if she feels poorly after eating gluten, she should avoid gluten altogether  Our management would not change  I agree with getting the H pylori stool antigen test done  Called pt with provider recommendations, pt verbalized understanding of provider recs   Pt had no further questions or concerns at this time

## 2021-09-07 NOTE — TELEPHONE ENCOUNTER
Office visit 8/30/21 Rsosana Fernandez   Hx: H  Pylori, IBS-D    Pt called asking about her "gluten sensitivity" as she was told she has a sensitivity a couple years ago after EGD  Per pt she has been on and off gluten since finding out this information however has not been able to maintain due to cost  Per pt she has been completely gluten free for 2 weeks  Over the weekend had a bite of pizza and reports breaking out in an itchy rash on face  Pt could not recall exact timing of rash "It was either minutes or a couple hours later"  Pt took benadryl to tx but did not do anything, Denies any anaphylactic reaction  Explained to pt there is no documentation of a gluten sensitivity and biopsies were negative for celiac  Could consider food allergy testing?  Also reviewed H  Pylori stool test  Please advise

## 2021-10-01 ENCOUNTER — TELEPHONE (OUTPATIENT)
Dept: GASTROENTEROLOGY | Facility: CLINIC | Age: 31
End: 2021-10-01

## 2021-10-01 DIAGNOSIS — R10.13 EPIGASTRIC PAIN: Primary | ICD-10-CM

## 2021-10-01 RX ORDER — OMEPRAZOLE 40 MG/1
40 CAPSULE, DELAYED RELEASE ORAL DAILY
Qty: 30 CAPSULE | Refills: 2 | Status: SHIPPED | OUTPATIENT
Start: 2021-10-01

## 2021-10-21 ENCOUNTER — PREP FOR PROCEDURE (OUTPATIENT)
Dept: GASTROENTEROLOGY | Facility: CLINIC | Age: 31
End: 2021-10-21

## 2021-10-21 DIAGNOSIS — R10.9 ABDOMINAL PAIN, UNSPECIFIED ABDOMINAL LOCATION: Primary | ICD-10-CM

## 2021-10-22 ENCOUNTER — TELEPHONE (OUTPATIENT)
Dept: PREADMISSION TESTING | Facility: HOSPITAL | Age: 31
End: 2021-10-22

## 2021-10-22 ENCOUNTER — TELEPHONE (OUTPATIENT)
Dept: GASTROENTEROLOGY | Facility: AMBULARY SURGERY CENTER | Age: 31
End: 2021-10-22

## 2021-10-22 ENCOUNTER — TELEPHONE (OUTPATIENT)
Dept: GASTROENTEROLOGY | Facility: HOSPITAL | Age: 31
End: 2021-10-22

## 2021-10-25 ENCOUNTER — HOSPITAL ENCOUNTER (OUTPATIENT)
Dept: GASTROENTEROLOGY | Facility: HOSPITAL | Age: 31
Setting detail: OUTPATIENT SURGERY
Discharge: HOME | End: 2021-10-25
Attending: STUDENT IN AN ORGANIZED HEALTH CARE EDUCATION/TRAINING PROGRAM | Admitting: STUDENT IN AN ORGANIZED HEALTH CARE EDUCATION/TRAINING PROGRAM
Payer: MEDICARE

## 2021-10-25 ENCOUNTER — ANESTHESIA EVENT (OUTPATIENT)
Dept: GASTROENTEROLOGY | Facility: HOSPITAL | Age: 31
End: 2021-10-25

## 2021-10-25 ENCOUNTER — ANESTHESIA (OUTPATIENT)
Dept: GASTROENTEROLOGY | Facility: HOSPITAL | Age: 31
End: 2021-10-25

## 2021-10-25 VITALS
BODY MASS INDEX: 31.39 KG/M2 | RESPIRATION RATE: 20 BRPM | TEMPERATURE: 98 F | SYSTOLIC BLOOD PRESSURE: 119 MMHG | HEART RATE: 73 BPM | HEIGHT: 67 IN | WEIGHT: 200 LBS | DIASTOLIC BLOOD PRESSURE: 81 MMHG | OXYGEN SATURATION: 98 %

## 2021-10-25 DIAGNOSIS — R10.9 ABDOMINAL PAIN, UNSPECIFIED ABDOMINAL LOCATION: ICD-10-CM

## 2021-10-25 DIAGNOSIS — R11.2 NON-INTRACTABLE VOMITING WITH NAUSEA: Primary | ICD-10-CM

## 2021-10-25 PROCEDURE — 88313 SPECIAL STAINS GROUP 2: CPT | Performed by: PATHOLOGY

## 2021-10-25 PROCEDURE — 88305 TISSUE EXAM BY PATHOLOGIST: CPT | Performed by: PATHOLOGY

## 2021-10-25 PROCEDURE — 43239 EGD BIOPSY SINGLE/MULTIPLE: CPT | Performed by: STUDENT IN AN ORGANIZED HEALTH CARE EDUCATION/TRAINING PROGRAM

## 2021-10-25 RX ORDER — PROPOFOL 10 MG/ML
INJECTION, EMULSION INTRAVENOUS AS NEEDED
Status: DISCONTINUED | OUTPATIENT
Start: 2021-10-25 | End: 2021-10-25

## 2021-10-25 RX ORDER — PROCHLORPERAZINE MALEATE 5 MG/1
5 TABLET ORAL EVERY 6 HOURS PRN
Qty: 30 TABLET | Refills: 0 | Status: SHIPPED | OUTPATIENT
Start: 2021-10-25

## 2021-10-25 RX ORDER — OMEPRAZOLE 20 MG/1
20 CAPSULE, DELAYED RELEASE ORAL DAILY
COMMUNITY
Start: 2021-09-03 | End: 2021-10-25

## 2021-10-25 RX ORDER — SODIUM CHLORIDE 9 MG/ML
125 INJECTION, SOLUTION INTRAVENOUS CONTINUOUS
Status: DISCONTINUED | OUTPATIENT
Start: 2021-10-25 | End: 2021-10-29 | Stop reason: HOSPADM

## 2021-10-25 RX ORDER — LIDOCAINE HYDROCHLORIDE 10 MG/ML
INJECTION, SOLUTION EPIDURAL; INFILTRATION; INTRACAUDAL; PERINEURAL AS NEEDED
Status: DISCONTINUED | OUTPATIENT
Start: 2021-10-25 | End: 2021-10-25

## 2021-10-25 RX ORDER — SOD PHOS DI, MONO/K PHOS MONO 250 MG
TABLET ORAL
COMMUNITY
Start: 2021-09-30

## 2021-10-25 RX ADMIN — PROPOFOL 100 MG: 10 INJECTION, EMULSION INTRAVENOUS at 11:39

## 2021-10-25 RX ADMIN — PROPOFOL 100 MG: 10 INJECTION, EMULSION INTRAVENOUS at 11:36

## 2021-10-25 RX ADMIN — PROPOFOL 100 MG: 10 INJECTION, EMULSION INTRAVENOUS at 11:34

## 2021-10-25 RX ADMIN — PROPOFOL 200 MG: 10 INJECTION, EMULSION INTRAVENOUS at 11:30

## 2021-10-25 RX ADMIN — LIDOCAINE HYDROCHLORIDE 50 MG: 10 INJECTION, SOLUTION EPIDURAL; INFILTRATION; INTRACAUDAL; PERINEURAL at 11:28

## 2021-10-25 RX ADMIN — PROPOFOL 100 MG: 10 INJECTION, EMULSION INTRAVENOUS at 11:32

## 2021-10-25 RX ADMIN — SODIUM CHLORIDE 125 ML/HR: 0.9 INJECTION, SOLUTION INTRAVENOUS at 09:18

## 2021-10-27 ENCOUNTER — OFFICE VISIT (OUTPATIENT)
Dept: DENTISTRY | Facility: CLINIC | Age: 31
End: 2021-10-27

## 2021-10-27 VITALS — TEMPERATURE: 98.7 F | HEART RATE: 111 BPM | DIASTOLIC BLOOD PRESSURE: 83 MMHG | SYSTOLIC BLOOD PRESSURE: 117 MMHG

## 2021-10-27 DIAGNOSIS — K02.9 DENTAL DECAY: Primary | ICD-10-CM

## 2021-10-27 PROCEDURE — D7140 EXTRACTION, ERUPTED TOOTH OR EXPOSED ROOT (ELEVATION AND/OR FORCEPS REMOVAL): HCPCS | Performed by: DENTIST

## 2021-11-01 ENCOUNTER — TELEPHONE (OUTPATIENT)
Dept: OTHER | Facility: OTHER | Age: 31
End: 2021-11-01

## 2021-11-02 ENCOUNTER — HOSPITAL ENCOUNTER (OUTPATIENT)
Dept: ULTRASOUND IMAGING | Facility: HOSPITAL | Age: 31
Discharge: HOME | End: 2021-11-02
Payer: MEDICARE

## 2021-11-02 ENCOUNTER — OFFICE VISIT (OUTPATIENT)
Dept: DENTISTRY | Facility: CLINIC | Age: 31
End: 2021-11-02

## 2021-11-02 VITALS — TEMPERATURE: 97.5 F | SYSTOLIC BLOOD PRESSURE: 118 MMHG | DIASTOLIC BLOOD PRESSURE: 79 MMHG | HEART RATE: 87 BPM

## 2021-11-02 DIAGNOSIS — K02.9 CARIES: Primary | ICD-10-CM

## 2021-11-02 DIAGNOSIS — R10.33 PERIUMBILICAL PAIN: ICD-10-CM

## 2021-11-02 DIAGNOSIS — R10.31 RIGHT LOWER QUADRANT PAIN: ICD-10-CM

## 2021-11-02 PROCEDURE — 76700 US EXAM ABDOM COMPLETE: CPT

## 2021-11-02 RX ORDER — FLUORIDE TOOTHPASTE
5 TOOTHPASTE DENTAL 2 TIMES DAILY
Qty: 1000 ML | Refills: 0 | Status: SHIPPED | OUTPATIENT
Start: 2021-11-02

## 2021-11-08 ENCOUNTER — OFFICE VISIT (OUTPATIENT)
Dept: GASTROENTEROLOGY | Facility: CLINIC | Age: 31
End: 2021-11-08
Payer: MEDICARE

## 2021-11-08 VITALS
SYSTOLIC BLOOD PRESSURE: 120 MMHG | HEART RATE: 96 BPM | BODY MASS INDEX: 32.18 KG/M2 | OXYGEN SATURATION: 97 % | WEIGHT: 205 LBS | TEMPERATURE: 98.8 F | HEIGHT: 67 IN | DIASTOLIC BLOOD PRESSURE: 80 MMHG

## 2021-11-08 DIAGNOSIS — K59.1 FUNCTIONAL DIARRHEA: ICD-10-CM

## 2021-11-08 DIAGNOSIS — R10.84 GENERALIZED ABDOMINAL PAIN: Primary | ICD-10-CM

## 2021-11-08 DIAGNOSIS — E73.9 LACTOSE INTOLERANCE: ICD-10-CM

## 2021-11-08 DIAGNOSIS — R19.4 CHANGE IN BOWEL HABITS: ICD-10-CM

## 2021-11-08 DIAGNOSIS — R11.2 NON-INTRACTABLE VOMITING WITH NAUSEA: ICD-10-CM

## 2021-11-08 PROCEDURE — 99214 OFFICE O/P EST MOD 30 MIN: CPT | Performed by: INTERNAL MEDICINE

## 2021-11-08 RX ORDER — ESCITALOPRAM OXALATE 10 MG/1
TABLET ORAL
COMMUNITY
Start: 2021-10-12

## 2021-11-08 RX ORDER — PREGABALIN 100 MG/1
CAPSULE ORAL
COMMUNITY
Start: 2021-11-01

## 2021-11-08 RX ORDER — PREDNISONE 5 MG/1
TABLET, DELAYED RELEASE ORAL
COMMUNITY
Start: 2021-10-13

## 2021-12-13 ENCOUNTER — HOSPITAL ENCOUNTER (OUTPATIENT)
Dept: NUCLEAR MEDICINE | Facility: HOSPITAL | Age: 31
Discharge: HOME | End: 2021-12-13
Payer: MEDICARE

## 2021-12-13 DIAGNOSIS — R11.2 NON-INTRACTABLE VOMITING WITH NAUSEA: ICD-10-CM

## 2021-12-13 PROCEDURE — G1004 CDSM NDSC: HCPCS

## 2021-12-13 PROCEDURE — A9541 TC99M SULFUR COLLOID: HCPCS

## 2021-12-13 PROCEDURE — 78264 GASTRIC EMPTYING IMG STUDY: CPT

## 2021-12-20 ENCOUNTER — TELEPHONE (OUTPATIENT)
Dept: GASTROENTEROLOGY | Facility: CLINIC | Age: 31
End: 2021-12-20

## 2021-12-21 ENCOUNTER — TELEMEDICINE (OUTPATIENT)
Dept: GASTROENTEROLOGY | Facility: CLINIC | Age: 31
End: 2021-12-21
Payer: MEDICARE

## 2021-12-21 ENCOUNTER — TELEPHONE (OUTPATIENT)
Dept: GASTROENTEROLOGY | Facility: CLINIC | Age: 31
End: 2021-12-21

## 2021-12-21 DIAGNOSIS — R11.0 NAUSEA: Primary | ICD-10-CM

## 2021-12-21 DIAGNOSIS — K58.0 IRRITABLE BOWEL SYNDROME WITH DIARRHEA: ICD-10-CM

## 2021-12-21 DIAGNOSIS — R10.84 GENERALIZED ABDOMINAL PAIN: ICD-10-CM

## 2021-12-21 DIAGNOSIS — E73.9 LACTOSE INTOLERANCE: ICD-10-CM

## 2021-12-21 PROBLEM — K44.9 HIATAL HERNIA: Status: ACTIVE | Noted: 2021-12-21

## 2021-12-21 PROCEDURE — 99214 OFFICE O/P EST MOD 30 MIN: CPT | Performed by: INTERNAL MEDICINE

## 2022-01-10 ENCOUNTER — TELEPHONE (OUTPATIENT)
Dept: GASTROENTEROLOGY | Facility: HOSPITAL | Age: 32
End: 2022-01-10

## 2022-01-19 ENCOUNTER — OFFICE VISIT (OUTPATIENT)
Dept: DENTISTRY | Facility: CLINIC | Age: 32
End: 2022-01-19

## 2022-01-19 DIAGNOSIS — K02.9 CARIES: Primary | ICD-10-CM

## 2022-01-19 PROCEDURE — D7140 EXTRACTION, ERUPTED TOOTH OR EXPOSED ROOT (ELEVATION AND/OR FORCEPS REMOVAL): HCPCS | Performed by: DENTIST

## 2022-05-05 ENCOUNTER — OFFICE VISIT (OUTPATIENT)
Dept: DENTISTRY | Facility: CLINIC | Age: 32
End: 2022-05-05

## 2022-05-05 VITALS — HEART RATE: 89 BPM | SYSTOLIC BLOOD PRESSURE: 121 MMHG | TEMPERATURE: 98.1 F | DIASTOLIC BLOOD PRESSURE: 85 MMHG

## 2022-05-05 DIAGNOSIS — Z01.20 ENCOUNTER FOR DENTAL EXAMINATION: Primary | ICD-10-CM

## 2022-05-05 PROCEDURE — WIS1000 RESIDENT TEACHING CASE: Performed by: DENTIST

## 2022-05-05 PROCEDURE — D0191 ASSESSMENT OF A PATIENT: HCPCS | Performed by: DENTIST

## 2022-05-05 NOTE — PROGRESS NOTES
Assessment    Jimena Espinoza initially presented for #22 and 27 buccal composite  Given extent of decay since previous visit, both teeth non-restorable  Recommend extraction  Discussed need for max and elisabeth complete dentures  Pt understood  Explained difference between immmediate and permanent complete dentures, pt prefers immediate  Impressions taken for diagnostic cast with stock tray and alginate  Explained phasing of treatment:  1  Ext #3, 13, 14  2  Fabricate max and elisabeth complete dentures  3  Ext remaining teeth and deliver complete dentures  Pt understood and agreed to treatment      NV: Ext of #3, 13, 14  NNV: Preliminary impression for max and elisabeth immediate complete dentures after 2+ months

## 2022-05-24 ENCOUNTER — OFFICE VISIT (OUTPATIENT)
Dept: DENTISTRY | Facility: CLINIC | Age: 32
End: 2022-05-24

## 2022-05-24 VITALS — SYSTOLIC BLOOD PRESSURE: 120 MMHG | HEART RATE: 89 BPM | TEMPERATURE: 97.5 F | DIASTOLIC BLOOD PRESSURE: 86 MMHG

## 2022-05-24 DIAGNOSIS — K02.9 CARIES: Primary | ICD-10-CM

## 2022-05-24 PROCEDURE — D7140 EXTRACTION, ERUPTED TOOTH OR EXPOSED ROOT (ELEVATION AND/OR FORCEPS REMOVAL): HCPCS | Performed by: DENTIST

## 2022-05-24 NOTE — PROGRESS NOTES
Cheyanne Yu presents for Ext #3, 13, 14  The risks and benefits of treatment were reviewed with the patient who provided written and verbal consent  Pre-Op BP WNL  PMH reviewed with no changes noted  Administered 4 5 carpules of 4% septocaine with 1:100,000 epi via local infiltration  Anesthesia verified  Reflected gingiva, elevated, and extracted #3, 14, and 15  Socket irrigated  Upon dismissal, patient received POI and gauze  The patient reported understanding of our instructions       NV: Take preliminary impressions for fabrication of upper and lower immediate dentures

## 2022-07-25 ENCOUNTER — OFFICE VISIT (OUTPATIENT)
Dept: OBGYN CLINIC | Facility: CLINIC | Age: 32
End: 2022-07-25
Payer: MEDICARE

## 2022-07-25 VITALS
WEIGHT: 183.4 LBS | DIASTOLIC BLOOD PRESSURE: 84 MMHG | HEIGHT: 67 IN | SYSTOLIC BLOOD PRESSURE: 122 MMHG | BODY MASS INDEX: 28.79 KG/M2

## 2022-07-25 DIAGNOSIS — Z90.710 STATUS POST LAPAROSCOPIC HYSTERECTOMY: ICD-10-CM

## 2022-07-25 DIAGNOSIS — Z86.69 HISTORY OF PERIPHERAL NEUROPATHY: ICD-10-CM

## 2022-07-25 DIAGNOSIS — N32.81 OAB (OVERACTIVE BLADDER): ICD-10-CM

## 2022-07-25 DIAGNOSIS — N39.46 MIXED STRESS AND URGE URINARY INCONTINENCE: ICD-10-CM

## 2022-07-25 DIAGNOSIS — Z87.39 HISTORY OF RHEUMATOID ARTHRITIS: ICD-10-CM

## 2022-07-25 DIAGNOSIS — Z01.419 WOMEN'S ANNUAL ROUTINE GYNECOLOGICAL EXAMINATION: ICD-10-CM

## 2022-07-25 PROCEDURE — G0101 CA SCREEN;PELVIC/BREAST EXAM: HCPCS | Performed by: OBSTETRICS & GYNECOLOGY

## 2022-07-25 RX ORDER — SOLIFENACIN SUCCINATE 5 MG/1
5 TABLET, FILM COATED ORAL DAILY
Qty: 30 TABLET | Refills: 1 | Status: SHIPPED | OUTPATIENT
Start: 2022-07-25 | End: 2022-08-30

## 2022-07-25 NOTE — PROGRESS NOTES
Assessment     Annual well-woman exam    Status post total laparoscopic hysterectomy with bilateral salpingectomy    Overactive bladder    Mixed stress / urge incontinence    Rheumatoid arthritis    Peripheral neuropathy    Anxiety depression        Plan     Pap smear no longer indicated since her hysterectomy    Trial of VESIcare 5 mg daily to improve OAB symptoms    Continue Kegel exercises   All questions answered  Follow-up in 6 weeks and p r n  Subjective     Here for annual exam     Juanjose Méndez is a 27 y o  female who presents for annual exam  Periods are absent since her hysterectomy in 2019  Ongoing symptoms of overactive bladder and stress / urge incontinence  Trial of VESIcare  Renew Kegel exercises  Follow-up in 6 weeks for follow-up care  The patient reports that there is not domestic violence in her life  Current contraception: status post hysterectomy  History of abnormal Pap smear: no  Family history of uterine or ovarian cancer: no  Regular self breast exam: yes  History of abnormal mammogram: no  Family history of breast cancer: no  History of abnormal lipids: no  Menstrual History:  OB History        4    Para   2    Term                AB        Living   2       SAB        IAB        Ectopic        Multiple        Live Births                    Menarche age: 14  No LMP recorded (lmp unknown)  Patient has had a hysterectomy  Review of Systems  Pertinent items are noted in HPI        Objective No acute distention   /84   Ht 5' 7" (1 702 m)   Wt 83 2 kg (183 lb 6 4 oz)   LMP  (LMP Unknown) Comment: irregular  BMI 28 72 kg/m²     General:   alert and oriented, in no acute distress, alert, appears stated age and cooperative   Heart: regular rate and rhythm, S1, S2 normal, no murmur, click, rub or gallop   Lungs: clear to auscultation bilaterally   Abdomen: soft, non-tender, without masses or organomegaly   Vulva: normal, Bartholin's, Urethra, Storla's normal, female escutcheon   Vagina: normal mucosa, normal discharge   Cervix: surgically absent   Uterus: surgically absent   Adnexa: normal adnexa   Bilateral breast exam in the sitting and supine position with chaperone present, no visible or palpable breast lesions identified  No breast masses noted  No supraclavicular or axillary lymphadenopathy noted  No nipple discharge  Reviewed self-breast exam techniques     Rectal exam,  deferred

## 2022-08-01 ENCOUNTER — APPOINTMENT (OUTPATIENT)
Dept: LAB | Facility: HOSPITAL | Age: 32
End: 2022-08-01
Attending: PLASTIC SURGERY
Payer: MEDICARE

## 2022-08-01 DIAGNOSIS — Z01.812 PRE-OPERATIVE LABORATORY EXAMINATION: ICD-10-CM

## 2022-08-01 LAB
APTT PPP: 30 SECONDS (ref 23–37)
BASOPHILS # BLD AUTO: 0.07 THOUSANDS/ΜL (ref 0–0.1)
BASOPHILS NFR BLD AUTO: 1 % (ref 0–1)
EOSINOPHIL # BLD AUTO: 0.05 THOUSAND/ΜL (ref 0–0.61)
EOSINOPHIL NFR BLD AUTO: 0 % (ref 0–6)
ERYTHROCYTE [DISTWIDTH] IN BLOOD BY AUTOMATED COUNT: 13.1 % (ref 11.6–15.1)
HCT VFR BLD AUTO: 44 % (ref 34.8–46.1)
HGB BLD-MCNC: 13.8 G/DL (ref 11.5–15.4)
IMM GRANULOCYTES # BLD AUTO: 0.08 THOUSAND/UL (ref 0–0.2)
IMM GRANULOCYTES NFR BLD AUTO: 1 % (ref 0–2)
INR PPP: 0.98 (ref 0.84–1.19)
LYMPHOCYTES # BLD AUTO: 2.82 THOUSANDS/ΜL (ref 0.6–4.47)
LYMPHOCYTES NFR BLD AUTO: 25 % (ref 14–44)
MCH RBC QN AUTO: 30.4 PG (ref 26.8–34.3)
MCHC RBC AUTO-ENTMCNC: 31.4 G/DL (ref 31.4–37.4)
MCV RBC AUTO: 97 FL (ref 82–98)
MONOCYTES # BLD AUTO: 0.72 THOUSAND/ΜL (ref 0.17–1.22)
MONOCYTES NFR BLD AUTO: 6 % (ref 4–12)
NEUTROPHILS # BLD AUTO: 7.62 THOUSANDS/ΜL (ref 1.85–7.62)
NEUTS SEG NFR BLD AUTO: 67 % (ref 43–75)
NRBC BLD AUTO-RTO: 0 /100 WBCS
PLATELET # BLD AUTO: 216 THOUSANDS/UL (ref 149–390)
PMV BLD AUTO: 12.3 FL (ref 8.9–12.7)
PROTHROMBIN TIME: 13.3 SECONDS (ref 11.6–14.5)
RBC # BLD AUTO: 4.54 MILLION/UL (ref 3.81–5.12)
WBC # BLD AUTO: 11.36 THOUSAND/UL (ref 4.31–10.16)

## 2022-08-01 PROCEDURE — 85730 THROMBOPLASTIN TIME PARTIAL: CPT

## 2022-08-01 PROCEDURE — 85025 COMPLETE CBC W/AUTO DIFF WBC: CPT

## 2022-08-01 PROCEDURE — 36415 COLL VENOUS BLD VENIPUNCTURE: CPT

## 2022-08-01 PROCEDURE — 85610 PROTHROMBIN TIME: CPT

## 2022-08-30 NOTE — PRE-PROCEDURE INSTRUCTIONS
Pre-Surgery Instructions:   Medication Instructions    ALPRAZolam (XANAX) 0 5 mg tablet Uses PRN- OK to take day of surgery    Calcium Carb-Cholecalciferol (CALCIUM 1000 + D PO) Stop taking 7 days prior to surgery   dicyclomine (BENTYL) 20 mg tablet Uses PRN- OK to take day of surgery    ergocalciferol (VITAMIN D2) 50,000 units Hold day of surgery   escitalopram (LEXAPRO) 10 mg tablet Take day of surgery   escitalopram (LEXAPRO) 20 mg tablet Take day of surgery   loperamide (IMODIUM) 2 mg capsule Uses PRN- DO NOT take day of surgery    Multiple Vitamin (multivitamin) tablet Stop taking 7 days prior to surgery   mupirocin (BACTROBAN) 2 % ointment Hold day of surgery   omeprazole (PriLOSEC) 40 MG capsule Take day of surgery   ondansetron (ZOFRAN) 4 mg tablet Uses PRN- OK to take day of surgery    oxyCODONE (ROXICODONE) 15 mg immediate release tablet Uses PRN- OK to take day of surgery    pregabalin (LYRICA) 100 mg capsule Take day of surgery   Sodium Fluoride 1 1 % PSTE Hold day of surgery   SUMAtriptan (IMITREX) 100 mg tablet Uses PRN- OK to take day of surgery    Virt-Phos 250 Neutral [id number] MG tablet Stop taking 7 days prior to surgery  Pt denies fever, sob, sore throat and cough  Pt verbalized understanding of shower and med instructions  Pt instructed to stop nsaids and supplements one week prior to surgery

## 2022-09-02 ENCOUNTER — TELEPHONE (OUTPATIENT)
Dept: GYNECOLOGY | Facility: CLINIC | Age: 32
End: 2022-09-02

## 2022-09-06 ENCOUNTER — ANESTHESIA EVENT (OUTPATIENT)
Dept: PERIOP | Facility: HOSPITAL | Age: 32
End: 2022-09-06
Payer: MEDICARE

## 2022-09-06 DIAGNOSIS — N32.81 OAB (OVERACTIVE BLADDER): Primary | ICD-10-CM

## 2022-09-06 RX ORDER — OXYBUTYNIN CHLORIDE 5 MG/1
5 TABLET, EXTENDED RELEASE ORAL DAILY
Qty: 30 TABLET | Refills: 2 | Status: SHIPPED | OUTPATIENT
Start: 2022-09-06 | End: 2022-09-07

## 2022-09-06 NOTE — H&P
H&P Exam - Norberto Andujar 27 y o  female MRN: [id number]    Unit/Bed#:  Encounter: [id number]    Assessment:  Bilateral macromastia    Plan:  Bilateral breast reduction mammoplasty    History of Present Illness   This is a 26-year-old female with bilateral large heavy pendulous breasts that hang down over her abdomen not supported well on the chest wall  Patient has a chronic intertriginous rash in the inframammary fold has upper back and neck strain as well as notching in shoulders and rounding of the shoulders    Review of Systems   All other systems reviewed and are negative  Historical Information   Past Medical History:   Diagnosis Date    Abnormal Pap smear of cervix     Anemia     Anxiety     Back pain     Depression     GERD (gastroesophageal reflux disease)     IBS (irritable bowel syndrome)     Iron deficiency     Irregular menses     Menorrhagia     Migraines     Neuropathy     Obesity     Osteoarthritis of back     Osteopenia     RA (rheumatoid arthritis) ([address] )     Scratches     On back and shoulder from scratching due to urticaria    Vasculitis ([address] )     Wears glasses      Past Surgical History:   Procedure Laterality Date    CAST APPLICATION Left 45/13/8239    Procedure: APPLICATION LONG ARM Fowler Kipper;  Surgeon: Erica Olson MD;  [facility];  Service: Orthopedics    CAST APPLICATION Right 68/0/9750    Procedure: APPLICATION LONG ARM SUGAR TONG SPLINT;  Surgeon: Erica Olson MD;  [facility];  Service: Orthopedics    CHOLECYSTECTOMY      Laparoscopic    COLONOSCOPY      DILATION AND CURETTAGE OF UTERUS      HYSTERECTOMY      JOINT REPLACEMENT Bilateral     knees    TX COLONOSCOPY FLX DX W/COLLJ SPEC WHEN PFRMD N/A 2/22/2019    Procedure: COLONOSCOPY;  Surgeon: Narcisa Rod MD;  Location: [facility];   Service: Gastroenterology    TX ESOPHAGOGASTRODUODENOSCOPY TRANSORAL DIAGNOSTIC N/A 2/22/2019    Procedure: ESOPHAGOGASTRODUODENOSCOPY (EGD); Surgeon: Reji Eaton MD;  Location: [facility];   Service: Gastroenterology    NV EXCIS DISTAL ULNA,PART/COMPLETE Right 10/8/2019    Procedure: EXCISION DISTAL ULNA ([place]) right;  Surgeon: Mohini Andrew MD;  [facility];  Service: Orthopedics    NV FUSION/GRAFT WRIST JOINT Left 4/4/2017    Procedure: WRIST FUSION / [address] ;  Surgeon: Mohini Andrew MD;  [facility];  Service: Orthopedics    NV FUSION/GRAFT WRIST JOINT Right 4/10/2018    Procedure: Removal of hardware right wrist with Fusion of Long finger carpometacarpal joint, splint application Right Wrist;  Surgeon: Mohini Andrew MD;  [facility];  Service: Orthopedics    NV HYSTEROSCOPY,W/ENDO BX N/A 12/1/2017    Procedure: DILATATION AND CURETTAGE (D&C) WITH HYSTEROSCOPY;  Surgeon: Hector Morales DO;  [facility];  Service: Gynecology    NV LAP,CHOLECYSTECTOMY N/A 3/14/2017    Procedure: CHOLECYSTECTOMY LAPAROSCOPIC;  Surgeon: Dai Graham DO;  [facility];  Service: General    NV LAPAROSCOPY W TOT HYSTERECTUTERUS <=250 GRAM  W TUBE/OVARY N/A 1/18/2019    Procedure: HYSTERECTOMY LAPAROSCOPIC TOTAL ([address]) Bilateral salpingectomy, cystoscopy;  Surgeon: Hector Morales DO;  [facility];  Service: Gynecology    NV REMOVAL DEEP IMPLANT Left 11/13/2018    Procedure: REMOVAL OF HARDWARE (wrist fusion plate  AND ulnar head arthroplasty) with conversion to [place] ;  Surgeon: Mohini Andrew MD;  [facility];  Service: Orthopedics    REPLACEMENT TOTAL KNEE BILATERAL      UPPER GASTROINTESTINAL ENDOSCOPY      WISDOM TOOTH EXTRACTION      WRIST SURGERY Right     For arthritis    WRIST SURGERY Left 4/4/2017    Procedure: ARTHROPLASTY WRIST ULNAR HEAD ARTHROPLASTY - INTEGRA SIZE 5 5 MM, 16 HEAD;  Surgeon: Mohini Andrew MD;  [facility];  Service:     WRIST TENDON TRANSFER Right 10/8/2019 Procedure: TRANSFER TENDON EXTENSOR CARPI ULNARIS AT THE WRIST;  Surgeon: Leopold Mitts, MD;  [facility];  Service: Orthopedics     Social History   Social History     Substance and Sexual Activity   Alcohol Use Not Currently     Social History     Substance and Sexual Activity   Drug Use Yes    Types: Marijuana     Social History     Tobacco Use   Smoking Status Former Smoker    Packs/day: 0 25    Years: 3 00    Pack years: 0 75    Types: Cigarettes    Quit date:     Years since quittin 6   Smokeless Tobacco Never Used     E-Cigarette Use: Never User     E-Cigarette/Vaping Substances    Nicotine No     THC No     CBD No     Flavoring No     Other No     Unknown No        Family History: non-contributory    Meds/Allergies   all medications and allergies reviewed  Allergies   Allergen Reactions    Nickel Rash    Tylenol With Codeine #3 [Acetaminophen-Codeine] Hives, Itching and Facial Swelling     Can take oxycodone       Objective   First Vitals:        Current Vitals:        No intake or output data in the 24 hours ending 22 1809    Invasive Devices  Report    Drain  Duration           NG/OG/Enteral Tube Orogastric 18 Fr Right mouth 1327 days                Physical Exam examination head ears eyes nose and throat is within normal limits heart sounds S1-S2 heard no murmurs or gallops lungs clear abdomen soft extremities are within normal limits except for the prior surgery of the above upper extremity bilateral breast are soft large heavy pendulous hang down over the abdomen not supported well on the chest wall there are no masses felt    Lab Results:   Imaging:   EKG, Pathology, and Other Studies:     Code Status: Prior  Advance Directive and Living Will:      Power of :    POLST:      Counseling / Coordination of Care:   None

## 2022-09-06 NOTE — ANESTHESIA PREPROCEDURE EVALUATION
Procedure:  BREAST REDUCTION (Bilateral Breast)    Relevant Problems   CARDIO   (+) Chronic bilateral thoracic back pain      GI/HEPATIC   (+) Hiatal hernia      GYN   (+) Status post laparoscopic hysterectomy      HEMATOLOGY   (+) Anemia      MUSCULOSKELETAL   (+) Chronic bilateral thoracic back pain   (+) Osteoarthritis of knee   (+) Rheumatoid arthritis (HCC)   (+) 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)   (+) 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)      NEURO/PSYCH   (+) Chronic bilateral thoracic back pain   (+) Depression        Physical Exam    Airway  Comment: Rh arthritis affecting her jaw moderately  Pt quite symtomatic  Cant chew for long or hard food  Painful jaw opening  Explained to her risk of jaw dislocation and we will do gentle intubation   Mallampati score: II  TM Distance: >3 FB  Neck ROM: full     Dental       Cardiovascular  Cardiovascular exam normal    Pulmonary  Pulmonary exam normal     Other Findings        Anesthesia Plan  ASA Score- 2     Anesthesia Type- general with ASA Monitors  Additional Monitors:   Airway Plan: ETT  Comment: Pt on low dose prednisone   Plan Factors-Exercise tolerance (METS): >4 METS  Chart reviewed  Existing labs reviewed  Patient is not a current smoker  Patient not instructed to abstain from smoking on day of procedure  Patient did not smoke on day of surgery  Induction- intravenous  Postoperative Plan- Plan for postoperative opioid use  Informed Consent- Anesthetic plan and risks discussed with patient  I personally reviewed this patient with the CRNA  Discussed and agreed on the Anesthesia Plan with the CRNA             Lab Results   Component Value Date    HGBA1C 5 2 11/05/2020       Lab Results   Component Value Date     (L) 11/11/2015    K 3 6 06/26/2020     06/26/2020    CO2 29 06/26/2020    ANIONGAP 8 11/11/2015    BUN 6 06/26/2020    CREATININE 0 49 (L) 06/26/2020    GLUCOSE 135 11/11/2015    GLUF 93 06/26/2020    CALCIUM 9 5 06/26/2020    AST 13 11/22/2019    ALT 18 11/22/2019    ALKPHOS 91 11/22/2019    PROT 7 9 04/02/2015    BILITOT 0 2 04/02/2015    EGFR 131 06/26/2020       Lab Results   Component Value Date    WBC 11 36 (H) 08/01/2022    HGB 13 8 08/01/2022    HCT 44 0 08/01/2022    MCV 97 08/01/2022     08/01/2022    Normal sinus rhythm  Normal ECG  When compared with ECG of 08-DEC-2017 12:14, (unconfirmed)  No significant change was found  Confirmed by INGE Mota , [facility] ([phone]) on 12/9/2017 7:53:14 AM

## 2022-09-07 ENCOUNTER — ANESTHESIA (OUTPATIENT)
Dept: PERIOP | Facility: HOSPITAL | Age: 32
End: 2022-09-07
Payer: MEDICARE

## 2022-09-07 ENCOUNTER — HOSPITAL ENCOUNTER (OUTPATIENT)
Facility: HOSPITAL | Age: 32
Setting detail: OUTPATIENT SURGERY
Discharge: HOME | End: 2022-09-07
Attending: PLASTIC SURGERY | Admitting: PLASTIC SURGERY
Payer: MEDICARE

## 2022-09-07 VITALS
BODY MASS INDEX: 28.25 KG/M2 | HEIGHT: 67 IN | TEMPERATURE: 98.3 F | RESPIRATION RATE: 16 BRPM | DIASTOLIC BLOOD PRESSURE: 83 MMHG | SYSTOLIC BLOOD PRESSURE: 120 MMHG | OXYGEN SATURATION: 94 % | HEART RATE: 100 BPM | WEIGHT: 180 LBS

## 2022-09-07 DIAGNOSIS — N62 BREAST HYPERTROPHY: Primary | ICD-10-CM

## 2022-09-07 DIAGNOSIS — N62 HYPERTROPHY OF BREAST: ICD-10-CM

## 2022-09-07 PROCEDURE — 88305 TISSUE EXAM BY PATHOLOGIST: CPT | Performed by: PATHOLOGY

## 2022-09-07 RX ORDER — ONDANSETRON 2 MG/ML
INJECTION INTRAMUSCULAR; INTRAVENOUS AS NEEDED
Status: DISCONTINUED | OUTPATIENT
Start: 2022-09-07 | End: 2022-09-07

## 2022-09-07 RX ORDER — FENTANYL CITRATE 50 UG/ML
INJECTION, SOLUTION INTRAMUSCULAR; INTRAVENOUS AS NEEDED
Status: DISCONTINUED | OUTPATIENT
Start: 2022-09-07 | End: 2022-09-07

## 2022-09-07 RX ORDER — GLYCOPYRROLATE 0.2 MG/ML
INJECTION INTRAMUSCULAR; INTRAVENOUS AS NEEDED
Status: DISCONTINUED | OUTPATIENT
Start: 2022-09-07 | End: 2022-09-07

## 2022-09-07 RX ORDER — HYDRALAZINE HYDROCHLORIDE 20 MG/ML
INJECTION INTRAMUSCULAR; INTRAVENOUS AS NEEDED
Status: DISCONTINUED | OUTPATIENT
Start: 2022-09-07 | End: 2022-09-07

## 2022-09-07 RX ORDER — CEPHALEXIN 500 MG/1
500 CAPSULE ORAL EVERY 8 HOURS SCHEDULED
Qty: 21 CAPSULE | Refills: 0 | Status: SHIPPED | OUTPATIENT
Start: 2022-09-07 | End: 2022-09-14

## 2022-09-07 RX ORDER — ONDANSETRON 2 MG/ML
4 INJECTION INTRAMUSCULAR; INTRAVENOUS ONCE
Status: DISCONTINUED | OUTPATIENT
Start: 2022-09-07 | End: 2022-09-07 | Stop reason: HOSPADM

## 2022-09-07 RX ORDER — OXYCODONE HYDROCHLORIDE AND ACETAMINOPHEN 5; 325 MG/1; MG/1
1 TABLET ORAL EVERY 4 HOURS PRN
Qty: 30 TABLET | Refills: 0 | Status: SHIPPED | OUTPATIENT
Start: 2022-09-07 | End: 2022-09-17

## 2022-09-07 RX ORDER — PROPOFOL 10 MG/ML
INJECTION, EMULSION INTRAVENOUS AS NEEDED
Status: DISCONTINUED | OUTPATIENT
Start: 2022-09-07 | End: 2022-09-07

## 2022-09-07 RX ORDER — PROPOFOL 10 MG/ML
INJECTION, EMULSION INTRAVENOUS CONTINUOUS PRN
Status: DISCONTINUED | OUTPATIENT
Start: 2022-09-07 | End: 2022-09-07

## 2022-09-07 RX ORDER — CEFAZOLIN SODIUM 2 G/50ML
2000 SOLUTION INTRAVENOUS ONCE
Status: COMPLETED | OUTPATIENT
Start: 2022-09-07 | End: 2022-09-07

## 2022-09-07 RX ORDER — MAGNESIUM HYDROXIDE 1200 MG/15ML
LIQUID ORAL AS NEEDED
Status: DISCONTINUED | OUTPATIENT
Start: 2022-09-07 | End: 2022-09-07 | Stop reason: HOSPADM

## 2022-09-07 RX ORDER — LABETALOL HYDROCHLORIDE 5 MG/ML
INJECTION, SOLUTION INTRAVENOUS AS NEEDED
Status: DISCONTINUED | OUTPATIENT
Start: 2022-09-07 | End: 2022-09-07

## 2022-09-07 RX ORDER — HYDROMORPHONE HCL/PF 1 MG/ML
0.5 SYRINGE (ML) INJECTION
Status: DISCONTINUED | OUTPATIENT
Start: 2022-09-07 | End: 2022-09-07 | Stop reason: HOSPADM

## 2022-09-07 RX ORDER — KETAMINE HYDROCHLORIDE 100 MG/ML
INJECTION, SOLUTION INTRAMUSCULAR; INTRAVENOUS AS NEEDED
Status: DISCONTINUED | OUTPATIENT
Start: 2022-09-07 | End: 2022-09-07

## 2022-09-07 RX ORDER — ONDANSETRON 2 MG/ML
4 INJECTION INTRAMUSCULAR; INTRAVENOUS ONCE AS NEEDED
Status: DISCONTINUED | OUTPATIENT
Start: 2022-09-07 | End: 2022-09-07 | Stop reason: HOSPADM

## 2022-09-07 RX ORDER — PROMETHAZINE HYDROCHLORIDE 25 MG/ML
12.5 INJECTION, SOLUTION INTRAMUSCULAR; INTRAVENOUS ONCE AS NEEDED
Status: DISCONTINUED | OUTPATIENT
Start: 2022-09-07 | End: 2022-09-07 | Stop reason: HOSPADM

## 2022-09-07 RX ORDER — ROCURONIUM BROMIDE 10 MG/ML
INJECTION, SOLUTION INTRAVENOUS AS NEEDED
Status: DISCONTINUED | OUTPATIENT
Start: 2022-09-07 | End: 2022-09-07

## 2022-09-07 RX ORDER — NEOSTIGMINE METHYLSULFATE 1 MG/ML
INJECTION INTRAVENOUS AS NEEDED
Status: DISCONTINUED | OUTPATIENT
Start: 2022-09-07 | End: 2022-09-07

## 2022-09-07 RX ORDER — SODIUM CHLORIDE 9 MG/ML
INJECTION INTRAVENOUS AS NEEDED
Status: DISCONTINUED | OUTPATIENT
Start: 2022-09-07 | End: 2022-09-07 | Stop reason: HOSPADM

## 2022-09-07 RX ORDER — OXYCODONE HYDROCHLORIDE AND ACETAMINOPHEN 5; 325 MG/1; MG/1
1 TABLET ORAL EVERY 4 HOURS PRN
Status: DISCONTINUED | OUTPATIENT
Start: 2022-09-07 | End: 2022-09-07 | Stop reason: HOSPADM

## 2022-09-07 RX ORDER — MIDAZOLAM HYDROCHLORIDE 2 MG/2ML
INJECTION, SOLUTION INTRAMUSCULAR; INTRAVENOUS AS NEEDED
Status: DISCONTINUED | OUTPATIENT
Start: 2022-09-07 | End: 2022-09-07

## 2022-09-07 RX ORDER — SODIUM CHLORIDE, SODIUM LACTATE, POTASSIUM CHLORIDE, CALCIUM CHLORIDE 600; 310; 30; 20 MG/100ML; MG/100ML; MG/100ML; MG/100ML
125 INJECTION, SOLUTION INTRAVENOUS CONTINUOUS
Status: DISCONTINUED | OUTPATIENT
Start: 2022-09-07 | End: 2022-09-07 | Stop reason: HOSPADM

## 2022-09-07 RX ORDER — HYDROMORPHONE HCL/PF 1 MG/ML
SYRINGE (ML) INJECTION AS NEEDED
Status: DISCONTINUED | OUTPATIENT
Start: 2022-09-07 | End: 2022-09-07

## 2022-09-07 RX ORDER — LIDOCAINE HYDROCHLORIDE AND EPINEPHRINE 10; 10 MG/ML; UG/ML
INJECTION, SOLUTION INFILTRATION; PERINEURAL AS NEEDED
Status: DISCONTINUED | OUTPATIENT
Start: 2022-09-07 | End: 2022-09-07 | Stop reason: HOSPADM

## 2022-09-07 RX ORDER — LIDOCAINE HYDROCHLORIDE 10 MG/ML
INJECTION, SOLUTION EPIDURAL; INFILTRATION; INTRACAUDAL; PERINEURAL AS NEEDED
Status: DISCONTINUED | OUTPATIENT
Start: 2022-09-07 | End: 2022-09-07

## 2022-09-07 RX ADMIN — CEFAZOLIN SODIUM 2000 MG: 2 SOLUTION INTRAVENOUS at 07:41

## 2022-09-07 RX ADMIN — ONDANSETRON 4 MG: 2 INJECTION INTRAMUSCULAR; INTRAVENOUS at 10:44

## 2022-09-07 RX ADMIN — NEOSTIGMINE 3 MG: 1 INJECTION INTRAVENOUS at 10:17

## 2022-09-07 RX ADMIN — ROCURONIUM BROMIDE 20 MG: 10 INJECTION, SOLUTION INTRAVENOUS at 08:47

## 2022-09-07 RX ADMIN — KETAMINE HYDROCHLORIDE 50 MG: 100 INJECTION INTRAMUSCULAR; INTRAVENOUS at 08:47

## 2022-09-07 RX ADMIN — GLYCOPYRROLATE 0.4 MG: 0.2 INJECTION, SOLUTION INTRAMUSCULAR; INTRAVENOUS at 10:17

## 2022-09-07 RX ADMIN — HYDROCORTISONE SODIUM SUCCINATE 50 MG: 100 INJECTION, POWDER, FOR SOLUTION INTRAMUSCULAR; INTRAVENOUS at 08:07

## 2022-09-07 RX ADMIN — HYDROMORPHONE HYDROCHLORIDE 0.5 MG: 1 INJECTION, SOLUTION INTRAMUSCULAR; INTRAVENOUS; SUBCUTANEOUS at 11:36

## 2022-09-07 RX ADMIN — SODIUM CHLORIDE, SODIUM LACTATE, POTASSIUM CHLORIDE, AND CALCIUM CHLORIDE 125 ML/HR: .6; .31; .03; .02 INJECTION, SOLUTION INTRAVENOUS at 07:00

## 2022-09-07 RX ADMIN — HYDROMORPHONE HYDROCHLORIDE 0.5 MG: 1 INJECTION, SOLUTION INTRAMUSCULAR; INTRAVENOUS; SUBCUTANEOUS at 08:22

## 2022-09-07 RX ADMIN — FENTANYL CITRATE 100 MCG: 50 INJECTION, SOLUTION INTRAMUSCULAR; INTRAVENOUS at 08:26

## 2022-09-07 RX ADMIN — MIDAZOLAM HYDROCHLORIDE 2 MG: 1 INJECTION, SOLUTION INTRAMUSCULAR; INTRAVENOUS at 07:45

## 2022-09-07 RX ADMIN — HYDROMORPHONE HYDROCHLORIDE 0.5 MG: 1 INJECTION, SOLUTION INTRAMUSCULAR; INTRAVENOUS; SUBCUTANEOUS at 11:46

## 2022-09-07 RX ADMIN — LABETALOL HYDROCHLORIDE 15 MG: 5 INJECTION, SOLUTION INTRAVENOUS at 09:05

## 2022-09-07 RX ADMIN — PROPOFOL 100 MCG/KG/MIN: 10 INJECTION, EMULSION INTRAVENOUS at 07:58

## 2022-09-07 RX ADMIN — HYDROMORPHONE HYDROCHLORIDE 0.5 MG: 1 INJECTION, SOLUTION INTRAMUSCULAR; INTRAVENOUS; SUBCUTANEOUS at 08:47

## 2022-09-07 RX ADMIN — ROCURONIUM BROMIDE 20 MG: 10 INJECTION, SOLUTION INTRAVENOUS at 09:35

## 2022-09-07 RX ADMIN — HYDROMORPHONE HYDROCHLORIDE 0.5 MG: 1 INJECTION, SOLUTION INTRAMUSCULAR; INTRAVENOUS; SUBCUTANEOUS at 09:35

## 2022-09-07 RX ADMIN — FENTANYL CITRATE 100 MCG: 50 INJECTION, SOLUTION INTRAMUSCULAR; INTRAVENOUS at 07:47

## 2022-09-07 RX ADMIN — OXYCODONE HYDROCHLORIDE AND ACETAMINOPHEN 1 TABLET: 5; 325 TABLET ORAL at 14:14

## 2022-09-07 RX ADMIN — LIDOCAINE HYDROCHLORIDE 50 MG: 10 INJECTION, SOLUTION EPIDURAL; INFILTRATION; INTRACAUDAL; PERINEURAL at 07:58

## 2022-09-07 RX ADMIN — ROCURONIUM BROMIDE 50 MG: 10 INJECTION, SOLUTION INTRAVENOUS at 07:58

## 2022-09-07 RX ADMIN — HYDROMORPHONE HYDROCHLORIDE 0.5 MG: 1 INJECTION, SOLUTION INTRAMUSCULAR; INTRAVENOUS; SUBCUTANEOUS at 12:04

## 2022-09-07 RX ADMIN — HYDRALAZINE HYDROCHLORIDE 10 MG: 20 INJECTION, SOLUTION INTRAMUSCULAR; INTRAVENOUS at 08:59

## 2022-09-07 RX ADMIN — PROPOFOL 200 MG: 10 INJECTION, EMULSION INTRAVENOUS at 07:58

## 2022-09-07 NOTE — INTERVAL H&P NOTE
H&P reviewed  After examining the patient I find no changes in the patients condition since the H&P had been written  Vitals:    09/07/22 0635   BP: 121/85   Pulse: 82   Resp: 20   Temp: 98 1 °F (36 7 °C)   SpO2: 97%   The patient was marked in the holding area for bilateral breast reduction mammoplasty    Nothing changed in the history and physical   This note is being dictated postoperatively

## 2022-09-07 NOTE — DISCHARGE INSTRUCTIONS
THIS IS CONSIDERED MAJOR SURGERY  PLEASE ACT ACCORDINGLY  THE FOLLOWING INSTRUCTIONS ARE INTENDED AS A GUIDE FOR YOU TO FOLLOW AT HOME  THEY ARE NOT INTENDED AS A SUBSTITUTE FOR MEDICAL CARE  AFTER SURGERY:    Following surgery before you're discharged from the short procedure unit the nurse will instruct you on how to activate your drains  If you have EXCESSIVE DRAINAGE, meaning that the BULBS fill up twice in 1 hour, please call Dr Ken Frankel for further instructions  Your drains will be removed the day after surgery in Dr Meenakshi Kimble office  For your protection, we recommend that someone spend the first night with you because of medications prescribed and for assistance getting out of bed  Leave your dressings in place; Dr Ken Frankel will change them the following day when drains are removed  After your first visit, no dressings are necessary  You may shower after your drains are removed; wash gently with soap and water and pat dry  Put on a radha shirt/tank top then your bra on top of the shirt  NOTE: Shower only; do note use hot tub or baths until instructed  Do not use deodorant  until after the sutures are removed  I  Fill your prescriptions and taking medications as directed  A very deep, sore muscular pain it is associated with this type of surgery and this is normal   Pain medication will decrease the amount of pain that you have, but will not totally take it away, this is normal   Do not drive while taking pain medication  If you're more comfortable lying on your side this is permissible; however, do not sleep on your stomach for 2 weeks following surgery  Any signs of bleeding or rash should be reported to the office  If one breast becomes considerably larger was harder than the other, please call the office immediately  FOLLOW-UP CARE:    It is recommended that you do not wear a bra for at least 3 weeks after surgery to allow the implants to drop down into the pocket    Stitches will be removed on the 10th postoperative day  Never use a Heating Pad or Microwave Beads! After stitches are removed it is important to use Scar Cream for approximately 3 months after surgery        IF YOU HAVE ANY PROBLEMS OR QUESTIONS, PLEASE DO NOT HESITATE TO CALL THE OFFICE     [phone]      Your first postoperative appointment will be

## 2022-09-07 NOTE — NURSING NOTE
Pt expressed concern regarding medication not being approved by pharmacy due to taking medications at home for pre-existing conditions  Pt states she made doctor aware prior to surgery that this may be a problem and he said just for pt not to take medications together  Pharmacy needing to speak with Doctor Sadiq Wharton to be approved  Message left for Dr Sadiq Wharton in office  Patient made aware  Requested pain medication prior to discharge  Pain medication given and then patient requesting to leave despite waiting to be monitored until pain medication starts to take effect  AVS printed and reviewed with patient  Drain teaching provided  Mother at bedside  Verbalized understanding  Dressings to breasts remained clean and dry, drains intact and draining serosanguinous drainage  Patient discharged via wheelchair in stable condition  35

## 2022-09-07 NOTE — NURSING NOTE
Pt expressed concern regarding medication not being approved by pharmacy due to taking medications at home for pre-existing conditions  Pt states she made doctor aware prior to surgery that this may be a problem and he said just for pt not to take medications together  Pharmacy needing to speak with Doctor Kaur Lauren to be approved  Message left for Dr Kaur Lauren in office  Patient made aware  Requested pain medication prior to discharge  Pain medication given and then patient requesting to leave despite waiting to be monitored until pain medication starts to take effect  AVS printed and reviewed with patient  Drain teaching provided  Mother at bedside  Verbalized understanding  Dressings to breasts remained clean and dry, drains intact and draining serosanguinous drainage  Patient discharged via wheelchair in stable condition

## 2022-09-07 NOTE — OP NOTE
OPERATIVE REPORT  PATIENT NAME: Jonh Stephens    :  1990  MRN: [id number]  Pt Location: [facility] OR ROOM 07    SURGERY DATE: 2022    Surgeon(s) and Role:     * Prema Jc MD - Primary     * HAMZAH Gibson - Assisting    Preop Diagnosis:  Hypertrophy of breast [N62]    Post-Op Diagnosis Codes:     * Hypertrophy of breast [N62]    Procedure(s) (LRB):  BREAST REDUCTION (Bilateral)    Specimen(s):  ID Type Source Tests Collected by Time Destination   1 : right breast reduction tissue Tissue Breast, Right TISSUE EXAM Prema Jc MD 2022 9587    2 : left breast reduction tissue Tissue Breast, Left TISSUE EXAM Prema Jc MD 2022 0900        Estimated Blood Loss:   Minimal    Drains:  Closed/Suction Drain Right; Inferior; Lateral Breast 19 Fr  (Active)   Status To bulb suction 22 0916   Number of days: 0       Closed/Suction Drain Left; Inferior; Lateral Breast Bulb 19 Fr  (Active)   Status To bulb suction 22 1022   Number of days: 0       NG/OG/Enteral Tube Orogastric 18 Fr Right mouth (Active)   Number of days: 5152       Anesthesia Type:   General    Operative Indications:  Hypertrophy of breast [N62]  Upper back neck strain chronic intertriginous rash in the inframammary fold    Operative Findings:  Excess breast tissue    Complications:   None    Procedure and Technique:  The patient was marked in the holding area for an inferior pedicle keyhole pattern reduction mammoplasty  Draped and prepped in normal sterile fashion after general endotracheal anesthesia  Tumescent liposuction was performed on the lateral sides of the breast   A 7 cm wide inferior pedicle was de-epithelialized and the nipple-areolar complex cut out with 42 mm cookie cutter  Excessive medial middle and lateral breast tissue was removed hemostasis achieved electrocautery  A 19   Channel drain was placed through separate stab wound in the flank the dermis was approximated with 2-0 Vicryl suture the T-incision closed with a running subcuticular 3-0 Prolene suture and the nipple closed with running subcuticular 4-0 Vicryl suture    Identical procedure was performed on both sides the patient tolerated the procedure well   I was present for the entire procedure    Patient Disposition:  PACU       SIGNATURE: Raymond Kong MD  DATE: September 7, 2022  TIME: 11:03 AM

## 2022-09-14 PROCEDURE — 88305 TISSUE EXAM BY PATHOLOGIST: CPT | Performed by: PATHOLOGY

## 2022-09-22 ENCOUNTER — TELEPHONE (OUTPATIENT)
Dept: OBGYN CLINIC | Facility: HOSPITAL | Age: 32
End: 2022-09-22

## 2022-09-22 NOTE — TELEPHONE ENCOUNTER
Hello,  Please advise if the following patient can be forced onto the schedule:    Patient: Cira Meyer     : 1990    MRN: [id number]    Call back #: [phone]    Insurance: [facility]    Reason for appointment: f/u left wrist    Requested doctor/location: [place]      Thank you

## 2022-10-07 ENCOUNTER — APPOINTMENT (OUTPATIENT)
Dept: LAB | Facility: HOSPITAL | Age: 32
End: 2022-10-07
Payer: MEDICARE

## 2022-11-21 ENCOUNTER — APPOINTMENT (OUTPATIENT)
Dept: LAB | Facility: HOSPITAL | Age: 32
End: 2022-11-21

## 2022-11-21 DIAGNOSIS — R29.898 RIGHT LEG WEAKNESS: ICD-10-CM

## 2022-11-21 LAB
ANION GAP SERPL CALCULATED.3IONS-SCNC: 5 MMOL/L (ref 5–14)
BUN SERPL-MCNC: 11 MG/DL (ref 5–25)
CALCIUM SERPL-MCNC: 9.2 MG/DL (ref 8.4–10.2)
CHLORIDE SERPL-SCNC: 103 MMOL/L (ref 96–108)
CO2 SERPL-SCNC: 29 MMOL/L (ref 21–32)
CREAT SERPL-MCNC: 0.69 MG/DL (ref 0.6–1.2)
GFR SERPL CREATININE-BSD FRML MDRD: 114 ML/MIN/1.73SQ M
GLUCOSE P FAST SERPL-MCNC: 82 MG/DL (ref 70–99)
POTASSIUM SERPL-SCNC: 4.1 MMOL/L (ref 3.5–5.3)
SODIUM SERPL-SCNC: 137 MMOL/L (ref 135–147)

## 2023-01-18 ENCOUNTER — APPOINTMENT (OUTPATIENT)
Dept: LAB | Facility: HOSPITAL | Age: 33
End: 2023-01-18

## 2023-01-18 DIAGNOSIS — E78.41 ELEVATED LIPOPROTEIN A LEVEL: ICD-10-CM

## 2023-01-18 DIAGNOSIS — D50.9 IRON DEFICIENCY ANEMIA, UNSPECIFIED IRON DEFICIENCY ANEMIA TYPE: ICD-10-CM

## 2023-01-18 DIAGNOSIS — R73.01 IMPAIRED FASTING GLUCOSE: Primary | ICD-10-CM

## 2023-01-18 DIAGNOSIS — R35.1 NOCTURIA: ICD-10-CM

## 2023-01-18 DIAGNOSIS — I51.9 MYXEDEMA HEART DISEASE: ICD-10-CM

## 2023-01-18 DIAGNOSIS — E78.1 PURE HYPERGLYCERIDEMIA: ICD-10-CM

## 2023-01-18 DIAGNOSIS — E88.81 DYSMETABOLIC SYNDROME X: ICD-10-CM

## 2023-01-18 DIAGNOSIS — Z01.84 IMMUNITY STATUS TESTING: ICD-10-CM

## 2023-01-18 DIAGNOSIS — E03.9 MYXEDEMA HEART DISEASE: ICD-10-CM

## 2023-01-18 DIAGNOSIS — R82.991 HYPOCITRATURIA: ICD-10-CM

## 2023-01-18 LAB
ANION GAP SERPL CALCULATED.3IONS-SCNC: 7 MMOL/L (ref 5–14)
BASOPHILS # BLD AUTO: 0.08 THOUSANDS/ÂΜL (ref 0–0.1)
BASOPHILS NFR BLD AUTO: 1 % (ref 0–1)
BILIRUB UR QL STRIP: NEGATIVE
BUN SERPL-MCNC: 10 MG/DL (ref 5–25)
CA-I BLD-SCNC: 1.15 MMOL/L (ref 1.12–1.32)
CALCIUM SERPL-MCNC: 8.7 MG/DL (ref 8.4–10.2)
CHLORIDE SERPL-SCNC: 111 MMOL/L (ref 96–108)
CLARITY UR: CLEAR
CO2 SERPL-SCNC: 22 MMOL/L (ref 21–32)
COLOR UR: YELLOW
CREAT SERPL-MCNC: 0.52 MG/DL (ref 0.6–1.2)
EOSINOPHIL # BLD AUTO: 0.09 THOUSAND/ÂΜL (ref 0–0.61)
EOSINOPHIL NFR BLD AUTO: 1 % (ref 0–6)
ERYTHROCYTE [DISTWIDTH] IN BLOOD BY AUTOMATED COUNT: 13.9 % (ref 11.6–15.1)
GFR SERPL CREATININE-BSD FRML MDRD: 125 ML/MIN/1.73SQ M
GLUCOSE P FAST SERPL-MCNC: 82 MG/DL (ref 70–99)
GLUCOSE UR STRIP-MCNC: NEGATIVE MG/DL
HCT VFR BLD AUTO: 42 % (ref 34.8–46.1)
HGB BLD-MCNC: 13.4 G/DL (ref 11.5–15.4)
HGB UR QL STRIP.AUTO: NEGATIVE
IMM GRANULOCYTES # BLD AUTO: 0.03 THOUSAND/UL (ref 0–0.2)
IMM GRANULOCYTES NFR BLD AUTO: 0 % (ref 0–2)
KETONES UR STRIP-MCNC: NEGATIVE MG/DL
LEUKOCYTE ESTERASE UR QL STRIP: NEGATIVE
LYMPHOCYTES # BLD AUTO: 3.57 THOUSANDS/ÂΜL (ref 0.6–4.47)
LYMPHOCYTES NFR BLD AUTO: 39 % (ref 14–44)
MAGNESIUM SERPL-MCNC: 1.9 MG/DL (ref 1.6–2.3)
MCH RBC QN AUTO: 29.7 PG (ref 26.8–34.3)
MCHC RBC AUTO-ENTMCNC: 31.9 G/DL (ref 31.4–37.4)
MCV RBC AUTO: 93 FL (ref 82–98)
MONOCYTES # BLD AUTO: 0.51 THOUSAND/ÂΜL (ref 0.17–1.22)
MONOCYTES NFR BLD AUTO: 6 % (ref 4–12)
NEUTROPHILS # BLD AUTO: 4.82 THOUSANDS/ÂΜL (ref 1.85–7.62)
NEUTS SEG NFR BLD AUTO: 53 % (ref 43–75)
NITRITE UR QL STRIP: NEGATIVE
NRBC BLD AUTO-RTO: 0 /100 WBCS
PH UR STRIP.AUTO: 6 [PH]
PHOSPHATE SERPL-MCNC: 3 MG/DL (ref 2.5–4.8)
PLATELET # BLD AUTO: 205 THOUSANDS/UL (ref 149–390)
PMV BLD AUTO: 12.5 FL (ref 8.9–12.7)
POTASSIUM SERPL-SCNC: 3.5 MMOL/L (ref 3.5–5.3)
PROT UR STRIP-MCNC: NEGATIVE MG/DL
RBC # BLD AUTO: 4.51 MILLION/UL (ref 3.81–5.12)
SODIUM SERPL-SCNC: 140 MMOL/L (ref 135–147)
SP GR UR STRIP.AUTO: 1.02 (ref 1–1.04)
TSH SERPL DL<=0.05 MIU/L-ACNC: 1.6 UIU/ML (ref 0.45–4.5)
URATE SERPL-MCNC: 3 MG/DL (ref 2–7.5)
UROBILINOGEN UA: NEGATIVE MG/DL
WBC # BLD AUTO: 9.1 THOUSAND/UL (ref 4.31–10.16)

## 2023-01-19 LAB
CORTIS SERPL-MCNC: 2 UG/DL
ESTRADIOL SERPL-MCNC: 133 PG/ML
FOLATE SERPL-MCNC: >20 NG/ML (ref 3.1–17.5)
FSH SERPL-ACNC: 4.3 MIU/ML
PROGEST SERPL-MCNC: 12.1 NG/ML
TESTOST FREE SERPL-MCNC: <0.2 PG/ML (ref 0–4.2)
TESTOST SERPL-MCNC: <3 NG/DL (ref 8–60)

## 2023-01-20 LAB
IRON SATN MFR SERPL: 18 % (ref 15–50)
IRON SERPL-MCNC: 54 UG/DL (ref 50–170)
TIBC SERPL-MCNC: 304 UG/DL (ref 250–450)

## 2023-08-29 ENCOUNTER — OFFICE VISIT (OUTPATIENT)
Dept: DENTISTRY | Facility: CLINIC | Age: 33
End: 2023-08-29

## 2023-08-29 VITALS — SYSTOLIC BLOOD PRESSURE: 126 MMHG | HEART RATE: 65 BPM | DIASTOLIC BLOOD PRESSURE: 86 MMHG

## 2023-08-29 DIAGNOSIS — K08.109 EDENTULISM: Primary | ICD-10-CM

## 2023-08-29 PROCEDURE — D0120 PERIODIC ORAL EVALUATION - ESTABLISHED PATIENT: HCPCS

## 2023-08-29 NOTE — DENTAL PROCEDURE DETAILS
Rafael Muse presents for a Periodic exam. Verbal consent for treatment given in addition to the forms. Reviewed health history - Patient is ASA II  Consents signed: Yes     Perio: Normal  Pain Scale: 0  Caries Assessment: N/A  Radiographs: None     Oral Hygiene instruction reviewed and given. Recommended Hygiene recall visits with the [address] [place] [address]. Patient was last seen at this clinic in May 2022. Since then patient reports that she had all her remaining teeth extracted by a surgeon in [place]. On exam, she is edentulous. No abnormal findings or pathologies noted. Patient has several areas on her upper arch that complicate UA denture fabrication: bony protrusion in right premolar area and buccal shelves bilaterally creating unfavorable undercut. Explained to patient process of pre-prosthetic surgery and healing time required in order to fabricate a well-fitting set of dentures. Patient understood. Diagnostic casts of UA and LA taken in alginate. Reviewed diagnostic models with Dr. Jorge Hassan. UA alveoloplasty may be completed at [facility] clinic focusing on bony protrusion in right premolar area and reducing bilateral undercuts. Note given to  to schedule patient for pre-prosthetic surgery at [facility] clinic with Dr. Jorge Hassan present. Treatment Plan:  UA Alveoloplasty  UA, LA complete denture fabrication  5. Case Difficulty Type 1  Prognosis is Good.   Referrals needed: No  Next Visit:  UA alveoloplasty

## 2023-10-10 ENCOUNTER — APPOINTMENT (EMERGENCY)
Dept: RADIOLOGY | Facility: HOSPITAL | Age: 33
End: 2023-10-10
Payer: MEDICARE

## 2023-10-10 ENCOUNTER — HOSPITAL ENCOUNTER (EMERGENCY)
Facility: HOSPITAL | Age: 33
Discharge: HOME | End: 2023-10-10
Attending: EMERGENCY MEDICINE
Payer: MEDICARE

## 2023-10-10 ENCOUNTER — APPOINTMENT (EMERGENCY)
Dept: CT IMAGING | Facility: HOSPITAL | Age: 33
End: 2023-10-10
Payer: MEDICARE

## 2023-10-10 VITALS
HEART RATE: 67 BPM | RESPIRATION RATE: 18 BRPM | TEMPERATURE: 98.3 F | SYSTOLIC BLOOD PRESSURE: 143 MMHG | OXYGEN SATURATION: 99 % | DIASTOLIC BLOOD PRESSURE: 88 MMHG

## 2023-10-10 DIAGNOSIS — R10.9 ABDOMINAL PAIN: Primary | ICD-10-CM

## 2023-10-10 DIAGNOSIS — M53.3 COCCYDYNIA: ICD-10-CM

## 2023-10-10 LAB
ALBUMIN SERPL BCP-MCNC: 4.3 G/DL (ref 3.5–5)
ALP SERPL-CCNC: 56 U/L (ref 34–104)
ALT SERPL W P-5'-P-CCNC: 10 U/L (ref 7–52)
ANION GAP SERPL CALCULATED.3IONS-SCNC: 5 MMOL/L
AST SERPL W P-5'-P-CCNC: 16 U/L (ref 13–39)
BACTERIA UR QL AUTO: ABNORMAL /HPF
BASOPHILS # BLD AUTO: 0.05 THOUSANDS/ÂΜL (ref 0–0.1)
BASOPHILS NFR BLD AUTO: 1 % (ref 0–1)
BILIRUB SERPL-MCNC: 0.99 MG/DL (ref 0.2–1)
BILIRUB UR QL STRIP: ABNORMAL
BUN SERPL-MCNC: 9 MG/DL (ref 5–25)
CALCIUM SERPL-MCNC: 9 MG/DL (ref 8.4–10.2)
CHLORIDE SERPL-SCNC: 106 MMOL/L (ref 96–108)
CLARITY UR: CLEAR
CO2 SERPL-SCNC: 25 MMOL/L (ref 21–32)
COLOR UR: YELLOW
CREAT SERPL-MCNC: 0.63 MG/DL (ref 0.6–1.3)
EOSINOPHIL # BLD AUTO: 0.04 THOUSAND/ÂΜL (ref 0–0.61)
EOSINOPHIL NFR BLD AUTO: 1 % (ref 0–6)
ERYTHROCYTE [DISTWIDTH] IN BLOOD BY AUTOMATED COUNT: 12.8 % (ref 11.6–15.1)
EXT PREGNANCY TEST URINE: NEGATIVE
EXT. CONTROL: NORMAL
GFR SERPL CREATININE-BSD FRML MDRD: 117 ML/MIN/1.73SQ M
GLUCOSE SERPL-MCNC: 95 MG/DL (ref 65–140)
GLUCOSE UR STRIP-MCNC: NEGATIVE MG/DL
HCT VFR BLD AUTO: 41.3 % (ref 34.8–46.1)
HGB BLD-MCNC: 13.4 G/DL (ref 11.5–15.4)
HGB UR QL STRIP.AUTO: NEGATIVE
IMM GRANULOCYTES # BLD AUTO: 0.03 THOUSAND/UL (ref 0–0.2)
IMM GRANULOCYTES NFR BLD AUTO: 0 % (ref 0–2)
KETONES UR STRIP-MCNC: ABNORMAL MG/DL
LEUKOCYTE ESTERASE UR QL STRIP: NEGATIVE
LIPASE SERPL-CCNC: 19 U/L (ref 11–82)
LYMPHOCYTES # BLD AUTO: 2.58 THOUSANDS/ÂΜL (ref 0.6–4.47)
LYMPHOCYTES NFR BLD AUTO: 32 % (ref 14–44)
MCH RBC QN AUTO: 30.6 PG (ref 26.8–34.3)
MCHC RBC AUTO-ENTMCNC: 32.4 G/DL (ref 31.4–37.4)
MCV RBC AUTO: 94 FL (ref 82–98)
MONOCYTES # BLD AUTO: 0.54 THOUSAND/ÂΜL (ref 0.17–1.22)
MONOCYTES NFR BLD AUTO: 7 % (ref 4–12)
MUCOUS THREADS UR QL AUTO: ABNORMAL
NEUTROPHILS # BLD AUTO: 4.88 THOUSANDS/ÂΜL (ref 1.85–7.62)
NEUTS SEG NFR BLD AUTO: 59 % (ref 43–75)
NITRITE UR QL STRIP: NEGATIVE
NON-SQ EPI CELLS URNS QL MICRO: ABNORMAL /HPF
NRBC BLD AUTO-RTO: 0 /100 WBCS
PH UR STRIP.AUTO: 5 [PH] (ref 4.5–8)
PLATELET # BLD AUTO: 179 THOUSANDS/UL (ref 149–390)
PMV BLD AUTO: 12.1 FL (ref 8.9–12.7)
POTASSIUM SERPL-SCNC: 3.4 MMOL/L (ref 3.5–5.3)
PROT SERPL-MCNC: 6.8 G/DL (ref 6.4–8.4)
PROT UR STRIP-MCNC: ABNORMAL MG/DL
RBC # BLD AUTO: 4.38 MILLION/UL (ref 3.81–5.12)
RBC #/AREA URNS AUTO: ABNORMAL /HPF
SODIUM SERPL-SCNC: 136 MMOL/L (ref 135–147)
SP GR UR STRIP.AUTO: >=1.03 (ref 1–1.03)
UROBILINOGEN UR QL STRIP.AUTO: 0.2 E.U./DL
WBC # BLD AUTO: 8.12 THOUSAND/UL (ref 4.31–10.16)
WBC #/AREA URNS AUTO: ABNORMAL /HPF

## 2023-10-10 PROCEDURE — 96376 TX/PRO/DX INJ SAME DRUG ADON: CPT

## 2023-10-10 PROCEDURE — 96374 THER/PROPH/DIAG INJ IV PUSH: CPT

## 2023-10-10 PROCEDURE — 99284 EMERGENCY DEPT VISIT MOD MDM: CPT

## 2023-10-10 PROCEDURE — 72220 X-RAY EXAM SACRUM TAILBONE: CPT

## 2023-10-10 PROCEDURE — 83690 ASSAY OF LIPASE: CPT | Performed by: EMERGENCY MEDICINE

## 2023-10-10 PROCEDURE — 36415 COLL VENOUS BLD VENIPUNCTURE: CPT

## 2023-10-10 PROCEDURE — 74177 CT ABD & PELVIS W/CONTRAST: CPT

## 2023-10-10 PROCEDURE — 80053 COMPREHEN METABOLIC PANEL: CPT | Performed by: EMERGENCY MEDICINE

## 2023-10-10 PROCEDURE — 85025 COMPLETE CBC W/AUTO DIFF WBC: CPT | Performed by: EMERGENCY MEDICINE

## 2023-10-10 PROCEDURE — G1004 CDSM NDSC: HCPCS

## 2023-10-10 PROCEDURE — 81025 URINE PREGNANCY TEST: CPT | Performed by: EMERGENCY MEDICINE

## 2023-10-10 PROCEDURE — 96375 TX/PRO/DX INJ NEW DRUG ADDON: CPT

## 2023-10-10 PROCEDURE — 81001 URINALYSIS AUTO W/SCOPE: CPT

## 2023-10-10 RX ORDER — LIDOCAINE HYDROCHLORIDE 20 MG/ML
15 SOLUTION OROPHARYNGEAL ONCE
Status: COMPLETED | OUTPATIENT
Start: 2023-10-10 | End: 2023-10-10

## 2023-10-10 RX ORDER — SUCRALFATE 1 G/1
1 TABLET ORAL 4 TIMES DAILY
Qty: 28 TABLET | Refills: 0 | Status: SHIPPED | OUTPATIENT
Start: 2023-10-10 | End: 2023-10-17

## 2023-10-10 RX ORDER — FAMOTIDINE 10 MG/ML
20 INJECTION, SOLUTION INTRAVENOUS ONCE
Status: COMPLETED | OUTPATIENT
Start: 2023-10-10 | End: 2023-10-10

## 2023-10-10 RX ORDER — ONDANSETRON 4 MG/1
4 TABLET, ORALLY DISINTEGRATING ORAL EVERY 6 HOURS PRN
Qty: 20 TABLET | Refills: 0 | Status: SHIPPED | OUTPATIENT
Start: 2023-10-10

## 2023-10-10 RX ORDER — ACETAMINOPHEN 500 MG
500 TABLET ORAL EVERY 6 HOURS PRN
Qty: 30 TABLET | Refills: 0 | Status: SHIPPED | OUTPATIENT
Start: 2023-10-10

## 2023-10-10 RX ORDER — DEXTROMETHORPHAN HYDROBROMIDE, BUPROPION HYDROCHLORIDE 105; 45 MG/1; MG/1
45 TABLET, MULTILAYER, EXTENDED RELEASE ORAL DAILY
COMMUNITY

## 2023-10-10 RX ORDER — MAGNESIUM HYDROXIDE/ALUMINUM HYDROXICE/SIMETHICONE 120; 1200; 1200 MG/30ML; MG/30ML; MG/30ML
30 SUSPENSION ORAL ONCE
Status: COMPLETED | OUTPATIENT
Start: 2023-10-10 | End: 2023-10-10

## 2023-10-10 RX ADMIN — Medication 15 ML: at 15:48

## 2023-10-10 RX ADMIN — FAMOTIDINE 20 MG: 10 INJECTION INTRAVENOUS at 15:48

## 2023-10-10 RX ADMIN — IOHEXOL 100 ML: 350 INJECTION, SOLUTION INTRAVENOUS at 17:13

## 2023-10-10 RX ADMIN — MORPHINE SULFATE 2 MG: 2 INJECTION, SOLUTION INTRAMUSCULAR; INTRAVENOUS at 17:52

## 2023-10-10 RX ADMIN — ALUMINUM HYDROXIDE, MAGNESIUM HYDROXIDE, AND DIMETHICONE 30 ML: 200; 20; 200 SUSPENSION ORAL at 15:48

## 2023-10-10 RX ADMIN — MORPHINE SULFATE 2 MG: 2 INJECTION, SOLUTION INTRAMUSCULAR; INTRAVENOUS at 17:00

## 2023-10-10 NOTE — Clinical Note
Kelly Minaya was seen and treated in our emergency department on 10/10/2023. Diagnosis:     Penelope  . She may return on this date: 10/12/2023         If you have any questions or concerns, please don't hesitate to call.       Aimee Tinajero PA-C    ______________________________           _______________          _______________  Hospital Representative                              Date                                Time

## 2023-10-10 NOTE — Clinical Note
Chidi Tyler was seen and treated in our emergency department on 10/10/2023. Diagnosis:     Penelope  . She may return on this date: 10/12/2023         If you have any questions or concerns, please don't hesitate to call.       Lorna Clifford PA-C    ______________________________           _______________          _______________  Hospital Representative                              Date                                Time

## 2023-10-10 NOTE — DISCHARGE INSTRUCTIONS
Please refer to the attached information for strict return instructions. If symptoms worsen or new symptoms develop please return to the ER. Please follow up with your primary care physician for re-evaluation.

## 2023-10-10 NOTE — ED PROVIDER NOTES
History  Chief Complaint   Patient presents with   • Abdominal Pain     Abdominal pain starting on 10/4. N/V. Also believes she fractured her tailbone. Milly Gonzáles yesterday. Denies hitting head/LOC. Taking prednisone and oxy without relief. Inna Rod is a 27-year-old female presenting with epigastric abdominal pain as well as tailbone pain after a fall several days ago. The abdominal pain began approximately 1 week ago, but worsened yesterday. She reports the pain is nonradiating, localized to epigastric region. She has had a decreased appetite. She also reports nausea and 2 episodes of vomiting. No fevers or chills. No diarrhea reported. No melena. She reports losing her balance and falling several days ago, landing on her bottom. Reports localized pain to this area which she describes as similar to remote history of coccygeal fracture. Notes pain primarily with sitting and pressure to area. No lower extremity pain or numbness/weakness/gait impairment. No loss of control of bowel or bladder function. No saddle anesthesia. History provided by:  Patient   used: No        Prior to Admission Medications   Prescriptions Last Dose Informant Patient Reported? Taking?    ALPRAZolam (XANAX) 0.5 mg tablet Past Week Self Yes Yes   Sig: Take by mouth as needed for anxiety   Calcium Carb-Cholecalciferol (CALCIUM 1000 + D PO) Past Week Self Yes Yes   Sig: Take 1 tablet by mouth daily   Dextromethorphan-buPROPion ER (Auvelity)  MG TBCR 10/10/2023 Self Yes Yes   Sig: Take 45 mg by mouth daily   SUMAtriptan (IMITREX) 100 mg tablet Past Month Self Yes Yes   Sig: Take 100 mg by mouth once as needed for migraine   Sodium Fluoride 1.1 % PSTE Past Week  No Yes   Sig: Apply 5 mL to teeth daily   Virt-Phos 250 Neutral 155-852-130 MG tablet Past Week  Yes Yes   Sig: if needed   ergocalciferol (VITAMIN D2) 50,000 units Past Week Self Yes Yes   Sig: Take 50,000 Units by mouth once a week   escitalopram (LEXAPRO) 20 mg tablet Not Taking Self Yes No   Sig: Take 20 mg by mouth every morning     Patient not taking: Reported on 10/10/2023   loperamide (IMODIUM) 2 mg capsule Past Week Self Yes Yes   Sig: Take 2 mg by mouth 4 (four) times a day as needed for diarrhea   mupirocin (BACTROBAN) 2 % ointment Not Taking  No No   Sig: Apply topically 3 (three) times a day   Patient not taking: Reported on 10/10/2023   omeprazole (PriLOSEC) 40 MG capsule 10/10/2023  No Yes   Sig: Take 1 capsule (40 mg total) by mouth daily   ondansetron (ZOFRAN) 4 mg tablet 10/10/2023 at 0800 Self Yes Yes   Sig: Take 4 mg by mouth every 8 (eight) hours as needed     predniSONE 5 mg tablet 10/10/2023 at 0800 Self Yes Yes   Sig: Take 14 mg by mouth daily   pregabalin (LYRICA) 100 mg capsule 10/10/2023 Self Yes Yes   Sig: Take one tablet twice a day and 2 tablets at bedtime      Facility-Administered Medications: None       Past Medical History:   Diagnosis Date   • Abnormal Pap smear of cervix    • Anemia    • Anxiety    • Back pain    • Depression    • GERD (gastroesophageal reflux disease)    • IBS (irritable bowel syndrome)    • Iron deficiency    • Irregular menses    • Menorrhagia    • Migraines    • Neuropathy    • Obesity    • Osteoarthritis of back    • Osteopenia    • RA (rheumatoid arthritis) ([address])    • Scratches     On back and shoulder from scratching due to urticaria   • Vasculitis ([address])    • Wears glasses        Past Surgical History:   Procedure Laterality Date   • CAST APPLICATION Left 26/76/1733    Procedure: APPLICATION LONG ARM Won Abbot;  Surgeon: Alexander Gowers, MD;  [facility];  Service: Orthopedics   • CAST APPLICATION Right 10/7/5706    Procedure: APPLICATION LONG ARM SUGAR TONG SPLINT;  Surgeon: Alexander Gowers, MD;  [facility];  Service: Orthopedics   • CHOLECYSTECTOMY      Laparoscopic   • COLONOSCOPY     • DILATION AND CURETTAGE OF UTERUS     • HYSTERECTOMY     • JOINT REPLACEMENT Bilateral     knees   • TN ARTHRODESIS WRIST W/ILIAC/OTHER AUTOGRAFT Left 4/4/2017    Procedure: WRIST FUSION / SPLINT APPLICATION ;  Surgeon: Yamileth Delvalle MD;  [facility];  Service: Orthopedics   • TN ARTHRODESIS WRIST W/ILIAC/OTHER AUTOGRAFT Right 4/10/2018    Procedure: Removal of hardware right wrist with Fusion of Long finger carpometacarpal joint, splint application Right Wrist;  Surgeon: Yamileth Delvalle MD;  [facility];  Service: Orthopedics   • TN BREAST REDUCTION Bilateral 9/7/2022    Procedure: BREAST REDUCTION;  Surgeon: Claire Morales MD;  Location: [address] [place] [address] MAIN OR;  Service: Plastics   • TN COLONOSCOPY FLX DX W/COLLJ SPEC WHEN PFRMD N/A 2/22/2019    Procedure: COLONOSCOPY;  Surgeon: Maurizio Pratt MD;  Location: [facility] GI LAB; Service: Gastroenterology   • TN ESOPHAGOGASTRODUODENOSCOPY TRANSORAL DIAGNOSTIC N/A 2/22/2019    Procedure: ESOPHAGOGASTRODUODENOSCOPY (EGD); Surgeon: Maurizio Pratt MD;  Location: [facility] GI LAB;   Service: Gastroenterology   • TN EXCISION DISTAL ULNA PARTIAL/COMPLETE Right 10/8/2019    Procedure: EXCISION DISTAL ULNA ([address] [facility]) right;  Surgeon: Yamileth Delvalle MD;  [facility];  Service: Orthopedics   • TN HYSTEROSCOPY BX ENDOMETRIUM&/POLYPC W/WO D&C N/A 12/1/2017    Procedure: DILATATION AND CURETTAGE (D&C) WITH HYSTEROSCOPY;  Surgeon: Giovana Kang DO;  [facility];  Service: Gynecology   • TN LAPAROSCOPY SURG CHOLECYSTECTOMY N/A 3/14/2017    Procedure: CHOLECYSTECTOMY LAPAROSCOPIC;  Surgeon: Abena Wahl DO;  [facility];  Service: General   • TN LAPS TOTAL HYSTERECT 250 GM/< W/RMVL TUBE/OVARY N/A 1/18/2019    Procedure: HYSTERECTOMY LAPAROSCOPIC TOTAL ([address]) Bilateral salpingectomy, cystoscopy;  Surgeon: Giovana Kang DO;  [facility];  Service: Gynecology   • TN REMOVAL IMPLANT DEEP Left 11/13/2018    Procedure: REMOVAL OF HARDWARE (wrist fusion plate  AND ulnar head arthroplasty) with conversion to [place].;  Surgeon: Ashlee Vera MD;  [facility];  Service: Orthopedics   • REPLACEMENT TOTAL KNEE BILATERAL     • UPPER GASTROINTESTINAL ENDOSCOPY     • WISDOM TOOTH EXTRACTION     • WRIST SURGERY Right     For arthritis   • WRIST SURGERY Left 2017    Procedure: ARTHROPLASTY WRIST ULNAR HEAD ARTHROPLASTY - INTEGRA SIZE 5.5 MM, 16 HEAD;  Surgeon: Ashlee Vera MD;  [facility];  Service:    • WRIST TENDON TRANSFER Right 10/8/2019    Procedure: TRANSFER TENDON EXTENSOR CARPI ULNARIS AT THE WRIST;  Surgeon: Ashlee Vera MD;  [facility];  Service: Orthopedics       Family History   Problem Relation Age of Onset   • Hypertension Mother    • Rheum arthritis Mother    • Depression Mother    • Anxiety disorder Mother      I have reviewed and agree with the history as documented. E-Cigarette/Vaping   • E-Cigarette Use Never User      E-Cigarette/Vaping Substances   • Nicotine No    • THC No    • CBD No    • Flavoring No    • Other No    • Unknown No      Social History     Tobacco Use   • Smoking status: Former     Packs/day: 0.25     Years: 3.00     Total pack years: 0.75     Types: Cigarettes     Quit date:      Years since quittin.7   • Smokeless tobacco: Never   Vaping Use   • Vaping Use: Never used   Substance Use Topics   • Alcohol use: Not Currently   • Drug use: Yes     Types: Marijuana       Review of Systems   Constitutional: Negative for chills and fever. HENT: Negative for congestion, rhinorrhea and sore throat. Eyes: Negative for pain and visual disturbance. Respiratory: Negative for cough, shortness of breath and wheezing. Cardiovascular: Negative for chest pain and palpitations. Gastrointestinal: Positive for abdominal pain, nausea and vomiting. Negative for diarrhea. Genitourinary: Negative for dysuria, frequency and urgency. Musculoskeletal: Negative for back pain, neck pain and neck stiffness.         +tailbone pain Skin: Negative for rash and wound. Neurological: Negative for dizziness, weakness, light-headedness and numbness. Physical Exam  Physical Exam  Constitutional:       General: She is not in acute distress. Appearance: She is well-developed. She is not diaphoretic. HENT:      Head: Normocephalic and atraumatic. Right Ear: External ear normal.      Left Ear: External ear normal.   Eyes:      Extraocular Movements:      Right eye: Normal extraocular motion. Left eye: Normal extraocular motion. Conjunctiva/sclera: Conjunctivae normal.      Pupils: Pupils are equal, round, and reactive to light. Cardiovascular:      Rate and Rhythm: Normal rate and regular rhythm. Pulmonary:      Effort: Pulmonary effort is normal. No accessory muscle usage or respiratory distress. Breath sounds: No wheezing or rales. Abdominal:      General: Abdomen is flat. There is no distension. Tenderness: There is abdominal tenderness. There is no right CVA tenderness, left CVA tenderness or guarding. Comments: TTP to epigastrium. No rigidity, rebound, or guarding. Neg Ty's. No CVAT. Musculoskeletal:         General: Tenderness present. Cervical back: Normal range of motion. No rigidity. Comments: No midline C/T/L TTP. TTP to coccygeal region noted in midline. Skin:     General: Skin is warm and dry. Capillary Refill: Capillary refill takes less than 2 seconds. Findings: No erythema or rash. Neurological:      Mental Status: She is alert and oriented to person, place, and time. Motor: No abnormal muscle tone. Coordination: Coordination normal.   Psychiatric:         Behavior: Behavior normal.         Thought Content:  Thought content normal.         Judgment: Judgment normal.         Vital Signs  ED Triage Vitals   Temperature Pulse Respirations Blood Pressure SpO2   10/10/23 1424 10/10/23 1424 10/10/23 1424 10/10/23 1424 10/10/23 1424   98.3 °F (36.8 °C) 98 18 151/92 99 %      Temp src Heart Rate Source Patient Position - Orthostatic VS BP Location FiO2 (%)   -- 10/10/23 1659 10/10/23 1659 10/10/23 1659 --    Monitor Lying Left arm       Pain Score       10/10/23 1659       5           Vitals:    10/10/23 1424 10/10/23 1659   BP: 151/92 143/88   Pulse: 98 67   Patient Position - Orthostatic VS:  Lying         Visual Acuity      ED Medications  Medications   Famotidine (PF) (PEPCID) injection 20 mg (20 mg Intravenous Given 10/10/23 1548)   aluminum-magnesium hydroxide-simethicone (MAALOX) oral suspension 30 mL (30 mL Oral Given 10/10/23 1548)   Lidocaine Viscous HCl (XYLOCAINE) 2 % mucosal solution 15 mL (15 mL Swish & Spit Given 10/10/23 1548)   morphine injection 2 mg (2 mg Intravenous Given 10/10/23 1700)   iohexol (OMNIPAQUE) 350 MG/ML injection (SINGLE-DOSE) 100 mL (100 mL Intravenous Given 10/10/23 1713)   morphine injection 2 mg (2 mg Intravenous Given 10/10/23 1752)       Diagnostic Studies  Results Reviewed     Procedure Component Value Units Date/Time    Comprehensive metabolic panel [[id number]]  (Abnormal) Collected: 10/10/23 1451    Lab Status: Final result Specimen: Blood from Arm, Right Updated: 10/10/23 1530     Sodium 136 mmol/L      Potassium 3.4 mmol/L      Chloride 106 mmol/L      CO2 25 mmol/L      ANION GAP 5 mmol/L      BUN 9 mg/dL      Creatinine 0.63 mg/dL      Glucose 95 mg/dL      Calcium 9.0 mg/dL      AST 16 U/L      ALT 10 U/L      Alkaline Phosphatase 56 U/L      Total Protein 6.8 g/dL      Albumin 4.3 g/dL      Total Bilirubin 0.99 mg/dL      eGFR 117 ml/min/1.73sq m     Narrative:      [place] guidelines for Chronic Kidney Disease (CKD):   •  Stage 1 with normal or high GFR (GFR > 90 mL/min/1.73 square meters)  •  Stage 2 Mild CKD (GFR = 60-89 mL/min/1.73 square meters)  •  Stage 3A Moderate CKD (GFR = 45-59 mL/min/1.73 square meters)  •  Stage 3B Moderate CKD (GFR = 30-44 mL/min/1.73 square meters)  •  Stage 4 Severe CKD (GFR = 15-29 mL/min/1.73 square meters)  •  Stage 5 End Stage CKD (GFR <15 mL/min/1.73 square meters)  Note: GFR calculation is accurate only with a steady state creatinine    Lipase [[id number]]  (Normal) Collected: 10/10/23 1451    Lab Status: Final result Specimen: Blood from Arm, Right Updated: 10/10/23 1530     Lipase 19 u/L     Urine Microscopic [[id number]]  (Abnormal) Collected: 10/10/23 1459    Lab Status: Final result Specimen: Urine, Clean Catch Updated: 10/10/23 1521     RBC, UA 1-2 /hpf      WBC, UA 2-4 /hpf      Epithelial Cells Occasional /hpf      Bacteria, UA Occasional /hpf      MUCUS THREADS Moderate    POCT pregnancy, urine [[id number]]  (Normal) Resulted: 10/10/23 1514    Lab Status: Final result Specimen: Urine Updated: 10/10/23 1514     EXT Preg Test, Ur Negative     Control Valid    Urine Macroscopic, POC [[id number]]  (Abnormal) Collected: 10/10/23 1459    Lab Status: Final result Specimen: Urine Updated: 10/10/23 1500     Color, UA Yellow     Clarity, UA Clear     pH, UA 5.0     Leukocytes, UA Negative     Nitrite, UA Negative     Protein, UA 30 (1+) mg/dl      Glucose, UA Negative mg/dl      Ketones, UA Trace mg/dl      Urobilinogen, UA 0.2 E.U./dl      Bilirubin, UA Small     Occult Blood, UA Negative     Specific Gravity, UA >=1.030    Narrative:      CLINITEK RESULT    CBC and differential [[id number]] Collected: 10/10/23 1451    Lab Status: Final result Specimen: Blood from Arm, Right Updated: 10/10/23 1457     WBC 8.12 Thousand/uL      RBC 4.38 Million/uL      Hemoglobin 13.4 g/dL      Hematocrit 41.3 %      MCV 94 fL      MCH 30.6 pg      MCHC 32.4 g/dL      RDW 12.8 %      MPV 12.1 fL      Platelets 306 Thousands/uL      nRBC 0 /100 WBCs      Neutrophils Relative 59 %      Immat GRANS % 0 %      Lymphocytes Relative 32 %      Monocytes Relative 7 %      Eosinophils Relative 1 %      Basophils Relative 1 %      Neutrophils Absolute 4.88 Thousands/µL      Immature Grans Absolute 0.03 Thousand/uL      Lymphocytes Absolute 2.58 Thousands/µL      Monocytes Absolute 0.54 Thousand/µL      Eosinophils Absolute 0.04 Thousand/µL      Basophils Absolute 0.05 Thousands/µL                  CT abdomen pelvis with contrast   Final Result by Pat Tapia MD (10/10 1176)      No acute inflammatory process identified in the abdomen or pelvis. No sacral fracture as questioned on recent x-ray. Stable right lobe liver hemangioma. Workstation performed: [id number]         XR sacrum and coccyx   Final Result by Lord Aga MD (10/10 1431)      Upper lateral left sacral ala displaced fracture with lower left SI joint space widening. Distal sacral minimally displaced fracture also suggested. The study was marked in [facility] for immediate notification. Workstation performed: [id number]                    Procedures  Procedures         ED Course                               SBIRT 20yo+    Flowsheet Row Most Recent Value   Initial Alcohol Screen: US AUDIT-C     1. How often do you have a drink containing alcohol? 1 Filed at: 10/10/2023 1433   2. How many drinks containing alcohol do you have on a typical day you are drinking? 0 Filed at: 10/10/2023 1433   3a. Male UNDER 65: How often do you have five or more drinks on one occasion? 0 Filed at: 10/10/2023 1433   3b. FEMALE Any Age, or MALE 65+: How often do you have 4 or more drinks on one occassion? 0 Filed at: 10/10/2023 1433   Audit-C Score 1 Filed at: 10/10/2023 1433   [place]: How many times in the past year have you. .. Used an illegal drug or used a prescription medication for non-medical reasons? Never Filed at: 10/10/2023 1433                    Medical Decision Making  Epigastric abdominal pain noted over about the past week, worse after eating and with associated nausea/vomiting. TTP to epigastrium on exam without peritoneal signs. History of cholecystectomy noted. Suspect gastritis/PUD.  She already takes omeprazole daily. Will give pepcid, GI cocktail, reassess. Check abdominal labs, consider imaging pending improvement. Mechanical fall onto buttocks, midline coccygeal pain since that time, worse with sitting. Suspect coccygeal fracture vs bruise. Check XR sacrum/coccyx.  -------------------------------------  XR sacrum initially with questionable fracture - however subsequent CT scan without fracture noted. Plan to treat supportively for this pain. Abdominal pain improved initially with GI cocktail from 7 to 4, but further improvement with morphine. CBC, CMP, lipase grossly unrevealing. CT abd/pelvis without acute abnormality to explain pain. Continue to suspect gastritis/PUD clinically. Will d/c with continued omeprazole, start carafate, Zofran PRN, maalox PRN. Follow up with her GI doctor recommended. Return to ED indications reviewed. Amount and/or Complexity of Data Reviewed  Labs: ordered. Radiology: ordered. Risk  OTC drugs. Prescription drug management. Disposition  Final diagnoses:   Abdominal pain   Coccydynia     Time reflects when diagnosis was documented in both MDM as applicable and the Disposition within this note     Time User Action Codes Description Comment    10/10/2023  6:48 PM Manuelnamaritrish Yeung Add [R10.9] Abdominal pain     10/10/2023  6:48 PM Silviano Yeung Add [M53.3] [facility]       ED Disposition     ED Disposition   Discharge    Condition   Stable    Date/Time   Tue Oct 10, 2023  6:48 PM    Comment   Martina Roe discharge to home/self care.                Follow-up Information     Follow up With Specialties Details Why Contact Info Additional Information    [facility] Emergency Department Emergency Medicine  If symptoms worsen [address] [id number] [address] [facility] Emergency Department, [address] [facility], [place], [place], [address] [place] [address] DO [facility] Schedule an appointment as soon as possible for a visit   [address] [facility]  1 [facility] Haroldo Fischer [address]  [address] [place] [address]             Discharge Medication List as of 10/10/2023  7:39 PM      START taking these medications    Details   acetaminophen (TYLENOL) 500 mg tablet Take 1 tablet (500 mg total) by mouth every 6 (six) hours as needed for mild pain or moderate pain, Starting Tue 10/10/2023, Normal      aluminum-magnesium hydroxide 200-200 MG/5ML suspension Take 10 mL by mouth every 6 (six) hours as needed for cramping, Starting Tue 10/10/2023, Normal      Diclofenac Sodium (VOLTAREN) 1 % Apply 2 g topically 4 (four) times a day as needed (Pain), Starting Tue 10/10/2023, Normal      sucralfate (CARAFATE) 1 g tablet Take 1 tablet (1 g total) by mouth 4 (four) times a day for 7 days, Starting Tue 10/10/2023, Until Tue 10/17/2023, Normal         CONTINUE these medications which have NOT CHANGED    Details   ALPRAZolam (XANAX) 0.5 mg tablet Take by mouth as needed for anxiety, Historical Med      Calcium Carb-Cholecalciferol (CALCIUM 1000 + D PO) Take 1 tablet by mouth daily, Historical Med      Dextromethorphan-buPROPion ER (Auvelity)  MG TBCR Take 45 mg by mouth daily, Historical Med      ergocalciferol (VITAMIN D2) 50,000 units Take 50,000 Units by mouth once a week, Historical Med      loperamide (IMODIUM) 2 mg capsule Take 2 mg by mouth 4 (four) times a day as needed for diarrhea, Historical Med      omeprazole (PriLOSEC) 40 MG capsule Take 1 capsule (40 mg total) by mouth daily, Starting Fri 10/1/2021, Normal      ondansetron (ZOFRAN) 4 mg tablet Take 4 mg by mouth every 8 (eight) hours as needed  , Historical Med      predniSONE 5 mg tablet Take 14 mg by mouth daily, Historical Med      pregabalin (LYRICA) 100 mg capsule Take one tablet twice a day and 2 tablets at bedtime, Historical Med      Sodium Fluoride 1.1 % PSTE Apply 5 mL to teeth daily, Starting Tue 11/2/2021, Normal      SUMAtriptan (IMITREX) 100 mg tablet Take 100 mg by mouth once as needed for migraine, Historical Med      Virt-Phos 250 Neutral 155-852-130 MG tablet if needed, Starting Thu 9/30/2021, Historical Med      escitalopram (LEXAPRO) 20 mg tablet Take 20 mg by mouth every morning  , Historical Med      mupirocin (BACTROBAN) 2 % ointment Apply topically 3 (three) times a day, Starting Mon 10/26/2020, Normal             No discharge procedures on file.     PDMP Review       Value Time User    PDMP Reviewed  Yes 10/8/2019 11:15 AM Adriano Darling PA-C          ED Provider  Electronically Signed by           Lauren Mast PA-C  10/10/23 3477

## 2023-10-11 ENCOUNTER — OFFICE VISIT (OUTPATIENT)
Dept: GASTROENTEROLOGY | Facility: CLINIC | Age: 33
End: 2023-10-11
Payer: MEDICARE

## 2023-10-11 VITALS
SYSTOLIC BLOOD PRESSURE: 120 MMHG | BODY MASS INDEX: 22.91 KG/M2 | HEIGHT: 67 IN | WEIGHT: 146 LBS | DIASTOLIC BLOOD PRESSURE: 72 MMHG | TEMPERATURE: 99.4 F

## 2023-10-11 DIAGNOSIS — R11.0 NAUSEA: ICD-10-CM

## 2023-10-11 DIAGNOSIS — R10.84 GENERALIZED ABDOMINAL PAIN: Primary | ICD-10-CM

## 2023-10-11 DIAGNOSIS — K44.9 HIATAL HERNIA: ICD-10-CM

## 2023-10-11 DIAGNOSIS — E73.9 LACTOSE INTOLERANCE: ICD-10-CM

## 2023-10-11 PROCEDURE — 99215 OFFICE O/P EST HI 40 MIN: CPT | Performed by: INTERNAL MEDICINE

## 2023-10-11 RX ORDER — LIDOCAINE HYDROCHLORIDE 20 MG/ML
15 SOLUTION OROPHARYNGEAL 4 TIMES DAILY PRN
Qty: 20 ML | Refills: 4 | Status: SHIPPED | OUTPATIENT
Start: 2023-10-11

## 2023-10-11 RX ORDER — DIPHENOXYLATE HYDROCHLORIDE AND ATROPINE SULFATE 2.5; .025 MG/1; MG/1
TABLET ORAL EVERY 12 HOURS
COMMUNITY
Start: 2023-06-15

## 2023-10-11 RX ORDER — OXYCODONE HYDROCHLORIDE 15 MG/1
15 TABLET ORAL 3 TIMES DAILY
COMMUNITY
Start: 2023-09-13

## 2023-10-11 NOTE — PROGRESS NOTES
[facility]  Progress Note - Roselle Sacks 28 y.o. female MRN: [id number]    Unit/Bed#:  Encounter: [id number]    Assessment/Plan:  1. Generalized abdominal pain  - Lidocaine Viscous HCl (XYLOCAINE) 2 % mucosal solution; Swish and spit 15 mL 4 (four) times a day as needed for mouth pain or discomfort  Dispense: 20 mL; Refill: 4    2. Hiatal hernia  3. Nausea  4. Lactose intolerance    Continue avoid dairy  We discussed that it is likely secondary musculoskeletal due to positional nature of the symptoms. Trial of lidocaine locally  We discussed if symptoms not alleviated EGD can be planned but this would be of low yield. She will call us back in 2 weeks if symptoms get worse or more associated oral intake of food schedule an EGD evaluation. Follow-up as needed if symptoms resolve    Subjective:     She is placed in for an urgent visit  She presents here for evaluation for right-sided abdominal pain focal to the upper abdominal region. The symptoms are worse with change in position and bending over. Symptoms are not related to food intake majority of the time but she does have difficulty trying to differentiate this at times. Symptoms are worse when getting out of bed or bending over to  something. The symptoms have been ongoing for the last several days to weeks and becoming more more debilitating. Denies NSAID use. She did have a recent mechanical fall but symptoms started prior to this. He does have a history of lactose intolerance undergone extensive evaluation in the past.  She has had gastric emptying study, EGD, colonoscopy, right upper quadrant ultrasound which have been within normal limits. She also has CT scan in 2018 which was within normal limits. Objective:     Vitals: Blood pressure 120/72, temperature 99.4 °F (37.4 °C), temperature source Tympanic, height 5' 7" (1.702 m), weight 66.2 kg (146 lb), not currently breastfeeding. ,Body mass index is 22.87 kg/m². [unfilled]    Physical Exam:    GEN: wn/wd, NAD  HEENT: MMM, no cervical or supraclavicular LAD, anciteric  CV: RRR, no m/r/g  CHEST: CTA b/l, no w/r/r  ABD: +BS, soft, NT/ND, no hepatosplenomegaly  EXT: no c/c/e  SKIN: no rashes  NEURO: aaox3      Invasive Devices       None                           Lab, Imaging and other studies:     No visits with results within 1 Day(s) from this visit.    Latest known visit with results is:   Admission on 10/10/2023, Discharged on 10/10/2023   Component Date Value    WBC 10/10/2023 8.12     RBC 10/10/2023 4.38     Hemoglobin 10/10/2023 13.4     Hematocrit 10/10/2023 41.3     MCV 10/10/2023 94     MCH 10/10/2023 30.6     MCHC 10/10/2023 32.4     RDW 10/10/2023 12.8     MPV 10/10/2023 12.1     Platelets 04/38/9348 179     nRBC 10/10/2023 0     Neutrophils Relative 10/10/2023 59     Immat GRANS % 10/10/2023 0     Lymphocytes Relative 10/10/2023 32     Monocytes Relative 10/10/2023 7     Eosinophils Relative 10/10/2023 1     Basophils Relative 10/10/2023 1     Neutrophils Absolute 10/10/2023 4.88     Immature Grans Absolute 10/10/2023 0.03     Lymphocytes Absolute 10/10/2023 2.58     Monocytes Absolute 10/10/2023 0.54     Eosinophils Absolute 10/10/2023 0.04     Basophils Absolute 10/10/2023 0.05     Sodium 10/10/2023 136     Potassium 10/10/2023 3.4 (L)     Chloride 10/10/2023 106     CO2 10/10/2023 25     ANION GAP 10/10/2023 5     BUN 10/10/2023 9     Creatinine 10/10/2023 0.63     Glucose 10/10/2023 95     Calcium 10/10/2023 9.0     AST 10/10/2023 16     ALT 10/10/2023 10     Alkaline Phosphatase 10/10/2023 56     Total Protein 10/10/2023 6.8     Albumin 10/10/2023 4.3     Total Bilirubin 10/10/2023 0.99     eGFR 10/10/2023 117     Lipase 10/10/2023 19     EXT Preg Test, Ur 10/10/2023 Negative     Control 10/10/2023 Valid     Color, UA 10/10/2023 Yellow     Clarity, UA 10/10/2023 Clear     pH, UA 10/10/2023 5.0     Leukocytes, UA 10/10/2023 Negative     Nitrite, UA 10/10/2023 Negative     Protein, UA 10/10/2023 30 (1+) (A)     Glucose, UA 10/10/2023 Negative     Ketones, UA 10/10/2023 Trace (A)     Urobilinogen, UA 10/10/2023 0.2     Bilirubin, UA 10/10/2023 Small (A)     Occult Blood, UA 10/10/2023 Negative     Specific Gravity, UA 10/10/2023 >=1.030     RBC, UA 10/10/2023 1-2     WBC, UA 10/10/2023 2-4 (A)     Epithelial Cells 10/10/2023 Occasional     Bacteria, UA 10/10/2023 Occasional     MUCUS THREADS 10/10/2023 Moderate (A)              No current facility-administered medications for this visit.

## 2023-10-26 ENCOUNTER — NURSE TRIAGE (OUTPATIENT)
Age: 33
End: 2023-10-26

## 2023-10-26 ENCOUNTER — PREP FOR PROCEDURE (OUTPATIENT)
Age: 33
End: 2023-10-26

## 2023-10-26 NOTE — TELEPHONE ENCOUNTER
----- Message from Elba Cortes sent at 10/26/2023  1:09 PM EDT -----  Pt was in to see  today and was prescribed -Lidocaine Viscous HCl (XYLOCAINE) 2 % mucosal solution- with the order to swish 15 ML 4 times a day, however, he only sent a prescription for 20ML total (perhaps a typo?) so the Pt was only able to get one doses worth. She did  the dose that was there for her because she wants to be able to take it now for some relief but She is asking if we can send in a new script for her for an adjusted dosage. She will go back for it today if possible.

## 2023-10-27 DIAGNOSIS — R10.84 GENERALIZED ABDOMINAL PAIN: ICD-10-CM

## 2023-10-27 RX ORDER — LIDOCAINE HYDROCHLORIDE 20 MG/ML
15 SOLUTION OROPHARYNGEAL 4 TIMES DAILY PRN
Qty: 300 ML | Refills: 4 | Status: SHIPPED | OUTPATIENT
Start: 2023-10-27

## 2023-10-29 ENCOUNTER — OFFICE VISIT (OUTPATIENT)
Dept: URGENT CARE | Facility: MEDICAL CENTER | Age: 33
End: 2023-10-29
Payer: MEDICARE

## 2023-10-29 ENCOUNTER — APPOINTMENT (OUTPATIENT)
Dept: RADIOLOGY | Facility: MEDICAL CENTER | Age: 33
End: 2023-10-29
Payer: MEDICARE

## 2023-10-29 VITALS
TEMPERATURE: 99 F | DIASTOLIC BLOOD PRESSURE: 92 MMHG | HEART RATE: 110 BPM | HEIGHT: 67 IN | BODY MASS INDEX: 22.91 KG/M2 | SYSTOLIC BLOOD PRESSURE: 135 MMHG | WEIGHT: 146 LBS | OXYGEN SATURATION: 100 %

## 2023-10-29 DIAGNOSIS — J40 BRONCHITIS: Primary | ICD-10-CM

## 2023-10-29 DIAGNOSIS — R06.02 SOB (SHORTNESS OF BREATH): ICD-10-CM

## 2023-10-29 LAB
ATRIAL RATE: 73 BPM
P AXIS: 47 DEGREES
PR INTERVAL: 146 MS
QRS AXIS: 48 DEGREES
QRSD INTERVAL: 84 MS
QT INTERVAL: 384 MS
QTC INTERVAL: 423 MS
T WAVE AXIS: 36 DEGREES
VENTRICULAR RATE: 73 BPM

## 2023-10-29 PROCEDURE — 99213 OFFICE O/P EST LOW 20 MIN: CPT | Performed by: PHYSICIAN ASSISTANT

## 2023-10-29 PROCEDURE — 93005 ELECTROCARDIOGRAM TRACING: CPT | Performed by: PHYSICIAN ASSISTANT

## 2023-10-29 PROCEDURE — 93010 ELECTROCARDIOGRAM REPORT: CPT | Performed by: PHYSICIAN ASSISTANT

## 2023-10-29 PROCEDURE — 71046 X-RAY EXAM CHEST 2 VIEWS: CPT

## 2023-10-29 PROCEDURE — G0463 HOSPITAL OUTPT CLINIC VISIT: HCPCS | Performed by: PHYSICIAN ASSISTANT

## 2023-10-29 RX ORDER — CLINDAMYCIN HYDROCHLORIDE 300 MG/1
CAPSULE ORAL
COMMUNITY
Start: 2023-10-26

## 2023-10-29 RX ORDER — BENZONATATE 100 MG/1
100 CAPSULE ORAL 3 TIMES DAILY PRN
Qty: 20 CAPSULE | Refills: 0 | Status: SHIPPED | OUTPATIENT
Start: 2023-10-29

## 2023-10-29 RX ORDER — PREDNISONE 20 MG/1
20 TABLET ORAL 2 TIMES DAILY WITH MEALS
Qty: 10 TABLET | Refills: 0 | Status: SHIPPED | OUTPATIENT
Start: 2023-10-29 | End: 2023-11-03

## 2023-10-29 NOTE — PROGRESS NOTES
[facility]        NAME: Galdino Maya is a 28 y.o. female  : 1990    MRN: [id number]  DATE: 2023  TIME: 5:15 PM    Assessment and Plan   Bronchitis [J40]  1. Bronchitis  ECG 12 lead    XR chest pa & lateral    predniSONE 20 mg tablet    benzonatate (TESSALON PERLES) 100 mg capsule        Patient Instructions     EKG- RRR, normal st and pr segments, no t wave inversions  Wet read of cxr wnl    Take medicine as prescribed- take prednisone 40mg in place of usual prednisone  C/w clindamycin  Follow up with PCP in 3-5 days. Proceed to  ER if symptoms worsen. Chief Complaint     Chief Complaint   Patient presents with    Shortness of Breath     Patient had some nasal issues and was prescribed cleocin. She took a shower yesterday and had difficulty breathing. SHe has a cough. He states she was feeling better today but then her SOB came back         History of Present Illness       31yo F presents c/o cough, chest tightness, and sob. Patient had nasal congestion which began 10/24/23. These symptoms progressed body aches, sore throat, and fever. Patient was given clindamycin 300mg BID x 7 days from her PCP via video visit. She began to feel improved on this course. Patient's cough began on 10/25/23 and has progressively worsened. Patient c/o chest tightness and sob. Yesterday while having a hot shower in attempts to "steam out cough", patient felt sob and felt as if she might pass out. Starting yesterday patient increased her daily prednisone to 8 BID. Review of Systems   Review of Systems   Constitutional:  Positive for fever. HENT:  Positive for congestion, rhinorrhea and sore throat. Respiratory:  Positive for cough, chest tightness, shortness of breath and wheezing. Cardiovascular:  Negative for chest pain, palpitations and leg swelling.          Current Medications       Current Outpatient Medications:     benzonatate (TESSALON PERLES) 100 mg capsule, Take 1 capsule (100 mg total) by mouth 3 (three) times a day as needed for cough, Disp: 20 capsule, Rfl: 0    clindamycin (CLEOCIN) 300 MG capsule, TAKE 1 CAPSULE BY MOUTH EVERY 12 HOURS FOR 7 DAYS, Disp: , Rfl:     escitalopram (LEXAPRO) 20 mg tablet, Take 20 mg by mouth every morning, Disp: , Rfl:     predniSONE 20 mg tablet, Take 1 tablet (20 mg total) by mouth 2 (two) times a day with meals for 5 days, Disp: 10 tablet, Rfl: 0    acetaminophen (TYLENOL) 500 mg tablet, Take 1 tablet (500 mg total) by mouth every 6 (six) hours as needed for mild pain or moderate pain, Disp: 30 tablet, Rfl: 0    ALPRAZolam (XANAX) 0.5 mg tablet, Take by mouth as needed for anxiety, Disp: , Rfl:     aluminum-magnesium hydroxide 200-200 MG/5ML suspension, Take 10 mL by mouth every 6 (six) hours as needed for cramping, Disp: 355 mL, Rfl: 0    Calcium Carb-Cholecalciferol (CALCIUM 1000 + D PO), Take 1 tablet by mouth daily, Disp: , Rfl:     Dextromethorphan-buPROPion ER (Auvelity)  MG TBCR, Take 45 mg by mouth daily, Disp: , Rfl:     Diclofenac Sodium (VOLTAREN) 1 %, Apply 2 g topically 4 (four) times a day as needed (Pain), Disp: 100 g, Rfl: 0    diphenoxylate-atropine (LOMOTIL) 2.5-0.025 mg per tablet, Take by mouth every 12 (twelve) hours, Disp: , Rfl:     ergocalciferol (VITAMIN D2) 50,000 units, Take 50,000 Units by mouth once a week, Disp: , Rfl:     Lidocaine Viscous HCl (XYLOCAINE) 2 % mucosal solution, Swish and spit 15 mL 4 (four) times a day as needed for mouth pain or discomfort, Disp: 300 mL, Rfl: 4    loperamide (IMODIUM) 2 mg capsule, Take 2 mg by mouth 4 (four) times a day as needed for diarrhea, Disp: , Rfl:     mupirocin (BACTROBAN) 2 % ointment, Apply topically 3 (three) times a day (Patient not taking: Reported on 10/10/2023), Disp: 22 g, Rfl: 3    omeprazole (PriLOSEC) 40 MG capsule, Take 1 capsule (40 mg total) by mouth daily, Disp: 30 capsule, Rfl: 2    ondansetron (ZOFRAN) 4 mg tablet, Take 4 mg by mouth every 8 (eight) hours as needed  , Disp: , Rfl:     ondansetron (ZOFRAN-ODT) 4 mg disintegrating tablet, Take 1 tablet (4 mg total) by mouth every 6 (six) hours as needed for nausea, Disp: 20 tablet, Rfl: 0    oxyCODONE (ROXICODONE) 15 mg immediate release tablet, Take 15 mg by mouth 3 (three) times a day, Disp: , Rfl:     predniSONE 5 mg tablet, Take 14 mg by mouth daily, Disp: , Rfl:     pregabalin (LYRICA) 100 mg capsule, Take one tablet twice a day and 2 tablets at bedtime, Disp: , Rfl:     Sodium Fluoride 1.1 % PSTE, Apply 5 mL to teeth daily, Disp: 100 mL, Rfl: 0    sucralfate (CARAFATE) 1 g tablet, Take 1 tablet (1 g total) by mouth 4 (four) times a day for 7 days, Disp: 28 tablet, Rfl: 0    SUMAtriptan (IMITREX) 100 mg tablet, Take 100 mg by mouth once as needed for migraine, Disp: , Rfl:     Virt-Phos 250 Neutral 155-852-130 MG tablet, if needed, Disp: , Rfl:     Current Allergies     Allergies as of 10/29/2023 - Reviewed 10/29/2023   Allergen Reaction Noted    Nickel Rash 06/18/2018    Tylenol with codeine #3 [acetaminophen-codeine] Hives, Itching, and Facial Swelling 10/08/2019            The following portions of the patient's history were reviewed and updated as appropriate: allergies, current medications, past family history, past medical history, past social history, past surgical history and problem list.     Past Medical History:   Diagnosis Date    Abnormal Pap smear of cervix     Anemia     Anxiety     Back pain     Depression     GERD (gastroesophageal reflux disease)     IBS (irritable bowel syndrome)     Iron deficiency     Irregular menses     Menorrhagia     Migraines     Neuropathy     Obesity     Osteoarthritis of back     Osteopenia     RA (rheumatoid arthritis) ([address])     Scratches     On back and shoulder from scratching due to urticaria    Vasculitis ([address])     Wears glasses        Past Surgical History:   Procedure Laterality Date    CAST APPLICATION Left 83/84/5124    Procedure: APPLICATION LONG ARM Slime Jewellbaum;  Surgeon: Christa Peters MD;  [facility];  Service: Orthopedics    CAST APPLICATION Right 86/4/4735    Procedure: APPLICATION LONG ARM SUGAR TONG SPLINT;  Surgeon: Christa Peters MD;  [facility];  Service: Orthopedics    CHOLECYSTECTOMY      Laparoscopic    COLONOSCOPY      DILATION AND CURETTAGE OF UTERUS      HYSTERECTOMY      JOINT REPLACEMENT Bilateral     knees    MO ARTHRODESIS WRIST W/ILIAC/OTHER AUTOGRAFT Left 4/4/2017    Procedure: WRIST FUSION / Duana Fling ;  Surgeon: Christa Peters MD;  [facility]: [facility];  Service: Orthopedics    MO ARTHRODESIS WRIST W/ILIAC/OTHER AUTOGRAFT Right 4/10/2018    Procedure: Removal of hardware right wrist with Fusion of Long finger carpometacarpal joint, splint application Right Wrist;  Surgeon: Christa Peters MD;  [facility];  Service: Orthopedics    MO BREAST REDUCTION Bilateral 9/7/2022    Procedure: BREAST REDUCTION;  Surgeon: Sarah Gordon MD;  Location: [address] [place] [address] MAIN OR;  Service: Plastics    MO COLONOSCOPY FLX DX W/COLLJ SPEC WHEN PFRMD N/A 2/22/2019    Procedure: COLONOSCOPY;  Surgeon: Leodan Ramon MD;  Location: [facility]; Service: Gastroenterology    MO ESOPHAGOGASTRODUODENOSCOPY TRANSORAL DIAGNOSTIC N/A 2/22/2019    Procedure: ESOPHAGOGASTRODUODENOSCOPY (EGD); Surgeon: Leodan Ramon MD;  Location: [facility];   Service: Gastroenterology    MO EXCISION DISTAL ULNA PARTIAL/COMPLETE Right 10/8/2019    Procedure: EXCISION DISTAL ULNA ([address] [place] [address]) right;  Surgeon: Christa Peters MD;  [facility];  Service: Orthopedics    MO HYSTEROSCOPY BX ENDOMETRIUM&/POLYPC W/WO D&C N/A 12/1/2017    Procedure: DILATATION AND CURETTAGE (D&C) WITH HYSTEROSCOPY;  Surgeon: Tremayne Tanner DO;  [facility];  Service: Gynecology    MO LAPAROSCOPY SURG CHOLECYSTECTOMY N/A 3/14/2017    Procedure: CHOLECYSTECTOMY LAPAROSCOPIC;  Surgeon: Wen Laguerre DO;  Location:  MAIN OR;  Service: General    ME LAPS TOTAL HYSTERECT 250 GM/< W/RMVL TUBE/OVARY N/A 1/18/2019    Procedure: HYSTERECTOMY LAPAROSCOPIC TOTAL ([address]) Bilateral salpingectomy, cystoscopy;  Surgeon: Malena Rodgers DO;  [facility];  Service: Gynecology    ME REMOVAL IMPLANT DEEP Left 11/13/2018    Procedure: REMOVAL OF HARDWARE (wrist fusion plate  AND ulnar head arthroplasty) with conversion to Darrach.;  Surgeon: Benny Lundy MD;  [facility];  Service: Orthopedics    REPLACEMENT TOTAL KNEE BILATERAL      UPPER GASTROINTESTINAL ENDOSCOPY      WISDOM TOOTH EXTRACTION      WRIST SURGERY Right     For arthritis    WRIST SURGERY Left 4/4/2017    Procedure: ARTHROPLASTY WRIST ULNAR HEAD ARTHROPLASTY - INTEGRA SIZE 5.5 MM, 16 HEAD;  Surgeon: Benny Lundy MD;  [facility];  Service:     WRIST TENDON TRANSFER Right 10/8/2019    Procedure: TRANSFER TENDON EXTENSOR CARPI ULNARIS AT THE WRIST;  Surgeon: Benny Lundy MD;  [facility];  Service: Orthopedics       Family History   Problem Relation Age of Onset    Hypertension Mother     Rheum arthritis Mother     Depression Mother     Anxiety disorder Mother          Medications have been verified. Objective   /92   Pulse (!) 110   Temp 99 °F (37.2 °C)   Ht 5' 7" (1.702 m)   Wt 66.2 kg (146 lb)   LMP  (LMP Unknown) Comment: irregular  SpO2 100%   BMI 22.87 kg/m²   No LMP recorded (lmp unknown). Patient has had a hysterectomy. Physical Exam     Physical Exam  Constitutional:       Appearance: She is well-developed. HENT:      Head: Normocephalic. Right Ear: Hearing, tympanic membrane, ear canal and external ear normal.      Left Ear: Hearing, tympanic membrane, ear canal and external ear normal.      Nose: Mucosal edema, congestion and rhinorrhea present. Rhinorrhea is clear.       Mouth/Throat:      Pharynx: No pharyngeal swelling, oropharyngeal exudate, posterior oropharyngeal erythema or uvula swelling. Tonsils: No tonsillar exudate. Cardiovascular:      Rate and Rhythm: Normal rate and regular rhythm. Heart sounds: Normal heart sounds. No murmur heard. No friction rub. No gallop. Pulmonary:      Effort: Pulmonary effort is normal. No respiratory distress. Breath sounds: No stridor. Wheezing present. No decreased breath sounds, rhonchi or rales. Abdominal:      General: Bowel sounds are normal. There is no distension. Palpations: Abdomen is soft. There is no mass. Tenderness: There is no abdominal tenderness. There is no guarding or rebound. Lymphadenopathy:      Cervical: Cervical adenopathy present. Right cervical: Superficial cervical adenopathy present. Left cervical: Superficial cervical adenopathy present. Neurological:      Mental Status: She is alert.

## 2023-10-29 NOTE — PATIENT INSTRUCTIONS
Go to ER if you feel like you are going to pass out again    Take medicine as prescribed    Follow up with family doctor within 3 days

## 2024-09-14 ENCOUNTER — APPOINTMENT (EMERGENCY)
Dept: RADIOLOGY | Facility: HOSPITAL | Age: 34
End: 2024-09-14
Payer: MEDICARE

## 2024-09-14 ENCOUNTER — HOSPITAL ENCOUNTER (EMERGENCY)
Facility: HOSPITAL | Age: 34
Discharge: HOME | End: 2024-09-14
Attending: EMERGENCY MEDICINE
Payer: MEDICARE

## 2024-09-14 VITALS
WEIGHT: 147.93 LBS | OXYGEN SATURATION: 98 % | BODY MASS INDEX: 23.22 KG/M2 | HEART RATE: 82 BPM | TEMPERATURE: 97.8 F | DIASTOLIC BLOOD PRESSURE: 82 MMHG | RESPIRATION RATE: 18 BRPM | SYSTOLIC BLOOD PRESSURE: 122 MMHG | HEIGHT: 67 IN

## 2024-09-14 DIAGNOSIS — R06.02 SHORTNESS OF BREATH: Primary | ICD-10-CM

## 2024-09-14 DIAGNOSIS — J06.9 VIRAL URI: ICD-10-CM

## 2024-09-14 DIAGNOSIS — M25.519 SHOULDER PAIN: ICD-10-CM

## 2024-09-14 LAB
ATRIAL RATE: 87 BPM
P AXIS: 71 DEGREES
PR INTERVAL: 140 MS
QRS AXIS: 66 DEGREES
QRSD INTERVAL: 82 MS
QT INTERVAL: 366 MS
QTC INTERVAL: 440 MS
T WAVE AXIS: 60 DEGREES
VENTRICULAR RATE: 87 BPM

## 2024-09-14 PROCEDURE — 73030 X-RAY EXAM OF SHOULDER: CPT

## 2024-09-14 PROCEDURE — 99284 EMERGENCY DEPT VISIT MOD MDM: CPT | Performed by: EMERGENCY MEDICINE

## 2024-09-14 PROCEDURE — 93010 ELECTROCARDIOGRAM REPORT: CPT | Performed by: INTERNAL MEDICINE

## 2024-09-14 PROCEDURE — 71046 X-RAY EXAM CHEST 2 VIEWS: CPT

## 2024-09-14 PROCEDURE — 99284 EMERGENCY DEPT VISIT MOD MDM: CPT

## 2024-09-14 PROCEDURE — 93005 ELECTROCARDIOGRAM TRACING: CPT

## 2024-09-14 PROCEDURE — 94640 AIRWAY INHALATION TREATMENT: CPT

## 2024-09-14 RX ORDER — ALBUTEROL SULFATE 90 UG/1
1-2 INHALANT RESPIRATORY (INHALATION) EVERY 6 HOURS PRN
Qty: 8 G | Refills: 0 | Status: SHIPPED | OUTPATIENT
Start: 2024-09-14

## 2024-09-14 RX ORDER — ALBUTEROL SULFATE 0.83 MG/ML
5 SOLUTION RESPIRATORY (INHALATION) ONCE
Status: COMPLETED | OUTPATIENT
Start: 2024-09-14 | End: 2024-09-14

## 2024-09-14 RX ADMIN — IPRATROPIUM BROMIDE 0.5 MG: 0.5 SOLUTION RESPIRATORY (INHALATION) at 12:51

## 2024-09-14 RX ADMIN — ALBUTEROL SULFATE 5 MG: 2.5 SOLUTION RESPIRATORY (INHALATION) at 12:51

## 2024-09-14 NOTE — ED ATTENDING ATTESTATION
9/14/2024  IJustin MD, saw and evaluated the patient. I have discussed the patient with the resident/non-physician practitioner and agree with the resident's/non-physician practitioner's findings, Plan of Care, and MDM as documented in the resident's/non-physician practitioner's note, except where noted. All available labs and Radiology studies were reviewed.  I was present for key portions of any procedure(s) performed by the resident/non-physician practitioner and I was immediately available to provide assistance.       At this point I agree with the current assessment done in the Emergency Department.  I have conducted an independent evaluation of this patient a history and physical is as follows:  Patient with hx of RA, comes with complaints of left shoulder pain, difficulty breathing, pain for past 3 days, worse with movement, no falls or injuries, numbness left arm, chest tightness, difficulty taking deep breath, thought she had wheezing, in past has used Albuterol, had URI symptoms last week, associated with cough with mild sputum. On exam, VSS, Left shoulder tednerness, old surgery left wrist, no motor/sensory deficit, Lung with mild upper zone wheezing. D/D: URI, Pneumonia, Asthma, left shoulder arthropathy.  Will check chest x-ray, left shoulder x-ray, give breathing treatment.    ED Course     Is reviewed, no significant acute abnormality noted.  Patient felt better after neb treatment.  Stable for discharge, will give albuterol inhaler.    Critical Care Time  Procedures

## 2024-09-14 NOTE — DISCHARGE INSTRUCTIONS
You were seen in the emergency department today for shoulder pain.    Your testing showed no evidence of fracture or pneumonia.    Follow up with your primary care provider.   Follow up with orthopedics if your shoulder pain does not improve or gets worse.     Take your normal medications as prescribed.   Use inhaler as prescribed as needed for shortness of breath.   Take Tylenol as needed for pain following the instructions on the bottle.     Return to the emergency department for any new or concerning symptoms including worsening difficulty breathing / chest pain especially if worse with exertion, worsening pain in your shoulder or weakness in your arm.     Thank you for choosing [facility] for your care today.

## 2024-09-14 NOTE — ED PROVIDER NOTES
1. Shortness of breath    2. Viral URI    3. Shoulder pain      ED Disposition       ED Disposition   Discharge    Condition   Stable    Date/Time   Sat Sep 14, 2024  1:41 PM    Comment   Penelope Shelby discharge to home/self care.                   Assessment & Plan       Medical Decision Making  Amount and/or Complexity of Data Reviewed  Radiology: ordered and independent interpretation performed.    Risk  Prescription drug management.    Patient is a 33 y.o. female with PMH of rheumatoid arthritis, chronic steroid use who presents to the ED with left shoulder pain and chest tightness / wheezing.    Vital signs stable, not hypoxic or tachycardic. On exam pain with ROM of left shoulder, distal neurovascular intact. Occasional wheeze left upper / middle lobes.    History and physical exam most consistent with musculoskeletal pain / URI. However, differential diagnosis included but not limited to pneumonia, fracture.     Plan: CXR, XR left shoulder, duo neb    View ED course for further discussion on patient workup.    All labs reviewed and utilized in the medical decision making process  All radiology studies independently viewed by me and interpreted by the radiologist.  I reviewed all testing with the patient.     Upon re-evaluation patients SOB improved, lungs clear bilaterally.    Disposition: I have reviewed the patient's vital signs, nursing notes, and other relevant tests/information. I had a detailed discussion with the patient regarding the history, exam findings, and any diagnostic results.   Plan to discharge home in stable condition with albuterol, follow up with primary care provider.  Discussed with patient who is agreeable to plan.  I discussed discharge instructions, need for follow-up, and oral return precautions for what to return for in addition to the written return precautions and discharge instructions, specifically highlighting areas of special concern.  The patient verbalized understanding of  "the discharge instructions and warnings that would necessitate return to the Emergency Department including worsening difficulty breathing, worsening pain / weakness in her arm, exertional chest pain.  All questions the patient had were answered prior to discharge to the best of my ability.       Portions of the record may have been created with voice recognition software. Occasional wrong word or \"sound a like\" substitutions may have occurred due to the inherent limitations of voice recognition software. Read the chart carefully and recognize, using context, where substitutions have occurred.              ED Course as of 09/14/24 1430   Sat Sep 14, 2024   1241 Procedure Note: EKG  Date/Time: 09/14/24 12:41 PM   Interpreted by: Shira David  Indications / Diagnosis: chest pain  ECG reviewed by me, the ED Provider: yes   The EKG demonstrates:  Rate: 87  Rhythm: NSR  Intervals: regular  Axis: regular  QRS/Blocks: regular  ST Changes: No acute ST Changes, no STD/RIKKI.  Compared to prior EKG on 10/29/23, no acute change.          Medications   albuterol inhalation solution 5 mg (5 mg Nebulization Given 9/14/24 1251)   ipratropium (ATROVENT) 0.02 % inhalation solution 0.5 mg (0.5 mg Nebulization Given 9/14/24 1251)       History of Present Illness     Chief Complaint   Patient presents with    Chest Pain     Pt reports CP and L-arm numbness/pain since last night some dizziness.        HPI    Patient is a 33 y.o. female with history of rheumatoid arthritis on chronic steroids presenting to the emergency department for shortness of breath / chest tightness and left shoulder pain. Patient states that she has been having pain in her left shoulder since Thursday. Denies any recent fall / injury. Also complains of having some tingling / shooting pain into her left hand as well. She also states that she has had URI symptoms recently with sinus congestion and chills, but now it is \"settling\" into her chest which she states is " normal for her. States that she feels some tightness and like she is wheezing. Had asthma as a child and has an inhaler at home that she can take as needed but has not used for this episode.  Denies having any exertional chest pain, no history of CAD, and no family history of sudden cardiac death / cardiac disease at a young age. Denies any history of blood clots, recent travel / immobilization / surgery, BCP use, or leg swelling.       Review of Systems   Constitutional:  Positive for chills.   HENT:  Positive for congestion.    Eyes:  Negative for visual disturbance.   Respiratory:  Positive for cough and shortness of breath.    Cardiovascular:  Negative for leg swelling.   Gastrointestinal:  Negative for abdominal pain, diarrhea and vomiting.   Endocrine: Negative for polyuria.   Genitourinary:  Negative for dysuria.   Musculoskeletal:  Negative for gait problem.        Left shoulder pain   Skin:  Negative for rash.   Neurological:  Negative for dizziness.   All other systems reviewed and are negative.          Objective     ED Triage Vitals [09/14/24 1212]   Temperature Pulse Blood Pressure Respirations SpO2 Patient Position - Orthostatic VS   97.8 °F (36.6 °C) 82 122/82 18 98 % Lying      Temp Source Heart Rate Source BP Location FiO2 (%) Pain Score    Oral Monitor Right arm -- 6        Physical Exam  Vitals and nursing note reviewed.   Constitutional:       Appearance: Normal appearance.   HENT:      Head: Normocephalic and atraumatic.      Mouth/Throat:      Mouth: Mucous membranes are moist.   Eyes:      Conjunctiva/sclera: Conjunctivae normal.   Cardiovascular:      Rate and Rhythm: Normal rate and regular rhythm.      Pulses: Normal pulses.      Heart sounds: Normal heart sounds.   Pulmonary:      Effort: Pulmonary effort is normal.      Breath sounds: Examination of the left-upper field reveals wheezing. Examination of the left-middle field reveals wheezing. Wheezing present.   Abdominal:      Palpations:  Abdomen is soft.      Tenderness: There is no abdominal tenderness.   Musculoskeletal:      Cervical back: Neck supple.      Right lower leg: No edema.      Left lower leg: No edema.      Comments: TTP diffusely to left shoulder, pain with ROM of left shoulder, distal strength / sensation / pulses intact   Skin:     General: Skin is warm and dry.      Capillary Refill: Capillary refill takes less than 2 seconds.   Neurological:      General: No focal deficit present.      Mental Status: She is alert. Mental status is at baseline.   Psychiatric:         Mood and Affect: Mood normal.         Labs Reviewed - No data to display  XR chest 2 views   ED Interpretation by hSira David DO (09/14 1306)   No acute cardiopulmonary abnormality       XR shoulder 2+ views LEFT   ED Interpretation by Shira David DO (09/14 1309)   No acute fracture or dislocation           Procedures         Shira David DO  09/14/24 0560

## 2024-10-22 ENCOUNTER — OFFICE VISIT (OUTPATIENT)
Dept: OBGYN CLINIC | Facility: MEDICAL CENTER | Age: 34
End: 2024-10-22
Payer: MEDICARE

## 2024-10-22 VITALS
BODY MASS INDEX: 23.23 KG/M2 | HEART RATE: 68 BPM | DIASTOLIC BLOOD PRESSURE: 74 MMHG | WEIGHT: 148 LBS | SYSTOLIC BLOOD PRESSURE: 110 MMHG | HEIGHT: 67 IN

## 2024-10-22 DIAGNOSIS — M25.512 CHRONIC PAIN OF BOTH SHOULDERS: ICD-10-CM

## 2024-10-22 DIAGNOSIS — M54.12 RADICULOPATHY, CERVICAL REGION: ICD-10-CM

## 2024-10-22 DIAGNOSIS — M54.2 CHRONIC NECK PAIN: Primary | ICD-10-CM

## 2024-10-22 DIAGNOSIS — G89.29 CHRONIC NECK PAIN: Primary | ICD-10-CM

## 2024-10-22 DIAGNOSIS — M25.511 CHRONIC PAIN OF BOTH SHOULDERS: ICD-10-CM

## 2024-10-22 DIAGNOSIS — G89.29 CHRONIC PAIN OF BOTH SHOULDERS: ICD-10-CM

## 2024-10-22 DIAGNOSIS — M47.812 CERVICAL SPONDYLOSIS: ICD-10-CM

## 2024-10-22 PROCEDURE — 99214 OFFICE O/P EST MOD 30 MIN: CPT | Performed by: EMERGENCY MEDICINE

## 2024-10-22 RX ORDER — CLARITHROMYCIN 500 MG/1
1 TABLET ORAL 2 TIMES DAILY
COMMUNITY
Start: 2024-10-07

## 2024-10-22 NOTE — PROGRESS NOTES
Assessment/Plan:    Diagnoses and all orders for this visit:    Chronic neck pain  -     Cancel: Ambulatory referral to Spine & Pain Management; Future  -     Ambulatory referral to Spine & Pain Management; Future    Chronic pain of both shoulders  -     Ambulatory Referral to Orthopedic Surgery  -     MRI cervical spine wo contrast; Future  -     Ambulatory Referral to Chiropractic; Future  -     Cancel: Ambulatory referral to Spine & Pain Management; Future  -     Ambulatory referral to Spine & Pain Management; Future    Cervical spondylosis  -     MRI cervical spine wo contrast; Future  -     Ambulatory Referral to Chiropractic; Future  -     Cancel: Ambulatory referral to Spine & Pain Management; Future  -     Ambulatory referral to Spine & Pain Management; Future    Radiculopathy, cervical region    Other orders  -     clarithromycin (BIAXIN) 500 mg tablet; Take 1 tablet by mouth 2 (two) times a day    Chronic neck pain with radiation into the  upper trapezius bilaterally with occasional radiation down to the hands.  Given chronicity will obtain MRI of the cervical spine and refer to pain management.  She does treat for rheumatoid arthritis and is on chronic prednisone and Lyrica recommended to check with rheumatologist to see if they may bump up her prednisone.  In the meantime we will refer to chiropractor as requested.  Reviewed ED note as well as x-rays of the shoulder and cervical spine showing Moderate cervical spondylosis and facet arthrosis. Trace   anterolisthesis of C2 on C3. Mild anterior endplate spurring is prese    Return if symptoms worsen or fail to improve.      Subjective:   Patient ID: Penelope Shelby is a 34 y.o. female.    Penelope Shelby is a 34 y.o. female with a past medical history of seropositive, erosive RA (-RF/+CCP), osteopenia, GERD, and anxiety/depression who presents today for ALEE shoulder pain left worse than right for the past two yrs with increasing pain over the past several  months. She did have neck pain this summer Xrays C spine were obtained.  Now the pain is mostly in the upper trapezius and the superior aspect of the shoulders.  Pain worse driving wearing seatbelt and with neck movement when it gets bad.  Pt states discomfort with abduction and pain with palpation. Pt states pain can radiate and states some numbness dan tingling. Pt states feelings of pressure in the shoulders.  On chronic Prednisone 8mg daily, also takes oxycodone and Lyrica, is suppose to start Xeljanz        Review of Systems    The following portions of the patient's chart were reviewed and updated as appropriate:   Allergy:    Allergies   Allergen Reactions    Nickel Rash    Tylenol With Codeine #3 [Acetaminophen-Codeine] Hives, Itching and Facial Swelling     Can take oxycodone       Medications:    Current Outpatient Medications:     acetaminophen (TYLENOL) 500 mg tablet, Take 1 tablet (500 mg total) by mouth every 6 (six) hours as needed for mild pain or moderate pain, Disp: 30 tablet, Rfl: 0    albuterol (Proventil HFA) 90 mcg/act inhaler, Inhale 1-2 puffs every 6 (six) hours as needed for wheezing, Disp: 8 g, Rfl: 0    ALPRAZolam (XANAX) 0.5 mg tablet, Take by mouth as needed for anxiety, Disp: , Rfl:     aluminum-magnesium hydroxide 200-200 MG/5ML suspension, Take 10 mL by mouth every 6 (six) hours as needed for cramping, Disp: 355 mL, Rfl: 0    benzonatate (TESSALON PERLES) 100 mg capsule, Take 1 capsule (100 mg total) by mouth 3 (three) times a day as needed for cough, Disp: 20 capsule, Rfl: 0    Calcium Carb-Cholecalciferol (CALCIUM 1000 + D PO), Take 1 tablet by mouth daily, Disp: , Rfl:     clarithromycin (BIAXIN) 500 mg tablet, Take 1 tablet by mouth 2 (two) times a day, Disp: , Rfl:     Dextromethorphan-buPROPion ER (Auvelity)  MG TBCR, Take 45 mg by mouth daily, Disp: , Rfl:     Diclofenac Sodium (VOLTAREN) 1 %, Apply 2 g topically 4 (four) times a day as needed (Pain), Disp: 100 g, Rfl:  0    diphenoxylate-atropine (LOMOTIL) 2.5-0.025 mg per tablet, Take by mouth every 12 (twelve) hours, Disp: , Rfl:     ergocalciferol (VITAMIN D2) 50,000 units, Take 50,000 Units by mouth once a week, Disp: , Rfl:     escitalopram (LEXAPRO) 20 mg tablet, Take 20 mg by mouth every morning, Disp: , Rfl:     Lidocaine Viscous HCl (XYLOCAINE) 2 % mucosal solution, Swish and spit 15 mL 4 (four) times a day as needed for mouth pain or discomfort, Disp: 300 mL, Rfl: 4    loperamide (IMODIUM) 2 mg capsule, Take 2 mg by mouth 4 (four) times a day as needed for diarrhea, Disp: , Rfl:     omeprazole (PriLOSEC) 40 MG capsule, Take 1 capsule (40 mg total) by mouth daily, Disp: 30 capsule, Rfl: 2    ondansetron (ZOFRAN) 4 mg tablet, Take 4 mg by mouth every 8 (eight) hours as needed  , Disp: , Rfl:     ondansetron (ZOFRAN-ODT) 4 mg disintegrating tablet, Take 1 tablet (4 mg total) by mouth every 6 (six) hours as needed for nausea, Disp: 20 tablet, Rfl: 0    oxyCODONE (ROXICODONE) 15 mg immediate release tablet, Take 15 mg by mouth 3 (three) times a day, Disp: , Rfl:     predniSONE 5 mg tablet, Take 14 mg by mouth daily, Disp: , Rfl:     pregabalin (LYRICA) 100 mg capsule, Take one tablet twice a day and 2 tablets at bedtime, Disp: , Rfl:     Sodium Fluoride 1.1 % PSTE, Apply 5 mL to teeth daily, Disp: 100 mL, Rfl: 0    SUMAtriptan (IMITREX) 100 mg tablet, Take 100 mg by mouth once as needed for migraine, Disp: , Rfl:     Virt-Phos 250 Neutral 155-852-130 MG tablet, if needed, Disp: , Rfl:     clindamycin (CLEOCIN) 300 MG capsule, TAKE 1 CAPSULE BY MOUTH EVERY 12 HOURS FOR 7 DAYS (Patient not taking: Reported on 10/22/2024), Disp: , Rfl:     mupirocin (BACTROBAN) 2 % ointment, Apply topically 3 (three) times a day (Patient not taking: Reported on 10/10/2023), Disp: 22 g, Rfl: 3    sucralfate (CARAFATE) 1 g tablet, Take 1 tablet (1 g total) by mouth 4 (four) times a day for 7 days, Disp: 28 tablet, Rfl: 0    Patient Active Problem  "List   Diagnosis    Rheumatoid arthritis (HCC)    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)    Abnormal uterine bleeding (AUB)    Anemia    AVM (arteriovenous malformation)    Chronic bilateral thoracic back pain    Closed fracture of base of metacarpal bone other than first metacarpal    Depression    Dyspareunia, female    Unspecified fracture of unspecified wrist and hand, sequela    Fracture of coccyx (HCC)    Hereditary and idiopathic peripheral neuropathy    Legal termination of pregnancy    Elbow disorder    Menometrorrhagia    Closed fracture of lunate (semilunar) bone of wrist    Osteoarthritis of knee    Rupture of tendon of hand    History of artificial joint    Severe dysmenorrhea    Urge incontinence of urine    S/P dilation and curettage    Pain in right wrist    Bilateral wrist pain    Generalized abdominal pain    Change in bowel habits    Irritable bowel syndrome with diarrhea    Lactose intolerance    Pelvic pain    Nausea    Functional diarrhea    Status post laparoscopic hysterectomy    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)    Hiatal hernia    Hypertrophy of breast       Objective:  /74   Pulse 68   Ht 5' 7\" (1.702 m)   Wt 67.1 kg (148 lb)   LMP  (LMP Unknown) Comment: irregular  BMI 23.18 kg/m²     Back Exam     Comments:  C-spine decreased rotation to the left and extension with pain and discomfort  Limited overhead reaching with shoulders with pain and discomfort      Right Shoulder Exam     Range of Motion   Active abduction:  abnormal       Left Shoulder Exam     Range of Motion   Active abduction:  abnormal              Physical Exam  Constitutional:       Appearance: She is well-developed.   HENT:      Head: Normocephalic and atraumatic.   Eyes:      Conjunctiva/sclera: Conjunctivae normal.   Pulmonary:      Effort: Pulmonary effort is normal.   Musculoskeletal:      Cervical back: Neck supple. "   Skin:     General: Skin is warm and dry.   Neurological:      Mental Status: She is alert and oriented to person, place, and time.   Psychiatric:         Behavior: Behavior normal.           Neurologic Exam     Mental Status   Oriented to person, place, and time.       Procedures    I have personally reviewed the written report of the pertinent studies.   Xrays Left Shoulder WNL    6/2024 Xrays C spine :  Moderate cervical spondylosis and facet arthrosis. Trace   anterolisthesis of C2 on C3. Mild anterior endplate spurring is present.             Past Medical History:   Diagnosis Date    Abnormal Pap smear of cervix     Anemia     Anxiety     Back pain     Depression     GERD (gastroesophageal reflux disease)     IBS (irritable bowel syndrome)     Iron deficiency     Irregular menses     Menorrhagia     Migraines     Neuropathy     Obesity     Osteoarthritis of back     Osteopenia     RA (rheumatoid arthritis) (HCC)     Scratches     On back and shoulder from scratching due to urticaria    Vasculitis (HCC)     Wears glasses        Past Surgical History:   Procedure Laterality Date    CAST APPLICATION Left 11/13/2018    Procedure: APPLICATION LONG ARM SUGARTONG SPLINT;  Surgeon: Kemal Harris MD;  Location: [facility];  Service: Orthopedics    CAST APPLICATION Right 10/8/2019    Procedure: APPLICATION LONG ARM SUGAR TONG SPLINT;  Surgeon: Kemal Harris MD;  Location: [facility];  Service: Orthopedics    CHOLECYSTECTOMY      Laparoscopic    COLONOSCOPY      DILATION AND CURETTAGE OF UTERUS      HYSTERECTOMY      JOINT REPLACEMENT Bilateral     knees    WV ARTHRODESIS WRIST W/ILIAC/OTHER AUTOGRAFT Left 4/4/2017    Procedure: WRIST FUSION / SPLINT APPLICATION ;  Surgeon: Kemal Harris MD;  Location: [facility];  Service: Orthopedics    WV ARTHRODESIS WRIST W/ILIAC/OTHER AUTOGRAFT Right 4/10/2018    Procedure: Removal of hardware right wrist with Fusion of Long finger carpometacarpal joint, splint  application Right Wrist;  Surgeon: Kemal Harris MD;  [facility]: [facility];  Service: Orthopedics    DC BREAST REDUCTION Bilateral 9/7/2022    Procedure: BREAST REDUCTION;  Surgeon: Khurram Moreno MD;  Location:  MAIN OR;  Service: Plastics    DC COLONOSCOPY FLX DX W/COLLJ SPEC WHEN PFRMD N/A 2/22/2019    Procedure: COLONOSCOPY;  Surgeon: Donnie Ugalde MD;  Location: [facility];  Service: Gastroenterology    DC ESOPHAGOGASTRODUODENOSCOPY TRANSORAL DIAGNOSTIC N/A 2/22/2019    Procedure: ESOPHAGOGASTRODUODENOSCOPY (EGD);  Surgeon: Donnie Ugalde MD;  Location: [facility];  Service: Gastroenterology    DC EXCISION DISTAL ULNA PARTIAL/COMPLETE Right 10/8/2019    Procedure: EXCISION DISTAL ULNA (DARRACH PROCEDURE) right;  Surgeon: Kemal Harris MD;  [facility];  Service: Orthopedics    DC HYSTEROSCOPY BX ENDOMETRIUM&/POLYPC W/WO D&C N/A 12/1/2017    Procedure: DILATATION AND CURETTAGE (D&C) WITH HYSTEROSCOPY;  Surgeon: C William Riedel, DO;  Location: [facility];  Service: Gynecology    DC LAPAROSCOPY SURG CHOLECYSTECTOMY N/A 3/14/2017    Procedure: CHOLECYSTECTOMY LAPAROSCOPIC;  Surgeon: Juan Keller DO;  [facility];  Service: General    DC LAPS TOTAL HYSTERECT 250 GM/< W/RMVL TUBE/OVARY N/A 1/18/2019    Procedure: HYSTERECTOMY LAPAROSCOPIC TOTAL (LTH) Bilateral salpingectomy, cystoscopy;  Surgeon: C William Riedel, DO;  Location: [facility];  Service: Gynecology    DC REMOVAL IMPLANT DEEP Left 11/13/2018    Procedure: REMOVAL OF HARDWARE (wrist fusion plate  AND ulnar head arthroplasty) with conversion to Darrach.;  Surgeon: Kemal Harris MD;  [facility];  Service: Orthopedics    REPLACEMENT TOTAL KNEE BILATERAL      UPPER GASTROINTESTINAL ENDOSCOPY      WISDOM TOOTH EXTRACTION      WRIST SURGERY Right     For arthritis    WRIST SURGERY Left 4/4/2017    Procedure: ARTHROPLASTY WRIST ULNAR HEAD ARTHROPLASTY - INTEGRA SIZE 5.5 MM, 16 HEAD;  Surgeon: Kemal  MD Steven;  [facility];  Service:     WRIST TENDON TRANSFER Right 10/8/2019    Procedure: TRANSFER TENDON EXTENSOR CARPI ULNARIS AT THE WRIST;  Surgeon: Kemal Harris MD;  [facility];  Service: Orthopedics       Social History     Socioeconomic History    Marital status:      Spouse name: Not on file    Number of children: Not on file    Years of education: Not on file    Highest education level: Not on file   Occupational History    Not on file   Tobacco Use    Smoking status: Former     Current packs/day: 0.00     Average packs/day: 0.3 packs/day for 3.0 years (0.8 ttl pk-yrs)     Types: Cigarettes     Start date:      Quit date:      Years since quittin.8    Smokeless tobacco: Never   Vaping Use    Vaping status: Never Used   Substance and Sexual Activity    Alcohol use: Yes    Drug use: Yes     Types: Marijuana    Sexual activity: Not Currently     Partners: Male     Birth control/protection: Other   Other Topics Concern    Not on file   Social History Narrative    Consumes 3-4 cups of coffee per day     Social Determinants of Health     Financial Resource Strain: Not on file   Food Insecurity: Not on file   Transportation Needs: Not on file   Physical Activity: Not on file   Stress: Not on file   Social Connections: Unknown (2024)    Received from [facility]    Social Connections     How often do you feel lonely or isolated from those around you? (Adult - for ages 18 years and over): Not on file   Intimate Partner Violence: Not on file   Housing Stability: Not on file       Family History   Problem Relation Age of Onset    Hypertension Mother     Rheum arthritis Mother     Depression Mother     Anxiety disorder Mother

## 2024-10-23 ENCOUNTER — HOSPITAL ENCOUNTER (OUTPATIENT)
Dept: MRI IMAGING | Facility: HOSPITAL | Age: 34
Discharge: HOME | End: 2024-10-23
Attending: EMERGENCY MEDICINE
Payer: MEDICARE

## 2024-10-23 DIAGNOSIS — G89.29 CHRONIC PAIN OF BOTH SHOULDERS: ICD-10-CM

## 2024-10-23 DIAGNOSIS — M25.511 CHRONIC PAIN OF BOTH SHOULDERS: ICD-10-CM

## 2024-10-23 DIAGNOSIS — M25.512 CHRONIC PAIN OF BOTH SHOULDERS: ICD-10-CM

## 2024-10-23 DIAGNOSIS — M47.812 CERVICAL SPONDYLOSIS: ICD-10-CM

## 2024-10-23 PROCEDURE — 72141 MRI NECK SPINE W/O DYE: CPT

## 2024-11-20 ENCOUNTER — CONSULT (OUTPATIENT)
Age: 34
End: 2024-11-20
Payer: MEDICARE

## 2024-11-20 VITALS
WEIGHT: 148 LBS | SYSTOLIC BLOOD PRESSURE: 112 MMHG | BODY MASS INDEX: 23.23 KG/M2 | HEIGHT: 67 IN | DIASTOLIC BLOOD PRESSURE: 64 MMHG

## 2024-11-20 DIAGNOSIS — M47.812 CERVICAL SPONDYLOSIS: ICD-10-CM

## 2024-11-20 DIAGNOSIS — M54.2 NECK PAIN: Primary | ICD-10-CM

## 2024-11-20 DIAGNOSIS — M79.18 MYOFASCIAL PAIN: ICD-10-CM

## 2024-11-20 PROCEDURE — 99202 OFFICE O/P NEW SF 15 MIN: CPT | Performed by: CHIROPRACTOR

## 2024-11-20 NOTE — PROGRESS NOTES
1. Neck pain        2. Cervical spondylosis  Ambulatory Referral to Chiropractic      3. Myofascial pain            Assessment/Plan:    Review of Diagnostic Studies and Office Notes:    The patient's cervical spine MRI report, orthopedic notes, cervical spine x-ray report were reviewed prior to the chiropractic evaluation today.    Results for orders placed during the hospital encounter of 10/23/24    MRI cervical spine wo contrast    Narrative  MRI CERVICAL SPINE WITHOUT CONTRAST    INDICATION: M25.511: Pain in right shoulder  G89.29: Other chronic pain  M25.512: Pain in left shoulder  M47.812: Spondylosis without myelopathy or radiculopathy, cervical region.    COMPARISON: MRI cervical spine 6/12/2018    TECHNIQUE:  Multiplanar, multisequence imaging of the cervical spine was performed. .      IMAGE QUALITY:  Diagnostic    FINDINGS:    ALIGNMENT: Straightening and mild reversal of the mid to upper cervical lordosis.  No compression fracture.  No subluxation.  No scoliosis.    MARROW SIGNAL:  Normal marrow signal is identified within the visualized bony structures.  No discrete marrow lesion.    CERVICAL AND VISUALIZED THORACIC CORD:  Normal signal within the visualized cord.    PREVERTEBRAL AND PARASPINAL SOFT TISSUES:  Normal.    VISUALIZED POSTERIOR FOSSA:  The visualized posterior fossa demonstrates no abnormal signal.    CERVICAL DISC SPACES:    C2-C3:  Normal.    C3-C4:  Normal.    C4-C5: Bilateral uncovertebral hypertrophy without central canal or neural foraminal narrowing.    C5-C6: Right paracentral disc osteophyte complex and uncovertebral hypertrophy. There is no central canal stenosis. Mild right neural foraminal narrowing.    C6-C7:  Right paracentral disc osteophyte complex and uncovertebral hypertrophy. There is no central canal stenosis. Mild right neural foraminal narrowing.    C7-T1:  Normal.    UPPER THORACIC DISC SPACES:  Normal.    Impression  Stable mild degenerative changes at the C4-5  through the C6-7 levels without central canal narrowing. There is no new disc herniation.    The cervical cord appears normal in caliber and signal with no evidence of focal impingement.    Impression    IMPRESSION: Marked reversal the normal cervical lordosis. No definite acute  compression fracture.            Workstation:[id number]  Narrative    EXAM: XR CERVICAL SPINE 2 OR 3 VW  DATE: 6/12/2024 5:25 PM  INDICATION: Ongoing cervical pain [M54.2, G89.29 (ICD-10-CM)]    COMPARISON: None.    Findings: Marked reversal the normal cervical lordosis. No definite acute  compression fracture  detected.  The odontoid and lateral masses are grossly  anatomic.  Moderate cervical spondylosis and facet arthrosis. Trace  anterolisthesis of C2 on C3. Mild anterior endplate spurring is present. Please  correlate with neurologic symptoms and if not contraindicated, dedicated MRI  cervical spine can be performed for further evaluation.    Exam End: 06/12/24  5:43 PM     Decision and Treatment Plan:    I reviewed my findings with the patient today.  Patient was interested in receiving chiropractic care.  We will treat the patient 2x/week for the next three weeks and re-evaluate. If there is improvement in symptoms and objective findings, frequency will be reduced.       The patient will be treated with conservative chiropractic care including myofascial release, joint mobilization, and a home stretching and icing program.      Patient Instructions:    Return for treatment later this week or twice next week.     Time/Reviewed with Patient:    Time:  At least 33 minutes of time was spent with the patient during the consultation including the patient's history/current complaints, physical exam, reviewing the diagnostic report, reviewing home instruction/reducing risk factors with the patient, reviewing my findings with the patient, and discussing the treatment with the patient.     No treatment was rendered today.     Review of Systems  "  Constitutional:  Negative for chills, fever and unexpected weight change.   Gastrointestinal:  Negative for constipation and diarrhea.   Genitourinary:  Negative for difficulty urinating and urgency.       Subjective:      Penelope Shelby is a 34 y.o. female presents today for chiropractic evaluation and possible treatment.  She has a chief complaint of neck and upper back pain.  She has pain that radiates to the top of the shoulders.  She was referred over by Dr. Piña.  She states she has had neck and upper back pain for about 3 years or so.  She did have a breast reduction approximately 3 years ago which did help with some of her pain.  However this summer she states she had neck pain.  She had difficulty turning her head. She wore a neck soft collar at that time and it did improve somewhat. Symptoms have improved over the past few months.  She states years ago she did do physical therapy for her neck and upper back but it seemed to aggravate her condition.  She does report numbness and tingling and a heavy feeling in both of her arms but she has been diagnosed with peripheral neuropathy.  The numbness in her hands and bilateral and improves all of her fingers except the thumb. She does see a neurologist.  She also gets numbness and tingling in her feet. She states she did have EMG studies in the past and it did show neuropathy.  She has had a fusion of both wrists.  She states she has had multiple surgeries from [id number].    She describes the pain in her neck and upper back as a dull achy feeling.  It increases when she is sitting for long peers of time especially when driving.  She does admit to self manipulating her neck.    She states in the past she has been offered injections into the cervical spine but she deferred.        She does have her rheumatoid arthritis and is going to start Xeljanz.  She does follow with rheumatology.         Objective:     /64   Ht 5' 7\" (1.702 m)   Wt 67.1 kg (148 lb)  "  LMP  (LMP Unknown) Comment: irregular  BMI 23.18 kg/m²     Appearance: Well developed, well nourished, and in no acute distress    Alert and oriented x 3    Mood/Affect: calm and pleasant    Gait/Posture:    Gait: normal    Posture: significant rounding of the shoulders and an anterior head carriage is noted within the seated position.       Lordosis: Cervical- decreased    Lordosis: Lumbar- normal    Kyphosis: Thoracic- increased    Upper extremity reflexes:    Deep tendon reflexes were normal and symmetrical in the upper extremities.    Upper Extremity Muscle Testing:    Muscle testing of the bilateral upper extremities was normal and graded +5/5.  Extension at the wrist was not performed due to limited mobility, pain and surgical site.    Samuel's was negative bilaterally.    Cervical Orthopedic Tests:    Maximal foraminal Compression on the Right: negative .    Maximal foraminal Compression on the Left: negative .      Active Cervical Ranges of Motion:  Cervical range of motion examination shows flexion to be 25% restricted with pulling noted.  Extension is 30% restricted with pain.  Left rotation is 20% restricted with pain on the right.  Right rotation is 30% restricted with pain on the left.    Palpation:  Moderate spasm and tenderness is noted bilateral upper trapezius, bilateral levator scapula, bilateral rhomboids, bilateral cervical and upper thoracic paraspinals.  Multiple trigger points are present in the bilateral upper trapezius, bilateral rhomboids, bilateral cervical upper thoracic paraspinals.    Decreased mobility and tenderness is noted T3-4, T5-6, C5-6.    Dictation voice to text software has been used in the creation of this document. Please consider this in light of any contextual or grammatical errors.

## 2024-11-21 ENCOUNTER — PROCEDURE VISIT (OUTPATIENT)
Age: 34
End: 2024-11-21
Payer: MEDICARE

## 2024-11-21 VITALS — BODY MASS INDEX: 23.23 KG/M2 | HEIGHT: 67 IN | WEIGHT: 148 LBS

## 2024-11-21 DIAGNOSIS — M54.2 NECK PAIN: ICD-10-CM

## 2024-11-21 DIAGNOSIS — M47.812 CERVICAL SPONDYLOSIS: ICD-10-CM

## 2024-11-21 DIAGNOSIS — M79.18 MYOFASCIAL PAIN: ICD-10-CM

## 2024-11-21 DIAGNOSIS — M99.02 SEGMENTAL DYSFUNCTION OF THORACIC REGION: ICD-10-CM

## 2024-11-21 DIAGNOSIS — M99.01 SEGMENTAL DYSFUNCTION OF CERVICAL REGION: Primary | ICD-10-CM

## 2024-11-21 PROCEDURE — 98940 CHIROPRACT MANJ 1-2 REGIONS: CPT | Performed by: CHIROPRACTOR

## 2025-01-07 ENCOUNTER — OFFICE VISIT (OUTPATIENT)
Dept: URGENT CARE | Facility: MEDICAL CENTER | Age: 35
End: 2025-01-07
Payer: MEDICARE

## 2025-01-07 ENCOUNTER — APPOINTMENT (OUTPATIENT)
Dept: RADIOLOGY | Facility: MEDICAL CENTER | Age: 35
End: 2025-01-07
Payer: MEDICARE

## 2025-01-07 VITALS
SYSTOLIC BLOOD PRESSURE: 141 MMHG | WEIGHT: 159.4 LBS | DIASTOLIC BLOOD PRESSURE: 74 MMHG | HEIGHT: 67 IN | TEMPERATURE: 98.5 F | RESPIRATION RATE: 18 BRPM | OXYGEN SATURATION: 100 % | BODY MASS INDEX: 25.02 KG/M2 | HEART RATE: 73 BPM

## 2025-01-07 DIAGNOSIS — M79.641 PAIN OF RIGHT HAND: ICD-10-CM

## 2025-01-07 DIAGNOSIS — R35.0 URINARY FREQUENCY: Primary | ICD-10-CM

## 2025-01-07 LAB
SL AMB  POCT GLUCOSE, UA: NEGATIVE
SL AMB LEUKOCYTE ESTERASE,UA: NEGATIVE
SL AMB POCT BILIRUBIN,UA: NEGATIVE
SL AMB POCT BLOOD,UA: NEGATIVE
SL AMB POCT CLARITY,UA: CLEAR
SL AMB POCT COLOR,UA: NORMAL
SL AMB POCT KETONES,UA: NEGATIVE
SL AMB POCT NITRITE,UA: NEGATIVE
SL AMB POCT PH,UA: 5
SL AMB POCT SPECIFIC GRAVITY,UA: 1.02
SL AMB POCT URINE PROTEIN: NEGATIVE
SL AMB POCT UROBILINOGEN: 0.2

## 2025-01-07 PROCEDURE — 87077 CULTURE AEROBIC IDENTIFY: CPT

## 2025-01-07 PROCEDURE — 73130 X-RAY EXAM OF HAND: CPT

## 2025-01-07 PROCEDURE — 73110 X-RAY EXAM OF WRIST: CPT

## 2025-01-07 PROCEDURE — 81002 URINALYSIS NONAUTO W/O SCOPE: CPT

## 2025-01-07 PROCEDURE — 87186 SC STD MICRODIL/AGAR DIL: CPT

## 2025-01-07 PROCEDURE — 87086 URINE CULTURE/COLONY COUNT: CPT

## 2025-01-07 PROCEDURE — 99214 OFFICE O/P EST MOD 30 MIN: CPT

## 2025-01-07 PROCEDURE — 87147 CULTURE TYPE IMMUNOLOGIC: CPT

## 2025-01-07 PROCEDURE — G0463 HOSPITAL OUTPT CLINIC VISIT: HCPCS

## 2025-01-07 NOTE — PROGRESS NOTES
[facility]        NAME: Penelope Shelby is a 34 y.o. female  : 1990    MRN: [id number]  DATE: 2025  TIME: 12:10 PM    Assessment and Plan   Urinary frequency [R35.0]  1. Urinary frequency  POCT urine dip    Urine culture    Urine culture      2. Pain of right hand  XR wrist 3+ vw right    XR hand 3+ vw right        Urine Dip: WNL.  Urine culture sent out.    Negative urine dip, holding on antibiotics until urine culture resulted. Advised PCP or OB/GYN follow up.    Right hand/wrist x-ray: No acute osseous abnormality, chronic findings without acute changes to previous x-rays from  noted per preliminary read.  Radiology to determine final read.    Right hand/wrist pain due to potential flare of RA versus tendinitis.  Patient already with trial of prednisone taper, taking daily prednisone, oxycodone, Tylenol without significant relief.  Provided with appropriate thumb spica brace.  Advised patient she needs to follow up with ortho/rheum for further evaluation and treatment.    Patient Instructions     Wrist/Hand Pain:  Your finalized x-ray results will be available on [employer] when read by the radiologist.  Recommend rest, ice, elevation.   Over the counter pain medication as directed on packaging as needed for pain (ex: Tylenol).  Continue with medications as previously prescribed for RA. Contact your rheumatologist and orthopedist to schedule follow up for further evaluation.    Urinary Symptoms:  Urine dip did not show signs of UTI.   Urine culture sent out to check for bacterial growth and antibiotic susceptibility. You may need to be placed on antibiotics if the culture is positive.   OTC Azo can help to decrease burning with urination, over the counter Tylenol or ibuprofen as directed on packaging as needed for pain.   Drink plenty of fluids and follow proper urinary hygiene.  Follow up with PCP or OB/GYN for further evaluation of symptoms.    Follow up with PCP in 3-5  days.  Proceed to  ER if symptoms worsen.    If tests are performed, our office will contact you with results only if changes need to made to the care plan discussed with you at the visit. You can review your full results on [facility] Mychart.    Chief Complaint     Chief Complaint   Patient presents with    right arm pain     Pt states around Thanksgiving she woke up one day and her right thumb has been stiff and locked in place every since that time.  She also complains of pain shooting from her wrist up to her elbow.     Possible UTI     Pt started yesterday with frequent urination, pelvic cramping and incomplete emptying of her bladder yesterday.  Pt states she had some left over Cipro and started to take it yesterday.          History of Present Illness       Patient presents for evaluation of two complaints today - right hand pain and UTI symptoms.  Her hand pain started around Thanksgiving.  She denies any specific injury or trauma to the area.  She called her rheumatologist, who she sees for rheumatoid arthritis, in December in regards to this, and was prescribed a higher dose of prednisone taper.  Patient notes she also sees orthopedics, but cannot get into them until March.  She has not attempted to schedule an appointment with her rheumatologist.  Patient notes she is also being treated with daily prednisone (8 mg) and Xeljanz for her RA.  Patient notes multiple surgeries to her right hand in past due to RA.  When asked if patient has been using Voltaren gel, states she threw it out as it had not worked on other areas with less pain.  Her UTI symptoms started over one week ago. She admits to urinary frequency, pelvic cramping, and feeling of incomplete emptying of her bladder.  She feels her frequent urination has been improving.   Pain in pelvic area described as cramping, comes and goes.  Pain is a 5/10 at most.  Patient does not have menstrual cycle, had hysterectomy in 2019.    Hand Pain   The  incident occurred more than 1 week ago. There was no injury mechanism. Pain location: right hand/base of thumb and wrist. The quality of the pain is described as aching. Radiates to: right elbow. The pain is at a severity of 6/10. Pertinent negatives include no numbness or tingling. Treatments tried: prednisone taper, Tylenol, oxycodone, thumb spica brace (incorrect side, she has been wearing a L hand one on her R hand) The treatment provided mild (temporary) relief.   Urinary Tract Infection   This is a new problem. The current episode started yesterday. The problem has been gradually improving (frequency improving). Quality: cramping in pelvic area. The pain is at a severity of 5/10. There has been no fever. She is Not sexually active. There is No history of pyelonephritis. Associated symptoms include frequency and urgency. Pertinent negatives include no chills, flank pain or hematuria. Treatments tried: leftover Ciprofloxacin. The treatment provided no relief.       Review of Systems   Review of Systems   Constitutional:  Negative for chills and fever.   Genitourinary:  Positive for frequency, pelvic pain (cramping) and urgency. Negative for dysuria, flank pain, hematuria, vaginal bleeding and vaginal discharge.        + feeling of incomplete emptying   Musculoskeletal:  Positive for arthralgias and joint swelling.   Skin:  Negative for color change.   Neurological:  Negative for tingling and numbness.         Current Medications       Current Outpatient Medications:     acetaminophen (TYLENOL) 500 mg tablet, Take 1 tablet (500 mg total) by mouth every 6 (six) hours as needed for mild pain or moderate pain, Disp: 30 tablet, Rfl: 0    albuterol (Proventil HFA) 90 mcg/act inhaler, Inhale 1-2 puffs every 6 (six) hours as needed for wheezing, Disp: 8 g, Rfl: 0    ALPRAZolam (XANAX) 0.5 mg tablet, Take by mouth as needed for anxiety, Disp: , Rfl:     aluminum-magnesium hydroxide 200-200 MG/5ML suspension, Take 10 mL  by mouth every 6 (six) hours as needed for cramping, Disp: 355 mL, Rfl: 0    Calcium Carb-Cholecalciferol (CALCIUM 1000 + D PO), Take 1 tablet by mouth daily, Disp: , Rfl:     ergocalciferol (VITAMIN D2) 50,000 units, Take 50,000 Units by mouth once a week, Disp: , Rfl:     escitalopram (LEXAPRO) 20 mg tablet, Take 20 mg by mouth every morning, Disp: , Rfl:     Lidocaine Viscous HCl (XYLOCAINE) 2 % mucosal solution, Swish and spit 15 mL 4 (four) times a day as needed for mouth pain or discomfort, Disp: 300 mL, Rfl: 4    loperamide (IMODIUM) 2 mg capsule, Take 2 mg by mouth 4 (four) times a day as needed for diarrhea, Disp: , Rfl:     omeprazole (PriLOSEC) 40 MG capsule, Take 1 capsule (40 mg total) by mouth daily, Disp: 30 capsule, Rfl: 2    ondansetron (ZOFRAN) 4 mg tablet, Take 4 mg by mouth every 8 (eight) hours as needed  , Disp: , Rfl:     oxyCODONE (ROXICODONE) 15 mg immediate release tablet, Take 15 mg by mouth 3 (three) times a day, Disp: , Rfl:     predniSONE 5 mg tablet, Take 14 mg by mouth daily, Disp: , Rfl:     pregabalin (LYRICA) 100 mg capsule, Take one tablet twice a day and 2 tablets at bedtime, Disp: , Rfl:     SUMAtriptan (IMITREX) 100 mg tablet, Take 100 mg by mouth once as needed for migraine, Disp: , Rfl:     Virt-Phos 250 Neutral 155-852-130 MG tablet, if needed, Disp: , Rfl:     benzonatate (TESSALON PERLES) 100 mg capsule, Take 1 capsule (100 mg total) by mouth 3 (three) times a day as needed for cough (Patient not taking: Reported on 1/7/2025), Disp: 20 capsule, Rfl: 0    clarithromycin (BIAXIN) 500 mg tablet, Take 1 tablet by mouth 2 (two) times a day (Patient not taking: Reported on 1/7/2025), Disp: , Rfl:     clindamycin (CLEOCIN) 300 MG capsule, TAKE 1 CAPSULE BY MOUTH EVERY 12 HOURS FOR 7 DAYS (Patient not taking: Reported on 1/7/2025), Disp: , Rfl:     Dextromethorphan-buPROPion ER (Auvelity)  MG TBCR, Take 45 mg by mouth daily (Patient not taking: Reported on 1/7/2025), Disp: ,  Rfl:     Diclofenac Sodium (VOLTAREN) 1 %, Apply 2 g topically 4 (four) times a day as needed (Pain) (Patient not taking: Reported on 1/7/2025), Disp: 100 g, Rfl: 0    diphenoxylate-atropine (LOMOTIL) 2.5-0.025 mg per tablet, Take by mouth every 12 (twelve) hours, Disp: , Rfl:     mupirocin (BACTROBAN) 2 % ointment, Apply topically 3 (three) times a day (Patient not taking: Reported on 1/7/2025), Disp: 22 g, Rfl: 3    ondansetron (ZOFRAN-ODT) 4 mg disintegrating tablet, Take 1 tablet (4 mg total) by mouth every 6 (six) hours as needed for nausea, Disp: 20 tablet, Rfl: 0    Sodium Fluoride 1.1 % PSTE, Apply 5 mL to teeth daily (Patient not taking: Reported on 1/7/2025), Disp: 100 mL, Rfl: 0    sucralfate (CARAFATE) 1 g tablet, Take 1 tablet (1 g total) by mouth 4 (four) times a day for 7 days, Disp: 28 tablet, Rfl: 0    Current Allergies     Allergies as of 01/07/2025 - Reviewed 01/07/2025   Allergen Reaction Noted    Nickel Rash 06/18/2018    Tylenol with codeine #3 [acetaminophen-codeine] Hives, Itching, and Facial Swelling 10/08/2019            The following portions of the patient's history were reviewed and updated as appropriate: allergies, current medications, past family history, past medical history, past social history, past surgical history and problem list.     Past Medical History:   Diagnosis Date    Abnormal Pap smear of cervix     Anemia     Anxiety     Back pain     Depression     Fibromyalgia     GERD (gastroesophageal reflux disease)     IBS (irritable bowel syndrome)     Iron deficiency     Irregular menses     Menorrhagia     Migraines     Neuropathy     Obesity     Osteoarthritis of back     Osteopenia     RA (rheumatoid arthritis) (HCC)     Scratches     On back and shoulder from scratching due to urticaria    Vasculitis (HCC)     Wears glasses        Past Surgical History:   Procedure Laterality Date    CAST APPLICATION Left 11/13/2018    Procedure: APPLICATION LONG ARM SUGARTONG SPLINT;   Surgeon: Kemal Harris MD;  [facility]: [facility];  Service: Orthopedics    CAST APPLICATION Right 10/8/2019    Procedure: APPLICATION LONG ARM SUGAR TONG SPLINT;  Surgeon: Kemal Harris MD;  [facility]: [facility];  Service: Orthopedics    CHOLECYSTECTOMY      Laparoscopic    COLONOSCOPY      DILATION AND CURETTAGE OF UTERUS      HYSTERECTOMY      JOINT REPLACEMENT Bilateral     knees    OK ARTHRODESIS WRIST W/ILIAC/OTHER AUTOGRAFT Left 4/4/2017    Procedure: WRIST FUSION / SPLINT APPLICATION ;  Surgeon: Kemal Harris MD;  [facility]: [facility];  Service: Orthopedics    OK ARTHRODESIS WRIST W/ILIAC/OTHER AUTOGRAFT Right 4/10/2018    Procedure: Removal of hardware right wrist with Fusion of Long finger carpometacarpal joint, splint application Right Wrist;  Surgeon: Kemal Harris MD;  [facility]: [facility];  Service: Orthopedics    OK BREAST REDUCTION Bilateral 9/7/2022    Procedure: BREAST REDUCTION;  Surgeon: Khurram Moreno MD;  Location:  MAIN OR;  Service: Plastics    OK COLONOSCOPY FLX DX W/COLLJ SPEC WHEN PFRMD N/A 2/22/2019    Procedure: COLONOSCOPY;  Surgeon: Donnie Ugalde MD;  Location: [facility];  Service: Gastroenterology    OK ESOPHAGOGASTRODUODENOSCOPY TRANSORAL DIAGNOSTIC N/A 2/22/2019    Procedure: ESOPHAGOGASTRODUODENOSCOPY (EGD);  Surgeon: Donnie Ugalde MD;  Location: [facility];  Service: Gastroenterology    OK EXCISION DISTAL ULNA PARTIAL/COMPLETE Right 10/8/2019    Procedure: EXCISION DISTAL ULNA (DARRACH PROCEDURE) right;  Surgeon: Kemal Harris MD;  [facility];  Service: Orthopedics    OK HYSTEROSCOPY BX ENDOMETRIUM&/POLYPC W/WO D&C N/A 12/1/2017    Procedure: DILATATION AND CURETTAGE (D&C) WITH HYSTEROSCOPY;  Surgeon: C William Riedel, DO;  Location: [facility];  Service: Gynecology    OK LAPAROSCOPY SURG CHOLECYSTECTOMY N/A 3/14/2017    Procedure: CHOLECYSTECTOMY LAPAROSCOPIC;  Surgeon: Juan Keller DO;  Location:  MAIN OR;  Service: General     "KS LAPS TOTAL HYSTERECT 250 GM/< W/RMVL TUBE/OVARY N/A 1/18/2019    Procedure: HYSTERECTOMY LAPAROSCOPIC TOTAL (LTH) Bilateral salpingectomy, cystoscopy;  Surgeon: C William Riedel, DO;  Location: [facility];  Service: Gynecology    KS REMOVAL IMPLANT DEEP Left 11/13/2018    Procedure: REMOVAL OF HARDWARE (wrist fusion plate  AND ulnar head arthroplasty) with conversion to Darrach.;  Surgeon: Kemal Harris MD;  [facility];  Service: Orthopedics    REPLACEMENT TOTAL KNEE BILATERAL      UPPER GASTROINTESTINAL ENDOSCOPY      WISDOM TOOTH EXTRACTION      WRIST SURGERY Right     For arthritis    WRIST SURGERY Left 4/4/2017    Procedure: ARTHROPLASTY WRIST ULNAR HEAD ARTHROPLASTY - INTEGRA SIZE 5.5 MM, 16 HEAD;  Surgeon: Kemal Harris MD;  [facility];  Service:     WRIST TENDON TRANSFER Right 10/8/2019    Procedure: TRANSFER TENDON EXTENSOR CARPI ULNARIS AT THE WRIST;  Surgeon: Kemal Harris MD;  [facility]: [facility];  Service: Orthopedics       Family History   Problem Relation Age of Onset    Hypertension Mother     Rheum arthritis Mother     Depression Mother     Anxiety disorder Mother          Medications have been verified.        Objective   /74 (BP Location: Left arm, Patient Position: Sitting)   Pulse 73   Temp 98.5 °F (36.9 °C) (Tympanic)   Resp 18   Ht 5' 7\" (1.702 m)   Wt 72.3 kg (159 lb 6.4 oz)   LMP  (LMP Unknown) Comment: irregular  SpO2 100%   BMI 24.97 kg/m²        Physical Exam     Physical Exam  Vitals and nursing note reviewed.   Constitutional:       General: She is not in acute distress.     Appearance: Normal appearance. She is not ill-appearing.   Cardiovascular:      Rate and Rhythm: Normal rate and regular rhythm.      Pulses: Normal pulses.      Heart sounds: Normal heart sounds.   Pulmonary:      Effort: Pulmonary effort is normal.      Breath sounds: Normal breath sounds.   Abdominal:      General: Abdomen is flat. Bowel sounds are normal. There " is no distension.      Palpations: Abdomen is soft.      Tenderness: There is abdominal tenderness (mild, generalized suprapubic area). There is no right CVA tenderness, left CVA tenderness or guarding.   Musculoskeletal:      Right wrist: Tenderness (generalized wrist) present. No swelling, deformity or effusion. Decreased range of motion. Normal pulse.      Right hand: Tenderness (generalized, main area of pain localized to base of thumb) present. No swelling or deformity. Decreased range of motion. Normal sensation. Normal capillary refill.      Comments: Scar noted to dorsal aspect of wrist.   Neurological:      Mental Status: She is alert.

## 2025-01-07 NOTE — PATIENT INSTRUCTIONS
"Wrist/Hand Pain:  Your finalized x-ray results will be available on [facility] when read by the radiologist.  Recommend rest, ice, elevation.   Over the counter pain medication as directed on packaging as needed for pain (ex: Tylenol).  Continue with medications as previously prescribed for RA. Contact your rheumatologist and orthopedist to schedule follow up for further evaluation.    Urinary Symptoms:  Urine dip did not show signs of UTI.   Urine culture sent out to check for bacterial growth and antibiotic susceptibility. You may need to be placed on antibiotics if the culture is positive.   OTC Azo can help to decrease burning with urination, over the counter Tylenol or ibuprofen as directed on packaging as needed for pain.   Drink plenty of fluids and follow proper urinary hygiene.  Follow up with PCP or OB/GYN for further evaluation of symptoms.    Follow up with PCP in 3-5 days.  Proceed to  ER if symptoms worsen.    If tests are performed, our office will contact you with results only if changes need to made to the care plan discussed with you at the visit. You can review your full results on [facility].    Patient Education     Hand pain   The Basics   Written by the doctors and editors at [facility]   What can cause hand pain? -- Different conditions can cause hand pain, including:   Osteoarthritis - Arthritis is a general term that means inflammation of the joints. There are different types of arthritis. The most common type, called osteoarthritis, often comes with age. It can cause pain, stiffness, and swelling in the finger joints.   Rheumatoid arthritis - This is another type of arthritis. It can also cause pain, stiffness, and swelling in the finger joints (picture 1). The stiffness is usually worst in the morning.   Trigger finger - This condition keeps a finger from straightening normally. When people try to straighten their trigger finger, the finger \"locks\" or \"catches\" in a bent position " (picture 2). Trigger finger can also cause pain in the finger or palm. Trigger finger is caused by a problem with a tendon. Tendons are strong bands of tissue that connect muscles to bones.   Cysts - This is an abnormal fluid-filled sac. If a cyst forms on or next to a tendon, it can cause a painful lump in the palm of the hand. Cysts can also form on joints (picture 3).   Dupuytren's contracture - This condition causes the tissue under the skin on the palm to get thick. Over time, this makes the fingers (usually the ring and little fingers) stiff and keeps them from straightening all of the way.   Mallet finger - This can happen when the finger joint nearest the fingernail gets hurt. People usually have pain and swelling on top of that joint. They can also have trouble straightening that joint all of the way.   Carpal tunnel syndrome - This condition affects a nerve that passes through the wrist to the hand. It usually causes pain, numbness, and tingling in the thumb, index finger, middle finger, and side of the ring finger. People can also have pain in their arm.  What can I do on my own to feel better? -- To ease your symptoms, you can:   Rest your hand - Don't  things too tightly or too often.   Put heat on the area to reduce pain and stiffness - Don't use heat for more than 20 minutes at a time. Don't use anything too hot that could burn your skin.   Do gentle exercises - Close your fingers to make a fist. Then, straighten your fingers all of the way.   Take medicine to reduce pain and swelling - To treat pain, you can take acetaminophen (sample brand name: Tylenol). To treat pain and swelling, you can take ibuprofen (sample brand names: Advil, Motrin).  To keep your hands from getting hurt:   Wear thick leather gloves when you do work that could hurt your hands.   Do not let your hands get too cold for too long.  Should I see a doctor or nurse? -- See a doctor or nurse if:   Your symptoms do not get better  "or get worse after you treat them on your own for a few days.   Your hand is weak.   You can't make a fist or straighten your fingers all 7of the way.  Will I need tests? -- Maybe. Your doctor or nurse will ask about your symptoms and do an exam. They will examine your hand and fingers carefully and see how they move and work.  Your doctor or nurse might do 1 or more of the following tests:   X-ray of your hand   Blood tests   A procedure to make sure that the diagnosis is correct - For example, if the doctor thinks that you have a cyst, they might drain the fluid with a needle.  How is hand pain treated? -- Treatment depends on the cause of the hand pain. After your doctor knows what condition is causing your hand pain, they will discuss your treatment options.  All topics are updated as new evidence becomes available and our peer review process is complete.  This topic retrieved from [employer] on: Apr 11, 2024.  Topic 85453 Version 14.0  Release: 32.3.2 - C32.100  © 2024 UpToDate, Inc. and/or its affiliates. All rights reserved.  picture 1: Rheumatoid arthritis in the hands     These photos show the hands of a 39-year-old woman who was diagnosed with rheumatoid arthritis as a child.  Courtesy of Huan Vaughan MD.  Graphic 33750 Version 6.0  picture 2: Trigger finger     When a person with trigger finger straightens the fingers, the condition causes a finger to \"catch\" or \"lock\" in the bent position.  Courtesy of Bertrand Elise MD.  Graphic 01719 Version 1.0  picture 3: Cyst on the finger     This person has a cyst on the finger joint nearest to the fingernail.  Courtesy of Bertrand Elise MD.  Graphic 37773 Version 1.0  Consumer Information Use and Disclaimer   Disclaimer: This generalized information is a limited summary of diagnosis, treatment, and/or medication information. It is not meant to be comprehensive and should be used as a tool to help the user understand and/or assess potential diagnostic " and treatment options. It does NOT include all information about conditions, treatments, medications, side effects, or risks that may apply to a specific patient. It is not intended to be medical advice or a substitute for the medical advice, diagnosis, or treatment of a health care provider based on the health care provider's examination and assessment of a patient's specific and unique circumstances. Patients must speak with a health care provider for complete information about their health, medical questions, and treatment options, including any risks or benefits regarding use of medications. This information does not endorse any treatments or medications as safe, effective, or approved for treating a specific patient. UpToDate, Inc. and its affiliates disclaim any warranty or liability relating to this information or the use thereof.The use of this information is governed by the Terms of Use, available at https://www.[employer]uwer.com/en/know/clinical-effectiveness-terms. 2024© UpToDate, Inc. and its affiliates and/or licensors. All rights reserved.  Copyright   © 2024 UpToDate, Inc. and/or its affiliates. All rights reserved.

## 2025-01-09 ENCOUNTER — RESULTS FOLLOW-UP (OUTPATIENT)
Dept: URGENT CARE | Facility: MEDICAL CENTER | Age: 35
End: 2025-01-09

## 2025-01-09 LAB — BACTERIA UR CULT: ABNORMAL

## 2025-01-10 ENCOUNTER — TELEPHONE (OUTPATIENT)
Dept: URGENT CARE | Facility: MEDICAL CENTER | Age: 35
End: 2025-01-10

## 2025-01-10 DIAGNOSIS — N30.01 ACUTE CYSTITIS WITH HEMATURIA: Primary | ICD-10-CM

## 2025-01-10 RX ORDER — NITROFURANTOIN 25; 75 MG/1; MG/1
100 CAPSULE ORAL 2 TIMES DAILY
Qty: 10 CAPSULE | Refills: 0 | Status: SHIPPED | OUTPATIENT
Start: 2025-01-10 | End: 2025-01-15

## 2025-01-10 NOTE — TELEPHONE ENCOUNTER
Discussed with patient that her urine culture had a small amount of bacteria growth. Patient still symptomatic. Prescribed course of Macrobid to cover for infection. Advised patient to still follow up with PCP or OB/GYN for further evaluation of symptoms. Patient expressed understanding.

## 2025-03-04 NOTE — PROGRESS NOTES
Assessment/Plan:    OAB/diet/ behavior mods reviewed at length. Pt would like to trial Vesicare once again. Advised monthly SBE, annual CBE and baseline screening mammography at age 39. Vaginal pap repeated.  STI testing ordered.  Diet/activity recommendations - Calcium 1200 mg daily and Vit D 600-100 IU daily.  RTO in one year or sooner PRN.      Diagnoses and all orders for this visit:    Encounter for gynecological examination (general) (routine) with abnormal findings    OAB (overactive bladder)  -     solifenacin (VESICARE) 10 MG tablet; Take 1 tablet (10 mg total) by mouth daily    Routine screening for STI (sexually transmitted infection)  -     HIV 1/2 AG/AB w Reflex [facility] for 2 yr old and above; Future  -     RPR-Syphilis Screening (Total Syphilis IGG/IGM); Future  -     Hepatitis B E Antigen; Future    Other orders  -     hydrOXYzine HCL (ATARAX) 50 mg tablet; Take 1 tablet by mouth in the morning  -     Cyanocobalamin (Vitamin B-12) 50 MCG LOZG        Subjective:      Patient ID: Penelope Shelby is a 34 y.o. female.    This patient presents for routine annual gyn exam.   Hx of breast reduction 2022.  Hx of hysterectomy in 2019 for cervical dysplasia with pathology as follows:  A.  Uterus, cervix, and bilateral fallopian tubes:     - Cervix:       -- High grade squamous intraepithelial lesion (HGSIL, CH[place] III, severe dysplasia) with endocervical glandular          involvement present in the 9-12:00 and 3-6:00 quadrants.       -- Ectocervical margin is negative for dysplasia.     - Endometrium:       -- Inactive appearing endometrium.       -- Unremarkable myometrium.     - Fallopian tubes, bilateral:       -- No significant pathologic abnormalities.    Will repeat pap.  She has a hx of OAB. She was on Vesicare 5 mg. Does not recall why she stopped. Drinks 2 cups of coffee daily.   She denies pelvic pain, dyspareunia, breast concerns, abnormal discharge, bowel dysfunction, depression/anxiety.   Sexually  "active, would like STI testing.       The following portions of the patient's history were reviewed and updated as appropriate: allergies, current medications, past family history, past medical history, past social history, past surgical history and problem list.    Review of Systems   Constitutional: Negative.    HENT: Negative.     Respiratory: Negative.     Cardiovascular: Negative.    Gastrointestinal: Negative.    Endocrine: Negative.    Genitourinary:  Positive for frequency and urgency. Negative for difficulty urinating, dysuria, menstrual problem, pelvic pain, vaginal bleeding, vaginal discharge and vaginal pain.   Musculoskeletal: Negative.    Skin: Negative.    Neurological: Negative.    Psychiatric/Behavioral: Negative.           Objective:      /80 (BP Location: Left arm, Patient Position: Sitting, Cuff Size: Standard)   Ht 5' 7\" (1.702 m)   Wt 70.9 kg (156 lb 3.2 oz)   LMP  (LMP Unknown) Comment: irregular  BMI 24.46 kg/m²          Physical Exam  Vitals and nursing note reviewed. Exam conducted with a chaperone present.   Constitutional:       Appearance: Normal appearance. She is well-developed.   HENT:      Head: Normocephalic and atraumatic.   Neck:      Thyroid: No thyroid mass or thyromegaly.   Cardiovascular:      Rate and Rhythm: Normal rate and regular rhythm.      Heart sounds: Normal heart sounds.   Pulmonary:      Effort: Pulmonary effort is normal.      Breath sounds: Normal breath sounds.   Chest:   Breasts:     Breasts are symmetrical.      Right: Skin change (surgical scars noted) present. No inverted nipple, mass, nipple discharge or tenderness.      Left: Skin change (surgical scars noted) present. No inverted nipple, mass, nipple discharge or tenderness.   Abdominal:      General: Bowel sounds are normal.      Palpations: Abdomen is soft.      Tenderness: There is no abdominal tenderness.      Hernia: There is no hernia in the left inguinal area or right inguinal area. "   Genitourinary:     General: Normal vulva.      Exam position: Supine.      Pubic Area: No rash.       Labia:         Right: No rash, tenderness, lesion or injury.         Left: No rash, tenderness, lesion or injury.       Urethra: No prolapse, urethral pain, urethral swelling or urethral lesion.      Vagina: Normal. No signs of injury and foreign body. No vaginal discharge, erythema, tenderness, bleeding, lesions or prolapsed vaginal walls.      Uterus: Not fixed.       Adnexa:         Right: No mass, tenderness or fullness.          Left: No mass, tenderness or fullness.        Rectum: No external hemorrhoid.      Comments: Urethra normal without lesions  No bladder tenderness  Cervix, uterus absent, no masses or tenderness on BME  Musculoskeletal:         General: Normal range of motion.      Cervical back: Normal range of motion and neck supple.   Lymphadenopathy:      Lower Body: No right inguinal adenopathy. No left inguinal adenopathy.   Skin:     General: Skin is warm and dry.   Neurological:      Mental Status: She is alert and oriented to person, place, and time.   Psychiatric:         Speech: Speech normal.         Behavior: Behavior normal. Behavior is cooperative.

## 2025-03-05 ENCOUNTER — ANNUAL EXAM (OUTPATIENT)
Dept: GYNECOLOGY | Facility: CLINIC | Age: 35
End: 2025-03-05
Payer: MEDICARE

## 2025-03-05 VITALS
BODY MASS INDEX: 24.52 KG/M2 | SYSTOLIC BLOOD PRESSURE: 136 MMHG | HEIGHT: 67 IN | DIASTOLIC BLOOD PRESSURE: 80 MMHG | WEIGHT: 156.2 LBS

## 2025-03-05 DIAGNOSIS — N32.81 OAB (OVERACTIVE BLADDER): ICD-10-CM

## 2025-03-05 DIAGNOSIS — Z11.3 ROUTINE SCREENING FOR STI (SEXUALLY TRANSMITTED INFECTION): Primary | ICD-10-CM

## 2025-03-05 DIAGNOSIS — Z01.411 ENCOUNTER FOR GYNECOLOGICAL EXAMINATION (GENERAL) (ROUTINE) WITH ABNORMAL FINDINGS: ICD-10-CM

## 2025-03-05 DIAGNOSIS — R87.613 HGSIL (HIGH GRADE SQUAMOUS INTRAEPITHELIAL LESION) ON PAP SMEAR OF CERVIX: ICD-10-CM

## 2025-03-05 PROCEDURE — G0145 SCR C/V CYTO,THINLAYER,RESCR: HCPCS | Performed by: SPECIALIST

## 2025-03-05 PROCEDURE — G0476 HPV COMBO ASSAY CA SCREEN: HCPCS | Performed by: OBSTETRICS & GYNECOLOGY

## 2025-03-05 PROCEDURE — 87491 CHLMYD TRACH DNA AMP PROBE: CPT | Performed by: OBSTETRICS & GYNECOLOGY

## 2025-03-05 PROCEDURE — 87591 N.GONORRHOEAE DNA AMP PROB: CPT | Performed by: OBSTETRICS & GYNECOLOGY

## 2025-03-05 PROCEDURE — G0101 CA SCREEN;PELVIC/BREAST EXAM: HCPCS | Performed by: OBSTETRICS & GYNECOLOGY

## 2025-03-05 RX ORDER — HYDROXYZINE HYDROCHLORIDE 50 MG/1
1 TABLET, FILM COATED ORAL DAILY
COMMUNITY
Start: 2025-02-25

## 2025-03-05 RX ORDER — SOLIFENACIN SUCCINATE 10 MG/1
10 TABLET, FILM COATED ORAL DAILY
Qty: 90 TABLET | Refills: 3 | Status: SHIPPED | OUTPATIENT
Start: 2025-03-05

## 2025-03-06 ENCOUNTER — PROCEDURE VISIT (OUTPATIENT)
Age: 35
End: 2025-03-06
Payer: MEDICARE

## 2025-03-06 DIAGNOSIS — M99.02 SEGMENTAL DYSFUNCTION OF THORACIC REGION: ICD-10-CM

## 2025-03-06 DIAGNOSIS — M99.01 SEGMENTAL DYSFUNCTION OF CERVICAL REGION: Primary | ICD-10-CM

## 2025-03-06 DIAGNOSIS — M79.18 MYOFASCIAL PAIN: ICD-10-CM

## 2025-03-06 DIAGNOSIS — M54.2 NECK PAIN: ICD-10-CM

## 2025-03-06 DIAGNOSIS — M47.812 CERVICAL SPONDYLOSIS: ICD-10-CM

## 2025-03-06 LAB
HPV HR 12 DNA CVX QL NAA+PROBE: POSITIVE
HPV16 DNA CVX QL NAA+PROBE: NEGATIVE
HPV18 DNA CVX QL NAA+PROBE: NEGATIVE

## 2025-03-06 PROCEDURE — 98940 CHIROPRACT MANJ 1-2 REGIONS: CPT | Performed by: CHIROPRACTOR

## 2025-03-06 NOTE — PROGRESS NOTES
Initial chiropractic visit--3/6/25, visit #1    Acute corrective treatment     Assessment:     1. Segmental dysfunction of cervical region        2. Cervical spondylosis        3. Neck pain        4. Myofascial pain        5. Segmental dysfunction of thoracic region            Condition is the same    PLAN/TREATMENT:    Return for treatment once next week.   Continue using 15 minutes as needed for posttreatment soreness.ice    Treatment:    Myofascial release was performed to the bilateral upper trapezius, bilateral levator scapula, bilateral rhomboids, bilateral cervical and thoracic paraspinals.    Manipulation was performed to the thoracic spine utilizing activator to T4-5 and T5-6.  Manipulation was performed to the cervical spine at C5-6 utilizing stairstepping technique.  No HVLA was performed to the cervical spine.    Subjective:  Penelope aggarwal is here today with complaints of neck and upper back pain on the right side.  Points to right upper trapezius region. Left sided neck pain is mild. Driving, sitting, stress can aggravate the right sided neck pain.    Recalls she felt a little better temporarily after the one treatment she had here on 11/21/24.  Was not able to get back in after that visit. Would like to try to restart treatment.    States since she stopped self manipulating her neck she has feels that has helped her neck pain..     Objective:    Moderate spasm and tenderness is noted in the bilateral upper trapezius, bilateral levator scapula, bilateral rhomboids, bilateral cervical and thoracic paraspinals.  Multiple trigger points are noted in the right upper trapezius, bilateral rhomboids, bilateral cervical paraspinals.  Decreased mobility and tenderness is noted at T3-4, T4-5, T5-6 as well as C5-6.    Cervical ROM exam shows flexion to be 20% restricted with pain, Extension is 30% restricted with pain.  Right rotation is 35% restricted with pain.  Left rotation is not painful.     Abduction of the  right shoulder is mildly painful and restricted.

## 2025-03-07 LAB
C TRACH DNA SPEC QL NAA+PROBE: NEGATIVE
N GONORRHOEA DNA SPEC QL NAA+PROBE: NEGATIVE

## 2025-03-12 ENCOUNTER — OFFICE VISIT (OUTPATIENT)
Dept: OBGYN CLINIC | Facility: HOSPITAL | Age: 35
End: 2025-03-12
Payer: MEDICARE

## 2025-03-12 ENCOUNTER — HOSPITAL ENCOUNTER (OUTPATIENT)
Dept: RADIOLOGY | Facility: HOSPITAL | Age: 35
Discharge: HOME | End: 2025-03-12
Attending: ORTHOPAEDIC SURGERY
Payer: MEDICARE

## 2025-03-12 ENCOUNTER — RESULTS FOLLOW-UP (OUTPATIENT)
Dept: GYNECOLOGY | Facility: CLINIC | Age: 35
End: 2025-03-12

## 2025-03-12 VITALS — HEIGHT: 67 IN | WEIGHT: 156 LBS | BODY MASS INDEX: 24.48 KG/M2

## 2025-03-12 DIAGNOSIS — M65.4 TENDINITIS, DE QUERVAIN'S: ICD-10-CM

## 2025-03-12 DIAGNOSIS — R52 PAIN: ICD-10-CM

## 2025-03-12 DIAGNOSIS — M79.631 PAIN OF RIGHT FOREARM: ICD-10-CM

## 2025-03-12 DIAGNOSIS — M18.11 ARTHRITIS OF CARPOMETACARPAL (CMC) JOINT OF RIGHT THUMB: Primary | ICD-10-CM

## 2025-03-12 LAB
LAB AP GYN PRIMARY INTERPRETATION: ABNORMAL
Lab: ABNORMAL
PATH INTERP SPEC-IMP: ABNORMAL

## 2025-03-12 PROCEDURE — 99204 OFFICE O/P NEW MOD 45 MIN: CPT | Performed by: ORTHOPAEDIC SURGERY

## 2025-03-12 PROCEDURE — G0124 SCREEN C/V THIN LAYER BY MD: HCPCS | Performed by: SPECIALIST

## 2025-03-12 PROCEDURE — 20550 NJX 1 TENDON SHEATH/LIGAMENT: CPT | Performed by: ORTHOPAEDIC SURGERY

## 2025-03-12 PROCEDURE — 73130 X-RAY EXAM OF HAND: CPT

## 2025-03-12 RX ORDER — BETAMETHASONE SODIUM PHOSPHATE AND BETAMETHASONE ACETATE 3; 3 MG/ML; MG/ML
6 INJECTION, SUSPENSION INTRA-ARTICULAR; INTRALESIONAL; INTRAMUSCULAR; SOFT TISSUE
Status: COMPLETED | OUTPATIENT
Start: 2025-03-12 | End: 2025-03-12

## 2025-03-12 RX ORDER — LIDOCAINE HYDROCHLORIDE 10 MG/ML
1 INJECTION, SOLUTION INFILTRATION; PERINEURAL
Status: COMPLETED | OUTPATIENT
Start: 2025-03-12 | End: 2025-03-12

## 2025-03-12 RX ADMIN — LIDOCAINE HYDROCHLORIDE 1 ML: 10 INJECTION, SOLUTION INFILTRATION; PERINEURAL at 09:45

## 2025-03-12 RX ADMIN — BETAMETHASONE SODIUM PHOSPHATE AND BETAMETHASONE ACETATE 6 MG: 3; 3 INJECTION, SUSPENSION INTRA-ARTICULAR; INTRALESIONAL; INTRAMUSCULAR; SOFT TISSUE at 09:45

## 2025-03-12 NOTE — PROGRESS NOTES
ORTHOPAEDIC HAND, WRIST, AND ELBOW OFFICE  VISIT     Name: Penelope Shelby      : 1990      MRN: [id number]  Encounter Provider: Kemal Harris MD  Encounter Date: 3/12/2025   Encounter department: [facility] [place]  :  Assessment & Plan  Arthritis of carpometacarpal (CMC) joint of right thumb  It was discussed that we will hold off on an injection as she just had one on 2025 at [facility]  We could try another injection after 3 months  Orders:  •  XR hand 3+ vw right; Future    Tendinitis, de Quervain's  Right deQuervain tendonitis  Patient was given a cortisone injection. She tolerated it well  She was advised after two injection we can discuss surgery  She will follow up after ultrasound  Orders:  •  Hand/upper extremity injection: R extensor compartment 1  •  lidocaine (XYLOCAINE) 1 % injection 1 mL  •  betamethasone acetate-betamethasone sodium phosphate (CELESTONE) injection 6 mg    Scribe Attestation    I,:  Shahzad Rodriguez PA-C am acting as a scribe while in the presence of the attending physician.:       I,:  Kemal Harris MD personally performed the services described in this documentation    as scribed in my presence.:           Pain of right forearm  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 female who presents right hand pain and wrist pain.  Patient has a previous surgical history with Dr. Harris for a right wrist fusion and darrach procedure in 2017.  She states of recent she started to have an increase in pain at the base of her thumb as well as over the dorsal radial aspect of  "her wrist.  She also have a clicking sensation as she pronates and supinates the wrist.  She saw [facility] and had 2 cortisone injections over the course of 3 weeks which only provided minimal relief of her pain and discomfort at the base of her thumb.  She has been wearing a custom fabricated brace from therapy which she states causes some discomfort from wearing it.      REVIEW OF SYSTEMS:  General: no fever, no chills  HEENT:  No loss of hearing or eyesight problems  Eyes:  No red eyes  Respiratory:  No coughing, shortness of breath or wheezing  Cardiovascular:  No chest pain, no palpitations  GI:  Abdomen soft nontender, denies nausea  Endocrine:  No muscle weakness, no frequent urination, no excessive thirst  Urinary:  No dysuria, no incontinence  Musculoskeletal: see HPI and PE  SKIN:  No skin rash, no dry skin  Neurological:  No headaches, no confusion  Psychiatric:  No suicide thoughts, no anxiety, no depression  Review of all other systems is negative    Objective   Ht 5' 7\" (1.702 m)   Wt 70.8 kg (156 lb)   LMP  (LMP Unknown) Comment: irregular  BMI 24.43 kg/m²      General: well developed and well nourished, alert, oriented times 3, and appears comfortable  Psychiatric: Normal  HEENT: Trachea Midline, No torticollis  Cardiovascular: No discernable arrhythmia  Pulmonary: No wheezing or stridor  Abdomen: No rebound or guarding  Extremities: No peripheral edema  Skin: No masses, erythema, lacerations, fluctation, ulcerations  Neurovascular: Sensation Intact to the Median, Ulnar, Radial Nerve, Motor Intact to the Median, Ulnar, Radial Nerve, and Pulses Intact    Musculoskeletal exam:  right CMC Exam:  No adduction contracture  No hyperextension deformity of MCP joint  Positive localized tenderness over radial and dorsal aspect of thumb (CMC joint)  Grind test is Positive for pain and Positive for crepitus  No triggering or tenderness over the A1 pulley  No pain with Finkelstein’s maneuver     Palpable click " appreciated over the wrist with pronation and supination on the right side over the ulna  Tenderness to palpation distal third of the forearm between the radius and ulna    De Quervain's Tenosynovitis Exam:  right side    Positive tender to palpation over 1st dorsal extensor compartment   Positive palpable nodule  Positive crepitus over 1st dorsal extensor compartment   Positive Finkelstein's test    Positive pain with resisted abduction of the thumb            STUDIES REVIEWED:  Images were reviewed in PACS and demonstrate: X-rays of the right wrist 3 views were reviewed with Dr. Harris and demonstrated significant rheumatological disease of the wrist with distal ulna resection, significant CMC arthritis.       PROCEDURES PERFORMED:  Hand/upper extremity injection: R extensor compartment 1  Universal Protocol:  Consent: Verbal consent obtained.  Risks and benefits: risks, benefits and alternatives were discussed  Consent given by: patient  Patient understanding: patient states understanding of the procedure being performed  Patient consent: the patient's understanding of the procedure matches consent given  Site marked: the operative site was marked  Patient identity confirmed: verbally with patient  Supporting Documentation  Indications: pain   Procedure Details  Condition:de Quervain's tenosynovitis Site: R extensor compartment 1   Preparation: Patient was prepped and draped in the usual sterile fashion  Needle size: 25 G  Approach: dorsal  Medications administered: 1 mL lidocaine 1 %; 6 mg betamethasone acetate-betamethasone sodium phosphate 6 (3-3) mg/mL  Patient tolerance: patient tolerated the procedure well with no immediate complications  Dressing:  Sterile dressing applied

## 2025-03-13 ENCOUNTER — OFFICE VISIT (OUTPATIENT)
Dept: PODIATRY | Facility: CLINIC | Age: 35
End: 2025-03-13
Payer: MEDICARE

## 2025-03-13 VITALS — BODY MASS INDEX: 25.27 KG/M2 | WEIGHT: 161 LBS | HEIGHT: 67 IN

## 2025-03-13 DIAGNOSIS — L74.513 HYPERHIDROSIS OF SOLES: ICD-10-CM

## 2025-03-13 DIAGNOSIS — B35.3 TINEA PEDIS OF BOTH FEET: Primary | ICD-10-CM

## 2025-03-13 PROCEDURE — 99203 OFFICE O/P NEW LOW 30 MIN: CPT | Performed by: PODIATRIST

## 2025-03-13 RX ORDER — CLOTRIMAZOLE AND BETAMETHASONE DIPROPIONATE 10; .64 MG/G; MG/G
CREAM TOPICAL 2 TIMES DAILY
Qty: 45 G | Refills: 1 | Status: SHIPPED | OUTPATIENT
Start: 2025-03-13 | End: 2025-04-10

## 2025-03-13 NOTE — PROGRESS NOTES
Name: Penelope Shelby      : 1990      MRN: [id number]  Encounter Provider: Len Warren DPM  Encounter Date: 3/13/2025   Encounter department: [facility] PODIAT[facility] [place]    Assessment & Plan     1. Tinea pedis of both feet  -     clotrimazole-betamethasone (LOTRISONE) 1-0.05 % cream; Apply topically 2 (two) times a day for 28 days Apply thin layer to affected area twice daily.  2. Hyperhidrosis of soles    Patient has peeling and erythematous changes on the plantar surface of the feet bilaterally.    She was on Xeljanz had some issues with this medication.  She states it started after she was on this medication.    Will plan on having her return for reevaluation in 6 weeks to see how she is doing at that time.  If she notices issues notify the office.        Return in about 6 weeks (around 2025).    Subjective     Has tried powders to the feet and noticed some peeling of the skin and thickened tissue.  Notices odor to feet and also a lot of sweating.                   Constitutional:  Negative for chills and fever.   Respiratory:  Negative for chest tightness and shortness of breath.    Gastrointestinal:  Negative for nausea and vomiting.     Current Outpatient Medications on File Prior to Visit   Medication Sig   • acetaminophen (TYLENOL) 500 mg tablet Take 1 tablet (500 mg total) by mouth every 6 (six) hours as needed for mild pain or moderate pain   • albuterol (Proventil HFA) 90 mcg/act inhaler Inhale 1-2 puffs every 6 (six) hours as needed for wheezing   • ALPRAZolam (XANAX) 0.5 mg tablet Take by mouth as needed for anxiety   • aluminum-magnesium hydroxide 200-200 MG/5ML suspension Take 10 mL by mouth every 6 (six) hours as needed for cramping   • Calcium Carb-Cholecalciferol (CALCIUM 1000 + D PO) Take 1 tablet by mouth daily   • Cyanocobalamin (Vitamin B-12) 50 MCG LOZG    • diphenoxylate-atropine (LOMOTIL) 2.5-0.025 mg per tablet Take by mouth every 12 (twelve) hours   •  ergocalciferol (VITAMIN D2) 50,000 units Take 50,000 Units by mouth once a week   • escitalopram (LEXAPRO) 20 mg tablet Take 20 mg by mouth every morning   • hydrOXYzine HCL (ATARAX) 50 mg tablet Take 1 tablet by mouth in the morning   • Lidocaine Viscous HCl (XYLOCAINE) 2 % mucosal solution Swish and spit 15 mL 4 (four) times a day as needed for mouth pain or discomfort   • loperamide (IMODIUM) 2 mg capsule Take 2 mg by mouth 4 (four) times a day as needed for diarrhea   • omeprazole (PriLOSEC) 40 MG capsule Take 1 capsule (40 mg total) by mouth daily   • ondansetron (ZOFRAN) 4 mg tablet Take 4 mg by mouth every 8 (eight) hours as needed     • oxyCODONE (ROXICODONE) 15 mg immediate release tablet Take 15 mg by mouth 3 (three) times a day   • predniSONE 5 mg tablet Take 14 mg by mouth daily   • pregabalin (LYRICA) 100 mg capsule Take one tablet twice a day and 2 tablets at bedtime   • solifenacin (VESICARE) 10 MG tablet Take 1 tablet (10 mg total) by mouth daily   • SUMAtriptan (IMITREX) 100 mg tablet Take 100 mg by mouth once as needed for migraine   • Virt-Phos 250 Neutral [phone] MG tablet if needed   • benzonatate (TESSALON PERLES) 100 mg capsule Take 1 capsule (100 mg total) by mouth 3 (three) times a day as needed for cough (Patient not taking: Reported on 1/7/2025)   • clindamycin (CLEOCIN) 300 MG capsule TAKE 1 CAPSULE BY MOUTH EVERY 12 HOURS FOR 7 DAYS (Patient not taking: Reported on 1/7/2025)   • Dextromethorphan-buPROPion ER (Auvelity)  MG TBCR Take 45 mg by mouth daily (Patient not taking: Reported on 1/7/2025)   • Diclofenac Sodium (VOLTAREN) 1 % Apply 2 g topically 4 (four) times a day as needed (Pain) (Patient not taking: Reported on 1/7/2025)   • mupirocin (BACTROBAN) 2 % ointment Apply topically 3 (three) times a day (Patient not taking: Reported on 3/13/2025)   • ondansetron (ZOFRAN-ODT) 4 mg disintegrating tablet Take 1 tablet (4 mg total) by mouth every 6 (six) hours as needed for  "nausea (Patient not taking: Reported on 3/13/2025)   • Sodium Fluoride 1.1 % PSTE Apply 5 mL to teeth daily (Patient not taking: Reported on 1/7/2025)   • sucralfate (CARAFATE) 1 g tablet Take 1 tablet (1 g total) by mouth 4 (four) times a day for 7 days       Objective     Ht 5' 7\" (1.702 m)   Wt 73 kg (161 lb)   LMP  (LMP Unknown) Comment: irregular  BMI 25.22 kg/m²     Vascular: Intact pedal pulses bilateral DP and PT.  Neurological: Gross protective sensation intact bilateral  Musculoskeletal: Muscle strength bilateral intact with dorsiflexion, inversion, eversion and plantarflexion.  Dermatological: No open lesions or ulcerations noted bilateral.    "

## 2025-03-14 ENCOUNTER — OFFICE VISIT (OUTPATIENT)
Dept: OBGYN CLINIC | Facility: MEDICAL CENTER | Age: 35
End: 2025-03-14
Payer: MEDICARE

## 2025-03-14 ENCOUNTER — APPOINTMENT (OUTPATIENT)
Dept: RADIOLOGY | Facility: MEDICAL CENTER | Age: 35
End: 2025-03-14
Payer: MEDICARE

## 2025-03-14 VITALS — BODY MASS INDEX: 24.64 KG/M2 | WEIGHT: 157 LBS | HEIGHT: 67 IN

## 2025-03-14 DIAGNOSIS — M25.511 CHRONIC PAIN OF BOTH SHOULDERS: ICD-10-CM

## 2025-03-14 DIAGNOSIS — M54.2 CHRONIC NECK PAIN: ICD-10-CM

## 2025-03-14 DIAGNOSIS — G89.29 CHRONIC PAIN OF BOTH SHOULDERS: Primary | ICD-10-CM

## 2025-03-14 DIAGNOSIS — M75.81 TENDINITIS OF RIGHT ROTATOR CUFF: ICD-10-CM

## 2025-03-14 DIAGNOSIS — M25.512 CHRONIC PAIN OF BOTH SHOULDERS: Primary | ICD-10-CM

## 2025-03-14 DIAGNOSIS — M05.731 RHEUMATOID ARTHRITIS INVOLVING RIGHT WRIST WITH POSITIVE RHEUMATOID FACTOR (HCC): ICD-10-CM

## 2025-03-14 DIAGNOSIS — M54.12 RADICULOPATHY, CERVICAL REGION: ICD-10-CM

## 2025-03-14 DIAGNOSIS — M47.812 CERVICAL SPONDYLOSIS: ICD-10-CM

## 2025-03-14 DIAGNOSIS — G89.29 CHRONIC PAIN OF BOTH SHOULDERS: ICD-10-CM

## 2025-03-14 DIAGNOSIS — M25.512 CHRONIC PAIN OF BOTH SHOULDERS: ICD-10-CM

## 2025-03-14 DIAGNOSIS — G89.29 CHRONIC NECK PAIN: ICD-10-CM

## 2025-03-14 DIAGNOSIS — M25.511 CHRONIC PAIN OF BOTH SHOULDERS: Primary | ICD-10-CM

## 2025-03-14 PROCEDURE — 99214 OFFICE O/P EST MOD 30 MIN: CPT | Performed by: EMERGENCY MEDICINE

## 2025-03-14 PROCEDURE — 73030 X-RAY EXAM OF SHOULDER: CPT

## 2025-03-14 NOTE — PATIENT INSTRUCTIONS
You may take Tylenol 500mg every 4-6 hours as needed OR max 1,000mg per dose up to 3 times per day for a total of 3,000mg per day    Rotator Cuff Exercises     About this topic   A rotator cuff tear is a problem that can limit how much you can move your shoulder. It can also be painful. The rotator cuff is a group of muscles and tendons in your shoulder. It helps your shoulder move and be steady. These muscles help to rotate and lift the arm. Tendons are strong bands that connect muscles to bones. You can tear a tendon in your shoulder if you fall or move your shoulder too fast and with too much force. Your tendon can also wear out over time and tear. Large tears may require surgery. For small tears, exercises may help make this problem better.  General   Before starting with a program, ask your doctor if you are healthy enough to do these exercises. Your doctor may have you work with a  or physical therapist to make a safe exercise program to meet your needs. You should not do the exercises if they cause sharp pains. You may need to start out with easy exercises at first. Then, once your shoulder is feeling better, you may start the harder exercises.  Passive Exercises:   You use the movement of your body to move your arm. Do NOT use your shoulder muscles to move your arm.  Pendulum exercises ? Hold onto a table with one hand and bend forward at the waist until your back is level with the table. Let your sore arm hang in front of you. Do each exercise for 1 minute.  Circles ? Move your body in large circles one way. After you move a few times, your arm should start gently moving in circles. Now, move your body in circles the other way until your arm moves the other way.  Swinging side-to-side ? Move your body side to side. After you move a few times, your arm should start gently moving side-to-side.  Swinging back and forth ? Move your body forwards and then backwards. After you move a few times, your arm  should start gently moving back and forth.  Stretching Exercises   Stretching exercises keep your muscles flexible. They also stop them from getting tight. Start by doing each of these stretches 2 to 3 times. In order for your body to make changes, you will need to hold these stretches for 20 to 30 seconds. Try to do the stretches 2 to 3 times each day. Do all exercises slowly.  Strengthening Exercises   Strengthening exercises keep your muscles firm and strong. Start by repeating each exercise 2 to 3 times. Work up to doing each exercise 10 times. Try to do the exercises 2 to 3 times each day. Do all exercises slowly.  Shoulder blade squeezes ? Pinch your shoulder blades together on your upper back and hold 3 to 5 seconds. Relax.  Cane rehab exercises:  Overhead flexions ? Lie down and grab the cane with both hands. Start with your arms straight and the cane resting on your upper legs. Keep your arms straight and lift the cane over your head.  Abductions or side-to-side motion ? Stand and grab the cane with both hands. Turn your thumbs to the left to stretch your left shoulder. Start with your arms straight and the cane resting on your upper legs. Push the cane to the left, raising your left arm. Keep your left elbow straight. Keep pushing until you feel a good stretch. Switch the position of your hands to point your thumbs to the right and push the cane right to stretch your right shoulder.  External rotations or side-to-side rotations ? Lie down and grab the cane with both hands. Start with your elbows bent and touching your body. Put the tip of the cane in the palm of your right hand to stretch your right shoulder. Grab the cane at the other end with your left hand. Push the cane sideways into your right palm until you feel a stretch in your shoulder. Be sure to keep your right elbow close to your body while you are stretching. Switch hands to stretch the left shoulder.  Internal rotations ? Stand up and grab  "the cane with both hands behind your back. With your palms facing backwards, slide the cane up your back. Go until you feel a good stretch in front of the shoulder.  Shoulder blade exercises ? Lie on your stomach near the edge of a mat or bed or supported on an exercise ball. Start with your arms hanging down in a relaxed position.  Y position ? Raise your arms up and to the sides so your elbows are near your ears and your arms are straight out diagonally like the letter Y. Lower the arms back to the starting position.  T position ? Raise your arms straight out to the sides, even with your shoulders. Lower the arms back to the starting position.  W position ? Raise your arms up and to the sides with your elbows bent. Your hands should be just above your shoulders and looks like a W from above. Lower the arms back to the starting position.  L position ? Keep your elbows at your sides and raise just your lower arms out to the side to make a letter L and a backwards letter L. Lower the arms back to the starting position.  Shoulder exercises ? Tie a knot in an exercise band. Secure the band in a door by shutting it in around waist level. Wrap the band around one hand for a good . Stand away from the door enough to have slight tension in the band. As the exercises get easier, you may need to change to a heavier band. Moving closer to, or farther away from, the point where the band is tied down will decrease or increase the tension in the band.  Internal rotations ? Face sideways with the arm holding the band closest to the door. Bend the elbow to 90 degrees or in an \"L\" position. Keeping your elbow by your side and bent, pull the band across your body. Bring it back to the starting position. Repeat with the other arm.  External rotations ? Face sideways with the arm holding the band farthest from the door. Bend the elbow to 90 degrees or in an \"L\" position. Keeping your elbow by your side and bent, pull the band away " from your body. Bring it back to the starting position. Repeat with the other arm.  Abductions ? Face sideways with the arm holding the band farthest from the door. Be sure that your thumb is facing upward. Keep your elbow straight and pull the band out to the side and away from your body. Go up to shoulder level. Bring it back to the starting position. Repeat with the other arm.  Flexions ? Face away from the door. Keeping your elbow straight, pull the band straight out in front of you. Bring it back to the starting position. Repeat with the other arm.                  What will the results be?   Less pain  More strength  Able to move your arm more  Easier to do daily activities  Shoulder is more stable  Prevent re-injury  Helpful tips   Stay active and work out to keep your muscles strong and flexible.  Eat a healthy diet to keep your muscles healthy.  Be sure you do not hold your breath when exercising. This can raise your blood pressure. If you tend to hold your breath, try counting out loud when exercising. If any exercise bothers you, stop right away.  Always warm up before stretching. Heated muscles stretch much easier than cool muscles. Stretching cool muscles can lead to injury.  Try swinging your arms at an easy pace for a few minutes to warm up your muscles. Do this again after exercising.  Never bounce when doing stretches.  After exercising, it is a good idea to use ice. Place an ice pack or a bag of frozen peas wrapped in a towel over the painful part. Never put ice right on the skin. Do not leave the ice on more than 10 to 15 minutes at a time. Ice after activity may help decrease pain and swelling. Never ice before stretching.  Doing exercises before a meal may be a good way to get into a routine.  Exercise may be slightly uncomfortable, but you should not have sharp pains. If you do get sharp pains, stop what you are doing. If the sharp pains continue, call your doctor.  Last Reviewed Date    2020-03-10  Consumer Information Use and Disclaimer   This generalized information is a limited summary of diagnosis, treatment, and/or medication information. It is not meant to be comprehensive and should be used as a tool to help the user understand and/or assess potential diagnostic and treatment options. It does NOT include all information about conditions, treatments, medications, side effects, or risks that may apply to a specific patient. It is not intended to be medical advice or a substitute for the medical advice, diagnosis, or treatment of a health care provider based on the health care provider's examination and assessment of a patient’s specific and unique circumstances. Patients must speak with a health care provider for complete information about their health, medical questions, and treatment options, including any risks or benefits regarding use of medications. This information does not endorse any treatments or medications as safe, effective, or approved for treating a specific patient. [employer] and its affiliates disclaim any warranty or liability relating to this information or the use thereof. The use of this information is governed by the Terms of Use, available at https://www.woltersHalldisuwer.com/en/know/clinical-effectiveness-terms   Copyright   Copyright © 2024 [employer] and its affiliates and/or licensors. All rights reserved.

## 2025-03-14 NOTE — PROGRESS NOTES
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      Subjective:   Patient ID: Penelope Shelby is a 34 y.o. female.    Patient returns to the office with right shoulder pain.  She notes pain about the right side of the neck and the upper trap as well as diffusely and circumferentially around the shoulder and into the periscapular area.  She does find improvement if she props up her elbow denies abduction sign, denies any numbness or tingling.  However she did not schedule  Last evaluation MRI C spine ordered and referred to Pain Management    Initial note:  Penelope hSelby is a 34 y.o. female with a past medical history of seropositive, erosive RA (-RF/+CCP), osteopenia, GERD, and anxiety/depression who presents today for ALEE shoulder pain left worse than right for  the past two yrs with increasing pain over the past several months. She did have neck pain this summer Xrays C spine were obtained.  Now the pain is mostly in the upper trapezius and the superior aspect of the shoulders.  Pain worse driving wearing seatbelt and with neck movement when it gets bad.  Pt states discomfort with abduction and pain with palpation. Pt states pain can radiate and states some numbness dan tingling. Pt states feelings of pressure in the shoulders.  On chronic Prednisone 8mg daily, also takes oxycodone and Lyrica, is suppose to start Xeljanz        Review of Systems    The following portions of the patient's chart were reviewed and updated as appropriate:   Allergy:    Allergies   Allergen Reactions    Nickel Rash    Tylenol With Codeine #3 [Acetaminophen-Codeine] Hives, Itching and Facial Swelling     Can take oxycodone       Medications:    Current Outpatient Medications:     acetaminophen (TYLENOL) 500 mg tablet, Take 1 tablet (500 mg total) by mouth every 6 (six) hours as needed for mild pain or moderate pain, Disp: 30 tablet, Rfl: 0    albuterol (Proventil HFA) 90 mcg/act inhaler, Inhale 1-2 puffs every 6 (six) hours as needed for wheezing, Disp: 8 g, Rfl: 0    ALPRAZolam (XANAX) 0.5 mg tablet, Take by mouth as needed for anxiety, Disp: , Rfl:     aluminum-magnesium hydroxide 200-200 MG/5ML suspension, Take 10 mL by mouth every 6 (six) hours as needed for cramping, Disp: 355 mL, Rfl: 0    Calcium Carb-Cholecalciferol (CALCIUM 1000 + D PO), Take 1 tablet by mouth daily, Disp: , Rfl:     clotrimazole-betamethasone (LOTRISONE) 1-0.05 % cream, Apply topically 2 (two) times a day for 28 days Apply thin layer to affected area twice daily., Disp: 45 g, Rfl: 1    Cyanocobalamin (Vitamin B-12) 50 MCG LOZG, , Disp: , Rfl:     diphenoxylate-atropine (LOMOTIL) 2.5-0.025 mg per tablet, Take by mouth every 12 (twelve) hours, Disp: , Rfl:     ergocalciferol (VITAMIN D2) 50,000 units, Take 50,000 Units  by mouth once a week, Disp: , Rfl:     escitalopram (LEXAPRO) 20 mg tablet, Take 20 mg by mouth every morning, Disp: , Rfl:     hydrOXYzine HCL (ATARAX) 50 mg tablet, Take 1 tablet by mouth in the morning, Disp: , Rfl:     Lidocaine Viscous HCl (XYLOCAINE) 2 % mucosal solution, Swish and spit 15 mL 4 (four) times a day as needed for mouth pain or discomfort, Disp: 300 mL, Rfl: 4    loperamide (IMODIUM) 2 mg capsule, Take 2 mg by mouth 4 (four) times a day as needed for diarrhea, Disp: , Rfl:     omeprazole (PriLOSEC) 40 MG capsule, Take 1 capsule (40 mg total) by mouth daily, Disp: 30 capsule, Rfl: 2    ondansetron (ZOFRAN) 4 mg tablet, Take 4 mg by mouth every 8 (eight) hours as needed  , Disp: , Rfl:     oxyCODONE (ROXICODONE) 15 mg immediate release tablet, Take 15 mg by mouth 3 (three) times a day, Disp: , Rfl:     predniSONE 5 mg tablet, Take 14 mg by mouth daily, Disp: , Rfl:     pregabalin (LYRICA) 100 mg capsule, Take one tablet twice a day and 2 tablets at bedtime, Disp: , Rfl:     solifenacin (VESICARE) 10 MG tablet, Take 1 tablet (10 mg total) by mouth daily, Disp: 90 tablet, Rfl: 3    SUMAtriptan (IMITREX) 100 mg tablet, Take 100 mg by mouth once as needed for migraine, Disp: , Rfl:     Virt-Phos 250 Neutral 155-852-130 MG tablet, if needed, Disp: , Rfl:     benzonatate (TESSALON PERLES) 100 mg capsule, Take 1 capsule (100 mg total) by mouth 3 (three) times a day as needed for cough (Patient not taking: Reported on 1/7/2025), Disp: 20 capsule, Rfl: 0    clindamycin (CLEOCIN) 300 MG capsule, TAKE 1 CAPSULE BY MOUTH EVERY 12 HOURS FOR 7 DAYS (Patient not taking: Reported on 1/7/2025), Disp: , Rfl:     Dextromethorphan-buPROPion ER (Auvelity)  MG TBCR, Take 45 mg by mouth daily (Patient not taking: Reported on 1/7/2025), Disp: , Rfl:     Diclofenac Sodium (VOLTAREN) 1 %, Apply 2 g topically 4 (four) times a day as needed (Pain) (Patient not taking: Reported on 3/14/2025), Disp: 100 g, Rfl: 0     "mupirocin (BACTROBAN) 2 % ointment, Apply topically 3 (three) times a day (Patient not taking: Reported on 3/13/2025), Disp: 22 g, Rfl: 3    ondansetron (ZOFRAN-ODT) 4 mg disintegrating tablet, Take 1 tablet (4 mg total) by mouth every 6 (six) hours as needed for nausea (Patient not taking: Reported on 3/14/2025), Disp: 20 tablet, Rfl: 0    Sodium Fluoride 1.1 % PSTE, Apply 5 mL to teeth daily (Patient not taking: Reported on 3/14/2025), Disp: 100 mL, Rfl: 0    sucralfate (CARAFATE) 1 g tablet, Take 1 tablet (1 g total) by mouth 4 (four) times a day for 7 days, Disp: 28 tablet, Rfl: 0    Patient Active Problem List   Diagnosis    Rheumatoid arthritis (HCC)    Rheumatoid arthritis involving left wrist with positive rheumatoid factor ([facility])    Abnormal uterine bleeding (AUB)    Anemia    AVM (arteriovenous malformation)    Chronic bilateral thoracic back pain    Closed fracture of base of metacarpal bone other than first metacarpal    Depression    Dyspareunia, female    Unspecified fracture of unspecified wrist and hand, sequela    Fracture of coccyx ([facility])    Hereditary and idiopathic peripheral neuropathy    Legal termination of pregnancy    Elbow disorder    Menometrorrhagia    Closed fracture of lunate (semilunar) bone of wrist    Osteoarthritis of knee    Rupture of tendon of hand    History of artificial joint    Severe dysmenorrhea    Urge incontinence of urine    S/P dilation and curettage    Pain in right wrist    Bilateral wrist pain    Generalized abdominal pain    Change in bowel habits    Irritable bowel syndrome with diarrhea    Lactose intolerance    Pelvic pain    Nausea    Functional diarrhea    Status post laparoscopic hysterectomy    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)    Hiatal hernia    Hypertrophy of breast    Cervical spondylosis    Chronic neck pain    Radiculopathy, cervical region    Chronic pain of both shoulders       Objective:  Ht 5' 7\" (1.702 " m)   Wt 71.2 kg (157 lb)   LMP  (LMP Unknown) Comment: irregular  BMI 24.59 kg/m²     Right Shoulder Exam     Range of Motion   External rotation:  normal     Muscle Strength   External rotation: 5/5     Tests   Drop arm: negative    Other   Erythema: absent    Comments:  Mild decreased overhead reaching, as well as internal rotation  Neg abduction sign    C spine decreased rotation RIGHT      Left Shoulder Exam     Range of Motion   External rotation:  normal     Muscle Strength   External rotation: 5/5              Physical Exam      Neurologic Exam    Procedures    Xray Right Shoulder No acute findings such as fracture or dislocation, no significant degenerative changes or osseous lesions        I have personally reviewed the written report of the pertinent studies.     MRI CERVICAL SPINE WITHOUT CONTRAST     INDICATION: M25.511: Pain in right shoulder  G89.29: Other chronic pain  M25.512: Pain in left shoulder  M47.812: Spondylosis without myelopathy or radiculopathy, cervical region.     COMPARISON: MRI cervical spine 6/12/2018     TECHNIQUE:  Multiplanar, multisequence imaging of the cervical spine was performed.   IMAGE QUALITY:  Diagnostic  FINDINGS:     ALIGNMENT: Straightening and mild reversal of the mid to upper cervical lordosis.  No compression fracture.  No subluxation.  No scoliosis.     MARROW SIGNAL:  Normal marrow signal is identified within the visualized bony structures.  No discrete marrow lesion.     CERVICAL AND VISUALIZED THORACIC CORD:  Normal signal within the visualized cord.     PREVERTEBRAL AND PARASPINAL SOFT TISSUES:  Normal.     VISUALIZED POSTERIOR FOSSA:  The visualized posterior fossa demonstrates no abnormal signal.     CERVICAL DISC SPACES:     C2-C3:  Normal.     C3-C4:  Normal.     C4-C5: Bilateral uncovertebral hypertrophy without central canal or neural foraminal narrowing.     C5-C6: Right paracentral disc osteophyte complex and uncovertebral hypertrophy. There is no  central canal stenosis. Mild right neural foraminal narrowing.     C6-C7:  Right paracentral disc osteophyte complex and uncovertebral hypertrophy. There is no central canal stenosis. Mild right neural foraminal narrowing.     C7-T1:  Normal.     UPPER THORACIC DISC SPACES:  Normal.    IMPRESSION:  Stable mild degenerative changes at the C4-5 through the C6-7 levels without central canal narrowing. There is no new disc herniation.  The cervical cord appears normal in caliber and signal with no evidence of focal impingement.           XR spine cervical 2 or 3 vw injury  Order: [id number]  Impression    IMPRESSION: Marked reversal the normal cervical lordosis. No definite acute  compression fracture.    Workstation:[id number]  Narrative    EXAM: XR CERVICAL SPINE 2 OR 3 VW  DATE: 6/12/2024 5:25 PM  INDICATION: Ongoing cervical pain [M54.2, G89.29 (ICD-10-CM)]    COMPARISON: None.    Findings: Marked reversal the normal cervical lordosis. No definite acute  compression fracture  detected.  The odontoid and lateral masses are grossly  anatomic.  Moderate cervical spondylosis and facet arthrosis. Trace  anterolisthesis of C2 on C3. Mild anterior endplate spurring is present. Please  correlate with neurologic symptoms and if not contraindicated, dedicated MRI  cervical spine can be performed for further evaluation.        Past Medical History:   Diagnosis Date    Abnormal Pap smear of cervix     Anemia     Anxiety     Back pain     Depression     Fibromyalgia     GERD (gastroesophageal reflux disease)     IBS (irritable bowel syndrome)     Iron deficiency     Irregular menses     Menorrhagia     Migraines     Neuropathy     Obesity     Osteoarthritis of back     Osteopenia     RA (rheumatoid arthritis) (HCC)     Scratches     On back and shoulder from scratching due to urticaria    Vasculitis (HCC)     Wears glasses        Past Surgical History:   Procedure Laterality Date    CAST APPLICATION Left 11/13/2018    Procedure:  APPLICATION LONG ARM SUGARTONG SPLINT;  Surgeon: Kemal Harris MD;  [facility];  Service: Orthopedics    CAST APPLICATION Right 10/8/2019    Procedure: APPLICATION LONG ARM SUGAR TONG SPLINT;  Surgeon: Kemal Harris MD;  [facility];  Service: Orthopedics    CHOLECYSTECTOMY      Laparoscopic    COLONOSCOPY      DILATION AND CURETTAGE OF UTERUS      HYSTERECTOMY      JOINT REPLACEMENT Bilateral     knees    KY ARTHRODESIS WRIST W/ILIAC/OTHER AUTOGRAFT Left 4/4/2017    Procedure: WRIST FUSION / SPLINT APPLICATION ;  Surgeon: Kemal Harris MD;  [facility];  Service: Orthopedics    KY ARTHRODESIS WRIST W/ILIAC/OTHER AUTOGRAFT Right 4/10/2018    Procedure: Removal of hardware right wrist with Fusion of Long finger carpometacarpal joint, splint application Right Wrist;  Surgeon: Kemal Harris MD;  [facility];  Service: Orthopedics    KY BREAST REDUCTION Bilateral 9/7/2022    Procedure: BREAST REDUCTION;  Surgeon: Khurram Moreno MD;  Location:  MAIN OR;  Service: Plastics    KY COLONOSCOPY FLX DX W/COLLJ SPEC WHEN PFRMD N/A 2/22/2019    Procedure: COLONOSCOPY;  Surgeon: Donnie Ugalde MD;  Location: [facility];  Service: Gastroenterology    KY ESOPHAGOGASTRODUODENOSCOPY TRANSORAL DIAGNOSTIC N/A 2/22/2019    Procedure: ESOPHAGOGASTRODUODENOSCOPY (EGD);  Surgeon: Donnie Ugalde MD;  Location: [facility];  Service: Gastroenterology    KY EXCISION DISTAL ULNA PARTIAL/COMPLETE Right 10/8/2019    Procedure: EXCISION DISTAL ULNA (DARRACH PROCEDURE) right;  Surgeon: Kemal Harris MD;  [facility];  Service: Orthopedics    KY HYSTEROSCOPY BX ENDOMETRIUM&/POLYPC W/WO D&C N/A 12/1/2017    Procedure: DILATATION AND CURETTAGE (D&C) WITH HYSTEROSCOPY;  Surgeon: C William Riedel, DO;  [facility];  Service: Gynecology    KY LAPAROSCOPY SURG CHOLECYSTECTOMY N/A 3/14/2017    Procedure: CHOLECYSTECTOMY LAPAROSCOPIC;  Surgeon: Juan Keller DO;   [facility];  Service: General    WA LAPS TOTAL HYSTERECT 250 GM/< W/RMVL TUBE/OVARY N/A 1/18/2019    Procedure: HYSTERECTOMY LAPAROSCOPIC TOTAL (LTH) Bilateral salpingectomy, cystoscopy;  Surgeon: C William Riedel, DO;  [facility];  Service: Gynecology    WA REMOVAL IMPLANT DEEP Left 11/13/2018    Procedure: REMOVAL OF HARDWARE (wrist fusion plate  AND ulnar head arthroplasty) with conversion to Darrach.;  Surgeon: Kemal Harris MD;  [facility];  Service: Orthopedics    REPLACEMENT TOTAL KNEE BILATERAL      UPPER GASTROINTESTINAL ENDOSCOPY      WISDOM TOOTH EXTRACTION      WRIST SURGERY Right     For arthritis    WRIST SURGERY Left 4/4/2017    Procedure: ARTHROPLASTY WRIST ULNAR HEAD ARTHROPLASTY - INTEGRA SIZE 5.5 MM, 16 HEAD;  Surgeon: Kemal Harris MD;  [facility];  Service:     WRIST TENDON TRANSFER Right 10/8/2019    Procedure: TRANSFER TENDON EXTENSOR CARPI ULNARIS AT THE WRIST;  Surgeon: Kemal Harris MD;  [facility];  Service: Orthopedics       Social History     Socioeconomic History    Marital status:      Spouse name: Not on file    Number of children: Not on file    Years of education: Not on file    Highest education level: Not on file   Occupational History    Not on file   Tobacco Use    Smoking status: Former     Current packs/day: 0.00     Average packs/day: 0.3 packs/day for 3.0 years (0.8 ttl pk-yrs)     Types: Cigarettes     Start date: 2007     Quit date: 2010     Years since quitting: 15.2    Smokeless tobacco: Never   Vaping Use    Vaping status: Never Used   Substance and Sexual Activity    Alcohol use: Yes    Drug use: Yes     Types: Marijuana    Sexual activity: Not Currently     Partners: Male     Birth control/protection: Other   Other Topics Concern    Not on file   Social History Narrative    Consumes 3-4 cups of coffee per day     Social Drivers of Health     Financial Resource Strain: Not on file   Food  Insecurity: Not on file   Transportation Needs: Not on file   Physical Activity: Not on file   Stress: Not on file   Social Connections: Unknown (6/18/2024)    Received from [employer]    Social [employer] Communications     How often do you feel lonely or isolated from those around you? (Adult - for ages 18 years and over): Not on file   Intimate Partner Violence: Not on file   Housing Stability: Not on file       Family History   Problem Relation Age of Onset    Hypertension Mother     Rheum arthritis Mother     Depression Mother     Anxiety disorder Mother

## 2025-03-17 ENCOUNTER — TELEPHONE (OUTPATIENT)
Age: 35
End: 2025-03-17

## 2025-03-17 NOTE — TELEPHONE ENCOUNTER
Caller: Pt    Doctor: Dr. Piña    Reason for call: Pt calling to cancel injection procedure for tomorrow. Has conflicting appt schedule at the same time/    Please call to reschedule.     Call back#: [phone]

## 2025-03-17 NOTE — TELEPHONE ENCOUNTER
Call placed to patient.  Patient had her schedule confused.  Pt has appt with chiropractor on Tuesday 3/18/25 and appointment for glenohumeral joint injection Wednesday 3/19/25.  Patient will keep appointments as scheduled.

## 2025-03-18 ENCOUNTER — PROCEDURE VISIT (OUTPATIENT)
Age: 35
End: 2025-03-18
Payer: MEDICARE

## 2025-03-18 VITALS — WEIGHT: 157 LBS | HEIGHT: 67 IN | BODY MASS INDEX: 24.64 KG/M2

## 2025-03-18 DIAGNOSIS — M99.01 SEGMENTAL DYSFUNCTION OF CERVICAL REGION: Primary | ICD-10-CM

## 2025-03-18 DIAGNOSIS — M79.18 MYOFASCIAL PAIN: ICD-10-CM

## 2025-03-18 DIAGNOSIS — M54.2 NECK PAIN: ICD-10-CM

## 2025-03-18 DIAGNOSIS — M47.812 CERVICAL SPONDYLOSIS: ICD-10-CM

## 2025-03-18 DIAGNOSIS — M99.02 SEGMENTAL DYSFUNCTION OF THORACIC REGION: ICD-10-CM

## 2025-03-18 PROCEDURE — 98940 CHIROPRACT MANJ 1-2 REGIONS: CPT | Performed by: CHIROPRACTOR

## 2025-03-18 NOTE — PROGRESS NOTES
Initial chiropractic visit--3/6/25, visit #2    Acute corrective treatment     Assessment:     1. Segmental dysfunction of cervical region        2. Cervical spondylosis        3. Neck pain        4. Myofascial pain        5. Segmental dysfunction of thoracic region            Condition is the same    PLAN/TREATMENT:    Return for treatment once next week.   Continue using 15 minutes as needed for posttreatment soreness.ice  Encouraged to consult with PMR/Pain medicine due to the chronicity of her symptoms.     Treatment:  Myofascial release was performed to the bilateral upper trapezius, bilateral levator scapula, bilateral rhomboids, bilateral cervical and thoracic paraspinals.    Manipulation was performed to the thoracic spine utilizing activator to T4-5 and T5-6.  Manipulation was performed to the cervical spine at C5-6 utilizing stairstepping technique.  No HVLA was performed to the cervical spine.    Subjective:  Penelope aggarwal is here today with complaints of neck and upper back pain on the right side.  Last visit was 3/6/25.  She went to see Dr. Piña as her shoulder were bothering her a lot.  She is scheduled to have shoulder injections tomorrow.  She was also referred to pain medicine/PMR but is hesitant to schedule that.     Objective:    Moderate spasm and tenderness is noted in the bilateral upper trapezius, bilateral levator scapula, bilateral rhomboids, bilateral cervical and thoracic paraspinals.  Multiple trigger points are noted in the right upper trapezius, bilateral rhomboids, bilateral cervical paraspinals.  Decreased mobility and tenderness is noted at T3-4, T4-5, T5-6 as well as C5-6.

## 2025-03-19 ENCOUNTER — HOSPITAL ENCOUNTER (OUTPATIENT)
Dept: RADIOLOGY | Facility: MEDICAL CENTER | Age: 35
Discharge: HOME | End: 2025-03-19
Payer: MEDICARE

## 2025-03-19 VITALS
DIASTOLIC BLOOD PRESSURE: 73 MMHG | TEMPERATURE: 97.3 F | HEART RATE: 75 BPM | OXYGEN SATURATION: 96 % | SYSTOLIC BLOOD PRESSURE: 108 MMHG | RESPIRATION RATE: 20 BRPM

## 2025-03-19 DIAGNOSIS — M25.511 CHRONIC PAIN OF BOTH SHOULDERS: ICD-10-CM

## 2025-03-19 DIAGNOSIS — G89.29 CHRONIC PAIN OF BOTH SHOULDERS: ICD-10-CM

## 2025-03-19 DIAGNOSIS — M25.512 CHRONIC PAIN OF BOTH SHOULDERS: ICD-10-CM

## 2025-03-19 DIAGNOSIS — M75.81 TENDINITIS OF RIGHT ROTATOR CUFF: ICD-10-CM

## 2025-03-19 DIAGNOSIS — M05.731 RHEUMATOID ARTHRITIS INVOLVING RIGHT WRIST WITH POSITIVE RHEUMATOID FACTOR (HCC): ICD-10-CM

## 2025-03-19 PROCEDURE — 77002 NEEDLE LOCALIZATION BY XRAY: CPT

## 2025-03-19 PROCEDURE — 77002 NEEDLE LOCALIZATION BY XRAY: CPT | Performed by: PHYSICAL MEDICINE & REHABILITATION

## 2025-03-19 PROCEDURE — 20610 DRAIN/INJ JOINT/BURSA W/O US: CPT | Performed by: PHYSICAL MEDICINE & REHABILITATION

## 2025-03-19 RX ORDER — ROPIVACAINE HYDROCHLORIDE 2 MG/ML
3 INJECTION, SOLUTION EPIDURAL; INFILTRATION; PERINEURAL ONCE
Status: COMPLETED | OUTPATIENT
Start: 2025-03-19 | End: 2025-03-19

## 2025-03-19 RX ORDER — METHYLPREDNISOLONE ACETATE 40 MG/ML
40 INJECTION, SUSPENSION INTRA-ARTICULAR; INTRALESIONAL; INTRAMUSCULAR; PARENTERAL; SOFT TISSUE ONCE
Status: COMPLETED | OUTPATIENT
Start: 2025-03-19 | End: 2025-03-19

## 2025-03-19 RX ADMIN — METHYLPREDNISOLONE ACETATE 40 MG: 40 INJECTION, SUSPENSION INTRA-ARTICULAR; INTRALESIONAL; INTRAMUSCULAR; SOFT TISSUE at 08:56

## 2025-03-19 RX ADMIN — ROPIVACAINE HYDROCHLORIDE 3 ML: 2 INJECTION, SOLUTION EPIDURAL; INFILTRATION at 08:56

## 2025-03-19 RX ADMIN — IOHEXOL 2 ML: 300 INJECTION, SOLUTION INTRAVENOUS at 08:56

## 2025-03-19 NOTE — DISCHARGE INSTR - LAB

## 2025-03-19 NOTE — H&P
History of Present Illness: The patient is a 34 y.o. female who presents with complaints of right shoulder pain    Past Medical History:   Diagnosis Date    Abnormal Pap smear of cervix     Anemia     Anxiety     Back pain     Depression     Fibromyalgia     GERD (gastroesophageal reflux disease)     IBS (irritable bowel syndrome)     Iron deficiency     Irregular menses     Menorrhagia     Migraines     Neuropathy     Obesity     Osteoarthritis of back     Osteopenia     RA (rheumatoid arthritis) (HCC)     Scratches     On back and shoulder from scratching due to urticaria    Vasculitis (HCC)     Wears glasses        Past Surgical History:   Procedure Laterality Date    CAST APPLICATION Left 11/13/2018    Procedure: APPLICATION LONG ARM SUGARTONG SPLINT;  Surgeon: Kemal Harris MD;  Location:  MAIN OR;  Service: Orthopedics    CAST APPLICATION Right 10/8/2019    Procedure: APPLICATION LONG ARM SUGAR TONG SPLINT;  Surgeon: Kemal Harris MD;  Location:  MAIN OR;  Service: Orthopedics    CHOLECYSTECTOMY      Laparoscopic    COLONOSCOPY      DILATION AND CURETTAGE OF UTERUS      HYSTERECTOMY      JOINT REPLACEMENT Bilateral     knees    TN ARTHRODESIS WRIST W/ILIAC/OTHER AUTOGRAFT Left 4/4/2017    Procedure: WRIST FUSION / SPLINT APPLICATION ;  Surgeon: Kemal Harris MD;  [facility]: [facility];  Service: Orthopedics    TN ARTHRODESIS WRIST W/ILIAC/OTHER AUTOGRAFT Right 4/10/2018    Procedure: Removal of hardware right wrist with Fusion of Long finger carpometacarpal joint, splint application Right Wrist;  Surgeon: Kemal Harris MD;  Location:  MAIN OR;  Service: Orthopedics    TN BREAST REDUCTION Bilateral 9/7/2022    Procedure: BREAST REDUCTION;  Surgeon: Khurram Moreno MD;  Location:  MAIN OR;  Service: Plastics    TN COLONOSCOPY FLX DX W/COLLJ SPEC WHEN PFRMD N/A 2/22/2019    Procedure: COLONOSCOPY;  Surgeon: Donnie Ugalde MD;  Location: [facility];  Service: Gastroenterology    TN  ESOPHAGOGASTRODUODENOSCOPY TRANSORAL DIAGNOSTIC N/A 2/22/2019    Procedure: ESOPHAGOGASTRODUODENOSCOPY (EGD);  Surgeon: Donnie Ugalde MD;  Location: [facility];  Service: Gastroenterology    IA EXCISION DISTAL ULNA PARTIAL/COMPLETE Right 10/8/2019    Procedure: EXCISION DISTAL ULNA (DARRACH PROCEDURE) right;  Surgeon: Kemal Harris MD;  [facility]: [facility];  Service: Orthopedics    IA HYSTEROSCOPY BX ENDOMETRIUM&/POLYPC W/WO D&C N/A 12/1/2017    Procedure: DILATATION AND CURETTAGE (D&C) WITH HYSTEROSCOPY;  Surgeon: C William Riedel, DO;  Location: [facility];  Service: Gynecology    IA LAPAROSCOPY SURG CHOLECYSTECTOMY N/A 3/14/2017    Procedure: CHOLECYSTECTOMY LAPAROSCOPIC;  Surgeon: Juan Keller DO;  [facility]: [facility];  Service: General    IA LAPS TOTAL HYSTERECT 250 GM/< W/RMVL TUBE/OVARY N/A 1/18/2019    Procedure: HYSTERECTOMY LAPAROSCOPIC TOTAL (LTH) Bilateral salpingectomy, cystoscopy;  Surgeon: C William Riedel, DO;  [facility]: [facility];  Service: Gynecology    IA REMOVAL IMPLANT DEEP Left 11/13/2018    Procedure: REMOVAL OF HARDWARE (wrist fusion plate  AND ulnar head arthroplasty) with conversion to Darrach.;  Surgeon: Kemal Harris MD;  [facility];  Service: Orthopedics    REPLACEMENT TOTAL KNEE BILATERAL      UPPER GASTROINTESTINAL ENDOSCOPY      WISDOM TOOTH EXTRACTION      WRIST SURGERY Right     For arthritis    WRIST SURGERY Left 4/4/2017    Procedure: ARTHROPLASTY WRIST ULNAR HEAD ARTHROPLASTY - INTEGRA SIZE 5.5 MM, 16 HEAD;  Surgeon: Kemal Harris MD;  [facility];  Service:     WRIST TENDON TRANSFER Right 10/8/2019    Procedure: TRANSFER TENDON EXTENSOR CARPI ULNARIS AT THE WRIST;  Surgeon: Kemal Harris MD;  [facility];  Service: Orthopedics         Current Outpatient Medications:     acetaminophen (TYLENOL) 500 mg tablet, Take 1 tablet (500 mg total) by mouth every 6 (six) hours as needed for mild pain or moderate pain, Disp: 30  tablet, Rfl: 0    albuterol (Proventil HFA) 90 mcg/act inhaler, Inhale 1-2 puffs every 6 (six) hours as needed for wheezing, Disp: 8 g, Rfl: 0    ALPRAZolam (XANAX) 0.5 mg tablet, Take by mouth as needed for anxiety, Disp: , Rfl:     aluminum-magnesium hydroxide 200-200 MG/5ML suspension, Take 10 mL by mouth every 6 (six) hours as needed for cramping, Disp: 355 mL, Rfl: 0    Calcium Carb-Cholecalciferol (CALCIUM 1000 + D PO), Take 1 tablet by mouth daily, Disp: , Rfl:     clotrimazole-betamethasone (LOTRISONE) 1-0.05 % cream, Apply topically 2 (two) times a day for 28 days Apply thin layer to affected area twice daily., Disp: 45 g, Rfl: 1    Cyanocobalamin (Vitamin B-12) 50 MCG LOZG, , Disp: , Rfl:     Diclofenac Sodium (VOLTAREN) 1 %, Apply 2 g topically 4 (four) times a day as needed (Pain) (Patient not taking: Reported on 3/14/2025), Disp: 100 g, Rfl: 0    diphenoxylate-atropine (LOMOTIL) 2.5-0.025 mg per tablet, Take by mouth every 12 (twelve) hours, Disp: , Rfl:     ergocalciferol (VITAMIN D2) 50,000 units, Take 50,000 Units by mouth once a week, Disp: , Rfl:     escitalopram (LEXAPRO) 20 mg tablet, Take 20 mg by mouth every morning, Disp: , Rfl:     hydrOXYzine HCL (ATARAX) 50 mg tablet, Take 1 tablet by mouth in the morning, Disp: , Rfl:     Lidocaine Viscous HCl (XYLOCAINE) 2 % mucosal solution, Swish and spit 15 mL 4 (four) times a day as needed for mouth pain or discomfort, Disp: 300 mL, Rfl: 4    loperamide (IMODIUM) 2 mg capsule, Take 2 mg by mouth 4 (four) times a day as needed for diarrhea, Disp: , Rfl:     mupirocin (BACTROBAN) 2 % ointment, Apply topically 3 (three) times a day (Patient not taking: Reported on 3/13/2025), Disp: 22 g, Rfl: 3    omeprazole (PriLOSEC) 40 MG capsule, Take 1 capsule (40 mg total) by mouth daily, Disp: 30 capsule, Rfl: 2    ondansetron (ZOFRAN) 4 mg tablet, Take 4 mg by mouth every 8 (eight) hours as needed  , Disp: , Rfl:     ondansetron (ZOFRAN-ODT) 4 mg disintegrating  tablet, Take 1 tablet (4 mg total) by mouth every 6 (six) hours as needed for nausea (Patient not taking: Reported on 3/14/2025), Disp: 20 tablet, Rfl: 0    oxyCODONE (ROXICODONE) 15 mg immediate release tablet, Take 15 mg by mouth 3 (three) times a day, Disp: , Rfl:     predniSONE 5 mg tablet, Take 14 mg by mouth daily, Disp: , Rfl:     pregabalin (LYRICA) 100 mg capsule, Take one tablet twice a day and 2 tablets at bedtime, Disp: , Rfl:     Sodium Fluoride 1.1 % PSTE, Apply 5 mL to teeth daily (Patient not taking: Reported on 3/14/2025), Disp: 100 mL, Rfl: 0    solifenacin (VESICARE) 10 MG tablet, Take 1 tablet (10 mg total) by mouth daily, Disp: 90 tablet, Rfl: 3    SUMAtriptan (IMITREX) 100 mg tablet, Take 100 mg by mouth once as needed for migraine, Disp: , Rfl:     Virt-Phos 250 Neutral [phone] MG tablet, if needed, Disp: , Rfl:     Current Facility-Administered Medications:     iohexol (OMNIPAQUE) 300 mg/mL injection 2 mL, 2 mL, Injection, Once, Hitesh Mendiola DO    methylPREDNISolone acetate (DEPO-MEDROL) injection 40 mg, 40 mg, Intra-articular, Once, Hitesh Mendiola DO    ropivacaine (NAROPIN) injection 3 mL, 3 mL, Intra-articular, Once, Hitesh Mendiola DO    Allergies   Allergen Reactions    Nickel Rash    Tylenol With Codeine #3 [Acetaminophen-Codeine] Hives, Itching and Facial Swelling     Can take oxycodone       Physical Exam:   Vitals:    03/19/25 0852   BP: 108/75   Pulse: 75   Resp: 20   Temp: (!) 97.3 °F (36.3 °C)   SpO2: 98%     General: Awake, Alert, Oriented x 3, Mood and affect appropriate  Respiratory: Respirations even and unlabored  Cardiovascular: Peripheral pulses intact; no edema  Musculoskeletal Exam: right shoulder pain    ASA Score: 3    Patient/Chart Verification  Patient ID Verified: Verbal  ID Band Applied: No  Consents Confirmed: To be obtained in the Procedural area  Interval H&P(within 24 hr) Complete (required for Outpatients and Surgery Admit only): To be obtained in the  Procedural area  Allergies Reviewed: Yes  Anticoag/NSAID held?: NA  Currently on antibiotics?: No  Pregnancy denied?: Yes    Assessment:   1. Chronic pain of both shoulders    2. 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)    3. Tendinitis of right rotator cuff        Plan: Right shoulder pain

## 2025-03-20 ENCOUNTER — APPOINTMENT (OUTPATIENT)
Dept: LAB | Facility: HOSPITAL | Age: 35
End: 2025-03-20
Payer: MEDICARE

## 2025-03-20 DIAGNOSIS — R73.01 IFG (IMPAIRED FASTING GLUCOSE): ICD-10-CM

## 2025-03-20 DIAGNOSIS — Z11.3 ROUTINE SCREENING FOR STI (SEXUALLY TRANSMITTED INFECTION): ICD-10-CM

## 2025-03-20 DIAGNOSIS — N39.0 URINARY TRACT INFECTION WITHOUT HEMATURIA, SITE UNSPECIFIED: ICD-10-CM

## 2025-03-20 LAB
BACTERIA UR QL AUTO: ABNORMAL /HPF
BILIRUB UR QL STRIP: NEGATIVE
CLARITY UR: CLEAR
COLOR UR: ABNORMAL
GLUCOSE UR STRIP-MCNC: NEGATIVE MG/DL
HGB UR QL STRIP.AUTO: NEGATIVE
KETONES UR STRIP-MCNC: NEGATIVE MG/DL
LEUKOCYTE ESTERASE UR QL STRIP: 25
MUCOUS THREADS UR QL AUTO: ABNORMAL
NITRITE UR QL STRIP: NEGATIVE
NON-SQ EPI CELLS URNS QL MICRO: ABNORMAL /HPF
PH UR STRIP.AUTO: 6 [PH]
PROT UR STRIP-MCNC: NEGATIVE MG/DL
RBC #/AREA URNS AUTO: ABNORMAL /HPF
SP GR UR STRIP.AUTO: 1.01 (ref 1–1.04)
UROBILINOGEN UA: NEGATIVE MG/DL
WBC #/AREA URNS AUTO: ABNORMAL /HPF

## 2025-03-20 PROCEDURE — 87389 HIV-1 AG W/HIV-1&-2 AB AG IA: CPT

## 2025-03-20 PROCEDURE — 86780 TREPONEMA PALLIDUM: CPT

## 2025-03-20 PROCEDURE — 87350 HEPATITIS BE AG IA: CPT

## 2025-03-20 PROCEDURE — 36415 COLL VENOUS BLD VENIPUNCTURE: CPT

## 2025-03-20 PROCEDURE — 81001 URINALYSIS AUTO W/SCOPE: CPT

## 2025-03-21 LAB
HBV E AG SERPL QL IA: NORMAL
HIV 1+2 AB+HIV1 P24 AG SERPL QL IA: NORMAL
TREPONEMA PALLIDUM IGG+IGM AB [PRESENCE] IN SERUM OR PLASMA BY IMMUNOASSAY: NORMAL

## 2025-03-25 ENCOUNTER — PROCEDURE VISIT (OUTPATIENT)
Age: 35
End: 2025-03-25
Payer: MEDICARE

## 2025-03-25 DIAGNOSIS — M79.18 MYOFASCIAL PAIN: ICD-10-CM

## 2025-03-25 DIAGNOSIS — M54.2 NECK PAIN: ICD-10-CM

## 2025-03-25 DIAGNOSIS — M99.02 SEGMENTAL DYSFUNCTION OF THORACIC REGION: ICD-10-CM

## 2025-03-25 DIAGNOSIS — M47.812 CERVICAL SPONDYLOSIS: ICD-10-CM

## 2025-03-25 DIAGNOSIS — M99.01 SEGMENTAL DYSFUNCTION OF CERVICAL REGION: Primary | ICD-10-CM

## 2025-03-25 PROCEDURE — 98940 CHIROPRACT MANJ 1-2 REGIONS: CPT | Performed by: CHIROPRACTOR

## 2025-03-25 NOTE — PROGRESS NOTES
Initial chiropractic visit--3/6/25, visit #5    Acute corrective treatment     Assessment:     1. Segmental dysfunction of cervical region        2. Cervical spondylosis        3. Neck pain        4. Myofascial pain        5. Segmental dysfunction of thoracic region            Condition is the same    PLAN/TREATMENT:    Return for treatment later this week.   Continue using 15 minutes as needed for posttreatment soreness.ice  She is considering making pain medicine appointment.    Treatment:  Myofascial release was performed to the bilateral upper trapezius, bilateral levator scapula, bilateral rhomboids, bilateral cervical and thoracic paraspinals.    Manipulation was performed to the thoracic spine utilizing activator to T4-5 and T5-6.  Manipulation was performed to the cervical spine at C5-6 utilizing stairstepping technique.  No HVLA was performed to the cervical spine.    Subjective:  Penelope aggarwal is here today with complaints of neck and upper back pain on the right side.  Noticing neck a little more since getting injection into the right shoulder, which helped with shoulder pain.     Objective:    Moderate spasm and tenderness is noted in the bilateral upper trapezius, bilateral levator scapula, bilateral rhomboids, bilateral cervical and thoracic paraspinals.  Multiple trigger points are noted in the right upper trapezius, bilateral rhomboids, bilateral cervical paraspinals.  Decreased mobility and tenderness is noted at T3-4, T4-5, T5-6 as well as C5-6.       70

## 2025-03-28 ENCOUNTER — TELEPHONE (OUTPATIENT)
Age: 35
End: 2025-03-28

## 2025-03-28 NOTE — TELEPHONE ENCOUNTER
Patient called, unable to make 1:00 colposcopy with Dr Orellana today. Pt experiencing a rheumatoid arthritis flare; hands are swollen & painful, unable to undress/ dress per pt.     S/w Jeanna in office, no appts available within 30 days. Pt placed on wait list & suggested to send tel encounter to office clinical to monitor & reach out to pt when able to r/s.

## 2025-03-31 NOTE — TELEPHONE ENCOUNTER
Left voice mail for patient to offer appointment for colposcopy for 4-24-25 @ 7:45 am.  Please call the office to accept or decline this appointment.

## 2025-04-02 ENCOUNTER — TELEPHONE (OUTPATIENT)
Dept: PAIN MEDICINE | Facility: CLINIC | Age: 35
End: 2025-04-02

## 2025-04-03 ENCOUNTER — PROCEDURE VISIT (OUTPATIENT)
Age: 35
End: 2025-04-03
Payer: MEDICARE

## 2025-04-03 DIAGNOSIS — M54.2 NECK PAIN: ICD-10-CM

## 2025-04-03 DIAGNOSIS — M99.01 SEGMENTAL DYSFUNCTION OF CERVICAL REGION: Primary | ICD-10-CM

## 2025-04-03 DIAGNOSIS — M99.02 SEGMENTAL DYSFUNCTION OF THORACIC REGION: ICD-10-CM

## 2025-04-03 DIAGNOSIS — M79.18 MYOFASCIAL PAIN: ICD-10-CM

## 2025-04-03 DIAGNOSIS — M47.812 CERVICAL SPONDYLOSIS: ICD-10-CM

## 2025-04-03 PROCEDURE — 98940 CHIROPRACT MANJ 1-2 REGIONS: CPT | Performed by: CHIROPRACTOR

## 2025-04-03 NOTE — PROGRESS NOTES
Initial chiropractic visit--3/6/25, visit #6    Acute corrective treatment     Assessment:     1. Segmental dysfunction of cervical region        2. Cervical spondylosis        3. Neck pain        4. Myofascial pain        5. Segmental dysfunction of thoracic region            Condition is improving.    PLAN/TREATMENT:    Return for treatment once next week.   Continue using 15 minutes as needed for posttreatment soreness.ice  She is considering making pain medicine appointment.    Treatment:  Myofascial release was performed to the bilateral upper trapezius, bilateral levator scapula, bilateral rhomboids, bilateral cervical and thoracic paraspinals.    Manipulation was performed to the thoracic spine utilizing activator to T4-5 and T5-6.  Manipulation was performed to the cervical spine at C5-6 utilizing stairstepping technique.  No HVLA was performed to the cervical spine.    Subjective:  Penelope aggarwal is here today with complaints of neck and upper back pain on the right side  Pain is less frequent and less intense.  Not feeling it every day now.     Objective:    Mild/moderate spasm and tenderness is noted in the bilateral upper trapezius, bilateral levator scapula, bilateral rhomboids, bilateral cervical and thoracic paraspinals.  Multiple trigger points are noted in the right upper trapezius, bilateral rhomboids, bilateral cervical paraspinals.  Decreased mobility and tenderness is noted at T3-4, T4-5, T5-6 as well as C5-6.

## 2025-04-10 ENCOUNTER — PROCEDURE VISIT (OUTPATIENT)
Age: 35
End: 2025-04-10
Payer: MEDICARE

## 2025-04-10 DIAGNOSIS — M99.02 SEGMENTAL DYSFUNCTION OF THORACIC REGION: ICD-10-CM

## 2025-04-10 DIAGNOSIS — M54.2 NECK PAIN: ICD-10-CM

## 2025-04-10 DIAGNOSIS — Z00.6 ENCOUNTER FOR EXAMINATION FOR NORMAL COMPARISON OR CONTROL IN CLINICAL RESEARCH PROGRAM: ICD-10-CM

## 2025-04-10 DIAGNOSIS — M47.812 CERVICAL SPONDYLOSIS: ICD-10-CM

## 2025-04-10 DIAGNOSIS — M99.01 SEGMENTAL DYSFUNCTION OF CERVICAL REGION: Primary | ICD-10-CM

## 2025-04-10 DIAGNOSIS — M79.18 MYOFASCIAL PAIN: ICD-10-CM

## 2025-04-10 PROCEDURE — 98940 CHIROPRACT MANJ 1-2 REGIONS: CPT | Performed by: CHIROPRACTOR

## 2025-04-10 NOTE — PROGRESS NOTES
Initial chiropractic visit--3/6/25, visit #7    Acute corrective treatment     Assessment:     1. Segmental dysfunction of cervical region        2. Cervical spondylosis        3. Neck pain        4. Myofascial pain        5. Segmental dysfunction of thoracic region            Condition is improving.    PLAN/TREATMENT:    Return for treatment once next week.   Continue using 15 minutes as needed for posttreatment soreness.ice  She is considering making pain medicine appointment.    Treatment:  Myofascial release was performed to the bilateral upper trapezius, bilateral levator scapula, bilateral rhomboids, bilateral cervical and thoracic paraspinals.    Manipulation was performed to the thoracic spine utilizing activator to T4-5 and T5-6.  Manipulation was performed to the cervical spine at C5-6 utilizing stairstepping technique.  No HVLA was performed to the cervical spine.    Subjective:  Penelope aggarwal is here today with complaints of neck and upper back pain on the right side  Pain is about the same as last visit.  Feels overall improvement.     Objective:    Mild/moderate spasm and tenderness is noted in the bilateral upper trapezius, bilateral levator scapula, bilateral rhomboids, bilateral cervical and thoracic paraspinals.  Multiple trigger points are noted in the right upper trapezius, bilateral rhomboids, bilateral cervical paraspinals.  Decreased mobility and tenderness is noted at T3-4, T4-5, T5-6 as well as C5-6.

## 2025-04-15 ENCOUNTER — HOSPITAL ENCOUNTER (OUTPATIENT)
Dept: RADIOLOGY | Facility: HOSPITAL | Age: 35
Discharge: HOME | End: 2025-04-15
Payer: MEDICARE

## 2025-04-15 DIAGNOSIS — M79.631 PAIN OF RIGHT FOREARM: ICD-10-CM

## 2025-04-15 PROCEDURE — 76882 US LMTD JT/FCL EVL NVASC XTR: CPT

## 2025-04-17 ENCOUNTER — PROCEDURE VISIT (OUTPATIENT)
Age: 35
End: 2025-04-17
Payer: MEDICARE

## 2025-04-17 DIAGNOSIS — M79.18 MYOFASCIAL PAIN: ICD-10-CM

## 2025-04-17 DIAGNOSIS — M54.2 NECK PAIN: ICD-10-CM

## 2025-04-17 DIAGNOSIS — M99.01 SEGMENTAL DYSFUNCTION OF CERVICAL REGION: Primary | ICD-10-CM

## 2025-04-17 DIAGNOSIS — M47.812 CERVICAL SPONDYLOSIS: ICD-10-CM

## 2025-04-17 DIAGNOSIS — M99.02 SEGMENTAL DYSFUNCTION OF THORACIC REGION: ICD-10-CM

## 2025-04-17 PROCEDURE — 98940 CHIROPRACT MANJ 1-2 REGIONS: CPT | Performed by: CHIROPRACTOR

## 2025-04-17 NOTE — PROGRESS NOTES
Initial chiropractic visit--3/6/25, visit #8    Acute corrective treatment     Assessment:     1. Segmental dysfunction of cervical region        2. Cervical spondylosis        3. Neck pain        4. Myofascial pain        5. Segmental dysfunction of thoracic region              PLAN/TREATMENT:  Return for treatment once next week.   Continue using 15 minutes as needed for posttreatment soreness.ice  She is considering making pain medicine appointment.    Treatment:  Myofascial release was performed to the bilateral upper trapezius, bilateral levator scapula, bilateral rhomboids, bilateral cervical and thoracic paraspinals.    Manipulation was performed to the thoracic spine utilizing activator to T4-5 and T5-6.  Manipulation was performed to the cervical spine at C5-6 utilizing stairstepping technique.  No HVLA was performed to the cervical spine.    Subjective:  Penelope aggarwal is here today with complaints of neck and upper back pain on the right side  Pain is about the same as last visit.  Feels worse since doing housework this week, mainly laundry.  Was feeling better prior to that.  Feels improvement after treatments.     Objective:    Mild/moderate spasm and tenderness is noted in the bilateral upper trapezius, bilateral levator scapula, bilateral rhomboids, bilateral cervical and thoracic paraspinals.  Multiple trigger points are noted in the right upper trapezius, bilateral rhomboids, bilateral cervical paraspinals.  Decreased mobility and tenderness is noted at T3-4, T4-5, T5-6 as well as C5-6.

## 2025-04-23 ENCOUNTER — HOSPITAL ENCOUNTER (OUTPATIENT)
Dept: RADIOLOGY | Facility: HOSPITAL | Age: 35
Discharge: HOME | End: 2025-04-23
Attending: ORTHOPAEDIC SURGERY
Payer: MEDICARE

## 2025-04-23 ENCOUNTER — OFFICE VISIT (OUTPATIENT)
Dept: OBGYN CLINIC | Facility: HOSPITAL | Age: 35
End: 2025-04-23
Payer: MEDICARE

## 2025-04-23 VITALS — BODY MASS INDEX: 24.64 KG/M2 | HEIGHT: 67 IN | WEIGHT: 157 LBS

## 2025-04-23 DIAGNOSIS — R52 PAIN: ICD-10-CM

## 2025-04-23 DIAGNOSIS — M77.8 LEFT WRIST TENDINITIS: ICD-10-CM

## 2025-04-23 DIAGNOSIS — M65.4 TENDINITIS, DE QUERVAIN'S: Primary | ICD-10-CM

## 2025-04-23 PROCEDURE — 99214 OFFICE O/P EST MOD 30 MIN: CPT | Performed by: ORTHOPAEDIC SURGERY

## 2025-04-23 PROCEDURE — 73110 X-RAY EXAM OF WRIST: CPT

## 2025-04-23 RX ORDER — CEFAZOLIN SODIUM 2 G/50ML
2000 SOLUTION INTRAVENOUS ONCE
OUTPATIENT
Start: 2025-04-23 | End: 2025-04-23

## 2025-04-23 NOTE — PROGRESS NOTES
ORTHOPAEDIC HAND, WRIST, AND ELBOW OFFICE  VISIT     Name: Penelope Sehlby      : 1990      MRN: [id number]  Encounter Provider: Kemal Harris MD  Encounter Date: 2025   Encounter department: [facility] [place]  :  Assessment & Plan  Tendinitis, de Quervain's  -Reviewed physical exam with patient at time of visit. US findings demonstrate no evidence of subluxing FCR, FCU, or ECU tendon.   -Discussed treatment options including continued observation, activity modification, bracing, anti-inflammatories, hand therapy, cortisone injection, versus surgical intervention.  - Patient elected to proceed with surgical intervention of right De Quervain's release.  - Surgical process and recovery process was discussed in great detail at this time  - patient will follow-up in the office post-operatively      Orders:  •  Case request operating room: Right de Quervain's release; Standing    Left wrist tendinitis  -Reviewed physical exam and imaging with patient at time of visit. Radiographic findings demonstrate convergence localized to the left wrist with no evidence of dorsal subluxation of the ulna.  Significant changes noted for previous surgical interventions.  No significant thumb CMC arthritis..   - discussed with the patient that further imaging is required to determine what is causing her pain  - MRI left wrist woc was ordered at time of visit  - patient will follow-up in the office post-operatively from right De Quervain's release, at that time we will discuss MRI left wrist  -Treatment plan will be discussed based on imaging results  -The patient expresses understanding and is in agreement with today's treatment plan.       Orders:  •  XR wrist 3+ vw left; Future  •  MRI wrist left wo contrast; Future        History of Present Illness   HPI  Chief Complaint   Patient presents with   • Right Hand - Follow-up     De Quervains inj - #1 3/12  US - 4/15       Penelope Shelby  "is a 34 y.o. female who presents for follow-up evaluation regarding right hand pain and wrist pain.  Patient has a previous surgical history with Dr. Harris for a right wrist fusion and darrach procedure in 2017.  She states of recent she started to have an increase in pain at the base of her thumb as well as over the dorsal radial aspect of her wrist.  She also have a clicking sensation as she pronates and supinates the wrist.  She saw [facility] and had 2 cortisone injections over the course of 3 weeks which only provided minimal relief of her pain and discomfort at the base of her thumb.  She has been wearing a custom fabricated brace from therapy which she states causes some discomfort from wearing it. At last visit on 3/12/25, patient was given a CS injection to right wrist for De Quervain's tenosynovitis which offered approximately 2-3 days of complete relief.  Patient states that she has also having pain into the left wrist, at the dorsal aspect.      REVIEW OF SYSTEMS:  General: no fever, no chills  HEENT:  No loss of hearing or eyesight problems  Eyes:  No red eyes  Respiratory:  No coughing, shortness of breath or wheezing  Cardiovascular:  No chest pain, no palpitations  GI:  Abdomen soft nontender, denies nausea  Endocrine:  No muscle weakness, no frequent urination, no excessive thirst  Urinary:  No dysuria, no incontinence  Musculoskeletal: see HPI and PE  SKIN:  No skin rash, no dry skin  Neurological:  No headaches, no confusion  Psychiatric:  No suicide thoughts, no anxiety, no depression  Review of all other systems is negative    Objective   Ht 5' 7\" (1.702 m)   Wt 71.2 kg (157 lb)   LMP  (LMP Unknown) Comment: irregular  BMI 24.59 kg/m²      General: well developed and well nourished, alert, oriented times 3, and appears comfortable  Psychiatric: Normal  HEENT: Trachea Midline, No torticollis  Cardiovascular: No discernable arrhythmia  Pulmonary: No wheezing or stridor  Abdomen: No rebound or " guarding  Extremities: No peripheral edema  Skin: No masses, erythema, lacerations, fluctation, ulcerations  Neurovascular: Sensation Intact to the Median, Ulnar, Radial Nerve, Motor Intact to the Median, Ulnar, Radial Nerve, and Pulses Intact    Musculoskeletal exam:  right CMC Exam:  No adduction contracture  No hyperextension deformity of MCP joint  Positive localized tenderness over radial and dorsal aspect of thumb (CMC joint)  Grind test is Positive for pain and Positive for crepitus  No triggering or tenderness over the A1 pulley  No pain with Finkelstein’s maneuver     Palpable click appreciated over the wrist with pronation and supination on the right side over the ulna  Tenderness to palpation distal third of the forearm between the radius and ulna    De Quervain's Tenosynovitis Exam:  right side    Positive tender to palpation over 1st dorsal extensor compartment   Positive palpable nodule  Positive crepitus over 1st dorsal extensor compartment   Positive Finkelstein's test    Positive pain with resisted abduction of the thumb      left wrist -  Patient presents with no obvious anatomical deformity  Skin is warm and dry to touch with no signs of erythema, ecchymosis, infection  ROM decreased in  Wrist flexion extension, ulnar and radial deviation  TTP over dorsal extensor tendons and extensor carpi ulnaris tendon as well as the thumb CMC joint  MMT: deferred  Mild generalized soft tissue swelling or effusion noted  Full FDS, FDP, extensor mechanisms are intact  - Thenar atrophy, - intrinsic atrophy  - Tinel's at carpal tunnel  - Phalen's sign  - Carpal Tunnel Compression  - AP Drawer  - LM Drawer  - Finklestein  Positive ulnar / Radial impaction   Negative Au's  Demonstrates decreased wrist motion .  Demonstrates WNL elbow and shoulder motion  Forearm compartments are soft and supple  2+ Distal radial pulse with brisk capillary refill to the fingers  Radial, median, ulnar motor and sensory  distribution intact  Sensation to light touch intact distally        STUDIES REVIEWED:  Images of left wrist were reviewed in PACS and demonstrate:     Ultrasound of right wrist from 4/15/2025 were reviewed in PACS and demonstrate: no evidence of subluxing FCR, FCU, or ECU tendon      Images of the right wrist from 1/7/25 were reviewed in PACS and demonstrate: X-rays of the right wrist 3 views were reviewed with Dr. Harris and demonstrated significant rheumatological disease of the wrist with distal ulna resection, significant CMC arthritis.       PROCEDURES PERFORMED:  Procedures   None performed today      Scribe Attestation    I,:  Mariajose Nunez am acting as a scribe while in the presence of the attending physician.:       I,:  Kemal Harris MD personally performed the services described in this documentation    as scribed in my presence.:

## 2025-04-24 ENCOUNTER — TELEPHONE (OUTPATIENT)
Age: 35
End: 2025-04-24

## 2025-04-24 ENCOUNTER — PROCEDURE VISIT (OUTPATIENT)
Dept: GYNECOLOGY | Facility: CLINIC | Age: 35
End: 2025-04-24
Payer: MEDICARE

## 2025-04-24 VITALS — BODY MASS INDEX: 25.15 KG/M2 | WEIGHT: 160.6 LBS | SYSTOLIC BLOOD PRESSURE: 116 MMHG | DIASTOLIC BLOOD PRESSURE: 80 MMHG

## 2025-04-24 DIAGNOSIS — R87.620: Primary | ICD-10-CM

## 2025-04-24 DIAGNOSIS — N39.41 URGE INCONTINENCE OF URINE: ICD-10-CM

## 2025-04-24 DIAGNOSIS — R87.811: Primary | ICD-10-CM

## 2025-04-24 PROCEDURE — 88305 TISSUE EXAM BY PATHOLOGIST: CPT | Performed by: PATHOLOGY

## 2025-04-24 PROCEDURE — 57421 EXAM/BIOPSY OF VAG W/SCOPE: CPT | Performed by: OBSTETRICS & GYNECOLOGY

## 2025-04-24 PROCEDURE — 99212 OFFICE O/P EST SF 10 MIN: CPT | Performed by: OBSTETRICS & GYNECOLOGY

## 2025-04-24 NOTE — TELEPHONE ENCOUNTER
I l/m informing her that per 's note she was to apply it for 28 days, but if she still has some left and wants to finish the script she may do so.

## 2025-04-24 NOTE — TELEPHONE ENCOUNTER
Caller: Penelope     Doctor and/or Office: Dr. Warren     #: [phone]     Escalation: Care Patient called into rs her appt she cannot get into May 21st  she wanted to know if she should continue the cream.  Pleaser advise thank you

## 2025-04-24 NOTE — PROGRESS NOTES
Colposcopy    Date/Time: 4/24/2025 7:55 AM    Performed by: Marco Antonio Orellana MD  Authorized by: Marco Antonio Orellana MD    Other Assisting Provider: No    Verbal consent obtained?: Yes    Risks and benefits: Risks, benefits and alternatives were discussed    Consent given by:  Patient  Time Out:     Time out: Immediately prior to the procedure a time out was called      Time out performed at:  4/24/2025 7:55 AM  Patient states understanding of procedure being performed: Yes    Patient's understanding of procedure matches consent: Yes    Procedure consent matches procedure scheduled: Yes    Relevant documents present and verified: Yes    Patient identity confirmed:  Verbally with patient  Pre-procedure:     Premeds:  Acetaminophen    Prepped with: acetic acid and Lugol    Indication:     Indication:  ASC-US  Procedure:     Procedure: Colposcopy of vagina w/ biopsy      Under satisfactory analgesia the patient was prepped and draped in the dorsal lithotomy position: yes      Percival speculum was placed in the vagina: yes      Monsel's solution was applied: yes      Specimen(s) to pathology: yes    Post-procedure:     Findings: White epithelium      Impression: Low grade cervical dysplasia      Patient tolerance of procedure:  Tolerated well, no immediate complications  Comments:      Speculum placed.  Patient is status post hysterectomy with removal of cervix.  Vaginal cuff visualized with small cystocele noted.  Acetowhite and iodine negative changes noted in the midportion of the vaginal cuff.  Tischler biopsy taken of this area.  Patient tolerated procedure well.  Minimal bleeding noted.  Monsel solution applied with excellent hemostasis.

## 2025-04-30 ENCOUNTER — RESULTS FOLLOW-UP (OUTPATIENT)
Dept: GYNECOLOGY | Facility: CLINIC | Age: 35
End: 2025-04-30

## 2025-04-30 PROCEDURE — 88305 TISSUE EXAM BY PATHOLOGIST: CPT | Performed by: PATHOLOGY

## 2025-04-30 NOTE — RESULT ENCOUNTER NOTE
Vaginal biopsy is benign.  No intervention is required for this finding.  Follow-up as scheduled.  Thanks

## 2025-05-05 ENCOUNTER — HOSPITAL ENCOUNTER (OUTPATIENT)
Dept: MRI IMAGING | Facility: HOSPITAL | Age: 35
Discharge: HOME | End: 2025-05-05
Attending: ORTHOPAEDIC SURGERY
Payer: MEDICARE

## 2025-05-05 ENCOUNTER — PROCEDURE VISIT (OUTPATIENT)
Age: 35
End: 2025-05-05
Payer: MEDICARE

## 2025-05-05 VITALS — WEIGHT: 160.5 LBS | HEIGHT: 67 IN | BODY MASS INDEX: 25.19 KG/M2

## 2025-05-05 DIAGNOSIS — M99.01 SEGMENTAL DYSFUNCTION OF CERVICAL REGION: Primary | ICD-10-CM

## 2025-05-05 DIAGNOSIS — M99.02 SEGMENTAL DYSFUNCTION OF THORACIC REGION: ICD-10-CM

## 2025-05-05 DIAGNOSIS — M47.812 CERVICAL SPONDYLOSIS: ICD-10-CM

## 2025-05-05 DIAGNOSIS — M79.18 MYOFASCIAL PAIN: ICD-10-CM

## 2025-05-05 DIAGNOSIS — M77.8 LEFT WRIST TENDINITIS: ICD-10-CM

## 2025-05-05 DIAGNOSIS — M54.2 NECK PAIN: ICD-10-CM

## 2025-05-05 PROCEDURE — 73221 MRI JOINT UPR EXTREM W/O DYE: CPT

## 2025-05-05 PROCEDURE — 98940 CHIROPRACT MANJ 1-2 REGIONS: CPT | Performed by: CHIROPRACTOR

## 2025-05-13 ENCOUNTER — APPOINTMENT (OUTPATIENT)
Age: 35
End: 2025-05-13
Attending: STUDENT IN AN ORGANIZED HEALTH CARE EDUCATION/TRAINING PROGRAM
Payer: MEDICARE

## 2025-05-13 ENCOUNTER — ANESTHESIA EVENT (OUTPATIENT)
Age: 35
End: 2025-05-13

## 2025-05-13 ENCOUNTER — ANESTHESIA (OUTPATIENT)
Age: 35
End: 2025-05-13

## 2025-05-13 ENCOUNTER — OFFICE VISIT (OUTPATIENT)
Age: 35
End: 2025-05-13
Payer: MEDICARE

## 2025-05-13 VITALS — HEIGHT: 67 IN | WEIGHT: 160 LBS | BODY MASS INDEX: 25.11 KG/M2

## 2025-05-13 DIAGNOSIS — Z96.653 HISTORY OF KNEE REPLACEMENT, TOTAL, BILATERAL: ICD-10-CM

## 2025-05-13 DIAGNOSIS — T84.023A INSTABILITY OF INTERNAL LEFT KNEE PROSTHESIS, INITIAL ENCOUNTER (HCC): Primary | ICD-10-CM

## 2025-05-13 PROCEDURE — 99214 OFFICE O/P EST MOD 30 MIN: CPT | Performed by: STUDENT IN AN ORGANIZED HEALTH CARE EDUCATION/TRAINING PROGRAM

## 2025-05-13 PROCEDURE — 73562 X-RAY EXAM OF KNEE 3: CPT

## 2025-05-13 NOTE — ASSESSMENT & PLAN NOTE
Orders:    XR knee 3 vw right non injury; Future    XR knee 3 vw left non injury; Future    Durable Medical Equipment    Ambulatory Referral to Physical Therapy; Future

## 2025-05-13 NOTE — PROGRESS NOTES
Knee New Office Note    Assessment:     1. History of knee replacement, total, bilateral        Plan:  Assessment & Plan  History of knee replacement, total, bilateral    Orders:    XR knee 3 vw right non injury; Future    XR knee 3 vw left non injury; Future    Durable Medical Equipment    Ambulatory Referral to Physical Therapy; Future      We discussed that she has V/V laxity of the L > R knee. She does have giving way symptoms of the left knee. Poly exchange likely in the future if becomes more symptomatic.  Offered brace but declined  We will also provide the patient with a prescription for physical therapy to strengthen the musculature around the knee  We will plan to see the patient back in 1 year time for repeat x-rays and repeat evaluation at that time  Patient understands agrees with plan    Subjective:     Patient ID: Penelope Shelby is a 34 y.o. female.  Chief Complaint:  HPI:  34 y.o. female who presents for purposes of establishing care for bilateral total knee arthroplasties.  Patient has severe rheumatoid arthritis and underwent bilateral total knee arthroplasties in 2012 with Dr. Bardales at age 22.  She states that she has not had follow-up for these surgeries after her 1 year postoperative visit.  She states that she subjectively feels that her left patella has been tracking medially when she has been walking for the past 4 years.  Left knee will also give way at times.      Allergy:  Allergies   Allergen Reactions    Nickel Rash    Tylenol With Codeine #3 [Acetaminophen-Codeine] Hives, Itching and Facial Swelling     Can take oxycodone    Cat Dander Other (See Comments)     Medications:  all current active meds have been reviewed  Past Medical History:  Past Medical History:   Diagnosis Date    Abnormal Pap smear of cervix     Anemia     Anxiety     Back pain     Depression     Fibromyalgia     GERD (gastroesophageal reflux disease)     IBS (irritable bowel syndrome)     Iron deficiency     Irregular  menses     Menorrhagia     Migraines     Neuropathy     Obesity     Osteoarthritis of back     Osteopenia     RA (rheumatoid arthritis) (HCC)     Scratches     On back and shoulder from scratching due to urticaria    Vasculitis (HCC)     Wears glasses      Past Surgical History:  Past Surgical History:   Procedure Laterality Date    CAST APPLICATION Left 11/13/2018    Procedure: APPLICATION LONG ARM SUGARTONG SPLINT;  Surgeon: Kemal Harris MD;  Location:  MAIN OR;  Service: Orthopedics    CAST APPLICATION Right 10/8/2019    Procedure: APPLICATION LONG ARM SUGAR TONG SPLINT;  Surgeon: Kemal Harris MD;  Location:  MAIN OR;  Service: Orthopedics    CHOLECYSTECTOMY      Laparoscopic    COLONOSCOPY      DILATION AND CURETTAGE OF UTERUS      HYSTERECTOMY      JOINT REPLACEMENT Bilateral     knees    CT ARTHRODESIS WRIST W/ILIAC/OTHER AUTOGRAFT Left 4/4/2017    Procedure: WRIST FUSION / SPLINT APPLICATION ;  Surgeon: Kemal Harris MD;  Location:  MAIN OR;  Service: Orthopedics    CT ARTHRODESIS WRIST W/ILIAC/OTHER AUTOGRAFT Right 4/10/2018    Procedure: Removal of hardware right wrist with Fusion of Long finger carpometacarpal joint, splint application Right Wrist;  Surgeon: Kemal Harris MD;  Location:  MAIN OR;  Service: Orthopedics    CT BREAST REDUCTION Bilateral 9/7/2022    Procedure: BREAST REDUCTION;  Surgeon: Khurram Moreno MD;  Location:  MAIN OR;  Service: Plastics    CT COLONOSCOPY FLX DX W/COLLJ SPEC WHEN PFRMD N/A 2/22/2019    Procedure: COLONOSCOPY;  Surgeon: Donnie Ugalde MD;  Location: [facility];  Service: Gastroenterology    CT ESOPHAGOGASTRODUODENOSCOPY TRANSORAL DIAGNOSTIC N/A 2/22/2019    Procedure: ESOPHAGOGASTRODUODENOSCOPY (EGD);  Surgeon: Donnie Ugalde MD;  Location: [facility];  Service: Gastroenterology    CT EXCISION DISTAL ULNA PARTIAL/COMPLETE Right 10/8/2019    Procedure: EXCISION DISTAL ULNA (DARRACH PROCEDURE) right;  Surgeon: Kemal Harris MD;   [facility]: [facility];  Service: Orthopedics    FL HYSTEROSCOPY BX ENDOMETRIUM&/POLYPC W/WO D&C N/A 12/1/2017    Procedure: DILATATION AND CURETTAGE (D&C) WITH HYSTEROSCOPY;  Surgeon: C William Riedel, DO;  [facility]: [facility];  Service: Gynecology    FL LAPAROSCOPY SURG CHOLECYSTECTOMY N/A 3/14/2017    Procedure: CHOLECYSTECTOMY LAPAROSCOPIC;  Surgeon: Juan Keller DO;  [facility]: [facility];  Service: General    FL LAPS TOTAL HYSTERECT 250 GM/< W/RMVL TUBE/OVARY N/A 1/18/2019    Procedure: HYSTERECTOMY LAPAROSCOPIC TOTAL (LTH) Bilateral salpingectomy, cystoscopy;  Surgeon: C William Riedel, DO;  [facility];  Service: Gynecology    FL REMOVAL IMPLANT DEEP Left 11/13/2018    Procedure: REMOVAL OF HARDWARE (wrist fusion plate  AND ulnar head arthroplasty) with conversion to Darrach.;  Surgeon: Kemal Harris MD;  [facility];  Service: Orthopedics    REPLACEMENT TOTAL KNEE BILATERAL      UPPER GASTROINTESTINAL ENDOSCOPY      WISDOM TOOTH EXTRACTION      WRIST SURGERY Right     For arthritis    WRIST SURGERY Left 4/4/2017    Procedure: ARTHROPLASTY WRIST ULNAR HEAD ARTHROPLASTY - INTEGRA SIZE 5.5 MM, 16 HEAD;  Surgeon: Kemal Harris MD;  [facility];  Service:     WRIST TENDON TRANSFER Right 10/8/2019    Procedure: TRANSFER TENDON EXTENSOR CARPI ULNARIS AT THE WRIST;  Surgeon: Kemal Harris MD;  [facility];  Service: Orthopedics     Family History:  Family History   Problem Relation Age of Onset    Hypertension Mother     Rheum arthritis Mother     Depression Mother     Anxiety disorder Mother      Social History:  Social History     Substance and Sexual Activity   Alcohol Use Yes     Social History     Substance and Sexual Activity   Drug Use Yes    Types: Marijuana     Social History     Tobacco Use   Smoking Status Former    Current packs/day: 0.00    Average packs/day: 0.3 packs/day for 3.0 years (0.8 ttl pk-yrs)    Types: Cigarettes    Start date: 2007    Quit  date: 2010    Years since quitting: 15.3   Smokeless Tobacco Never           ROS:  General: Per HPI  Skin: Negative, except if noted below  HEENT: Negative  Respiratory: Negative  Cardiovascular: Negative  Gastrointestinal: Negative  Urinary: Negative  Vascular: Negative  Musculoskeletal: Positive per HPI   Neurologic: Positive per HPI  Endocrine: Negative    Objective:  BP Readings from Last 1 Encounters:   04/24/25 116/80      Wt Readings from Last 1 Encounters:   05/13/25 72.6 kg (160 lb)        Respiratory:   non-labored respirations    Lymphatics:  no palpable lymph nodes    Gait:   Normal    Neurologic:   Alert and oriented times 3  Patient with normal sensation except as noted below  Deep tendon reflexes 2+ except as noted in MSK exam    Bilateral Lower Extremity:  Left Knee:      Inspection:  prior incision c/d/i    Overall limb alignment neutral    Effusion: none    ROM 0-120 without pain    Extensor Lag: none    Palpation: no Joint line tenderness to palpation    AP Stability at 90 deg 10-15 varus/valgus laxity    M/L stability in full extension 10-15 varus/valgus laxity    M/L stability in midflexion 10-15 varus/valgus laxity    Motor: 5/5 Q/HS/TA/GS/P    Pulses: 2+ DP / 2+ PT    SILT DP/SP/S/S/TN    Right knee:     Inspection:  prior incision c/d/i    Overall limb alignment neutral    Effusion: none    ROM 0-120 without pain    Extensor Lag: none    Palpation: no Joint line tenderness to palpation    AP Stability at 90 deg mild V/V    M/L stability in full extension mild V/V    M/L stability in midflexion mild V/V    Motor: 5/5 Q/HS/TA/GS/P    Pulses: 2+ DP / 2+ PT    SILT DP/SP/S/S/TN    Imaging:  My interpretation XR AP scanogram/AP bilateral knee/lateral/gilbert/sunrise bilateral knee: cemented PS triathlon total knee arthroplasty components in maintained alignment without evidence of aseptic loosening or hardware failure.  Patient also has patella kristine on both knees    BMI:   Estimated body mass  "index is 25.06 kg/m² as calculated from the following:    Height as of this encounter: 5' 7\" (1.702 m).    Weight as of this encounter: 72.6 kg (160 lb).  BSA:   Estimated body surface area is 1.84 meters squared as calculated from the following:    Height as of this encounter: 5' 7\" (1.702 m).    Weight as of this encounter: 72.6 kg (160 lb).           Scribe Attestation      I,:   am acting as a scribe while in the presence of the attending physician.:       I,:   personally performed the services described in this documentation    as scribed in my presence.:               "

## 2025-05-14 ENCOUNTER — PROCEDURE VISIT (OUTPATIENT)
Age: 35
End: 2025-05-14
Payer: MEDICARE

## 2025-05-14 DIAGNOSIS — M79.18 MYOFASCIAL PAIN: ICD-10-CM

## 2025-05-14 DIAGNOSIS — M99.02 SEGMENTAL DYSFUNCTION OF THORACIC REGION: ICD-10-CM

## 2025-05-14 DIAGNOSIS — M47.812 CERVICAL SPONDYLOSIS: ICD-10-CM

## 2025-05-14 DIAGNOSIS — M54.2 NECK PAIN: ICD-10-CM

## 2025-05-14 DIAGNOSIS — M99.01 SEGMENTAL DYSFUNCTION OF CERVICAL REGION: Primary | ICD-10-CM

## 2025-05-14 PROCEDURE — 98941 CHIROPRACT MANJ 3-4 REGIONS: CPT | Performed by: CHIROPRACTOR

## 2025-05-14 NOTE — PROGRESS NOTES
Initial chiropractic visit--3/6/25, visit #9    Acute corrective treatment     Assessment:     1. Segmental dysfunction of cervical region        2. Cervical spondylosis        3. Neck pain        4. Myofascial pain        5. Segmental dysfunction of thoracic region              PLAN/TREATMENT:  Return for treatment once next week.   Continue using 15 minutes as needed for posttreatment soreness.ice  She is considering making pain medicine appointment.    Treatment:  Myofascial release was performed to the bilateral upper trapezius, bilateral levator scapula, bilateral rhomboids, bilateral cervical and thoracic paraspinals.    Manipulation was performed to the thoracic spine utilizing activator to T4-5 and T5-6.  Manipulation was performed to the cervical spine at C5-6 utilizing stairstepping technique.  No HVLA was performed to the cervical spine.    Subjective:  Penelope aggarwal is here today with complaints of neck and upper back pain on the right side. Feels ongoing neck and UB pain but feels a little better. Feels she has better ROM. Doing PT for shoulders.     Objective:    Mild/moderate spasm and tenderness is noted in the bilateral upper trapezius, bilateral levator scapula, bilateral rhomboids, bilateral cervical and thoracic paraspinals.  Multiple trigger points are noted in the right upper trapezius, bilateral rhomboids, bilateral cervical paraspinals.  Decreased mobility and tenderness is noted at T3-4, T4-5, T5-6 as well as C5-6.

## 2025-05-19 ENCOUNTER — PROCEDURE VISIT (OUTPATIENT)
Age: 35
End: 2025-05-19
Payer: MEDICARE

## 2025-05-19 VITALS — BODY MASS INDEX: 25.12 KG/M2 | WEIGHT: 160.05 LBS | HEIGHT: 67 IN

## 2025-05-19 DIAGNOSIS — M79.18 MYOFASCIAL PAIN: ICD-10-CM

## 2025-05-19 DIAGNOSIS — M54.2 NECK PAIN: ICD-10-CM

## 2025-05-19 DIAGNOSIS — M99.02 SEGMENTAL DYSFUNCTION OF THORACIC REGION: ICD-10-CM

## 2025-05-19 DIAGNOSIS — M47.812 CERVICAL SPONDYLOSIS: ICD-10-CM

## 2025-05-19 DIAGNOSIS — M99.01 SEGMENTAL DYSFUNCTION OF CERVICAL REGION: Primary | ICD-10-CM

## 2025-05-19 PROCEDURE — 98940 CHIROPRACT MANJ 1-2 REGIONS: CPT | Performed by: CHIROPRACTOR

## 2025-05-19 NOTE — PROGRESS NOTES
Initial chiropractic visit--3/6/25, visit #10    Acute corrective treatment     Assessment:     1. Segmental dysfunction of cervical region        2. Cervical spondylosis        3. Neck pain        4. Myofascial pain        5. Segmental dysfunction of thoracic region              PLAN/TREATMENT:  Return for treatment once per week when recovered from surgery.   Continue using 15 minutes as needed for posttreatment soreness.ice    Treatment:  Myofascial release was performed to the bilateral upper trapezius, bilateral levator scapula, bilateral rhomboids, bilateral cervical and thoracic paraspinals.    Manipulation was performed to the thoracic spine utilizing activator to T4-5 and T5-6.  Manipulation was performed to the cervical spine at C5-6 utilizing stairstepping technique.  No HVLA was performed to the cervical spine.    Subjective:  Penelope aggarwal is here today with complaints of neck and upper back pain on the right side. Felt a little better after last visit.  Doing PT for shoulders.   Getting surgery for de Quervain's next week.     Objective:    Mild/moderate spasm and tenderness is noted in the bilateral upper trapezius, bilateral levator scapula, bilateral rhomboids, bilateral cervical and thoracic paraspinals.  Multiple trigger points are noted in the right upper trapezius, bilateral rhomboids, bilateral cervical paraspinals.  Decreased mobility and tenderness is noted at T3-4, T4-5, T5-6 as well as C5-6.

## 2025-05-21 RX ORDER — OMEPRAZOLE 20 MG/1
CAPSULE, DELAYED RELEASE ORAL
COMMUNITY
Start: 2025-03-15

## 2025-05-21 RX ORDER — PREDNISONE 1 MG/1
TABLET ORAL
COMMUNITY
Start: 2025-05-08

## 2025-05-21 NOTE — PRE-PROCEDURE INSTRUCTIONS
Pre-Surgery Instructions:   Medication Instructions    acetaminophen (TYLENOL) 500 mg tablet Uses PRN- OK to take day of surgery    albuterol (Proventil HFA) 90 mcg/act inhaler Uses PRN- OK to take day of surgery    ALPRAZolam (XANAX) 0.5 mg tablet Uses PRN- OK to take day of surgery    Calcium Carb-Cholecalciferol (CALCIUM 1000 + D PO) Stop taking 7 days prior to surgery.    Cyanocobalamin (Vitamin B-12) 50 MCG LOZG Stop taking 7 days prior to surgery.    diphenoxylate-atropine (LOMOTIL) 2.5-0.025 mg per tablet Uses PRN- OK to take day of surgery    ergocalciferol (VITAMIN D2) 50,000 units N/a for DOS     escitalopram (LEXAPRO) 20 mg tablet Take day of surgery.    hydrOXYzine HCL (ATARAX) 50 mg tablet Take night before surgery    Lidocaine Viscous HCl (XYLOCAINE) 2 % mucosal solution Hold day of surgery.    loperamide (IMODIUM) 2 mg capsule Uses PRN- OK to take day of surgery    omeprazole (PriLOSEC) 20 mg delayed release capsule Take day of surgery.    ondansetron (ZOFRAN) 4 mg tablet Uses PRN- OK to take day of surgery    oxyCODONE (ROXICODONE) 15 mg immediate release tablet Uses PRN- OK to take day of surgery    predniSONE 1 mg tablet Take day of surgery.    predniSONE 5 mg tablet Take day of surgery.    pregabalin (LYRICA) 100 mg capsule Take day of surgery.    solifenacin (VESICARE) 10 MG tablet Hold day of surgery.    SUMAtriptan (IMITREX) 100 mg tablet Uses PRN- OK to take day of surgery    Virt-Phos 250 Neutral [phone] MG tablet Stop taking 7 days prior to surgery.    Medication instructions for day of surgery reviewed. Please take all instructed medications with only a sip of water.       You will receive a call one business day prior to surgery with an arrival time and hospital directions. If your surgery is scheduled on a Monday, the hospital will be calling you on the Friday prior to your surgery. If you have not heard from anyone by 8pm, please call the hospital supervisor through the hospital   at [phone].    Do not eat or drink anything after midnight the night before your surgery, including candy, mints, lifesavers, or chewing gum. Do not drink alcohol 24hrs before your surgery. Try not to smoke at least 24hrs before your surgery.   No marijuana morning of surgery.     Follow the pre surgery showering instructions as listed in the “My Surgical Experience Booklet” or otherwise provided by your surgeon's office. Do not use a blade to shave the surgical area 1 week before surgery. It is okay to use a clean electric clippers up to 24 hours before surgery. Do not apply any lotions, creams, including makeup, cologne, deodorant, or perfumes after showering on the day of your surgery. Do not use dry shampoo, hair spray, hair gel, or any type of hair products.     No contact lenses, eye make-up, or artificial eyelashes. Remove nail polish, including gel polish, and any artificial, gel, or acrylic nails if possible. Remove all jewelry including rings and body piercing jewelry.     Wear causal clothing that is easy to take on and off. Consider your type of surgery.    Keep any valuables, jewelry, piercings at home. Please bring any specially ordered equipment (sling, braces) if indicated.    Arrange for a responsible person to drive you to and from the hospital on the day of your surgery. Please confirm the visitor policy for the day of your procedure when you receive your phone call with an arrival time.     Call the surgeon's office with any new illnesses, exposures, or additional questions prior to surgery.    Please reference your “My Surgical Experience Booklet” for additional information to prepare for your upcoming surgery.

## 2025-05-23 ENCOUNTER — TELEPHONE (OUTPATIENT)
Dept: GYNECOLOGY | Facility: CLINIC | Age: 35
End: 2025-05-23

## 2025-05-23 ENCOUNTER — TELEMEDICINE (OUTPATIENT)
Dept: GYNECOLOGY | Facility: CLINIC | Age: 35
End: 2025-05-23

## 2025-05-23 DIAGNOSIS — R87.620: ICD-10-CM

## 2025-05-23 DIAGNOSIS — N39.41 URGE INCONTINENCE OF URINE: Primary | ICD-10-CM

## 2025-05-23 DIAGNOSIS — R87.811: ICD-10-CM

## 2025-05-23 NOTE — TELEPHONE ENCOUNTER
I called and spoke with patient, annual gyn exam scheculed for 5/14/26----- Message from Mattie PRATT sent at 5/23/2025 11:33 AM EDT -----    ----- Message -----  From: Marco Antonio Orellana MD  Sent: 5/23/2025  11:20 AM EDT  To: [facility]    Needs follow-up yearly exam March 2026 with Pap/HPV.  Please arrange for this.  Thanks

## 2025-05-23 NOTE — PROGRESS NOTES
Virtual Regular Visit  Name: Penelope Shelby      : 1990      MRN: [id number]  Encounter Provider: Marco Antonio Orellana MD  Encounter Date: 2025   Encounter department: [facility] [place]  :  Assessment & Plan  Urge incontinence of urine         Atypical squamous cell changes of undetermined significance (ASCUS) on vaginal cytology with high risk human papillomavirus (HPV) detected         1. Pap with ASCUS positive HPV from the vagina-colposcopy was done 2025.  Biopsy of the midline vagina was negative.  Patient was reassured by this.  To follow-up 2026 for yearly exam with Pap/HPV or as needed.  2.  Prior history of cervical dysplasia-status post hysterectomy 2019.  Had [place]-3 at that time.  No Pap was done until this most recent Pap from 3/5/2025 with ASCUS positive HPV.  Follow-up as above.  3.  Urge incontinence-was treated with Vesicare 10 mg as per Jeanna Massey.  Initially, she did not think this was working.  However, she did miss the medication for 2 days and had significant symptoms so she does note some improvement.  She will continue with this medication.  She has active prescription.  4.  Status post hysterectomy-2019  5.  Status post breast reduction  Follow-up 2026 for yearly exam or as needed.    History of Present Illness     HPI  Review of Systems    Objective   LMP  (LMP Unknown) Comment: irregular    Physical Exam    Administrative Statements   Encounter provider Marco Antonio Orellana MD    The Patient is located at Home and in the following state in which I hold an active license [place].    The patient was identified by name and date of birth. Penelope Shelby was informed that this is a telemedicine visit and that the visit is being conducted through the Epic Embedded platform. She agrees to proceed..  My office door was closed. No one else was in the room.  She acknowledged consent and understanding of privacy and security of the video platform. The patient has  agreed to participate and understands they can discontinue the visit at any time.    I have spent a total time of 15 minutes in caring for this patient on the day of the visit/encounter including Diagnostic results and Risks and benefits of tx options, not including the time spent for establishing the audio/video connection.

## 2025-05-26 ENCOUNTER — ANESTHESIA EVENT (OUTPATIENT)
Age: 35
End: 2025-05-26
Payer: MEDICARE

## 2025-05-27 ENCOUNTER — ANESTHESIA (OUTPATIENT)
Age: 35
End: 2025-05-27
Payer: MEDICARE

## 2025-05-27 ENCOUNTER — HOSPITAL ENCOUNTER (OUTPATIENT)
Age: 35
Setting detail: OUTPATIENT SURGERY
Discharge: HOME | End: 2025-05-27
Attending: ORTHOPAEDIC SURGERY | Admitting: ORTHOPAEDIC SURGERY
Payer: MEDICARE

## 2025-05-27 VITALS
DIASTOLIC BLOOD PRESSURE: 93 MMHG | WEIGHT: 165 LBS | BODY MASS INDEX: 25.9 KG/M2 | SYSTOLIC BLOOD PRESSURE: 151 MMHG | HEART RATE: 84 BPM | TEMPERATURE: 98.4 F | RESPIRATION RATE: 15 BRPM | OXYGEN SATURATION: 98 % | HEIGHT: 67 IN

## 2025-05-27 DIAGNOSIS — M65.4 TENDINITIS, DE QUERVAIN'S: Primary | ICD-10-CM

## 2025-05-27 PROBLEM — F12.90 MARIJUANA USE, EPISODIC: Status: ACTIVE | Noted: 2025-05-27

## 2025-05-27 PROCEDURE — 25000 INCISION OF TENDON SHEATH: CPT | Performed by: ORTHOPAEDIC SURGERY

## 2025-05-27 PROCEDURE — NC001 PR NO CHARGE: Performed by: ORTHOPAEDIC SURGERY

## 2025-05-27 RX ORDER — PROPOFOL 10 MG/ML
INJECTION, EMULSION INTRAVENOUS AS NEEDED
Status: DISCONTINUED | OUTPATIENT
Start: 2025-05-27 | End: 2025-05-27

## 2025-05-27 RX ORDER — FENTANYL CITRATE 50 UG/ML
INJECTION, SOLUTION INTRAMUSCULAR; INTRAVENOUS AS NEEDED
Status: DISCONTINUED | OUTPATIENT
Start: 2025-05-27 | End: 2025-05-27

## 2025-05-27 RX ORDER — METOCLOPRAMIDE HYDROCHLORIDE 5 MG/ML
10 INJECTION INTRAMUSCULAR; INTRAVENOUS ONCE AS NEEDED
Status: DISCONTINUED | OUTPATIENT
Start: 2025-05-27 | End: 2025-05-27 | Stop reason: HOSPADM

## 2025-05-27 RX ORDER — SODIUM CHLORIDE, SODIUM LACTATE, POTASSIUM CHLORIDE, CALCIUM CHLORIDE 600; 310; 30; 20 MG/100ML; MG/100ML; MG/100ML; MG/100ML
125 INJECTION, SOLUTION INTRAVENOUS CONTINUOUS
Status: DISCONTINUED | OUTPATIENT
Start: 2025-05-27 | End: 2025-05-27 | Stop reason: HOSPADM

## 2025-05-27 RX ORDER — MIDAZOLAM HYDROCHLORIDE 2 MG/2ML
INJECTION, SOLUTION INTRAMUSCULAR; INTRAVENOUS AS NEEDED
Status: DISCONTINUED | OUTPATIENT
Start: 2025-05-27 | End: 2025-05-27

## 2025-05-27 RX ORDER — SENNOSIDES 8.6 MG
650 CAPSULE ORAL EVERY 8 HOURS PRN
Qty: 30 TABLET | Refills: 0 | Status: SHIPPED | OUTPATIENT
Start: 2025-05-27

## 2025-05-27 RX ORDER — HYDROMORPHONE HCL/PF 1 MG/ML
0.5 SYRINGE (ML) INJECTION
Status: DISCONTINUED | OUTPATIENT
Start: 2025-05-27 | End: 2025-05-27 | Stop reason: HOSPADM

## 2025-05-27 RX ORDER — TRAMADOL HYDROCHLORIDE 50 MG/1
50 TABLET ORAL EVERY 6 HOURS PRN
Status: CANCELLED | OUTPATIENT
Start: 2025-05-27

## 2025-05-27 RX ORDER — MAGNESIUM HYDROXIDE 1200 MG/15ML
LIQUID ORAL AS NEEDED
Status: DISCONTINUED | OUTPATIENT
Start: 2025-05-27 | End: 2025-05-27 | Stop reason: HOSPADM

## 2025-05-27 RX ORDER — ONDANSETRON 2 MG/ML
INJECTION INTRAMUSCULAR; INTRAVENOUS AS NEEDED
Status: DISCONTINUED | OUTPATIENT
Start: 2025-05-27 | End: 2025-05-27

## 2025-05-27 RX ORDER — HYDROMORPHONE HCL IN WATER/PF 6 MG/30 ML
0.2 PATIENT CONTROLLED ANALGESIA SYRINGE INTRAVENOUS
Status: DISCONTINUED | OUTPATIENT
Start: 2025-05-27 | End: 2025-05-27 | Stop reason: HOSPADM

## 2025-05-27 RX ORDER — LABETALOL HYDROCHLORIDE 5 MG/ML
10 INJECTION, SOLUTION INTRAVENOUS
Status: DISCONTINUED | OUTPATIENT
Start: 2025-05-27 | End: 2025-05-27 | Stop reason: HOSPADM

## 2025-05-27 RX ORDER — ONDANSETRON 2 MG/ML
4 INJECTION INTRAMUSCULAR; INTRAVENOUS EVERY 6 HOURS PRN
Status: CANCELLED | OUTPATIENT
Start: 2025-05-27

## 2025-05-27 RX ORDER — PROMETHAZINE HYDROCHLORIDE 25 MG/ML
6.25 INJECTION, SOLUTION INTRAMUSCULAR; INTRAVENOUS ONCE AS NEEDED
Status: DISCONTINUED | OUTPATIENT
Start: 2025-05-27 | End: 2025-05-27 | Stop reason: HOSPADM

## 2025-05-27 RX ORDER — ONDANSETRON 2 MG/ML
4 INJECTION INTRAMUSCULAR; INTRAVENOUS ONCE AS NEEDED
Status: DISCONTINUED | OUTPATIENT
Start: 2025-05-27 | End: 2025-05-27 | Stop reason: HOSPADM

## 2025-05-27 RX ORDER — ACETAMINOPHEN 325 MG/1
975 TABLET ORAL ONCE
Status: COMPLETED | OUTPATIENT
Start: 2025-05-27 | End: 2025-05-27

## 2025-05-27 RX ORDER — LIDOCAINE HYDROCHLORIDE 10 MG/ML
INJECTION, SOLUTION EPIDURAL; INFILTRATION; INTRACAUDAL; PERINEURAL AS NEEDED
Status: DISCONTINUED | OUTPATIENT
Start: 2025-05-27 | End: 2025-05-27

## 2025-05-27 RX ORDER — CEFAZOLIN SODIUM 2 G/50ML
2000 SOLUTION INTRAVENOUS ONCE
Status: COMPLETED | OUTPATIENT
Start: 2025-05-27 | End: 2025-05-27

## 2025-05-27 RX ORDER — ACETAMINOPHEN 325 MG/1
650 TABLET ORAL EVERY 6 HOURS PRN
Status: CANCELLED | OUTPATIENT
Start: 2025-05-27

## 2025-05-27 RX ORDER — HYDRALAZINE HYDROCHLORIDE 20 MG/ML
5 INJECTION INTRAMUSCULAR; INTRAVENOUS
Status: DISCONTINUED | OUTPATIENT
Start: 2025-05-27 | End: 2025-05-27 | Stop reason: HOSPADM

## 2025-05-27 RX ORDER — DEXAMETHASONE SODIUM PHOSPHATE 10 MG/ML
INJECTION, SOLUTION INTRAMUSCULAR; INTRAVENOUS AS NEEDED
Status: DISCONTINUED | OUTPATIENT
Start: 2025-05-27 | End: 2025-05-27

## 2025-05-27 RX ORDER — LORAZEPAM 2 MG/ML
1 INJECTION INTRAMUSCULAR
Status: DISCONTINUED | OUTPATIENT
Start: 2025-05-27 | End: 2025-05-27 | Stop reason: HOSPADM

## 2025-05-27 RX ORDER — LIDOCAINE HYDROCHLORIDE AND EPINEPHRINE 10; 10 MG/ML; UG/ML
INJECTION, SOLUTION INFILTRATION; PERINEURAL AS NEEDED
Status: DISCONTINUED | OUTPATIENT
Start: 2025-05-27 | End: 2025-05-27 | Stop reason: HOSPADM

## 2025-05-27 RX ORDER — FENTANYL CITRATE/PF 50 MCG/ML
25 SYRINGE (ML) INJECTION
Status: DISCONTINUED | OUTPATIENT
Start: 2025-05-27 | End: 2025-05-27 | Stop reason: HOSPADM

## 2025-05-27 RX ORDER — ALBUTEROL SULFATE 0.83 MG/ML
2.5 SOLUTION RESPIRATORY (INHALATION) ONCE AS NEEDED
Status: DISCONTINUED | OUTPATIENT
Start: 2025-05-27 | End: 2025-05-27 | Stop reason: HOSPADM

## 2025-05-27 RX ADMIN — PROPOFOL 200 MG: 10 INJECTION, EMULSION INTRAVENOUS at 11:17

## 2025-05-27 RX ADMIN — DEXAMETHASONE SODIUM PHOSPHATE 10 MG: 10 INJECTION INTRAMUSCULAR; INTRAVENOUS at 11:23

## 2025-05-27 RX ADMIN — CEFAZOLIN SODIUM 2000 MG: 2 SOLUTION INTRAVENOUS at 11:18

## 2025-05-27 RX ADMIN — MIDAZOLAM 2 MG: 1 INJECTION INTRAMUSCULAR; INTRAVENOUS at 11:11

## 2025-05-27 RX ADMIN — SODIUM CHLORIDE, SODIUM LACTATE, POTASSIUM CHLORIDE, AND CALCIUM CHLORIDE 125 ML/HR: .6; .31; .03; .02 INJECTION, SOLUTION INTRAVENOUS at 10:32

## 2025-05-27 RX ADMIN — ONDANSETRON 4 MG: 2 INJECTION INTRAMUSCULAR; INTRAVENOUS at 11:23

## 2025-05-27 RX ADMIN — ACETAMINOPHEN 975 MG: 325 TABLET ORAL at 10:32

## 2025-05-27 RX ADMIN — FENTANYL CITRATE 25 MCG: 50 INJECTION INTRAMUSCULAR; INTRAVENOUS at 11:17

## 2025-05-27 RX ADMIN — LIDOCAINE HYDROCHLORIDE 50 MG: 10 INJECTION, SOLUTION EPIDURAL; INFILTRATION; INTRACAUDAL; PERINEURAL at 11:17

## 2025-05-27 RX ADMIN — FENTANYL CITRATE 25 MCG: 50 INJECTION INTRAMUSCULAR; INTRAVENOUS at 12:01

## 2025-05-27 NOTE — ANESTHESIA POSTPROCEDURE EVALUATION
Post-Op Assessment Note    Last Filed PACU Vitals:  Vitals Value Taken Time   Temp 98.4 °F (36.9 °C) 05/27/25 12:30   Pulse 84 05/27/25 12:30   /93 05/27/25 12:30   Resp 15 05/27/25 12:30   SpO2 98 % 05/27/25 12:30       Modified Micah:     Vitals Value Taken Time   Activity 2 05/27/25 12:30   Respiration 2 05/27/25 12:30   Circulation 2 05/27/25 12:30   Consciousness 2 05/27/25 12:30   Oxygen Saturation 2 05/27/25 12:30     Modified Micah Score: 10

## 2025-05-27 NOTE — ANESTHESIA PREPROCEDURE EVALUATION
Procedure:  Right de Quervain's release (Right: Hand)    Relevant Problems   ANESTHESIA (within normal limits)      CARDIO   (+) AVM (arteriovenous malformation)      GI/HEPATIC   (+) Hiatal hernia      HEMATOLOGY   (+) Anemia      MUSCULOSKELETAL   (+) Chronic bilateral thoracic back pain   (+) Osteoarthritis of knee   (+) Rheumatoid arthritis (HCC)   (+) 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)   (+) 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)      NEURO/PSYCH   (+) Chronic bilateral thoracic back pain   (+) Chronic neck pain   (+) Chronic pain of both shoulders   (+) Depression      Behavioral Health   (+) Marijuana use, episodic      Obstetrics/Gynecology   (+) Status post laparoscopic hysterectomy      Neurology/Sleep   (+) Hereditary and idiopathic peripheral neuropathy      Urinary   (+) Urge incontinence of urine        Physical Exam    Airway     Mallampati score: II  TM Distance: >3 FB  Neck ROM: full  Mouth opening: >= 4 cm      Cardiovascular  Rhythm: regular, Rate: normalCardiovascular exam normal    Dental    edentulous    Pulmonary  Pulmonary exam normal Breath sounds clear to auscultation    Neurological  - normal exam  She appears awake, alert and oriented x3.      Other Findings  post-pubertal.      Anesthesia Plan  ASA Score- 3     Anesthesia Type- general with ASA Monitors.         Additional Monitors:     Airway Plan: LMA and LMA.           Plan Factors-Exercise tolerance (METS): >4 METS.    Chart reviewed.    Patient summary reviewed.    Patient is a current smoker.  Patient instructed to abstain from smoking on day of procedure. Patient did not smoke on day of surgery.            Induction- intravenous.    Postoperative Plan- .   Monitoring Plan - Monitoring plan - standard ASA monitoring  Post Operative Pain Plan - multimodal analgesia        Informed Consent- Anesthetic plan and risks discussed with patient.  I  personally reviewed this patient with the CRNA. Discussed and agreed on the Anesthesia Plan with the CRNA..      NPO Status:  No vitals data found for the desired time range.

## 2025-05-27 NOTE — ANESTHESIA POSTPROCEDURE EVALUATION
Post-Op Assessment Note    CV Status:  Stable  Pain Score: 0    Pain management: adequate       Mental Status:  Alert and awake   Hydration Status:  Euvolemic   PONV Controlled:  Controlled   Airway Patency:  Patent     Post Op Vitals Reviewed: Yes    No anethesia notable event occurred.    Staff: Anesthesiologist, CRNA           Last Filed PACU Vitals:  Vitals Value Taken Time   Temp 98.6    Pulse 98 05/27/25 12:04   /99 05/27/25 12:00   Resp 14 05/27/25 12:04   SpO2 100 % 05/27/25 12:04   Vitals shown include unfiled device data.    Modified Micah:     Vitals Value Taken Time   Activity 2 05/27/25 12:00   Respiration 2 05/27/25 12:00   Circulation 2 05/27/25 12:00   Consciousness 0 05/27/25 12:00   Oxygen Saturation 1 05/27/25 12:00     Modified Micah Score: 7

## 2025-05-27 NOTE — H&P
"H&P Exam - Orthopedics   Penelope Shelby 34 y.o. female MRN: [id number]  Unit/Bed#: [facility] 05    Assessment/Plan   Assessment:  Right de Quervain's tendinitis    Plan:  Right de Quervain's release with sedation    History of Present Illness   HPI:  Penelope Shelby is a 34 y.o. female who presents with right wrist pain.  She has tried conservative treat without relief of her symptoms.  She would like to proceed with surgical intervention.    Historical Information  Review Of Systems:   Skin: Normal  Neuro: See HPI  Musculoskeletal: See HPI  14 point review of systems negative except as stated above     Past Medical History:   Past Medical History[1]    Past Surgical History:   Past Surgical History[2]    Family History:  Family history reviewed and non-contributory  Family History[3]    Social History:  Social History[4]    Allergies:   Allergies[5]        Labs:  0   Lab Value Date/Time    HCT 41.3 10/10/2023 1451    HCT 42.0 01/18/2023 0907    HCT 44.0 08/01/2022 1256    HCT 38.4 11/11/2015 1300    HCT 34.6 (L) 10/27/2015 1257    HCT 37.3 04/02/2015 1332    HGB 13.4 10/10/2023 1451    HGB 13.4 01/18/2023 0907    HGB 13.8 08/01/2022 1256    HGB 11.9 11/11/2015 1300    HGB 10.8 (L) 10/27/2015 1257    HGB 11.6 09/12/2015 1709    HGB 11.9 04/02/2015 1332    INR 0.98 08/01/2022 1256    INR 1.07 06/10/2014 1210    WBC 8.12 10/10/2023 1451    WBC 9.10 01/18/2023 0907    WBC 11.36 (H) 08/01/2022 1256    WBC 9.97 11/11/2015 1300    WBC 10.94 (H) 10/27/2015 1257    WBC 8.96 04/02/2015 1332    ESR 6 09/07/2018 1308    ESR 20 11/11/2015 1300    CRP <1.0 04/17/2025 0958       Meds:  Current Medications[6]    Blood Culture:   No results found for: \"BLOODCX\"    Wound Culture:   No results found for: \"WOUNDCULT\"    Ins and Outs:  No intake/output data recorded.            Physical Exam  Ht 5' 7\" (1.702 m)   Wt 71.7 kg (158 lb)   LMP  (LMP Unknown) Comment: irregular  BMI 24.75 kg/m²   Ht 5' 7\" (1.702 m)   Wt 71.7 kg (158 lb)   LMP  " (LMP Unknown) Comment: irregular  BMI 24.75 kg/m²   Gen: No acute distress, resting comfortably in bed  HEENT: Eyes clear, moist mucus membranes, hearing intact  Respiratory: No audible wheezing or stridor  Cardiovascular: Well Perfused peripherally, 2+ distal pulse  Abdomen: nondistended, no peritoneal signs  Ortho Exam: De Quervain's Tenosynovitis Exam:  right side    Positive tender to palpation over 1st dorsal extensor compartment   Positive palpable nodule  Positive crepitus over 1st dorsal extensor compartment   Positive Finkelstein's test    Positive pain with resisted abduction of the thumb    Neuro Exam: Patient is neurovascularly intact from a median, radial and ulnar nerve distribution. Capillary refill less than 2 seconds.       Lab Results: Reviewed  Imaging: Reviewed         [1]   Past Medical History:  Diagnosis Date    Abnormal Pap smear of cervix     Anemia     Anxiety     Back pain     Cervical spondylosis     Chronic neck pain     Depression     Fibromyalgia     GERD (gastroesophageal reflux disease)     IBS (irritable bowel syndrome)     Iron deficiency     Irregular menses     Menorrhagia     Migraines     Neuropathy     peripheral    Obesity     Osteoarthritis of back     Osteopenia     RA (rheumatoid arthritis) (HCC)     Scratches     On back and shoulder from scratching due to urticaria    Vasculitis (HCC)     Wears glasses    [2]   Past Surgical History:  Procedure Laterality Date    CAST APPLICATION Left 11/13/2018    Procedure: APPLICATION LONG ARM SUGARTONG SPLINT;  Surgeon: Kemal Harris MD;  [facility];  Service: Orthopedics    CAST APPLICATION Right 10/8/2019    Procedure: APPLICATION LONG ARM SUGAR TONG SPLINT;  Surgeon: Kemal Harris MD;  [facility];  Service: Orthopedics    CHOLECYSTECTOMY      Laparoscopic    COLONOSCOPY      DILATION AND CURETTAGE OF UTERUS      HYSTERECTOMY      JOINT REPLACEMENT Bilateral     knees    FL ARTHRODESIS WRIST  W/ILIAC/OTHER AUTOGRAFT Left 4/4/2017    Procedure: WRIST FUSION / SPLINT APPLICATION ;  Surgeon: Kemal Harris MD;  [facility];  Service: Orthopedics    IL ARTHRODESIS WRIST W/ILIAC/OTHER AUTOGRAFT Right 4/10/2018    Procedure: Removal of hardware right wrist with Fusion of Long finger carpometacarpal joint, splint application Right Wrist;  Surgeon: Kemal Harris MD;  [facility];  Service: Orthopedics    IL BREAST REDUCTION Bilateral 9/7/2022    Procedure: BREAST REDUCTION;  Surgeon: Khurram Moreno MD;  Location:  MAIN OR;  Service: Plastics    IL COLONOSCOPY FLX DX W/COLLJ SPEC WHEN PFRMD N/A 2/22/2019    Procedure: COLONOSCOPY;  Surgeon: Donnie Ugalde MD;  Location: [facility];  Service: Gastroenterology    IL ESOPHAGOGASTRODUODENOSCOPY TRANSORAL DIAGNOSTIC N/A 2/22/2019    Procedure: ESOPHAGOGASTRODUODENOSCOPY (EGD);  Surgeon: Donnie Ugalde MD;  Location: [facility];  Service: Gastroenterology    IL EXCISION DISTAL ULNA PARTIAL/COMPLETE Right 10/8/2019    Procedure: EXCISION DISTAL ULNA (DARRACH PROCEDURE) right;  Surgeon: Kemal Harris MD;  [facility];  Service: Orthopedics    IL HYSTEROSCOPY BX ENDOMETRIUM&/POLYPC W/WO D&C N/A 12/1/2017    Procedure: DILATATION AND CURETTAGE (D&C) WITH HYSTEROSCOPY;  Surgeon: C William Riedel, DO;  [facility];  Service: Gynecology    IL LAPAROSCOPY SURG CHOLECYSTECTOMY N/A 3/14/2017    Procedure: CHOLECYSTECTOMY LAPAROSCOPIC;  Surgeon: Juan Keller DO;  [facility];  Service: General    IL LAPS TOTAL HYSTERECT 250 GM/< W/RMVL TUBE/OVARY N/A 1/18/2019    Procedure: HYSTERECTOMY LAPAROSCOPIC TOTAL (LTH) Bilateral salpingectomy, cystoscopy;  Surgeon: C William Riedel, DO;  [facility];  Service: Gynecology    IL REMOVAL IMPLANT DEEP Left 11/13/2018    Procedure: REMOVAL OF HARDWARE (wrist fusion plate  AND ulnar head arthroplasty) with conversion to Darrach.;  Surgeon: Kemal Harris MD;   [facility];  Service: Orthopedics    REPLACEMENT TOTAL KNEE BILATERAL      UPPER GASTROINTESTINAL ENDOSCOPY      WISDOM TOOTH EXTRACTION      WRIST SURGERY Right     For arthritis    WRIST SURGERY Left 4/4/2017    Procedure: ARTHROPLASTY WRIST ULNAR HEAD ARTHROPLASTY - INTEGRA SIZE 5.5 MM, 16 HEAD;  Surgeon: Kemal Harris MD;  [facility];  Service:     WRIST TENDON TRANSFER Right 10/8/2019    Procedure: TRANSFER TENDON EXTENSOR CARPI ULNARIS AT THE WRIST;  Surgeon: Kemal Harris MD;  [facility];  Service: Orthopedics   [3]   Family History  Problem Relation Name Age of Onset    Hypertension Mother      Rheum arthritis Mother      Depression Mother      Anxiety disorder Mother     [4]   Social History  Socioeconomic History    Marital status:    Tobacco Use    Smoking status: Former     Current packs/day: 0.00     Average packs/day: 0.3 packs/day for 3.0 years (0.8 ttl pk-yrs)     Types: Cigarettes     Start date: 2007     Quit date: 2010     Years since quitting: 15.4    Smokeless tobacco: Never   Vaping Use    Vaping status: Never Used   Substance and Sexual Activity    Alcohol use: Yes     Comment: holidays only socially    Drug use: Yes     Frequency: 3.0 times per week     Types: Marijuana    Sexual activity: Not Currently     Partners: Male     Birth control/protection: Other   Social History Narrative    Consumes 3-4 cups of coffee per day     Social Drivers of Health      Received from [employer]   [5]   Allergies  Allergen Reactions    Nickel Rash    Tylenol With Codeine #3 [Acetaminophen-Codeine] Hives, Itching and Facial Swelling     Can take oxycodone    Cat Dander Other (See Comments)   [6]   Current Facility-Administered Medications:     ceFAZolin (ANCEF) IVPB (premix in dextrose) 2,000 mg 50 mL, 2,000 mg, Intravenous, Once, Shahzad Rodriguez PA-C

## 2025-05-27 NOTE — DISCHARGE INSTR - AVS FIRST PAGE
Post Operative Instructions    You have had surgery on your arm today, please read and follow the information below:  Elevate your hand above your elbow during the next 24-48 hours to help with swelling.  Place your hand and arm over your head with motion at your shoulder three times a day.  Do not apply any cream/ointment/oil to your incisions including antibiotics.  Do not soak your hands in standing water (dishwater, tubs, Jacuzzi's, pools, etc.) until given permission (typically 2-3 weeks after injury)    Call the office if you notice any:  Increased numbness or tingling of your hand or fingers that is not relieved with elevation.  Increasing pain that is not controlled with medication.  Difficulty chewing, breathing, swallowing.  Chest pains or shortness of breath.  Fever over 101.4 degrees.    Bandage: Remove bandage after 5 days.    Motion: Move fingers into a fist 5 times a day, DO NOT move any splinted fingers.    Weight bearing status: Avoid heavy lifting (>5 pounds) with the extremity that was operated on until follow up appointment. Normal activities of daily living are OK.    Ice: Ice for 10 minutes every hour as needed for swelling x 24 hours.    Sling: No sling necessary.    Medications:   Tylenol Extended Release 650 mg every 8 hours  Please take your prescribed narcotic pain medication to help with pain. Please consult pain management if needed for additional pain medicines.     After surgery, we would like you to take Tylenol 650 mg one tablet by mouth every 8 hours  (at breakfast, lunch and dinner) for 3-5 days after your surgery.  Please take this medication EVERYDAY after surgery for 3-5 days, and not just as needed. Taking this medication after surgery will limit your need for prescription pain medication.             Follow-up Appointment: 7-10 days.      Please call the office if you have any questions or concerns regarding your post-operative care.

## 2025-05-27 NOTE — OP NOTE
OPERATIVE REPORT  PATIENT NAME: Penelope Shelby  :  1990  MRN: [id number]  Pt Location: [facility]    SURGERY DATE: 25    Surgeons and Role:     * Kemal Harris MD - Primary     * Barry Ferreira MD - Assisting    Pre-Op Diagnosis:  Tendinitis, de Quervain's [M65.4]    Post-Op Diagnosis:  Tendinitis, de Quervain's [M65.4]    Procedure(s) (LRB):  Right de Quervain's release (Right)    Specimen(s):  No specimens collected during this procedure.    Estimated Blood Loss:   Minimal      Anesthesia Type:   IV Sedation with Anesthesia    Operative Indications:  The patient has a history of right de Quervain's stenosing tenosynovitis that was recalcitrant to conservative management.  The decision was made to bring the patient to the operating room for right de Quervain release.  Risks of the procedure were explained which include, but are not limited to bleeding; infection; damage to nerves, arteries,veins, tendons; scar; pain; need for reoperation; failure to give desired result; and risks of anaesthesia.  All questions were answered to satisfaction and they were willing to proceed.         Operative Findings:  Right wrist de Quervain's stenosing tenosynovitis    Complications:   None    Procedure and Technique:  After the patient, site, and procedure were identified, the patient was brought into the operating room in a supine position.  General anaesthesia and local medication were provided.  A well padded tourniquet was applied to the extremity, set at 250 mmHg.  The  right upper extremity was then prepped and drapped in a normal, sterile, orthopedic fashion.    After the patient, site, and procedure were once again identified, attention was turned to the right wrist.  A longitudinal incision was made over the first dorsal extensor compartment.  Dissection was carried out inline with the extensor tendons.  Care was taken to protect the superficial neurovascular structures including the branches  of the superficial sensory branch of the radial nerve.  The tendon sheath was identified and was divided inline with the tendons under direct visualization in its entirety.  The extensor pollicis brevis was inspected to ensure complete release from any subcompartment.  The abductor pollicis longus was also evaluated.  The thumb was brought through a full range of motion to ensure complete release without any binding, catching, popping, subluxation, clicking, or locking.  A loose stay suture was placed within the leaflets of the extensor tendon sheath to prevent tendon subluxation.        At the completion of the procedure, hemostasis was obtained with cautery and direct pressure.  The wounds were copiously irrigated with sterile solution.  The wounds were closed with Prolene.  Sterile dressings were applied, including Xeroform, Gauze, and Tegaderm.  Please note, all sponge, needle, and instrument counts were correct prior to closure.  Loupe magnification was utilized.   The patient tolerated the procedure well.     I was present for all critical portions of the procedure.    Patient Disposition:  PACU , hemodynamically stable, and extubated and stable    SIGNATURE: Kemal Harris MD  DATE: 05/27/25  TIME: 11:44 AM

## 2025-06-09 ENCOUNTER — PREP FOR PROCEDURE (OUTPATIENT)
Dept: OBGYN CLINIC | Facility: CLINIC | Age: 35
End: 2025-06-09

## 2025-06-09 ENCOUNTER — OFFICE VISIT (OUTPATIENT)
Dept: OBGYN CLINIC | Facility: CLINIC | Age: 35
End: 2025-06-09
Payer: MEDICARE

## 2025-06-09 VITALS — WEIGHT: 165 LBS | HEIGHT: 67 IN | BODY MASS INDEX: 25.9 KG/M2

## 2025-06-09 DIAGNOSIS — M65.4 DE QUERVAIN'S DISEASE (TENOSYNOVITIS): Primary | ICD-10-CM

## 2025-06-09 DIAGNOSIS — M96.89 DRUJ (DISTAL RADIOULNAR JOINT) INSTABILITY, POST-OPERATIVE: ICD-10-CM

## 2025-06-09 DIAGNOSIS — M25.532 PAIN IN LEFT WRIST: ICD-10-CM

## 2025-06-09 PROCEDURE — 99214 OFFICE O/P EST MOD 30 MIN: CPT | Performed by: ORTHOPAEDIC SURGERY

## 2025-06-09 NOTE — PROGRESS NOTES
ORTHOPAEDIC HAND, WRIST, AND ELBOW OFFICE  VISIT     Name: Penelope Shelby      : 1990      MRN: [id number]  Encounter Provider: Kemal Harris MD  Encounter Date: 2025   Encounter department: [facility] [place]  :  Assessment & Plan  De Quervain's disease (tenosynovitis)  Patient is s/p Right de Quervain's release - Right  -Patient was advised that the swelling is likely coming from her using her hand more  -She was advised to continue to let pain be her guide for returning to normal activities of daily living  - she will follow-up on an as needed basis if symptoms persist or new symptoms arise  -The patient expresses understanding and is in agreement with today's treatment plan.          DRUJ (distal radioulnar joint) instability, post-operative  -Reviewed physical exam and imaging with patient at time of visit. Radiographic findings demonstrate instability of DRUJ, post-operative. Her symptoms are consistent with instability of DRUJ of her left wrist.  -discussed that due to convergence of the left wrist, there is a surgical option of adding a spacer between the distal aspect of the ulna and the radius, with a left wrist achilles allograft DRUJ interposition  -Discussed in depth that the surgery is to prevent the clicking of the left wrist however there is no guarantee that the surgery will help with any of her pain or strength that is likely being caused from her rheumatoid arthritis  -Patient will follow-up post-operatively  -The patient expresses understanding and is in agreement with today's treatment plan.       Orders:  •  Case request operating room: Left wrist DRUJ reconstruction with Achilles allograft; Standing            History of Present Illness   HPI  Chief Complaint   Patient presents with   • Right Wrist - Post-op, Suture / Staple Removal     De Quervains Release -    • Left Wrist - Follow-up     MRI -        SUBJECTIVE:  Penelope Shelby is a  "34 y.o. female who presents for follow up after Right de Quervain's release - Right on 5/27/2025.  Today patient has recently developed swelling however she states that she has been using her wrist more recently.    Patient also presents for follow-up of left wrist MRI.       PHYSICAL EXAMINATION:  Vital signs: Ht 5' 7\" (1.702 m)   Wt 74.8 kg (165 lb)   LMP  (LMP Unknown) Comment: irregular  BMI 25.84 kg/m²   General: well developed and well nourished, alert, oriented times 3, and appears comfortable  Psychiatric: Normal    MUSCULOSKELETAL EXAMINATION:  Incision: Clean, dry, intact  Range of Motion: As expected  Neurovascular status: Neuro intact, good cap refill  Activity Restrictions: No restrictions  Done today: Sutures out  Left wrist significant tenderness to palpation.  Pain with DRUJ compression with pronation and supination.  Positive instability noted with dorsal and volar subluxation and shucking.      STUDIES REVIEWED:  MRI left wrist was reviewed in PACS and demonstrate:     Advanced rheumatoid arthropathy with complete loss of articular cartilage of the radiocarpal and intercarpal joint spaces.     Patchy marrow edema across the carpometacarpal joints likely stress reaction.     No significant tendinosis or tenosynovitis.      PROCEDURES PERFORMED:  Procedures  No Procedures performed today    Scribe Attestation    I,:  Mariajose Nunez am acting as a scribe while in the presence of the attending physician.:       I,:  Kemal Harris MD personally performed the services described in this documentation    as scribed in my presence.:           "

## 2025-06-09 NOTE — LETTER
June 9, 2025     Patient: Penelope Shelby   YOB: 1990   Date of Visit: 6/9/2025       To Whom it May Concern:    Penelope Shelby was seen in my clinic on 6/9/2025. She can return to PT to work on range of motion of her shoulder.    If you have any questions or concerns, please don't hesitate to call.         Sincerely,          Kemal Harris MD        CC: No Recipients

## 2025-06-11 ENCOUNTER — OFFICE VISIT (OUTPATIENT)
Dept: PODIATRY | Facility: CLINIC | Age: 35
End: 2025-06-11
Payer: MEDICARE

## 2025-06-11 VITALS — HEIGHT: 67 IN | WEIGHT: 166.4 LBS | BODY MASS INDEX: 26.12 KG/M2

## 2025-06-11 DIAGNOSIS — L84 CALLUS: ICD-10-CM

## 2025-06-11 DIAGNOSIS — B07.0 VP (VERRUCAE PEDIS): Primary | ICD-10-CM

## 2025-06-11 DIAGNOSIS — B35.3 TINEA PEDIS OF BOTH FEET: ICD-10-CM

## 2025-06-11 DIAGNOSIS — L74.513 HYPERHIDROSIS OF SOLES: ICD-10-CM

## 2025-06-11 PROCEDURE — 99213 OFFICE O/P EST LOW 20 MIN: CPT | Performed by: PODIATRIST

## 2025-06-11 PROCEDURE — 17110 DESTRUCTION B9 LES UP TO 14: CPT | Performed by: PODIATRIST

## 2025-06-11 NOTE — PROGRESS NOTES
"Name: Penelope Shelby      : 1990      MRN: [id number]  Encounter Provider: Len Warren DPM  Encounter Date: 2025   Encounter department: [facility] [place]  :  Assessment & Plan   (verrucae pedis)  Wart underneath the left heel I did trim this down and applied Cantharone today.  Orders:  •  Lesion Destruction    Callus  Recommended power steps for patient to help offload the areas if this does not improve we could consider formal custom orthotics.           Tinea pedis of both feet  Resolved       Hyperhidrosis of soles    Recommend Certain Dri that she can use at nighttime on her feet.  If no success we could give her a prescription for Drysol.         Lesion Destruction    Date/Time: 2025 8:30 AM    Performed by: Len Warren DPM  Authorized by: Len Warren DPM    Universal Protocol:  procedure performed by consultantConsent: Verbal consent obtained  Risks and benefits: risks, benefits and alternatives were discussed  Consent given by: patient  Time out: Immediately prior to procedure a \"time out\" was called to verify the correct patient, procedure, equipment, support staff and site/side marked as required.  Patient understanding: patient states understanding of the procedure being performed  Patient identity confirmed: verbally with patient    Procedure Details - Lesion Destruction:     Number of Lesions:  1  Lesion 1:     Body area:  Lower extremity    Lower extremity location:  L foot    Malignancy: benign lesion      Destruction method: chemical removal       Hyperkeratotic tissue trimmed down to pinpoint bleeding with #15 blade.  Cantharone was applied to the lesion(s) as above with band-aid.  Discussed with patient \"off label\" use of cantharone for treatment of verrucae.  Discussed risks/benefits of the medication.  Answered questions to patients satisfaction.   Patient was instructed to keep this occlusive dressing intact for 24 hours and then " "changing the dressing keeping the area covered  Return to clinic in about 2-3 weeks for reevaluation of lesion and possible retreatment.  Notify office if extreme pain or notices any signs of infection such as increased swelling, redness, drainage etc.          History of Present Illness   HPI  Penelope Shelby is a 34 y.o. female who presents for evaluation of bilateral tinea pedis.  She was given clotrimazole betamethasone and did well with this.  She denies any issues with the fungal nails.    She does relate some thickness and dystrophic changes to the left heel.      Review of Systems       Objective   Ht 5' 7\" (1.702 m) Comment: verbal  Wt 75.5 kg (166 lb 6.4 oz)   LMP  (LMP Unknown) Comment: irregular  BMI 26.06 kg/m²      Physical Exam    Peeling and erythematous changes have healed at this time.  There still is remaining hyperhidrosis noted bilaterally.  There is left heel callus formation with some pinpoint bleeding on trimming to the central portion of the heel.    No other areas of acute open ulcerations or lesions intact pedal pulses.  Neurological sensation is grossly intact distally.  No other areas of acute discomfort or tenderness noted on examination.      "

## 2025-06-12 ENCOUNTER — TELEPHONE (OUTPATIENT)
Age: 35
End: 2025-06-12

## 2025-06-12 NOTE — TELEPHONE ENCOUNTER
Caller: Penelope    Doctor: Dr. Harris / [facility]    Reason for call: Would to know if she can do PT for her shoulders since she does use her hands during PT. She will need a letter if she can. Please fax to   Fax #  for [phone]    Would like to know if she can she her chiro Dr? Will needed a letter if she can  Please fax to   Fax # for Chiro [phone]    5/27 - Right de Quervain's release would like to know if she needs to do therapy for hands.  If it is recommended please fax script/order to PT office.  If it is not recommend then please let patient know.      Call back#: [phone]

## 2025-06-12 NOTE — TELEPHONE ENCOUNTER
Caller: Patient    Doctor: Dr. Harris / [facility]    Reason for call: Patient is calling to request a letter from the DR that will OK her to continue PT for her shoulders, since she uses her hands during PT. They are requesting this clearance since the patient told them about her surgery on 5/27; please advise/fax.     5/27 - Right de Quervain's release (Right: Hand)     Call back#: [phone]     Fax: *Patient will CB with fax number*

## 2025-06-13 DIAGNOSIS — M65.4 DE QUERVAIN'S DISEASE (TENOSYNOVITIS): Primary | ICD-10-CM

## 2025-06-13 NOTE — TELEPHONE ENCOUNTER
Could we please fax the letter to the 2 numbers provided for her chiropractor and PT.  Could we please advise the patient that these have been faxed and can we also let the patient know that therapy is usually not needed after de Quervain's release but if she would like to perform therapy for her hands, a prescription can be placed.  Thank you.

## 2025-07-03 ENCOUNTER — OFFICE VISIT (OUTPATIENT)
Dept: PODIATRY | Facility: CLINIC | Age: 35
End: 2025-07-03
Payer: MEDICARE

## 2025-07-03 VITALS — BODY MASS INDEX: 26.37 KG/M2 | WEIGHT: 168 LBS | HEIGHT: 67 IN

## 2025-07-03 DIAGNOSIS — L74.513 HYPERHIDROSIS OF SOLES: ICD-10-CM

## 2025-07-03 DIAGNOSIS — B07.0 VP (VERRUCAE PEDIS): Primary | ICD-10-CM

## 2025-07-03 DIAGNOSIS — L84 CALLUS: ICD-10-CM

## 2025-07-03 DIAGNOSIS — B35.3 TINEA PEDIS OF BOTH FEET: ICD-10-CM

## 2025-07-03 PROCEDURE — 99213 OFFICE O/P EST LOW 20 MIN: CPT | Performed by: PODIATRIST

## 2025-07-03 NOTE — PROGRESS NOTES
"Name: Penelope Shelby      : 1990      MRN: [id number]  Encounter Provider: Len Warren DPM  Encounter Date: 7/3/2025   Encounter department: [facility] [place]  :  Assessment & Plan   (verrucae pedis)  Wart treatment appears to be successful at removal of the plantar heel wart.    Recommend continue coverage with bandage for an additional week.    Return as needed.       Tinea pedis of both feet         Hyperhidrosis of soles  Recommend topical Certain Dri for the bottom of the foot hyperhidrosis.           Callus         Procedures      History of Present Illness   HPI  Penelope Shelby is a 34 y.o. female who presents for return evaluation for work treatments.  She is also following up for hyperhidrosis.  She is doing well she had treatment completed previously had a lot of pain initially however at this point has done well.      Review of Systems       Objective   Ht 5' 7\" (1.702 m) Comment: verbal  Wt 76.2 kg (168 lb)   LMP  (LMP Unknown) Comment: irregular  BMI 26.31 kg/m²      Physical Exam    Vascular: Intact pedal pulses bilateral DP and PT.  Neurological: Gross protective sensation intact bilateral  Musculoskeletal: Muscle strength bilateral intact with dorsiflexion, inversion, eversion and plantarflexion.  Dermatological: No open lesions or ulcerations noted bilateral.  Improvement in healing noted to the left heel at the area of pinpoint bleeding.  I do not see any residual wart noted today.  There was some callus formation noted to the area that was able to trim off.  There are some hyperhidrosis that is noted to the foot.          "

## 2025-07-07 ENCOUNTER — ANESTHESIA (OUTPATIENT)
Age: 35
End: 2025-07-07

## 2025-07-07 ENCOUNTER — ANESTHESIA EVENT (OUTPATIENT)
Age: 35
End: 2025-07-07

## 2025-07-11 ENCOUNTER — ANESTHESIA EVENT (OUTPATIENT)
Age: 35
End: 2025-07-11
Payer: MEDICARE

## 2025-07-11 NOTE — PRE-PROCEDURE INSTRUCTIONS
Pre-Surgery Instructions:   Medication Instructions    acetaminophen (TYLENOL) 500 mg tablet Uses PRN- OK to take day of surgery    albuterol (Proventil HFA) 90 mcg/act inhaler Uses PRN- OK to take day of surgery    ALPRAZolam (XANAX) 0.5 mg tablet Uses PRN- OK to take day of surgery    Calcium Carb-Cholecalciferol (CALCIUM 1000 + D PO) Stop taking 7 days prior to surgery.    ergocalciferol (VITAMIN D2) 50,000 units Stop taking 7 days prior to surgery.    escitalopram (LEXAPRO) 20 mg tablet Take day of surgery.    hydrOXYzine HCL (ATARAX) 50 mg tablet Take night before surgery    Lidocaine Viscous HCl (XYLOCAINE) 2 % mucosal solution Hold day of surgery.    loperamide (IMODIUM) 2 mg capsule Uses PRN- OK to take day of surgery    omeprazole (PriLOSEC) 20 mg delayed release capsule Take day of surgery.    ondansetron (ZOFRAN) 4 mg tablet Uses PRN- OK to take day of surgery    oxyCODONE (ROXICODONE) 15 mg immediate release tablet Take day of surgery.    POTASSIUM PO Stop taking 7 days prior to surgery.    predniSONE 1 mg tablet Take day of surgery.    predniSONE 5 mg tablet Take day of surgery.    pregabalin (LYRICA) 100 mg capsule Take day of surgery.    solifenacin (VESICARE) 10 MG tablet Hold day of surgery.    SUMAtriptan (IMITREX) 100 mg tablet Uses PRN- OK to take day of surgery    Virt-Phos 250 Neutral [phone] MG tablet Stop taking 7 days prior to surgery.    Medication instructions for day of surgery reviewed. Patient verbalized understanding and agrees with the plan.  Please take all instructed medications with only a sip of water. Please do not take any over the counter (non-prescribed) vitamins or supplements for one week prior to date of surgery.      You will receive a call one business day prior to surgery with an arrival time and hospital directions. If your surgery is scheduled on a Monday, the hospital will be calling you on the Friday prior to your surgery. If you have not heard from anyone by  8pm, please call the hospital supervisor through the hospital  at [phone]. ([place] [phone] or [place] [phone]).    Do not eat or drink anything after midnight the night before your surgery, including candy, mints, lifesavers, or chewing gum. Do not drink alcohol 24hrs before your surgery. Try not to smoke at least 24hrs before your surgery.       Follow the pre surgery showering instructions as listed in the “My Surgical Experience Booklet” or otherwise provided by your surgeon's office. Do not use a blade to shave the surgical area 1 week before surgery. It is okay to use a clean electric clippers up to 24 hours before surgery. Do not apply any lotions, creams, including makeup, cologne, deodorant, or perfumes after showering on the day of your surgery. Do not use dry shampoo, hair spray, hair gel, or any type of hair products.     No contact lenses, eye make-up, or artificial eyelashes. Remove nail polish, including gel polish, and any artificial, gel, or acrylic nails if possible. Remove all jewelry including rings and body piercing jewelry.     Wear causal clothing that is easy to take on and off. Consider your type of surgery.    Keep any valuables, jewelry, piercings at home. Please bring any specially ordered equipment (sling, braces) if indicated.    Arrange for a responsible person to drive you to and from the hospital on the day of your surgery. Please confirm the visitor policy for the day of your procedure when you receive your phone call with an arrival time.     Call the surgeon's office with any new illnesses, exposures, or additional questions prior to surgery.    Please reference your “My Surgical Experience Booklet” for additional information to prepare for your upcoming surgery.

## 2025-07-21 ENCOUNTER — TELEPHONE (OUTPATIENT)
Age: 35
End: 2025-07-21

## 2025-07-21 NOTE — TELEPHONE ENCOUNTER
"Caller: Patient    Doctor: Dr. Harris    Reason for call: Patient is calling stating her wrist is very swollen and wanted to know if during the surgery tomorrow Dr Harris can \"scrape the inflamed tissue\" to remove it. Please advise.    Call back#: [phone]  "

## 2025-07-21 NOTE — TELEPHONE ENCOUNTER
Caller: Penelope     Doctor: Dr. Harris /     Reason for call: Patient is calling for status of her previous message regarding her wrist swelling.    She would also like to know how long surgery will take tomorrow ?   Please call patient to advise     Call back#: [phone]

## 2025-07-21 NOTE — ANESTHESIA PREPROCEDURE EVALUATION
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     Dental    edentulous,     Pulmonary   Breath sounds clear to auscultation    Neurological    She appears alert and oriented x3.      Other Findings      post-pubertal.            Anesthesia Plan  ASA Score- 2     Anesthesia Type- general with ASA Monitors.         Additional Monitors:     Airway Plan: LMA and LMA.    Comment: Discussed benefits/risks of general anesthesia including possibility of mouth/throat pain, injury to lips/teeth, nausea/vomiting, and surgical pain along with more rare complications such as stroke, MI, pneumonia, aspiration, and injury to blood vessels. All questions answered.    Discussed nerve block to assist with post-operative analgesia. Discussed possibility of uncommon complications including permanent nerve injury, damage to blood vessels, infection local anesthetic toxicity, and nerve block failure. Patient understands and wishes to proceed.       .       Plan Factors-Exercise tolerance (METS): >4 METS.    Chart reviewed. EKG reviewed.  Existing labs reviewed.                   Induction- intravenous.    Postoperative Plan- Plan for postoperative opioid  use.   Monitoring Plan - Monitoring plan - standard ASA monitoring  Post Operative Pain Plan - plan for postoperative opioid use    Perioperative Resuscitation Plan - Level 1 - Full Code.       Informed Consent- Anesthetic plan and risks discussed with patient.  I personally reviewed this patient with the CRNA. Discussed and agreed on the Anesthesia Plan with the CRNA..      NPO Status:  No vitals data found for the desired time range.

## 2025-07-21 NOTE — TELEPHONE ENCOUNTER
The surgery will take about 2 hours to complete. We will do our best to get rid of inflammation at the time of surgery. Thank you

## 2025-07-22 ENCOUNTER — HOSPITAL ENCOUNTER (OUTPATIENT)
Age: 35
Setting detail: OUTPATIENT SURGERY
Discharge: HOME | End: 2025-07-22
Attending: ORTHOPAEDIC SURGERY | Admitting: ORTHOPAEDIC SURGERY
Payer: MEDICARE

## 2025-07-22 ENCOUNTER — ANESTHESIA (OUTPATIENT)
Age: 35
End: 2025-07-22
Payer: MEDICARE

## 2025-07-22 ENCOUNTER — APPOINTMENT (OUTPATIENT)
Age: 35
End: 2025-07-22
Payer: MEDICARE

## 2025-07-22 VITALS
SYSTOLIC BLOOD PRESSURE: 110 MMHG | OXYGEN SATURATION: 96 % | BODY MASS INDEX: 26.46 KG/M2 | TEMPERATURE: 97.4 F | HEIGHT: 67 IN | RESPIRATION RATE: 18 BRPM | WEIGHT: 168.6 LBS | DIASTOLIC BLOOD PRESSURE: 64 MMHG | HEART RATE: 78 BPM

## 2025-07-22 DIAGNOSIS — M25.339: ICD-10-CM

## 2025-07-22 DIAGNOSIS — M05.79 RHEUMATOID ARTHRITIS INVOLVING MULTIPLE SITES WITH POSITIVE RHEUMATOID FACTOR (HCC): Primary | ICD-10-CM

## 2025-07-22 PROCEDURE — NC001 PR NO CHARGE: Performed by: ORTHOPAEDIC SURGERY

## 2025-07-22 PROCEDURE — 25337 RECONSTRUCT ULNA/RADIOULNAR: CPT | Performed by: PHYSICIAN ASSISTANT

## 2025-07-22 PROCEDURE — C1713 ANCHOR/SCREW BN/BN,TIS/BN: HCPCS | Performed by: ORTHOPAEDIC SURGERY

## 2025-07-22 PROCEDURE — C1781 MESH (IMPLANTABLE): HCPCS | Performed by: ORTHOPAEDIC SURGERY

## 2025-07-22 PROCEDURE — 25337 RECONSTRUCT ULNA/RADIOULNAR: CPT | Performed by: ORTHOPAEDIC SURGERY

## 2025-07-22 PROCEDURE — 73110 X-RAY EXAM OF WRIST: CPT

## 2025-07-22 PROCEDURE — C1762 CONN TISS, HUMAN(INC FASCIA): HCPCS | Performed by: ORTHOPAEDIC SURGERY

## 2025-07-22 DEVICE — MINI QUICKANCHOR PLUS (NUMBER 2/0 SUTURE) SIZE 2/0 (3 METRIC) ORTHOCORD BRAIDED COMPOSITE SUTURE, 18 INCHES (45CM), DOUBLE-ARMED V-5 NEEDLES AND 2.0 X 9.7MM DRILL BIT, WITH DISPOSABLE INSERTER.
Type: IMPLANTABLE DEVICE | Site: ARM | Status: FUNCTIONAL
Brand: QUICKANCHOR ORTHOCORD

## 2025-07-22 RX ORDER — PROMETHAZINE HYDROCHLORIDE 25 MG/ML
6.25 INJECTION, SOLUTION INTRAMUSCULAR; INTRAVENOUS ONCE AS NEEDED
Status: DISCONTINUED | OUTPATIENT
Start: 2025-07-22 | End: 2025-07-22 | Stop reason: HOSPADM

## 2025-07-22 RX ORDER — ONDANSETRON 2 MG/ML
INJECTION INTRAMUSCULAR; INTRAVENOUS AS NEEDED
Status: DISCONTINUED | OUTPATIENT
Start: 2025-07-22 | End: 2025-07-22

## 2025-07-22 RX ORDER — ACETAMINOPHEN 325 MG/1
650 TABLET ORAL EVERY 6 HOURS PRN
Status: CANCELLED | OUTPATIENT
Start: 2025-07-22

## 2025-07-22 RX ORDER — TRAMADOL HYDROCHLORIDE 50 MG/1
50 TABLET ORAL EVERY 6 HOURS PRN
Status: CANCELLED | OUTPATIENT
Start: 2025-07-22

## 2025-07-22 RX ORDER — MAGNESIUM HYDROXIDE 1200 MG/15ML
LIQUID ORAL AS NEEDED
Status: DISCONTINUED | OUTPATIENT
Start: 2025-07-22 | End: 2025-07-22 | Stop reason: HOSPADM

## 2025-07-22 RX ORDER — HYDROCODONE BITARTRATE AND ACETAMINOPHEN 5; 325 MG/1; MG/1
1 TABLET ORAL EVERY 6 HOURS PRN
Qty: 20 TABLET | Refills: 0 | Status: SHIPPED | OUTPATIENT
Start: 2025-07-22 | End: 2025-08-01

## 2025-07-22 RX ORDER — CEFADROXIL 500 MG/1
500 CAPSULE ORAL EVERY 12 HOURS SCHEDULED
Qty: 10 CAPSULE | Refills: 0 | Status: SHIPPED | OUTPATIENT
Start: 2025-07-22 | End: 2025-07-27

## 2025-07-22 RX ORDER — MIDAZOLAM HYDROCHLORIDE 2 MG/2ML
INJECTION, SOLUTION INTRAMUSCULAR; INTRAVENOUS AS NEEDED
Status: DISCONTINUED | OUTPATIENT
Start: 2025-07-22 | End: 2025-07-22

## 2025-07-22 RX ORDER — HYDROMORPHONE HCL/PF 1 MG/ML
0.5 SYRINGE (ML) INJECTION
Refills: 0 | Status: DISCONTINUED | OUTPATIENT
Start: 2025-07-22 | End: 2025-07-22 | Stop reason: HOSPADM

## 2025-07-22 RX ORDER — LIDOCAINE HYDROCHLORIDE 10 MG/ML
INJECTION, SOLUTION EPIDURAL; INFILTRATION; INTRACAUDAL; PERINEURAL AS NEEDED
Status: DISCONTINUED | OUTPATIENT
Start: 2025-07-22 | End: 2025-07-22

## 2025-07-22 RX ORDER — CEFAZOLIN SODIUM 2 G/50ML
2000 SOLUTION INTRAVENOUS ONCE
Status: COMPLETED | OUTPATIENT
Start: 2025-07-22 | End: 2025-07-22

## 2025-07-22 RX ORDER — NAPROXEN SODIUM 220 MG/1
220 TABLET, FILM COATED ORAL 2 TIMES DAILY WITH MEALS
Qty: 10 TABLET | Refills: 0 | Status: SHIPPED | OUTPATIENT
Start: 2025-07-22 | End: 2025-07-27

## 2025-07-22 RX ORDER — PROPOFOL 10 MG/ML
INJECTION, EMULSION INTRAVENOUS CONTINUOUS PRN
Status: DISCONTINUED | OUTPATIENT
Start: 2025-07-22 | End: 2025-07-22

## 2025-07-22 RX ORDER — SCOPOLAMINE 1 MG/3D
1 PATCH, EXTENDED RELEASE TRANSDERMAL
Status: DISCONTINUED | OUTPATIENT
Start: 2025-07-22 | End: 2025-07-22 | Stop reason: HOSPADM

## 2025-07-22 RX ORDER — DEXAMETHASONE SODIUM PHOSPHATE 10 MG/ML
INJECTION, SOLUTION INTRAMUSCULAR; INTRAVENOUS AS NEEDED
Status: DISCONTINUED | OUTPATIENT
Start: 2025-07-22 | End: 2025-07-22

## 2025-07-22 RX ORDER — ONDANSETRON 2 MG/ML
4 INJECTION INTRAMUSCULAR; INTRAVENOUS EVERY 6 HOURS PRN
Status: CANCELLED | OUTPATIENT
Start: 2025-07-22

## 2025-07-22 RX ORDER — BUPIVACAINE HYDROCHLORIDE 5 MG/ML
INJECTION, SOLUTION EPIDURAL; INTRACAUDAL; PERINEURAL
Status: COMPLETED | OUTPATIENT
Start: 2025-07-22 | End: 2025-07-22

## 2025-07-22 RX ORDER — SODIUM CHLORIDE, SODIUM LACTATE, POTASSIUM CHLORIDE, CALCIUM CHLORIDE 600; 310; 30; 20 MG/100ML; MG/100ML; MG/100ML; MG/100ML
125 INJECTION, SOLUTION INTRAVENOUS CONTINUOUS
Status: DISCONTINUED | OUTPATIENT
Start: 2025-07-22 | End: 2025-07-22 | Stop reason: HOSPADM

## 2025-07-22 RX ADMIN — BUPIVACAINE 20 ML: 13.3 INJECTION, SUSPENSION, LIPOSOMAL INFILTRATION at 12:19

## 2025-07-22 RX ADMIN — PROPOFOL 200 MG: 10 INJECTION, EMULSION INTRAVENOUS at 13:58

## 2025-07-22 RX ADMIN — SCOPOLAMINE 1 PATCH: 1.5 PATCH, EXTENDED RELEASE TRANSDERMAL at 11:34

## 2025-07-22 RX ADMIN — ONDANSETRON 4 MG: 2 INJECTION INTRAMUSCULAR; INTRAVENOUS at 16:03

## 2025-07-22 RX ADMIN — BUPIVACAINE HYDROCHLORIDE 10 ML: 5 INJECTION, SOLUTION EPIDURAL; INTRACAUDAL; PERINEURAL at 12:19

## 2025-07-22 RX ADMIN — CEFAZOLIN SODIUM 2000 MG: 2 SOLUTION INTRAVENOUS at 13:54

## 2025-07-22 RX ADMIN — LIDOCAINE HYDROCHLORIDE 50 MG: 10 INJECTION, SOLUTION EPIDURAL; INFILTRATION; INTRACAUDAL; PERINEURAL at 13:58

## 2025-07-22 RX ADMIN — PROPOFOL 120 MCG/KG/MIN: 10 INJECTION, EMULSION INTRAVENOUS at 14:01

## 2025-07-22 RX ADMIN — MIDAZOLAM 4 MG: 1 INJECTION INTRAMUSCULAR; INTRAVENOUS at 12:18

## 2025-07-22 RX ADMIN — SODIUM CHLORIDE, SODIUM LACTATE, POTASSIUM CHLORIDE, AND CALCIUM CHLORIDE 125 ML/HR: .6; .31; .03; .02 INJECTION, SOLUTION INTRAVENOUS at 11:36

## 2025-07-22 RX ADMIN — DEXMEDETOMIDINE HYDROCHLORIDE 0.3 MCG/KG/HR: 100 INJECTION, SOLUTION INTRAVENOUS at 14:01

## 2025-07-22 RX ADMIN — SODIUM CHLORIDE, SODIUM LACTATE, POTASSIUM CHLORIDE, AND CALCIUM CHLORIDE 125 ML/HR: .6; .31; .03; .02 INJECTION, SOLUTION INTRAVENOUS at 13:46

## 2025-07-22 RX ADMIN — DEXAMETHASONE SODIUM PHOSPHATE 10 MG: 10 INJECTION, SOLUTION INTRAMUSCULAR; INTRAVENOUS at 14:00

## 2025-07-22 NOTE — ANESTHESIA POSTPROCEDURE EVALUATION
Post-Op Assessment Note    CV Status:  Stable    Pain management: adequate       Mental Status:  Awake   Hydration Status:  Euvolemic   PONV Controlled:  Controlled   Airway Patency:  Patent     Post Op Vitals Reviewed: Yes    No anethesia notable event occurred.    Staff: Anesthesiologist           Last Filed PACU Vitals:  Vitals Value Taken Time   Temp 97.4 °F (36.3 °C) 07/22/25 16:30   Pulse 81 07/22/25 16:59   /77 07/22/25 16:45   Resp 16 07/22/25 16:59   SpO2 97 % 07/22/25 16:59   Vitals shown include unfiled device data.    Modified Micah:     Vitals Value Taken Time   Activity 1 07/22/25 16:45   Respiration 2 07/22/25 16:45   Circulation 2 07/22/25 16:45   Consciousness 1 07/22/25 16:45   Oxygen Saturation 1 07/22/25 16:45     Modified Micah Score: 7

## 2025-07-22 NOTE — H&P
"H&P Exam - Orthopedics   Penelope Shelby 34 y.o. female MRN: [id number]  Unit/Bed#: APU 04    Assessment/Plan   Assessment:  Instability of the left DRUJ    Plan:  Left wrist DRUJ reconstruction with Achilles allograft with regional anesthesia    History of Present Illness   HPI:  Penelope Shelby is a 34 y.o. female who presents with left wrist pain.  Patient has pain over her left wrist as well as previous surgical intervention.  She states she has pain with movement of her wrist with every motion.  She has tried conservative treatment that relief of her symptoms.  She would like to proceed with surgical intervention.    Historical Information  Review Of Systems:   Skin: Normal  Neuro: See HPI  Musculoskeletal: See HPI  14 point review of systems negative except as stated above     Past Medical History:   Past Medical History[1]    Past Surgical History:   Past Surgical History[2]    Family History:  Family history reviewed and non-contributory  Family History[3]    Social History:  Social History[4]    Allergies:   Allergies[5]        Labs:  0   Lab Value Date/Time    HCT 41.3 10/10/2023 1451    HCT 42.0 01/18/2023 0907    HCT 44.0 08/01/2022 1256    HCT 38.4 11/11/2015 1300    HCT 34.6 (L) 10/27/2015 1257    HCT 37.3 04/02/2015 1332    HGB 13.4 10/10/2023 1451    HGB 13.4 01/18/2023 0907    HGB 13.8 08/01/2022 1256    HGB 11.9 11/11/2015 1300    HGB 10.8 (L) 10/27/2015 1257    HGB 11.6 09/12/2015 1709    HGB 11.9 04/02/2015 1332    INR 0.98 08/01/2022 1256    INR 1.07 06/10/2014 1210    WBC 8.12 10/10/2023 1451    WBC 9.10 01/18/2023 0907    WBC 11.36 (H) 08/01/2022 1256    WBC 9.97 11/11/2015 1300    WBC 10.94 (H) 10/27/2015 1257    WBC 8.96 04/02/2015 1332    ESR 6 09/07/2018 1308    ESR 20 11/11/2015 1300    CRP <1.0 04/17/2025 0958       Meds:  Current Medications[6]    Blood Culture:   No results found for: \"BLOODCX\"    Wound Culture:   No results found for: \"WOUNDCULT\"    Ins and Outs:  No intake/output data " "recorded.            Physical Exam  /75   Pulse 72   Temp 97.5 °F (36.4 °C) (Temporal)   Resp 16   Ht 5' 7\" (1.702 m)   Wt 76.5 kg (168 lb 9.6 oz)   LMP  (LMP Unknown) Comment: irregular  SpO2 98%   BMI 26.41 kg/m²   /75   Pulse 72   Temp 97.5 °F (36.4 °C) (Temporal)   Resp 16   Ht 5' 7\" (1.702 m)   Wt 76.5 kg (168 lb 9.6 oz)   LMP  (LMP Unknown) Comment: irregular  SpO2 98%   BMI 26.41 kg/m²   Gen: No acute distress, resting comfortably in bed  HEENT: Eyes clear, moist mucus membranes, hearing intact  Respiratory: No audible wheezing or stridor  Cardiovascular: Well Perfused peripherally, 2+ distal pulse  Abdomen: nondistended, no peritoneal signs  Ortho Exam: Left wrist  Range of motion is decreased with wrist flexion, extension, radial and ulnar deviation  Tenderness to palpation over the dorsal extensor tendons, extensor carpi all naris as well as the thumb CMC joint    Neuro Exam: Patient is neurovascularly intact from a median, radial and ulnar nerve distribution. Capillary refill less than 2 seconds.       Lab Results: Reviewed  Imaging: Reviewed         [1]   Past Medical History:  Diagnosis Date    Abnormal Pap smear of cervix     Anemia     Anxiety     Back pain     Cervical spondylosis     Chronic neck pain     Depression     Fibromyalgia     Full dentures     GERD (gastroesophageal reflux disease)     IBS (irritable bowel syndrome)     Iron deficiency     Irregular menses     Menorrhagia     Migraines     Neuropathy     peripheral    Obesity     Osteoarthritis of back     Osteopenia     RA (rheumatoid arthritis) (HCC)     Scratches     On back and shoulder from scratching due to urticaria    Urinary incontinence     Urinary urgency     Vasculitis (HCC)     Wears glasses    [2]   Past Surgical History:  Procedure Laterality Date    CAST APPLICATION Left 11/13/2018    Procedure: APPLICATION LONG ARM SUGARTONG SPLINT;  Surgeon: Kemal Harris MD;  [facility]: [facility];  " Service: Orthopedics    CAST APPLICATION Right 10/8/2019    Procedure: APPLICATION LONG ARM SUGAR TONG SPLINT;  Surgeon: Kemal Harris MD;  [facility];  Service: Orthopedics    CHOLECYSTECTOMY      Laparoscopic    COLONOSCOPY      DILATION AND CURETTAGE OF UTERUS      HYSTERECTOMY      JOINT REPLACEMENT Bilateral     knees    WV ARTHRODESIS WRIST W/ILIAC/OTHER AUTOGRAFT Left 4/4/2017    Procedure: WRIST FUSION / SPLINT APPLICATION ;  Surgeon: Kemal Harris MD;  [facility];  Service: Orthopedics    WV ARTHRODESIS WRIST W/ILIAC/OTHER AUTOGRAFT Right 4/10/2018    Procedure: Removal of hardware right wrist with Fusion of Long finger carpometacarpal joint, splint application Right Wrist;  Surgeon: Kemal Harris MD;  [facility];  Service: Orthopedics    WV BREAST REDUCTION Bilateral 9/7/2022    Procedure: BREAST REDUCTION;  Surgeon: Khurram Moreno MD;  Location:  MAIN OR;  Service: Plastics    WV COLONOSCOPY FLX DX W/COLLJ SPEC WHEN PFRMD N/A 2/22/2019    Procedure: COLONOSCOPY;  Surgeon: Donnie Ugalde MD;  Location: [facility];  Service: Gastroenterology    WV ESOPHAGOGASTRODUODENOSCOPY TRANSORAL DIAGNOSTIC N/A 2/22/2019    Procedure: ESOPHAGOGASTRODUODENOSCOPY (EGD);  Surgeon: Donnie Ugalde MD;  Location: [facility];  Service: Gastroenterology    WV EXCISION DISTAL ULNA PARTIAL/COMPLETE Right 10/8/2019    Procedure: EXCISION DISTAL ULNA (DARRACH PROCEDURE) right;  Surgeon: Kemal Harris MD;  Location:  MAIN OR;  Service: Orthopedics    WV HYSTEROSCOPY BX ENDOMETRIUM&/POLYPC W/WO D&C N/A 12/1/2017    Procedure: DILATATION AND CURETTAGE (D&C) WITH HYSTEROSCOPY;  Surgeon: C William Riedel, DO;  Location: [facility];  Service: Gynecology    WV INCISION EXTENSOR TENDON SHEATH WRIST Right 5/27/2025    Procedure: Right de Quervain's release;  Surgeon: Kemal Harris MD;  Location:  MAIN OR;  Service: Orthopedics    WV LAPAROSCOPY SURG CHOLECYSTECTOMY N/A  3/14/2017    Procedure: CHOLECYSTECTOMY LAPAROSCOPIC;  Surgeon: Juan Keller DO;  [facility];  Service: General    CT LAPS TOTAL HYSTERECT 250 GM/< W/RMVL TUBE/OVARY N/A 1/18/2019    Procedure: HYSTERECTOMY LAPAROSCOPIC TOTAL (LTH) Bilateral salpingectomy, cystoscopy;  Surgeon: C William Riedel, DO;  [facility];  Service: Gynecology    CT REMOVAL IMPLANT DEEP Left 11/13/2018    Procedure: REMOVAL OF HARDWARE (wrist fusion plate  AND ulnar head arthroplasty) with conversion to Darrach.;  Surgeon: Kemal Harris MD;  [facility];  Service: Orthopedics    REPLACEMENT TOTAL KNEE BILATERAL      UPPER GASTROINTESTINAL ENDOSCOPY      WISDOM TOOTH EXTRACTION      WRIST SURGERY Right     For arthritis    WRIST SURGERY Left 4/4/2017    Procedure: ARTHROPLASTY WRIST ULNAR HEAD ARTHROPLASTY - INTEGRA SIZE 5.5 MM, 16 HEAD;  Surgeon: Kemal Harris MD;  [facility];  Service:     WRIST TENDON TRANSFER Right 10/8/2019    Procedure: TRANSFER TENDON EXTENSOR CARPI ULNARIS AT THE WRIST;  Surgeon: Kemal Harris MD;  [facility];  Service: Orthopedics   [3]   Family History  Problem Relation Name Age of Onset    Hypertension Mother      Rheum arthritis Mother      Depression Mother      Anxiety disorder Mother     [4]   Social History  Socioeconomic History    Marital status:    Tobacco Use    Smoking status: Former     Current packs/day: 0.00     Average packs/day: 0.3 packs/day for 3.0 years (0.8 ttl pk-yrs)     Types: Cigarettes     Start date: 2007     Quit date: 2010     Years since quitting: 15.5    Smokeless tobacco: Never   Vaping Use    Vaping status: Former    Substances: THC   Substance and Sexual Activity    Alcohol use: Yes     Comment: holidays only socially    Drug use: Yes     Frequency: 3.0 times per week     Types: Marijuana    Sexual activity: Not Currently     Partners: Male     Birth control/protection: Other   Social History Narrative     Consumes 3-4 cups of coffee per day     Social Drivers of Health     Food Insecurity: No Food Insecurity (7/22/2025)    Nursing - Inadequate Food Risk Classification     Ran Out of Food in the Last Year: Never true   Transportation Needs: No Transportation Needs (7/22/2025)    Nursing - Transportation Risk Classification     Lack of Transportation: No    Received from [facility]    Social Connections   Intimate Partner Violence: Unknown (7/22/2025)    Nursing IPS     Physically Hurt by Someone: No     Hurt or Threatened by Someone: No   Housing Stability: Unknown (7/22/2025)    Nursing: Inadequate Housing Risk Classification     Unable to Pay for Housing in the Last Year: No     Has Housing: No   [5]   Allergies  Allergen Reactions    Nickel Rash    Cat Dander Other (See Comments)   [6]   Current Facility-Administered Medications:     ceFAZolin (ANCEF) IVPB (premix in dextrose) 2,000 mg 50 mL, 2,000 mg, Intravenous, Once, Shahzad Rodriguez PA-C    lactated ringers infusion, 125 mL/hr, Intravenous, Continuous, Kemal Harris MD, Last Rate: 125 mL/hr at 07/22/25 1136, 125 mL/hr at 07/22/25 1136    scopolamine (TRANSDERM-SCOP) 1 mg/3 days TD 72 hr patch 1 patch, 1 patch, Transdermal, Q72H, Bright Chery Hall MD, 1 patch at 07/22/25 1134

## 2025-07-22 NOTE — ANESTHESIA POSTPROCEDURE EVALUATION
Post-Op Assessment Note    CV Status:  Stable  Pain Score: 0    Pain management: adequate       Mental Status:  Arousable, sleepy and awake   Hydration Status:  Euvolemic   PONV Controlled:  Controlled   Airway Patency:  Patent     Post Op Vitals Reviewed: Yes    No anethesia notable event occurred.    Staff: CRNA           Last Filed PACU Vitals:  Vitals Value Taken Time   Temp 97.4    Pulse 77 07/22/25 16:32   /77 07/22/25 16:32   Resp 19 07/22/25 16:32   SpO2 99 % 07/22/25 16:32   Vitals shown include unfiled device data.

## 2025-07-22 NOTE — DISCHARGE INSTR - AVS FIRST PAGE
Post Operative Instructions    You have had surgery on your arm today, please read and follow the information below:  Elevate your hand above your elbow during the next 24-48 hours to help with swelling.  Place your hand and arm over your head with motion at your shoulder three times a day.  Do not apply any cream/ointment/oil to your incisions including antibiotics.  Do not soak your hands in standing water (dishwater, tubs, Jacuzzi's, pools, etc.) until given permission (typically 2-3 weeks after injury)    Call the office if you notice any:  Increased numbness or tingling of your hand or fingers that is not relieved with elevation.  Increasing pain that is not controlled with medication.  Difficulty chewing, breathing, swallowing.  Chest pains or shortness of breath.  Fever over 101.4 degrees.    Bandage: Do NOT remove bandage until follow-up appointment.    Motion: Move fingers into a fist 5 times a day, DO NOT move any splinted fingers.    Weight bearing status: The operated extremity should be non-weight bearing until further notice.    Ice: Ice for 10 minutes every hour as needed for swelling x 24 hours.    Sling: Please use your sling while your arm is numb from the block. When your arm is FULLY awake again, you no longer need this and may use your sling as needed for comfort. While using the sling, make sure to move your shoulder throughout the day to prevent stiffness here.     Medications:   Naproxen 220 mg two times a day   Tylenol Extended Release 650 mg every 8 hours  Norco 1 tab every 6 hours AS needed for pain  Duricef twice a day for 5 days - antibiotic     After surgery, we would like you to take naproxen 220 mg one tablet by mouth every 12 hours with food  AND Tylenol 650 mg one tablet by mouth every 8 hours  (at breakfast, lunch and dinner) for 3-5 days after your surgery.  Please take these medication EVERYDAY after surgery for 3-5 days, and not just as needed. You can take these medications at  the same time.  Taking these medications after surgery will limit your need for prescription pain medication.      We will also prescribe a narcotic pain medication for a limited time after surgery that you can take as needed for moderate or severe pain.      Follow-up Appointment: 7-10 days.      Please call the office if you have any questions or concerns regarding your post-operative care.

## 2025-07-22 NOTE — ANESTHESIA PROCEDURE NOTES
Peripheral Block    Patient location during procedure: pre-op  Start time: 7/22/2025 12:19 PM  Reason for block: at surgeon's request and post-op pain management  Staffing  Performed by: Hermelindo Hall MD  Authorized by: Hermelindo Hall MD    Preanesthetic Checklist  Completed: patient identified, IV checked, site marked, risks and benefits discussed, surgical consent, monitors and equipment checked, pre-op evaluation and timeout performed  Peripheral Block  Patient position: supine  Prep: ChloraPrep  Patient monitoring: continuous pulse ox, frequent blood pressure checks and heart rate  Block type: supraclavicular  Laterality: left  Injection technique: single-shot  Procedures: ultrasound guided, Ultrasound guidance required for the procedure to increase accuracy and safety of medication placement and decrease risk of complications.  Ultrasound permanent image saved  bupivacaine (PF) (MARCAINE) 0.5 % injection 20 mL - Perineural   10 mL - 7/22/2025 12:19:00 PM  Needle  Needle type: Stimuplex   Needle gauge: 20 G  Needle length: 4 in  Needle localization: ultrasound guidance  Assessment  Injection assessment: incremental injection, local visualized surrounding nerve on ultrasound and no paresthesia on injection  Paresthesia pain: none  patient tolerated the procedure well with no immediate complications  Additional Notes  Uncomplicated supraclavicular block

## 2025-07-22 NOTE — OP NOTE
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  The decision was made to bring the patient to the operating room for left wrist distal radial ulnar joint stabilization with Achilles allograft interpositional arthroplasty.  Risks of the procedure were explained which include, but are not limited to bleeding; infection; damage to nerves, arteries,veins, tendons; scar; pain; need for reoperation; failure to give desired result; and risks of anaesthesia.  All questions were answered to satisfaction and they were willing to proceed.         Operative Findings:  Unstable distal radial ulnar joint with convergence    Complications:   None    Procedure and Technique:  After the patient, site, and procedure were identified, the patient was brought into the operating room in a supine position.  Regional and general anaesthesia were provided.  A well padded tourniquet was applied to the extremity, set at 250 mmHg.  The  left upper extremity was then prepped and drapped in a normal, sterile, orthopedic fashion.    After the patient, site, and procedure identified attention was turned towards the left arm.  Esmarch bandage was  used to exsanguinate the limb and the tourniquet was inflated to 250 mmHg.  Longitudinal incision was made along the ulnar aspect of the forearm.  We dissected down through skin subcutaneous tissues maintaining hemostasis.  We opened up the interval between the 6th and 5th extensor compartment.  This brought us down to the level of the ulna.  We are able to identify where the ulna was converging at the level of the radius.  The radial aspect of the ulna was then freed to the level of the interosseous membrane.  We then dissected towards the radial side, we were able to identify the sigmoid notch as well as on the ulnar aspect of the radial shaft.  Area of convergence was identified.  3 anchors were then placed to allow for securing of the graft.  Achilles allograft was then appropriately defrosted, this was then performed to an appropriate size interpositional arthroplasty to allow to fit between the radius and the ulna to prevent convergence.  We then placed this within an area that would prevent the distal stump of the ulna from converging upon the radius.  Multiple sutures were placed through the Achilles to create this pillow, drill holes were then placed through the ulna in 3 separate spots.  Sutures were run through the ulna, over top and through the graft, and secured down to the anchoring stitches securing the interpositional arthroplasty within the intramembranous space as well as down against the interosseous membrane.  This prevented motion or subsidence of the graft, appropriately secured it to the ulna, and prevented any type of convergence.  Stress testing of the wrist under fluoroscopy demonstrated no evidence of convergence.  Pronation and supination of the forearm was full with no evidence of convergence.  We were satisfied with the overall positioning.  Extra graft length was trimmed and removed.  At this point in time, tourniquet was deflated.  Significant irrigation then commenced, and we closed  the fascial layer.    At the completion of the procedure, hemostasis was obtained with cautery and direct pressure.  The wounds were copiously irrigated with sterile solution.  The wounds were closed with Vicryl and Prolene.  Sterile dressings were applied, including Xeroform, gauze, tweeners, webril, ACE and U Splint.  Please note, all sponge, needle, and instrument counts were correct prior to closure.  Loupe magnification was utilized.   The patient tolerated the procedure well.     I was present for all critical portions of the procedure., A qualified resident physician was not available., and A physician assistant was required during the procedure for retraction, tissue handling, dissection and suturing.    Patient Disposition:  PACU , hemodynamically stable, and extubated and stable    SIGNATURE: Kemal Harris MD  DATE: 07/22/25  TIME: 4:26 PM

## 2025-07-23 ENCOUNTER — TELEPHONE (OUTPATIENT)
Age: 35
End: 2025-07-23

## 2025-07-23 DIAGNOSIS — M25.339: Primary | ICD-10-CM

## 2025-07-23 RX ORDER — OXYCODONE HYDROCHLORIDE 5 MG/1
5 TABLET ORAL EVERY 4 HOURS PRN
Qty: 12 TABLET | Refills: 0 | Status: SHIPPED | OUTPATIENT
Start: 2025-07-23

## 2025-07-23 NOTE — TELEPHONE ENCOUNTER
Caller: Patient Steven    Doctor:      Reason for call: Patient states she is already on oxycodone for her rheumatology issues and was prescribed HYDROcodone-acetaminophen. They said they can not take this medication the same time, the pharmacy recommended prescribing oxycodone since patient is already on     Call back#: [phone]

## 2025-07-23 NOTE — TELEPHONE ENCOUNTER
Caller: patient    Doctor: Dr. Harris    Reason for call: Patient would like the previous messages to be disregarded, patient will take the medication that was sent to her and if there are any issues she will give a call back     Call back#: [phone]

## 2025-07-23 NOTE — TELEPHONE ENCOUNTER
Recived transfer call w/pt, relayed KIMMY Rodriguez's msg and she is taking oxycodone 15mg 4 times a day and she is having breakthrough surgical pain and was told by pharmacist that she cannot take Norco while on oxycodone as it would be safer to take additional oxycodone.  She was told that she could not take it on the same day. Pt thought she could take it in between doses of oxycodone. She cannot stop oxycodone and just take Norco as it is less potent.   She states that Dr Harris has done this w/her past surgeries. She would like KIMMY Rodriguez PA-C to speak w/Dr Harris about this. Pt currently is still experiencing nerve block but would like this addressed before it wears off. Thank you.

## 2025-07-23 NOTE — TELEPHONE ENCOUNTER
Please advise the patient that I did send oxycodone to the pharmacy for her. Thank you.    Pt is returning NN call. Please call him at [phone]. He stated that he could not make the appt. PSR told him to talk to NN first before r/s appt. Pt stated that he over did it on the first d/c day and is having problems walking today.

## 2025-07-23 NOTE — TELEPHONE ENCOUNTER
PDMP was reviewed which demonstrated the medication was written and filled yesterday. We will not be prescribing oxycodone for the patient as she is taking this already and can take the Norco in adjunct with the oxycodone. If there are questions from the pharmacy, please have them reach out to us. Thank you.

## 2025-08-01 ENCOUNTER — HOSPITAL ENCOUNTER (OUTPATIENT)
Dept: RADIOLOGY | Facility: HOSPITAL | Age: 35
Discharge: HOME | End: 2025-08-01
Attending: PHYSICIAN ASSISTANT
Payer: MEDICARE

## 2025-08-01 ENCOUNTER — OFFICE VISIT (OUTPATIENT)
Dept: OBGYN CLINIC | Facility: HOSPITAL | Age: 35
End: 2025-08-01

## 2025-08-01 VITALS — HEIGHT: 67 IN | WEIGHT: 168 LBS | BODY MASS INDEX: 26.37 KG/M2

## 2025-08-01 DIAGNOSIS — M25.339: Primary | ICD-10-CM

## 2025-08-01 DIAGNOSIS — M25.339: ICD-10-CM

## 2025-08-01 PROCEDURE — 73110 X-RAY EXAM OF WRIST: CPT

## 2025-08-01 PROCEDURE — 99024 POSTOP FOLLOW-UP VISIT: CPT | Performed by: PHYSICIAN ASSISTANT

## 2025-08-08 ENCOUNTER — HOSPITAL ENCOUNTER (OUTPATIENT)
Dept: RADIOLOGY | Facility: HOSPITAL | Age: 35
Discharge: HOME | End: 2025-08-08
Attending: PHYSICIAN ASSISTANT
Payer: MEDICARE

## 2025-08-08 ENCOUNTER — OFFICE VISIT (OUTPATIENT)
Dept: OBGYN CLINIC | Facility: HOSPITAL | Age: 35
End: 2025-08-08
Payer: MEDICARE

## 2025-08-08 VITALS — HEIGHT: 67 IN | WEIGHT: 168 LBS | BODY MASS INDEX: 26.37 KG/M2

## 2025-08-08 DIAGNOSIS — R52 PAIN: ICD-10-CM

## 2025-08-08 DIAGNOSIS — M65.351 TRIGGER FINGER, RIGHT LITTLE FINGER: Primary | ICD-10-CM

## 2025-08-08 PROCEDURE — 73130 X-RAY EXAM OF HAND: CPT

## 2025-08-08 PROCEDURE — 99213 OFFICE O/P EST LOW 20 MIN: CPT | Performed by: PHYSICIAN ASSISTANT

## 2025-08-08 PROCEDURE — 20550 NJX 1 TENDON SHEATH/LIGAMENT: CPT | Performed by: PHYSICIAN ASSISTANT

## 2025-08-08 RX ORDER — BETAMETHASONE SODIUM PHOSPHATE AND BETAMETHASONE ACETATE 3; 3 MG/ML; MG/ML
6 INJECTION, SUSPENSION INTRA-ARTICULAR; INTRALESIONAL; INTRAMUSCULAR; SOFT TISSUE
Status: COMPLETED | OUTPATIENT
Start: 2025-08-08 | End: 2025-08-08

## 2025-08-08 RX ORDER — LIDOCAINE HYDROCHLORIDE 10 MG/ML
1 INJECTION, SOLUTION INFILTRATION; PERINEURAL
Status: COMPLETED | OUTPATIENT
Start: 2025-08-08 | End: 2025-08-08

## 2025-08-08 RX ADMIN — BETAMETHASONE SODIUM PHOSPHATE AND BETAMETHASONE ACETATE 6 MG: 3; 3 INJECTION, SUSPENSION INTRA-ARTICULAR; INTRALESIONAL; INTRAMUSCULAR; SOFT TISSUE at 10:15

## 2025-08-08 RX ADMIN — LIDOCAINE HYDROCHLORIDE 1 ML: 10 INJECTION, SOLUTION INFILTRATION; PERINEURAL at 10:15

## 2025-08-12 ENCOUNTER — PROCEDURE VISIT (OUTPATIENT)
Age: 35
End: 2025-08-12
Payer: MEDICARE

## 2025-08-12 ENCOUNTER — TELEPHONE (OUTPATIENT)
Age: 35
End: 2025-08-12

## 2025-08-14 ENCOUNTER — PROCEDURE VISIT (OUTPATIENT)
Age: 35
End: 2025-08-14
Payer: MEDICARE

## (undated) DEVICE — CHLORAPREP HI-LITE 26ML ORANGE

## (undated) DEVICE — ARM SLING: Brand: DEROYAL

## (undated) DEVICE — SAW BLADE MICRO SAGGITAL 4.5MM X 25.5 X .024

## (undated) DEVICE — 2000CC GUARDIAN II: Brand: GUARDIAN

## (undated) DEVICE — IV EXTENSION TUBING 33 IN

## (undated) DEVICE — C-ARM: Brand: UNBRANDED

## (undated) DEVICE — INTENDED FOR TISSUE SEPARATION, AND OTHER PROCEDURES THAT REQUIRE A SHARP SURGICAL BLADE TO PUNCTURE OR CUT.: Brand: BARD-PARKER SAFETY BLADES SIZE 15, STERILE

## (undated) DEVICE — KERLIX BANDAGE ROLL: Brand: KERLIX

## (undated) DEVICE — Device

## (undated) DEVICE — INTENDED FOR TISSUE SEPARATION, AND OTHER PROCEDURES THAT REQUIRE A SHARP SURGICAL BLADE TO PUNCTURE OR CUT.: Brand: BARD-PARKER ® CARBON RIB-BACK BLADES

## (undated) DEVICE — ENDOPATH XCEL BLADELESS TROCARS WITH STABILITY SLEEVES: Brand: ENDOPATH XCEL

## (undated) DEVICE — SPONGE PVP SCRUB WING STERILE

## (undated) DEVICE — ACE WRAP 4 IN STERILE

## (undated) DEVICE — GAUZE SPONGES,16 PLY: Brand: CURITY

## (undated) DEVICE — CANNULA 4MM TRI-PORT III 22CM 10 MM PORT

## (undated) DEVICE — SUT VICRYL 3-0 SH 27 IN J416H

## (undated) DEVICE — SUT VICRYL 4-0 PS-2 27 IN J426H

## (undated) DEVICE — JACKSON-PRATT 100CC BULB RESERVOIR: Brand: CARDINAL HEALTH

## (undated) DEVICE — CAST PLASTER 4 IN ROLL FAST SET

## (undated) DEVICE — STERILE BETHLEHEM PLASTIC HAND: Brand: CARDINAL HEALTH

## (undated) DEVICE — 3M™ TEGADERM™ CHG DRESSING 25/CARTON 4 CARTONS/CASE 1659: Brand: TEGADERM™

## (undated) DEVICE — STANDARD SURGICAL GOWN, L: Brand: CONVERTORS

## (undated) DEVICE — DISPOSABLE EQUIPMENT COVER: Brand: SMALL TOWEL DRAPE

## (undated) DEVICE — BLUNT TIP LAPAROSCOPIC SEALER/DIVIDER: Brand: LIGASURE

## (undated) DEVICE — DRAPE C-ARM X-RAY

## (undated) DEVICE — GLOVE INDICATOR PI UNDERGLOVE SZ 7 BLUE

## (undated) DEVICE — SUT VICRYL 0 CT-1 36 IN J946H

## (undated) DEVICE — TUBING INFILTRATION 9FT

## (undated) DEVICE — IRRIG ENDO FLO TUBING

## (undated) DEVICE — INTENDED FOR TISSUE SEPARATION, AND OTHER PROCEDURES THAT REQUIRE A SHARP SURGICAL BLADE TO PUNCTURE OR CUT.: Brand: BARD-PARKER SAFETY BLADES SIZE 11, STERILE

## (undated) DEVICE — 3000CC GUARDIAN II: Brand: GUARDIAN

## (undated) DEVICE — GLOVE PI ULTRA TOUCH SZ.7.5

## (undated) DEVICE — PLASTIC ADHESIVE BANDAGE: Brand: CURITY

## (undated) DEVICE — DRAIN CHANNEL RND FULL FLUTED 19FR W/TROCAR

## (undated) DEVICE — GLOVE SRG BIOGEL 7.5

## (undated) DEVICE — SUT VICRYL PLUS 2-0 CTB-1 27 IN VCPB259H

## (undated) DEVICE — ACE WRAP 4 IN UNSTERILE

## (undated) DEVICE — CAST PLASTER 4 IN ROLL

## (undated) DEVICE — SUT STRATAFIX SPIRAL 2-0 CT-1 20 CM SXPD1B400

## (undated) DEVICE — NEEDLE 25G X 1 1/2

## (undated) DEVICE — ENDOPATH XCEL UNIVERSAL TROCAR STABLILITY SLEEVES: Brand: ENDOPATH XCEL

## (undated) DEVICE — ADHESIVE SKN CLSR HISTOACRYL FLEX 0.5ML LF

## (undated) DEVICE — SYRINGE 30ML LL

## (undated) DEVICE — LAPAROSCOPIC SMOKE EVAC TUBING

## (undated) DEVICE — BURR OVAL 4 MM C0901

## (undated) DEVICE — BERKELEY 1/2" (13MM) COLLECTION SET, DISPOSABLE W/HANDLE AND TAPERED FITTING TUBING, 6 FT (183 CM): Brand: BERKELEY

## (undated) DEVICE — BRA SURGICAL SZ XLGE (39-42)

## (undated) DEVICE — SUT PROLENE 4-0 PS-2 18 IN 8682G

## (undated) DEVICE — SUT MONOCRYL 4-0 PS-2 18 IN Y496G

## (undated) DEVICE — PREP PAD BNS: Brand: CONVERTORS

## (undated) DEVICE — SINGLE PORT MANIFOLD: Brand: NEPTUNE 2

## (undated) DEVICE — HARMONIC ACE 5MM DIAMETER SHEARS 36CM SHAFT LENGTH + ADAPTIVE TISSUE TECHNOLOGY FOR USE WITH GENERATOR G11: Brand: HARMONIC ACE

## (undated) DEVICE — SYRINGE 10ML LL CONTROL TOP

## (undated) DEVICE — BLADE MICRO OSCILLATING

## (undated) DEVICE — SPLINT 5 X 30 IN FAST SET PLASTER

## (undated) DEVICE — PENCIL SMOKE EVAC TELESCOPING W/TUBING

## (undated) DEVICE — SPECIMEN CONTAINER STERILE PEEL PACK

## (undated) DEVICE — WET SKIN PREP TRAY: Brand: MEDLINE INDUSTRIES, INC.

## (undated) DEVICE — DRAPE EQUIPMENT RF WAND

## (undated) DEVICE — 3M™ STERI-STRIP™ REINFORCED ADHESIVE SKIN CLOSURES, R1547, 1/2 IN X 4 IN (12 MM X 100 MM), 6 STRIPS/ENVELOPE: Brand: 3M™ STERI-STRIP™

## (undated) DEVICE — GLOVE INDICATOR PI UNDERGLOVE SZ 8 BLUE

## (undated) DEVICE — TRAY FOLEY 16FR URIMETER SILICONE SURESTEP

## (undated) DEVICE — BLUE HEAT SCOPE WARMER

## (undated) DEVICE — DRESSING XEROFORM 5 X 9

## (undated) DEVICE — GLOVE INDICATOR UNDERGLOVE SZ 6 BLUE

## (undated) DEVICE — PACK UNIVERSAL DRAPES SUB-Q ICD

## (undated) DEVICE — SKIN MARKER DUAL TIP WITH RULER CAP, FLEXIBLE RULER AND LABELS: Brand: DEVON

## (undated) DEVICE — PREMIUM DRY TRAY LF: Brand: MEDLINE INDUSTRIES, INC.

## (undated) DEVICE — SPARE REAMER TUBE FOR HOLLOW REAMER (309.200)

## (undated) DEVICE — SUT PROLENE 3-0 PC-5 18 IN 8632G

## (undated) DEVICE — C-WIRE PAK DOUBLE ENDED ORTHOPAEDIC WIRE, SPADE, .045" (1.14 MM)
Type: IMPLANTABLE DEVICE | Site: WRIST | Status: NON-FUNCTIONAL
Brand: C-WIRE
Removed: 2017-04-04

## (undated) DEVICE — SUT VICRYL 2-0 J459H

## (undated) DEVICE — OCCLUSIVE GAUZE STRIP,3% BISMUTH TRIBROMOPHENATE IN PETROLATUM BLEND: Brand: XEROFORM

## (undated) DEVICE — GLOVE PI ULTRA TOUCH SZ.8.0

## (undated) DEVICE — CURITY STRETCH BANDAGE: Brand: CURITY

## (undated) DEVICE — GLOVE PI ULTRA TOUCH SZ.6.5

## (undated) DEVICE — PACK PBDS LAP CHOLE RF

## (undated) DEVICE — CUFF TOURNIQUET 18 X 4 IN QUICK CONNECT DISP 1 BLADDER

## (undated) DEVICE — DISPOSABLE OR TOWEL: Brand: CARDINAL HEALTH

## (undated) DEVICE — GLOVE SRG BIOGEL ORTHOPEDIC 8

## (undated) DEVICE — POOLE SUCTION HANDLE: Brand: CARDINAL HEALTH

## (undated) DEVICE — MAYO STAND COVER: Brand: CONVERTORS

## (undated) DEVICE — UTERINE MANIPULATOR RUMI 6.7 X 6 CM

## (undated) DEVICE — TUBING SUCTION 5MM X 12 FT

## (undated) DEVICE — ENDOPATH 5MM ENDOSCOPIC BLUNT TIP DISSECTORS (12 POUCHES CONTAINING 3 DISSECTORS EACH): Brand: ENDOPATH

## (undated) DEVICE — NEEDLE BLUNT 18 G X 1 1/2IN

## (undated) DEVICE — SUT VICRYL 4-0 PS-2 18 IN J496G

## (undated) DEVICE — NEEDLE FILTER 5 MICR 19G X 1.5IN

## (undated) DEVICE — PADDING CAST 4 IN  COTTON STRL

## (undated) DEVICE — SUT VICRYL PLUS 1 CTB-1 36 IN VCPB947H

## (undated) DEVICE — GLOVE PI ULTRA TOUCH SZ.8.5

## (undated) DEVICE — 1820 FOAM BLOCK NEEDLE COUNTER: Brand: DEVON

## (undated) DEVICE — SPONGE LAP 18 X 18 IN STRL RFD

## (undated) DEVICE — [HIGH FLOW INSUFFLATOR,  DO NOT USE IF PACKAGE IS DAMAGED,  KEEP DRY,  KEEP AWAY FROM SUNLIGHT,  PROTECT FROM HEAT AND RADIOACTIVE SOURCES.]: Brand: PNEUMOSURE

## (undated) DEVICE — PACK PLASTIC HAND PBDS

## (undated) DEVICE — SYRINGE 3ML LL

## (undated) DEVICE — DRAPE SHEET THREE QUARTER

## (undated) DEVICE — STRL ALLENTOWN HYSTEROSCOPY PK: Brand: CARDINAL HEALTH

## (undated) DEVICE — SYRINGE 50ML LL

## (undated) DEVICE — SCD SEQUENTIAL COMPRESSION COMFORT SLEEVE MEDIUM KNEE LENGTH: Brand: KENDALL SCD

## (undated) DEVICE — STERILE POLYISOPRENE POWDER-FREE SURGICAL GLOVES: Brand: PROTEXIS

## (undated) DEVICE — OCCLUDER COLPO-PNEUMO

## (undated) DEVICE — ENDOPATH 5MM CURVED SCISSORS WITH MONOPOLAR CAUTERY: Brand: ENDOPATH

## (undated) DEVICE — DRAPE TOWEL: Brand: CONVERTORS

## (undated) DEVICE — GLOVE INDICATOR PI UNDERGLOVE SZ 6.5 BLUE

## (undated) DEVICE — STRAIGHT CATH RED RUBBER 12FR

## (undated) DEVICE — BLADE SAGITTAL 25.6 X 9.5MM

## (undated) DEVICE — STRL COTTON TIP APPLCTR 6IN PK: Brand: CARDINAL HEALTH

## (undated) DEVICE — NEEDLE 22 G X 1 1/2 SAFETY

## (undated) DEVICE — SUT VICRYL 0 UR-6 27 IN J603H

## (undated) DEVICE — LIGAMAX 5 MM ENDOSCOPIC MULTIPLE CLIP APPLIER: Brand: LIGAMAX

## (undated) DEVICE — HEWSON SUTURE RETRIEVER: Brand: HEWSON SUTURE RETRIEVER

## (undated) DEVICE — TUBING ASPIRATION LIPOSUCTION SET 12FT

## (undated) DEVICE — BETHLEHEM UNIVERSAL GYN LAP PK: Brand: CARDINAL HEALTH

## (undated) DEVICE — EXTRACTION BOLT FOR 2.7MM SCREWS

## (undated) DEVICE — INTENDED FOR TISSUE SEPARATION, AND OTHER PROCEDURES THAT REQUIRE A SHARP SURGICAL BLADE TO PUNCTURE OR CUT.: Brand: BARD-PARKER ® SAFETYLOCK CARBON RIB-BACK BLADES

## (undated) DEVICE — CANNISTER WASTE IMPLOSION PROOF

## (undated) DEVICE — CYSTO TUBING SINGLE IRRIGATION

## (undated) DEVICE — ACE WRAP 3 IN VELCRO LATEX FREE

## (undated) DEVICE — GLOVE SRG BIOGEL 6.5